# Patient Record
Sex: FEMALE | Race: WHITE | NOT HISPANIC OR LATINO | Employment: FULL TIME | URBAN - METROPOLITAN AREA
[De-identification: names, ages, dates, MRNs, and addresses within clinical notes are randomized per-mention and may not be internally consistent; named-entity substitution may affect disease eponyms.]

---

## 2017-01-20 ENCOUNTER — GENERIC CONVERSION - ENCOUNTER (OUTPATIENT)
Dept: OTHER | Facility: OTHER | Age: 52
End: 2017-01-20

## 2017-01-25 ENCOUNTER — GENERIC CONVERSION - ENCOUNTER (OUTPATIENT)
Dept: OTHER | Facility: OTHER | Age: 52
End: 2017-01-25

## 2017-01-25 ENCOUNTER — ALLSCRIPTS OFFICE VISIT (OUTPATIENT)
Dept: OTHER | Facility: OTHER | Age: 52
End: 2017-01-25

## 2017-01-25 LAB
ADDITIONAL INFORMATION (HISTORICAL): NORMAL
ADEQUACY: (HISTORICAL): NORMAL
COMMENT (HISTORICAL): NORMAL
CYTOTECHNOLOGIST: (HISTORICAL): NORMAL
GENERAL CATEGORIZATION (HISTORICAL): NORMAL
INFECTION (HISTORICAL): NORMAL
INTERPRETATION (HISTORICAL): NORMAL
LMP (HISTORICAL): NORMAL
PATHOLOGIST (HISTORICAL): NORMAL
PREV. BX: (HISTORICAL): NORMAL
PREV. PAP (HISTORICAL): NORMAL
REVIEWED BY (HISTORICAL): NORMAL
SOURCE (HISTORICAL): NORMAL
SPECIMEN STATUS REPORT (HISTORICAL): NORMAL

## 2017-02-14 ENCOUNTER — GENERIC CONVERSION - ENCOUNTER (OUTPATIENT)
Dept: OTHER | Facility: OTHER | Age: 52
End: 2017-02-14

## 2017-02-14 LAB
CHOLEST SERPL-MCNC: 192 MG/DL
HDLC SERPL-MCNC: 69 MG/DL
TRIGL SERPL-MCNC: 89 MG/DL

## 2017-03-17 ENCOUNTER — ALLSCRIPTS OFFICE VISIT (OUTPATIENT)
Dept: OTHER | Facility: OTHER | Age: 52
End: 2017-03-17

## 2017-04-20 ENCOUNTER — ALLSCRIPTS OFFICE VISIT (OUTPATIENT)
Dept: OTHER | Facility: OTHER | Age: 52
End: 2017-04-20

## 2017-05-12 ENCOUNTER — ALLSCRIPTS OFFICE VISIT (OUTPATIENT)
Dept: OTHER | Facility: OTHER | Age: 52
End: 2017-05-12

## 2017-06-02 ENCOUNTER — ALLSCRIPTS OFFICE VISIT (OUTPATIENT)
Dept: OTHER | Facility: OTHER | Age: 52
End: 2017-06-02

## 2017-06-02 ENCOUNTER — GENERIC CONVERSION - ENCOUNTER (OUTPATIENT)
Dept: OTHER | Facility: OTHER | Age: 52
End: 2017-06-02

## 2017-06-08 ENCOUNTER — ALLSCRIPTS OFFICE VISIT (OUTPATIENT)
Dept: OTHER | Facility: OTHER | Age: 52
End: 2017-06-08

## 2017-06-08 ENCOUNTER — TRANSCRIBE ORDERS (OUTPATIENT)
Dept: ADMINISTRATIVE | Facility: HOSPITAL | Age: 52
End: 2017-06-08

## 2017-06-08 DIAGNOSIS — M48.02 SPINAL STENOSIS OF CERVICAL REGION: ICD-10-CM

## 2017-06-08 DIAGNOSIS — M48.02 SPINAL STENOSIS IN CERVICAL REGION: Primary | ICD-10-CM

## 2017-06-08 DIAGNOSIS — M48.02 FORAMINAL STENOSIS OF CERVICAL REGION: ICD-10-CM

## 2017-06-14 ENCOUNTER — HOSPITAL ENCOUNTER (OUTPATIENT)
Dept: MRI IMAGING | Facility: HOSPITAL | Age: 52
Discharge: HOME | End: 2017-06-14
Attending: INTERNAL MEDICINE
Payer: COMMERCIAL

## 2017-06-14 DIAGNOSIS — M48.02 FORAMINAL STENOSIS OF CERVICAL REGION: ICD-10-CM

## 2017-06-14 DIAGNOSIS — M48.02 SPINAL STENOSIS IN CERVICAL REGION: ICD-10-CM

## 2017-06-14 PROCEDURE — 72141 MRI NECK SPINE W/O DYE: CPT

## 2017-06-15 ENCOUNTER — GENERIC CONVERSION - ENCOUNTER (OUTPATIENT)
Dept: OTHER | Facility: OTHER | Age: 52
End: 2017-06-15

## 2017-07-26 ENCOUNTER — ALLSCRIPTS OFFICE VISIT (OUTPATIENT)
Dept: OTHER | Facility: OTHER | Age: 52
End: 2017-07-26

## 2017-07-26 DIAGNOSIS — M48.02 SPINAL STENOSIS OF CERVICAL REGION: ICD-10-CM

## 2017-09-17 DIAGNOSIS — E66.01 MORBID (SEVERE) OBESITY DUE TO EXCESS CALORIES (HCC): ICD-10-CM

## 2017-09-17 DIAGNOSIS — R19.7 DIARRHEA: ICD-10-CM

## 2017-09-17 DIAGNOSIS — J45.909 UNCOMPLICATED ASTHMA: ICD-10-CM

## 2017-09-17 DIAGNOSIS — I10 ESSENTIAL (PRIMARY) HYPERTENSION: ICD-10-CM

## 2017-09-17 DIAGNOSIS — R73.03 PREDIABETES: ICD-10-CM

## 2017-10-04 ENCOUNTER — GENERIC CONVERSION - ENCOUNTER (OUTPATIENT)
Dept: INTERNAL MEDICINE CLINIC | Facility: CLINIC | Age: 52
End: 2017-10-04

## 2017-10-04 ENCOUNTER — GENERIC CONVERSION - ENCOUNTER (OUTPATIENT)
Dept: OTHER | Facility: OTHER | Age: 52
End: 2017-10-04

## 2017-10-12 ENCOUNTER — ALLSCRIPTS OFFICE VISIT (OUTPATIENT)
Dept: RADIOLOGY | Facility: CLINIC | Age: 52
End: 2017-10-12
Payer: COMMERCIAL

## 2017-11-17 ENCOUNTER — GENERIC CONVERSION - ENCOUNTER (OUTPATIENT)
Dept: OTHER | Facility: OTHER | Age: 52
End: 2017-11-17

## 2017-11-27 ENCOUNTER — ALLSCRIPTS OFFICE VISIT (OUTPATIENT)
Dept: OTHER | Facility: OTHER | Age: 52
End: 2017-11-27

## 2017-11-27 DIAGNOSIS — M25.572 PAIN IN LEFT ANKLE: ICD-10-CM

## 2017-11-29 ENCOUNTER — APPOINTMENT (OUTPATIENT)
Dept: RADIOLOGY | Facility: CLINIC | Age: 52
End: 2017-11-29
Payer: COMMERCIAL

## 2017-11-29 DIAGNOSIS — M25.572 PAIN IN LEFT ANKLE: ICD-10-CM

## 2017-11-29 PROCEDURE — 73610 X-RAY EXAM OF ANKLE: CPT

## 2017-11-29 NOTE — PROGRESS NOTES
Assessment    1  Left ankle pain (706 72) (M25 572)    Plan  Left ankle pain    · * XR ANKLE 3+ VIEW LEFT; Status:Active; Requested for:27Nov2017;    · Follow-up PRN Evaluation and Treatment  Follow-up  Status: Complete  Done: 67RID1131    Discussion/Summary  Discussion Summary:   Left ankle discomfort-slight swelling noted to lateral aspect of L ankle, will do ankle xray for now  up with me as needed and with Dr Gia Farr as scheduled in April, labs prior  Counseling Documentation With Imm: The patient was counseled regarding instructions for management,-- risk factor reductions,-- patient and family education,-- impressions,-- importance of compliance with treatment  total time of encounter was 20 minutes-- and-- 5 minutes was spent counseling  Medication SE Review and Pt Understands Tx: The treatment plan was reviewed with the patient/guardian  The patient/guardian understands and agrees with the treatment plan      Chief Complaint  Chief Complaint Free Text Note Form: pt presents for f/u left ankle discomfort  onset 11/11/2017      History of Present Illness  HPI: Patient with discomfort left ankle for 2 1/2 weeks, started after ankle became irritated by wool socks  It is sensitive to heat  She is able to walk and stand on it with no problems, but she does note that it feels irritated  Review of Systems  Complete-Female:  Constitutional: No fever, no chills, feels well, no tiredness, no recent weight gain or weight loss  Cardiovascular: No complaints of slow heart rate, no fast heart rate, no chest pain, no palpitations, no leg claudication, no lower extremity edema  Respiratory: No complaints of shortness of breath, no wheezing, no cough, no SOB on exertion, no orthopnea, no PND  Musculoskeletal: left ankle discomfort  Integumentary: No complaints of skin rash or lesions, no itching, no skin wounds, no breast pain or lump    Neurological: No complaints of headache, no confusion, no convulsions, no numbness, no dizziness or fainting, no tingling, no limb weakness, no difficulty walking  Psychiatric: Not suicidal, no sleep disturbance, no anxiety or depression, no change in personality, no emotional problems  Active Problems  1  Alopecia mucinosa (704 09) (L65 2)   2  Arthralgia of both ankles (719 47) (M25 571,M25 572)   3  Asthma (493 90) (J45 909)   4  Benign essential hypertension (401 1) (I10)   5  Cervical spinal stenosis (723 0) (M48 02)   6  Chronic sinusitis (473 9) (J32 9)   7  Colon polyp (211 3) (K63 5)   8  Diarrhea (787 91) (R19 7)   9  Ear pain (388 70) (H92 09)   10  Encounter for gynecological examination with Papanicolaou smear of cervix (V72 31) (Z01 419)   11  Encounter for screening colonoscopy (V76 51) (Z12 11)   12  Encounter for screening for cervical cancer (V76 2) (Z12 4)   13  Encounter for screening mammogram for malignant neoplasm of breast (V76 12) (Z12 31)   14  Eosinophilia (288 3) (D72 1)   15  Flu vaccine need (V04 81) (Z23)   16  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)   17  Fungal rash of trunk (111 9) (B36 9)   18  Greater trochanteric bursitis, left (726 5) (M70 62)   19  Hyperlipidemia (272 4) (E78 5)   20  Hypoalbuminemia (273 8) (E88 09)   21  Lumbar radiculopathy (724 4) (M54 16)   22  Memory disturbance (780 93) (R41 3)   23  Morbid or severe obesity due to excess calories (278 01) (E66 01)   24  Need for hepatitis C screening test (V73 89) (Z11 59)   25  Need for pneumococcal vaccination (V03 82) (Z23)   26  History of Need for vaccination for DTP (V06 1) (Z23)   27  Osteoarthritis of knees, bilateral (715 96) (M17 0)   28  Pain in shoulder (719 41) (M25 519)   29  Pityriasis rosea (696 3) (L42)   30  Prediabetes (790 29) (R73 03)   31  Right cervical radiculopathy (723 4) (M54 12)   32  Skin lesion of left lower extremity (709 9) (L98 9)   33  Skin rash (782 1) (R21)   34  Tendinitis (726 90) (M77 9)   35   Trochanteric bursitis of both hips (726 5) (M70 61,M70 62)   36  Upper extremity pain, diffuse, right (729 5) (M79 601)   37  UTI symptoms (788 99) (R39 9)    Past Medical History  1  History of Back Pain   2  History of Bleeding per rectum (569 3) (K62 5)   3  History of Burning with urination (788 1) (R30 0)   4  History of Colonoscopy (Fiberoptic) Screening   5  History of Cough (786 2) (R05)   6  History of Cutaneous skin tags (701 9) (L91 8)   7  History of Hip pain, unspecified laterality   8  History of acute bronchitis (V12 69) (Z87 09)   9  History of acute sinusitis (V12 69) (Z87 09)   10  History of allergic rhinitis (V12 69) (Z87 09)   11  History of chest pain (V13 89) (Z87 898)   12  History of hypercholesterolemia (V12 29) (Z86 39)   13  History of hypertension (V12 59) (Z86 79)   14  History of hypertension (V12 59) (Z86 79)   15  History of ulceration (V13 89) (Z87 898)   16  History of Hyponatremia (276 1) (E87 1)   17  History of Need for vaccination for DTP (V06 1) (Z23)   18  History of Pain in wrist, unspecified laterality (719 43) (M25 539)   19  History of Protein in urine (791 0) (R80 9)   20  History of Tick bite (919 4,E906 4) (W57 XXXA)   21  History of UTI (lower urinary tract infection) (599 0) (N39 0)   22  History of Vaginal candidiasis (112 1) (B37 3)  Active Problems And Past Medical History Reviewed: The active problems and past medical history were reviewed and updated today  Surgical History  1  History of  Section   2  History of Wrist Surgery  Surgical History Reviewed: The surgical history was reviewed and updated today  Family History  Mother    1  No pertinent family history  Family History    2  Adopted (V68 89) (Z02 82)  Family History Reviewed: The family history was reviewed and updated today  Social History     · Full-time employment   · Lives with spouse   ·    · Never A Smoker   · Never Drank Alcohol  Social History Reviewed:  The social history was reviewed and updated today  Current Meds   1  Acetaminophen 500 MG Oral Tablet; Take 1-2 every 4-6 hours as needed; Therapy: 77HCG7969 to Recorded   2  Cholestyramine 4 GM Oral Packet; take 1 packet twice daily; Therapy: 60WFX6407 to (Last Rx:04Oct2017)  Requested for: 74ONG3959 Ordered   3  Diclofenac Potassium 50 MG Oral Tablet; take 1 tablet by mouth twice a day; Therapy: 92Lfw3438 to (Evaluate:53Tgq3048)  Requested for: 74HRF5310; Last Rx:01Nov2017 Ordered   4  Gabapentin 400 MG Oral Capsule; TAKE 1 CAPSULE 3 TIMES DAILY; Therapy: 47COI6939 to (Evaluate:02Apr2018)  Requested for: 55PRW4075; Last Rx:04Oct2017 Ordered   5  Ginkgo Biloba 30 MG Oral Capsule; 1 DAILY; Therapy: 03WJW6493 to Recorded   6  Nasonex 50 MCG/ACT Nasal Suspension; instill 1 spray into each nostril once daily; Therapy: 91RSS6225 to (Nils Hurd)  Requested for: 86UHW0042; Last Rx:10Mar2016 Ordered   7  Omeprazole 10 MG Oral Capsule Delayed Release; take 1 capsule by mouth once daily; Therapy: 57NNZ7160 to (Evaluate:58Osh2768)  Requested for: 18MPW5385; Last Rx:17Mar2017 Ordered   8  Simvastatin 5 MG Oral Tablet; take 1 tablet by mouth once daily at bedtime; Therapy: 33Rat2355 to (Evaluate:01Jun2018)  Requested for: 20ELX1894; Last Rx:04Oct2017 Ordered   9  Spironolactone 50 MG Oral Tablet; take 1 tablet by mouth once daily; Therapy: 76BJQ3351 to (0489 33 97 26)  Requested for: 42BWY7133; Last Rx:04Oct2017 Ordered   10  Verapamil HCl  MG Oral Capsule Extended Release 24 Hour; take 1 capsule by mouth once  daily before EATING; Therapy: 93YRV5206 to (Evaluate:11Mar2018)  Requested for: 03Hua7741; Last Rx:91Aml5035  Ordered   11  Vitamin D 2000 UNIT Oral Tablet; Therapy: 48LDE0697 to Recorded  Medication List Reviewed: The medication list was reviewed and updated today  Allergies  1   Levaquin TABS   2  Hydrocodone HD SYRP    Vitals  Vital Signs    Recorded: 32LSS8825 04:49PM   Temperature 98 6 F   Heart Rate 79   Systolic 923 Diastolic 60   Height 5 ft 9 6 in   Weight 283 lb 4 oz   BMI Calculated 41 11   BSA Calculated 2 41   O2 Saturation 98       Physical Exam   Constitutional  General appearance: No acute distress, well appearing and well nourished  Pulmonary  Respiratory effort: No increased work of breathing or signs of respiratory distress  Auscultation of lungs: Clear to auscultation  Cardiovascular  Auscultation of heart: Normal rate and rhythm, normal S1 and S2, without murmurs  Musculoskeletal  Gait and station: Normal    Digits and nails: Normal without clubbing or cyanosis  Inspection/palpation of joints, bones, and muscles: Abnormal  -- lateral aspect of left ankle slightly swollen  FROM  Skin  Skin and subcutaneous tissue: Normal without rashes or lesions  Neurologic  Reflexes: 2+ and symmetric  Sensation: No sensory loss     Psychiatric  Orientation to person, place, and time: Normal    Mood and affect: Normal          Future Appointments    Date/Time Provider Specialty Site   04/17/2018 01:30 PM Tammy Pastor DO Internal Medicine [facility] INTERNAL MED Pravin Vaca       Signatures   Electronically signed by : Viktor Saez [facility]; Nov 27 2017  5:25PM EST                       (Author)    Electronically signed by : Hero Self DO; Nov 28 2017  7:43AM EST

## 2017-12-04 ENCOUNTER — GENERIC CONVERSION - ENCOUNTER (OUTPATIENT)
Dept: OTHER | Facility: OTHER | Age: 52
End: 2017-12-04

## 2018-01-12 NOTE — MISCELLANEOUS
Message   Recorded as Task   Date: 10/18/2017 10:03 AM, Created By: Olena Blanca   Task Name: Follow Up   Assigned To: [facility] ,Team   Regarding Patient: Shaniqua Vallejo, Status: In Progress   Comment:    Mamie Wheeler - 18 Oct 2017 10:03 AM     TASK CREATED  S/p MARLEN 10/12   Mamie Wheeler - 18 Oct 2017 11:15 AM     TASK EDITED  S/w pt who states she is doing ok and pain is better  When I questioned pt further, she then states that pain is the same as it was, but better than when she left procedure suite  Pt did not provide anymore information  Schedule f/u ov? Cyrilla Corporal - 75 Oct 2017 12:10 PM     TASK REPLIED TO: Previously Assigned To Cyrilla Corporal  schedule OV [address] - 18 Oct 2017 12:34 PM     TASK REASSIGNED: Previously Assigned To [facility] es procedure,Team   India Prescott - 23 Oct 2017 5:39 PM     TASK  [address] - 23 Oct 2017 5:39 PM     [facility]: [facility]  l[facility] to cb to schedule [facility] f-up appt   Mamie Wheeler - 13 Nov 2017 2:54 PM     TASK EDITED  Please try 1 more time to schedule f/u   India Prescott - 14 Nov 2017 11:29 AM     TASK REPLIED TO: Previously Assigned To [facility]  left 2nd vm to cb to schedue a 8 wk f-up from 10/12   Mamie Wheeler - 14 Nov 2017 1:13 PM     TASK IN PROGRESS   Kadi Thornton - 17 Nov 2017 11:00 AM     TASK EDITED  Please send unable to reach letter  Shanon Abdi - 17 Nov 2017 11:13 AM     TASK IN PROGRESS   Manuela Rivero - 17 Nov 2017 11:14 AM     TASK EDITED  letter sent        Active Problems    1  Alopecia mucinosa (704 09) (L65 2)   2  Arthralgia of both ankles (719 47) (M25 571,M25 572)   3  Asthma (493 90) (J45 909)   4  Benign essential hypertension (401 1) (I10)   5  Cervical spinal stenosis (723 0) (M48 02)   6  Chronic sinusitis (473 9) (J32 9)   7  Colon polyp (211 3) (K63 5)   8  Diarrhea (787 91) (R19 7)   9   Ear pain (202 70) (H92 09) 10  Encounter for gynecological examination with Papanicolaou smear of cervix (V72 31)    (Z01 419)   11  Encounter for screening colonoscopy (V76 51) (Z12 11)   12  Encounter for screening for cervical cancer (V76 2) (Z12 4)   13  Encounter for screening mammogram for malignant neoplasm of breast (V76 12)    (Z12 31)   14  Eosinophilia (288 3) (D72 1)   15  Flu vaccine need (V04 81) (Z23)   16  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)   17  Fungal rash of trunk (111 9) (B36 9)   18  Greater trochanteric bursitis, left (726 5) (M70 62)   19  Hyperlipidemia (272 4) (E78 5)   20  Hypoalbuminemia (273 8) (E88 09)   21  Lumbar radiculopathy (724 4) (M54 16)   22  Memory disturbance (780 93) (R41 3)   23  Morbid or severe obesity due to excess calories (278 01) (E66 01)   24  Need for hepatitis C screening test (V73 89) (Z11 59)   25  Need for pneumococcal vaccination (V03 82) (Z23)   26  History of Need for vaccination for DTP (V06 1) (Z23)   27  Osteoarthritis of knees, bilateral (715 96) (M17 0)   28  Pain in shoulder (719 41) (M25 519)   29  Pityriasis rosea (696 3) (L42)   30  Prediabetes (790 29) (R73 03)   31  Right cervical radiculopathy (723 4) (M54 12)   32  Skin lesion of left lower extremity (709 9) (L98 9)   33  Skin rash (782 1) (R21)   34  Tendinitis (726 90) (M77 9)   35  Trochanteric bursitis of both hips (726 5) (M70 61,M70 62)   36  Upper extremity pain, diffuse, right (729 5) (M79 601)   37  UTI symptoms (788 99) (R39 9)    Current Meds   1  Acetaminophen 500 MG Oral Tablet; Take 1-2 every 4-6 hours as needed; Therapy: 15NZM8132 to Recorded   2  Cholestyramine 4 GM Oral Packet; take 1 packet twice daily; Therapy: 28DRL9209 to (Last Rx:04Oct2017)  Requested for: 54NSL9113 Ordered   3  Diclofenac Potassium 50 MG Oral Tablet; take 1 tablet by mouth twice a day; Therapy: 91Imc5131 to (Evaluate:76Uwb5499)  Requested for: 57LEQ1356; Last   Rx:01Nov2017 Ordered   4   Gabapentin 400 MG Oral Capsule; TAKE 1 CAPSULE 3 TIMES DAILY; Therapy: 75MHS9331 to (Evaluate:02Apr2018)  Requested for: 45PQC0348; Last   Rx:04Oct2017 Ordered   5  Ginkgo Biloba 30 MG Oral Capsule; 1 DAILY; Therapy: 35PES5913 to Recorded   6  Nasonex 50 MCG/ACT Nasal Suspension (Mometasone Furoate); instill 1 spray into each   nostril once daily; Therapy: 67EXD2220 to (Jessica Conrad)  Requested for: 82TBB3017; Last   Rx:10Mar2016 Ordered   7  Omeprazole 10 MG Oral Capsule Delayed Release; take 1 capsule by mouth once daily; Therapy: 58QTB7711 to (Evaluate:42Ikc0443)  Requested for: 08QUT7607; Last   Rx:17Mar2017 Ordered   8  Simvastatin 5 MG Oral Tablet; take 1 tablet by mouth once daily at bedtime; Therapy: 27Uwi8935 to (Evaluate:01Jun2018)  Requested for: 57CMQ9150; Last   Rx:04Oct2017 Ordered   9  Spironolactone 50 MG Oral Tablet; take 1 tablet by mouth once daily; Therapy: 03XLP5170 to ([phone])  Requested for: 85PEL7572; Last   Rx:04Oct2017 Ordered   10  Verapamil HCl  MG Oral Capsule Extended Release 24 Hour; take 1 capsule by    mouth once daily before EATING; Therapy: 61UDK9679 to (Evaluate:11Mar2018)  Requested for: 22Qvy2877; Last    Rx:64Ydm0075 Ordered   11  Vitamin D 2000 UNIT Oral Tablet; Therapy: 53KAK2945 to Recorded    Allergies    1   Levaquin TABS   2  Hydrocodone HD SYRP    Signatures   Electronically signed by : Willian Prescott, ; Nov 17 2017 11:15AM EST                       (Author)

## 2018-01-12 NOTE — PROGRESS NOTES
Assessment    1  Tendinitis (726 90) (M77 9)   2  Prediabetes (790 29) (R73 09)   3  Hypoalbuminemia (273 8) (E88 09)    Plan  Cervical Spondylosis (C5 - C6)    · Diclofenac Potassium 50 MG Oral Tablet  Hypoalbuminemia    · (1) HEPATIC FUNCTION PANEL; Status:Active; Requested APY:85SMN0179;    · (1) PROTEIN, URINE 24HR; Status:Active; Requested NHW:23OGH4208;    · Follow-up visit in 2 weeks Evaluation and Treatment  Follow-up  Status: Complete  Done:  36KML2089  PMH: UTI (lower urinary tract infection)    · Nitrofurantoin Macrocrystal 100 MG Oral Capsule; TAKE 1 CAPSULE EVERY 12  HOURS DAILY  Prediabetes    · (1) BASIC METABOLIC PROFILE; Status:Active; Requested APM:86JAG9067;    · (1) HEMOGLOBIN A1C; Status:Active; Requested IGD:68WFK7334;   Tendinitis    · Oxaprozin 600 MG Oral Tablet (Daypro); TAKE 1 TABLET TWICE DAILY    Chief Complaint  pt c/o Left elbow pain and decreased ROM  urine frequency and burning  Patient denies fevers chills flank pain hematuria  She does note urinary frequency and dysuria  She notes a few week history of left elbow discomfort she noted this after picking up CDs and caring large groceries  Just denies any elbow on breakage of the skin fevers chills swelling or any infectious type symptoms in reference to the elbow      Review of Systems    Constitutional: No fever, no chills, feels well, no tiredness, no recent weight gain or loss  ENT: no ear ache, no loss of hearing, no nosebleeds or nasal discharge, no sore throat or hoarseness  Cardiovascular: no complaints of slow or fast heart rate, no chest pain, no palpitations, no leg claudication or lower extremity edema  Respiratory: no complaints of shortness of breath, no wheezing, no dyspnea on exertion, no orthopnea or PND  Breasts: no complaints of breast pain, breast lump or nipple discharge  Gastrointestinal: no complaints of abdominal pain, no constipation, no nausea or diarrhea, no vomiting, no bloody stools  Genitourinary: dysuria and urine frequency, but no complaints of dysuria, no incontinence, no pelvic pain, no dysmenorrhea, no vaginal discharge or abnormal vaginal bleeding  Musculoskeletal: no complaints of arthralgia, no myalgia, no joint swelling or stiffness, no limb pain or swelling  Integumentary: no complaints of skin rash or lesion, no itching or dry skin, no skin wounds  Neurological: no complaints of headache, no confusion, no numbness or tingling, no dizziness or fainting  ROS reviewed  Active Problems    1  Alopecia mucinosa (704 09) (L65 2)   2  Asthma (493 90) (J45 909)   3  Cervical spinal stenosis (723 0) (M48 02)   4  Cervical Spondylosis (C5 - C6) (721 0)   5  Chronic sinusitis (473 9) (J32 9)   6  Colon polyp (211 3) (K63 5)   7  Encounter for gynecological examination with Papanicolaou smear of cervix   (V72 31,V76 2) (Z01 419,Z12 4)   8  Encounter for screening mammogram for malignant neoplasm of breast (V76 12)   (Z12 31)   9  Flu vaccine need (V04 81) (Z23)   10  Greater trochanteric bursitis, left (726 5) (M70 62)   11  Hyperlipidemia (272 4) (E78 5)   12  Lumbar radiculopathy (724 4) (M54 16)   13  Memory disturbance (780 93) (R41 3)   14  Morbid or severe obesity due to excess calories (278 01) (E66 01)   15  Need for pneumococcal vaccination (V03 82) (Z23)   16  History of Need for vaccination for DTP (V06 1) (Z23)   17  Pityriasis rosea (696 3) (L42)   18  Prediabetes (790 29) (R73 09)   19  Right cervical radiculopathy (723 4) (M54 12)   20  Skin lesion of left lower extremity (709 9) (L98 9)   21  Skin rash (782 1) (R21)    Past Medical History  Active Problems And Past Medical History Reviewed: The active problems and past medical history were reviewed and updated today  Social History    · Never A Smoker   · Never Drank Alcohol  The social history was reviewed and is unchanged  Family History  Family History Reviewed:    The family history was reviewed and updated today  Current Meds   1  Acetaminophen 500 MG Oral Tablet; Take 1-2 every 4-6 hours as needed; Therapy: 83THT6910 to Recorded   2  Diclofenac Potassium 50 MG Oral Tablet; take 1 tablet by mouth twice a day; Therapy: 13Vmq3827 to (Evaluate:31Mar2016)  Requested for: 33Nli2019; Last   Rx:03Vyp3872 Ordered   3  Gabapentin 300 MG Oral Capsule; TAKE 1 CAPSULE 3 TIMES DAILY; Therapy: 07BCP2647 to (Mikie Coral)  Requested for: 33Fgw3413; Last   Rx:51Bod1884 Ordered   4  Ginkgo Biloba 30 MG Oral Capsule; 1 DAILY; Therapy: 17AVK8264 to Recorded   5  Nasonex 50 MCG/ACT Nasal Suspension; instill 1 spray into each nostril once daily; Therapy: 06RLX4033 to (Evaluate:54Qae6749)  Requested for: 73ZGV0967; Last   Rx:63Dwd5893 Ordered   6  Omeprazole 10 MG Oral Capsule Delayed Release; take 1 capsule by mouth once daily; Therapy: 07KOV7136 to (Evaluate:11Ikp2787)  Requested for: 14ECT8989; Last   Rx:69Tnh4949 Ordered   7  Ortho Tri-Cyclen (28) TABS; TAKE AS DIRECTED; Therapy: (Recorded:33Ffl9867) to Recorded   8  Simvastatin 5 MG Oral Tablet; take 1 tablet by mouth once daily at bedtime; Therapy: 41RVR5609 to (Evaluate:89Mtl4026)  Requested for: 91Tss3237; Last   Rx:40Wuo1320 Ordered   9  Spironolactone 50 MG Oral Tablet; Take 1 tablet daily; Therapy: 25STM1244 to (Evaluate:20Jan2016)  Requested for: 39Tuy8227; Last   Rx:02Xya6409 Ordered   10  Ventolin  (90 Base) MCG/ACT Inhalation Aerosol Solution; INHALE 2 PUFFS    EVERY 4-6 HOURS AS NEEDED; Therapy: 45AEC0948 to (Last Rx:96Svy3652)  Requested for: 01VJV0730 Ordered   11  Verapamil HCl  MG Oral Tablet Extended Release; take 1 tablet by mouth every    day; Therapy: 64YEH3871 to (Kenji Victor)  Requested for: 91DTM3120; Last    Rx:94Ccq1230 Ordered   12  Vitamin D 2000 UNIT Oral Tablet; Therapy: 46JCD8064 to Recorded    Allergies    1   Levaquin TABS   2  Hydrocodone HD SYRP    Vitals   Recorded: 57QMM9723 11:05AM Temperature 98 6 F   Heart Rate 873   Systolic 369   Diastolic 70   Height 5 ft 9 6 in   Weight 252 lb    BMI Calculated 36 58   BSA Calculated 2 29   O2 Saturation 97     Physical Exam    Constitutional   General appearance: No acute distress, well appearing and well nourished  Eyes   Conjunctiva and lids: No swelling, erythema or discharge  Pupils and irises: Equal, round and reactive to light  Ears, Nose, Mouth, and Throat   External inspection of ears and nose: Normal     Otoscopic examination: Tympanic membranes translucent with normal light reflex  Canals patent without erythema  Nasal mucosa, septum, and turbinates: Normal without edema or erythema  Oropharynx: Normal with no erythema, edema, exudate or lesions  Pulmonary   Respiratory effort: No increased work of breathing or signs of respiratory distress  Auscultation of lungs: Clear to auscultation  Cardiovascular   Palpation of heart: Normal PMI, no thrills  Auscultation of heart: Normal rate and rhythm, normal S1 and S2, without murmurs  Examination of extremities for edema and/or varicosities: Normal     Carotid pulses: Normal     Abdomen   Abdomen: Abnormal   Trace suprapubic tenderness without rebound no flank pain or ill-appearing patient  Liver and spleen: No hepatomegaly or splenomegaly  Lymphatic   Palpation of lymph nodes in neck: No lymphadenopathy  Musculoskeletal   Gait and station: Normal     Digits and nails: Normal without clubbing or cyanosis  Inspection/palpation of joints, bones, and muscles: Abnormal   Positive tenderness left elbow lateral epicondyle, no elbow swelling tenderness or heat  Skin   Skin and subcutaneous tissue: Normal without rashes or lesions  Neurologic   Cranial nerves: Cranial nerves 2-12 intact  Reflexes: 2+ and symmetric  Sensation: No sensory loss      Psychiatric   Orientation to person, place, and time: Normal     Mood and affect: Normal          Future Appointments    Date/Time Provider Specialty Site   10/12/2016 02:00 PM Mar Shaw,  Internal Medicine [address] [facility] INTERNAL MED Kayleen Calzada     Signatures   Electronically signed by : BRII Hathaway ; Jan 9 2016 12:10PM EST                       (Author)

## 2018-01-13 VITALS
HEART RATE: 80 BPM | HEIGHT: 70 IN | DIASTOLIC BLOOD PRESSURE: 78 MMHG | TEMPERATURE: 97.6 F | WEIGHT: 280 LBS | SYSTOLIC BLOOD PRESSURE: 126 MMHG | BODY MASS INDEX: 40.09 KG/M2

## 2018-01-13 VITALS
HEIGHT: 70 IN | TEMPERATURE: 98.3 F | SYSTOLIC BLOOD PRESSURE: 124 MMHG | OXYGEN SATURATION: 97 % | DIASTOLIC BLOOD PRESSURE: 80 MMHG | WEIGHT: 283 LBS | HEART RATE: 75 BPM | BODY MASS INDEX: 40.52 KG/M2

## 2018-01-13 VITALS
BODY MASS INDEX: 38.87 KG/M2 | WEIGHT: 271.5 LBS | SYSTOLIC BLOOD PRESSURE: 100 MMHG | HEIGHT: 70 IN | DIASTOLIC BLOOD PRESSURE: 80 MMHG | HEART RATE: 84 BPM

## 2018-01-13 NOTE — PROGRESS NOTES
Assessment    1  Eosinophilia (288 3) (D72 1)   2  Hypoalbuminemia (273 8) (E88 09)   3  UTI symptoms (788 99) (R39 9)   4  Tendinitis (726 90) (M77 9)    Plan  Colon polyp    · Follow-up visit in 2 months Evaluation and Treatment  Follow-up  Status: Complete  Done: 50VPK2435   · 1 - Camryn BRANDT, Yamile Daniel  (Gastroenterology) Physician Referral  Consult  colon polyp rectal bleeding  Status: Active  Requested for: 30Jun2016  Care Summary provided  : Yes  Eosinophilia    · (1) CBC/PLT/DIFF; Status:Active; Requested for:44Zmu5883;   Hypoalbuminemia    · (1) PROTEIN, URINE 24HR; Status:Active; Requested for:83Ipk4311;    · (Q) MICROALBUMIN, RANDOM URINE (W/CREATININE); Status:Active; Requested YDL:87QYE5749;   UTI symptoms    · (1) URINALYSIS w URINE C/S REFLEX (will reflex a microscopy if leukocytes, occult blood, or  nitrites are not within normal limits); Status:Active; Requested for:48Drs5743;     Chief Complaint  Chief Complaint Free Text Note Form: pt presents for f/u      History of Present Illness  HPI: 1-proteinuria-patient had this with long-term NSAID<SHE IS DUE [facility] Dr Ronnie Lorenz not resolved at ace  3-ayssqmcvnj-wxnzjrjg with rest and meds  3-prior UTI-repeat UA  $-EOSINOPHILIA-RESOLVED  Rectal bleeding-GI EVAL FOLLOWUP @[facility] DR REGAN-damien-scope with polyp hx  MASTODYNIA-RESOLVED< Pap and breast exam via Dr Kelly Douglas  follow-up2 months Dr Leobardo Childress      Review of Systems  Complete-Female:   Constitutional: No fever, no chills, feels well, no tiredness, no recent weight gain or weight loss  Eyes: No complaints of eye pain, no red eyes, no eyesight problems, no discharge, no dry eyes, no itching of eyes  ENT: no complaints of earache, no loss of hearing, no nose bleeds, no nasal discharge, no sore throat, no hoarseness  Cardiovascular: No complaints of slow heart rate, no fast heart rate, no chest pain, no palpitations, no leg claudication, no lower extremity edema  Respiratory: No complaints of shortness of breath, no wheezing, no cough, no SOB on exertion, no orthopnea, no PND  Gastrointestinal: No complaints of abdominal pain, no constipation, no nausea or vomiting, no diarrhea, no bloody stools  Genitourinary: No complaints of dysuria, no incontinence, no pelvic pain, no dysmenorrhea, no vaginal discharge or bleeding  Musculoskeletal: arthralgias, but No complaints of arthralgias, no myalgias, no joint swelling or stiffness, no limb pain or swelling  Integumentary: pain improving, but No complaints of skin rash or lesions, no itching, no skin wounds, no breast pain or lump  Neurological: No complaints of headache, no confusion, no convulsions, no numbness, no dizziness or fainting, no tingling, no limb weakness, no difficulty walking  Psychiatric: Not suicidal, no sleep disturbance, no anxiety or depression, no change in personality, no emotional problems  Endocrine: No complaints of proptosis, no hot flashes, no muscle weakness, no deepening of the voice, no feelings of weakness  Hematologic/Lymphatic: No complaints of swollen glands, no swollen glands in the neck, does not bleed easily, does not bruise easily  Active Problems    1  Alopecia mucinosa (704 09) (L65 2)   2  Arthralgia of both ankles (719 47) (M25 571,M25 572)   3  Asthma (493 90) (J45 909)   4  Cervical spinal stenosis (723 0) (M48 02)   5  Cervical Spondylosis (C5 - C6) (721 0)   6  Chronic sinusitis (473 9) (J32 9)   7  Colon polyp (211 3) (K63 5)   8  Encounter for gynecological examination with Papanicolaou smear of cervix (V72 31,V76 2)   (Z01 419,Z12 4)   9  Encounter for screening for cervical cancer (V76 2) (Z12 4)   10  Encounter for screening mammogram for malignant neoplasm of breast (V76 12) (Z12 31)   11  Eosinophilia (288 3) (D72 1)   12  Flu vaccine need (V04 81) (Z23)   13  Greater trochanteric bursitis, left (726 5) (M70 62)   14  Hyperlipidemia (272 4) (E78 5)   15  Hypoalbuminemia (273 8) (E88 09)   16  Lumbar radiculopathy (724 4) (M54 16)   17  Memory disturbance (780 93) (R41 3)   18  Morbid or severe obesity due to excess calories (278 01) (E66 01)   19  Need for pneumococcal vaccination (V03 82) (Z23)   20  History of Need for vaccination for DTP (V06 1) (Z23)   21  Osteoarthritis of knees, bilateral (715 96) (M17 0)   22  Pain in shoulder (719 41) (M25 519)   23  Pityriasis rosea (696 3) (L42)   24  Prediabetes (790 29) (R73 09)   25  Right cervical radiculopathy (723 4) (M54 12)   26  Skin lesion of left lower extremity (709 9) (L98 9)   27  Skin rash (782 1) (R21)   28  Tendinitis (726 90) (M77 9)   29  UTI symptoms (788 99) (R39 9)    Past Medical History    1  History of Back Pain   2  History of Burning with urination (788 1) (R30 0)   3  History of Colonoscopy (Fiberoptic) Screening   4  History of Cough (786 2) (R05)   5  History of Cutaneous skin tags (701 9) (L91 8)   6  History of Hip pain, unspecified laterality   7  History of acute bronchitis (V12 69) (Z87 09)   8  History of acute sinusitis (V12 69) (Z87 09)   9  History of allergic rhinitis (V12 69) (Z87 09)   10  History of chest pain (V13 89) (Z87 898)   11  History of hypercholesterolemia (V12 29) (Z86 39)   12  History of hypertension (V12 59) (Z86 79)   13  History of hypertension (V12 59) (Z86 79)   14  History of Hyponatremia (276 1) (E87 1)   15  History of Need for vaccination for DTP (V06 1) (Z23)   16  History of Pain in wrist, unspecified laterality (719 43) (M25 539)   17  History of Protein in urine (791 0) (R80 9)   18  History of Tick bite (919 4,E906 4) (W57 XXXA)   19  History of UTI (lower urinary tract infection) (599 0) (N39 0)   20  History of Vaginal candidiasis (112 1) (B37 3)  Active Problems And Past Medical History Reviewed: The active problems and past medical history were reviewed and updated today  Surgical History    1   History of  Section  Surgical History Reviewed: The surgical history was reviewed and updated today  Family History  Mother    1  No pertinent family history  Family History    2  Adopted (V68 89) (Z02 82)  Family History Reviewed: The family history was reviewed and updated today  Social History    · Never A Smoker   · Never Drank Alcohol  Social History Reviewed: The social history was reviewed and updated today  The social history was reviewed and is unchanged  Current Meds   1  Acetaminophen 500 MG Oral Tablet; Take 1-2 every 4-6 hours as needed; Therapy: 34ASN4215 to Recorded   2  Gabapentin 300 MG Oral Capsule; TAKE 1 CAPSULE 3 TIMES DAILY; Therapy: 21ATQ9039 to (Loli Sánchez)  Requested for: 86Eiy3713; Last Rx:16Pdk1628   Ordered   3  Ginkgo Biloba 30 MG Oral Capsule; 1 DAILY; Therapy: 51ZXL2660 to Recorded   4  Nasonex 50 MCG/ACT Nasal Suspension; instill 1 spray into each nostril once daily; Therapy: 21CPD3094 to (Whitney Xiong)  Requested for: 49MDX0192; Last Rx:10Mar2016   Ordered   5  Nitrofurantoin Macrocrystal 100 MG Oral Capsule; TAKE 1 CAPSULE EVERY 12 HOURS DAILY; Therapy: 59KIL2687 to (Evaluate:84Kph0187)  Requested for: 21QLJ8804; Last Rx:17Gcn6653   Ordered   6  Omeprazole 10 MG Oral Capsule Delayed Release; take 1 capsule by mouth once daily; Therapy: 25UHM9441 to (Evaluate:44Erk2622)  Requested for: 20Jan2016; Last Rx:20Jan2016   Ordered   7  Ortho Tri-Cyclen (28) TABS; TAKE AS DIRECTED; Therapy: (Recorded:80Acs2399) to Recorded   8  Oxaprozin 600 MG Oral Tablet; take 1 tablet by mouth twice a day; Therapy: 47JBY7957 to (Evaluate:04Yyk7953)  Requested for: 18ROS8210; Last Rx:98Hxr7975   Ordered   9  Simvastatin 5 MG Oral Tablet; take 1 tablet by mouth once daily at bedtime; Therapy: 65Vku2780 to (Evaluate:05Jan2017)  Requested for: 64HAA7896; Last Rx:10May2016   Ordered   10  Spironolactone 50 MG Oral Tablet; Take 1 tablet daily;     Therapy: 92EEJ9388 to (Evaluate:00Ibm0440) Requested for: 25FJU1013; Last Rx:14Jan2016    Ordered   11  Ventolin  (90 Base) MCG/ACT Inhalation Aerosol Solution; INHALE 2 PUFFS EVERY 4-6    HOURS AS NEEDED; Therapy: 66CAT9060 to (Last Rx:41Xzw1396)  Requested for: 65JUP5210 Ordered   12  Verapamil HCl  MG Oral Tablet Extended Release; take 1 tablet by mouth every day; Therapy: 10NPM3574 to (Conner Heck)  Requested for: 04Apr2016; Last Rx:96Azl1326;    Status: ACTIVE - Renewal Denied Ordered   13  Vitamin D 2000 UNIT Oral Tablet; Therapy: 16CMF6221 to Recorded  Medication List Reviewed: The medication list was reviewed and updated today  Allergies    1  Levaquin TABS   2  Hydrocodone HD SYRP    Vitals  Vital Signs [Data Includes: Current Encounter]    Recorded: 37OLV9440 05:26PM   Temperature 98 4 F   Heart Rate 97   Systolic 864   Diastolic 78   Weight 291 lb 6 08 oz   O2 Saturation 97     Physical Exam    Constitutional   General appearance: No acute distress, well appearing and well nourished  Eyes   Conjunctiva and lids: No swelling, erythema or discharge  Pupils and irises: Equal, round and reactive to light  Ears, Nose, Mouth, and Throat   External inspection of ears and nose: Normal     Otoscopic examination: Tympanic membranes translucent with normal light reflex  Canals patent without erythema  Nasal mucosa, septum, and turbinates: Normal without edema or erythema  Oropharynx: Normal with no erythema, edema, exudate or lesions  Pulmonary   Respiratory effort: No increased work of breathing or signs of respiratory distress  Auscultation of lungs: Clear to auscultation  Cardiovascular   Palpation of heart: Normal PMI, no thrills  Auscultation of heart: Normal rate and rhythm, normal S1 and S2, without murmurs  Examination of extremities for edema and/or varicosities: Normal     Carotid pulses: Normal     Abdomen   Abdomen: Non-tender, no masses      Liver and spleen: No hepatomegaly or splenomegaly  Lymphatic   Palpation of lymph nodes in neck: No lymphadenopathy  Musculoskeletal   Gait and station: Normal     Digits and nails: Normal without clubbing or cyanosis  Inspection/palpation of joints, bones, and muscles: Normal     Skin   Skin and subcutaneous tissue: Normal without rashes or lesions  Neurologic   Cranial nerves: Cranial nerves 2-12 intact  Reflexes: 2+ and symmetric  Sensation: No sensory loss      Psychiatric   Orientation to person, place, and time: Normal     Mood and affect: Normal          Future Appointments    Date/Time Provider Specialty Site   09/08/2016 04:30 PM Vita Macias DO Internal Medicine [facility]   10/12/2016 02:00 PM Vita Macias DO Internal Medicine [address] [place]     Signatures   Electronically signed by : BRII Salmon ; Jun 30 2016  9:34PM EST                       (Author)

## 2018-01-14 VITALS
OXYGEN SATURATION: 98 % | SYSTOLIC BLOOD PRESSURE: 105 MMHG | BODY MASS INDEX: 40.3 KG/M2 | WEIGHT: 281.5 LBS | HEART RATE: 102 BPM | TEMPERATURE: 98.5 F | HEIGHT: 70 IN | DIASTOLIC BLOOD PRESSURE: 62 MMHG

## 2018-01-14 VITALS
SYSTOLIC BLOOD PRESSURE: 115 MMHG | HEART RATE: 110 BPM | BODY MASS INDEX: 40.16 KG/M2 | OXYGEN SATURATION: 98 % | TEMPERATURE: 98.8 F | DIASTOLIC BLOOD PRESSURE: 80 MMHG | HEIGHT: 70 IN | WEIGHT: 280.5 LBS

## 2018-01-14 VITALS
HEIGHT: 70 IN | WEIGHT: 276.5 LBS | DIASTOLIC BLOOD PRESSURE: 76 MMHG | SYSTOLIC BLOOD PRESSURE: 120 MMHG | HEART RATE: 98 BPM | OXYGEN SATURATION: 97 % | TEMPERATURE: 98.6 F | BODY MASS INDEX: 39.58 KG/M2

## 2018-01-14 VITALS
SYSTOLIC BLOOD PRESSURE: 120 MMHG | HEART RATE: 77 BPM | HEIGHT: 70 IN | BODY MASS INDEX: 40.8 KG/M2 | OXYGEN SATURATION: 98 % | TEMPERATURE: 97.2 F | WEIGHT: 285 LBS | DIASTOLIC BLOOD PRESSURE: 70 MMHG

## 2018-01-15 VITALS
HEART RATE: 79 BPM | OXYGEN SATURATION: 98 % | TEMPERATURE: 98.6 F | HEIGHT: 70 IN | WEIGHT: 283.25 LBS | SYSTOLIC BLOOD PRESSURE: 118 MMHG | DIASTOLIC BLOOD PRESSURE: 60 MMHG | BODY MASS INDEX: 40.55 KG/M2

## 2018-01-15 NOTE — MISCELLANEOUS
Date: 07/06/2017   Dear Forrest Lu:     We have attempted to reach you regarding your upcoming appointment on July 26, 2017 and with Dr Cayla Logan     Unfortunately, due to a change in the provider's schedule we need to change your appointment  Please call our office as soon as possible so we can reschedule your appointment  We apologize for any inconvenience this may cause  Thank you in advance for your cooperation and assistance       Sincerely,   [facility]      Signatures   Electronically signed by : Jamal Washington, ; Jul 6 2017 10:34AM EST                       (Author)

## 2018-01-22 VITALS
HEART RATE: 78 BPM | BODY MASS INDEX: 39.65 KG/M2 | TEMPERATURE: 98.3 F | WEIGHT: 277 LBS | OXYGEN SATURATION: 97 % | HEIGHT: 70 IN

## 2018-01-22 VITALS — DIASTOLIC BLOOD PRESSURE: 65 MMHG | SYSTOLIC BLOOD PRESSURE: 110 MMHG

## 2018-01-23 NOTE — RESULT NOTES
Verified Results  * XR ANKLE 3+ VIEW LEFT 22VCM4559 04:19PM Hector COOPER Order Number: [id number]     Test Name Result Flag Reference   XR ANKLE 3+ VW LEFT (Report)     LEFT ANKLE     INDICATION: Left ankle pain  COMPARISON: None     VIEWS: 3     IMAGES: 3     FINDINGS:     There is no acute fracture or dislocation  Tibiotalar joint within normal limits  There is a large calcaneal heel spur noted  No lytic or blastic lesions are seen  Soft tissues are unremarkable  IMPRESSION:     No acute osseous abnormality       Large calcaneal heel spur       Workstation performed: [id number]     Signed by:   Dominik Maloney MD   12/2/17

## 2018-02-06 ENCOUNTER — OFFICE VISIT (OUTPATIENT)
Dept: INTERNAL MEDICINE CLINIC | Facility: CLINIC | Age: 53
End: 2018-02-06
Payer: COMMERCIAL

## 2018-02-06 VITALS
SYSTOLIC BLOOD PRESSURE: 110 MMHG | BODY MASS INDEX: 42.77 KG/M2 | HEART RATE: 87 BPM | OXYGEN SATURATION: 98 % | HEIGHT: 68 IN | WEIGHT: 282.2 LBS | TEMPERATURE: 98.2 F | DIASTOLIC BLOOD PRESSURE: 78 MMHG

## 2018-02-06 DIAGNOSIS — M70.61 TROCHANTERIC BURSITIS OF BOTH HIPS: ICD-10-CM

## 2018-02-06 DIAGNOSIS — M70.62 TROCHANTERIC BURSITIS OF BOTH HIPS: ICD-10-CM

## 2018-02-06 DIAGNOSIS — M54.16 LUMBAR RADICULOPATHY: Primary | ICD-10-CM

## 2018-02-06 PROBLEM — M25.572 LEFT ANKLE PAIN: Status: ACTIVE | Noted: 2017-11-27

## 2018-02-06 PROBLEM — M79.601 UPPER EXTREMITY PAIN, DIFFUSE, RIGHT: Status: ACTIVE | Noted: 2017-04-20

## 2018-02-06 PROCEDURE — 99213 OFFICE O/P EST LOW 20 MIN: CPT | Performed by: INTERNAL MEDICINE

## 2018-02-06 PROCEDURE — 20610 DRAIN/INJ JOINT/BURSA W/O US: CPT | Performed by: INTERNAL MEDICINE

## 2018-02-06 RX ORDER — MOMETASONE FUROATE 50 UG/1
1 SPRAY, METERED NASAL DAILY
COMMUNITY
Start: 2013-01-16 | End: 2018-04-04 | Stop reason: SDUPTHER

## 2018-02-06 RX ORDER — VERAPAMIL HYDROCHLORIDE 180 MG/1
1 CAPSULE, EXTENDED RELEASE ORAL DAILY
COMMUNITY
Start: 2016-07-14 | End: 2018-03-11 | Stop reason: SDUPTHER

## 2018-02-06 RX ORDER — SPIRONOLACTONE 50 MG/1
1 TABLET, FILM COATED ORAL DAILY
COMMUNITY
Start: 2012-12-20 | End: 2018-03-23 | Stop reason: SDUPTHER

## 2018-02-06 RX ORDER — ALBUTEROL SULFATE 90 UG/1
AEROSOL, METERED RESPIRATORY (INHALATION)
COMMUNITY
End: 2018-10-23 | Stop reason: SDUPTHER

## 2018-02-06 RX ORDER — ACETAMINOPHEN 500 MG
TABLET ORAL
COMMUNITY
Start: 2013-09-27

## 2018-02-06 RX ORDER — METHYLPREDNISOLONE ACETATE 40 MG/ML
2 INJECTION, SUSPENSION INTRA-ARTICULAR; INTRALESIONAL; INTRAMUSCULAR; SOFT TISSUE
Status: COMPLETED | OUTPATIENT
Start: 2018-02-06 | End: 2018-02-06

## 2018-02-06 RX ORDER — CHOLESTYRAMINE 4 G/9G
1 POWDER, FOR SUSPENSION ORAL 2 TIMES DAILY
COMMUNITY
Start: 2017-10-04 | End: 2018-10-23 | Stop reason: SDUPTHER

## 2018-02-06 RX ORDER — GABAPENTIN 400 MG/1
1 CAPSULE ORAL 3 TIMES DAILY
COMMUNITY
Start: 2013-12-13 | End: 2018-05-02 | Stop reason: SDUPTHER

## 2018-02-06 RX ORDER — SIMVASTATIN 5 MG
1 TABLET ORAL
COMMUNITY
Start: 2013-04-13 | End: 2018-06-18 | Stop reason: SDUPTHER

## 2018-02-06 RX ORDER — DICLOFENAC POTASSIUM 50 MG/1
TABLET, FILM COATED ORAL
COMMUNITY
Start: 2017-12-23 | End: 2018-10-23 | Stop reason: SDUPTHER

## 2018-02-06 RX ORDER — LIDOCAINE HYDROCHLORIDE 10 MG/ML
1 INJECTION, SOLUTION INFILTRATION; PERINEURAL
Status: COMPLETED | OUTPATIENT
Start: 2018-02-06 | End: 2018-02-06

## 2018-02-06 RX ORDER — OMEPRAZOLE 10 MG/1
1 CAPSULE, DELAYED RELEASE ORAL DAILY
COMMUNITY
Start: 2013-12-13 | End: 2018-06-18 | Stop reason: SDUPTHER

## 2018-02-06 RX ORDER — ACETAMINOPHEN 160 MG/5ML
SUSPENSION, ORAL (FINAL DOSE FORM) ORAL DAILY
COMMUNITY
Start: 2015-11-25

## 2018-02-06 RX ORDER — MULTIVIT-MIN/IRON/FOLIC ACID/K 18-600-40
CAPSULE ORAL
COMMUNITY
Start: 2013-01-16

## 2018-02-06 RX ADMIN — LIDOCAINE HYDROCHLORIDE 1 ML: 10 INJECTION, SOLUTION INFILTRATION; PERINEURAL at 10:43

## 2018-02-06 RX ADMIN — METHYLPREDNISOLONE ACETATE 2 ML: 40 INJECTION, SUSPENSION INTRA-ARTICULAR; INTRALESIONAL; INTRAMUSCULAR; SOFT TISSUE at 10:43

## 2018-02-06 NOTE — PROGRESS NOTES
Assessment/Plan:    1 patient with known lumbar radiculopathy is having occasional numbness of her right leg as well as hip pain patient did respond well to cortisone injection and lidocaine to the right greater trochanter which makes trochanteric bursitis more likely will continue conservative treatment if patient develops weakness of her right leg which she does not have or bladder bowel incontinence to return immediately  2  Greater trochanter bursitis improved greatly with injection may do every 6 months if patient is not much improved next week will get x-ray of the right hip to rule out osteoarthritis     Diagnoses and all orders for this visit:    Lumbar radiculopathy    Trochanteric bursitis of both hips  -     Large joint arthrocentesis    Other orders  -     acetaminophen (TYLENOL) 500 mg tablet; Take by mouth  -     albuterol (PROVENTIL HFA,VENTOLIN HFA) 90 mcg/act inhaler; 2 puffs  -     cholestyramine (QUESTRAN) 4 GM/DOSE powder; Take 1 packet by mouth 2 (two) times a day  -     diclofenac potassium (CATAFLAM) 50 mg tablet;   -     gabapentin (NEURONTIN) 400 mg capsule; Take 1 capsule by mouth 3 (three) times a day  -     Ginkgo Biloba 30 MG CAPS; Take by mouth daily  -     mometasone (NASONEX) 50 mcg/act nasal spray; 1 spray into each nostril daily  -     omeprazole (PriLOSEC) 10 mg delayed release capsule; Take 1 capsule by mouth daily  -     simvastatin (ZOCOR) 5 MG tablet; Take 1 tablet by mouth  -     spironolactone (ALDACTONE) 50 mg tablet; Take 1 tablet by mouth daily  -     verapamil (VERELAN PM) 180 MG 24 hr capsule; Take 1 capsule by mouth daily  -     Cholecalciferol (VITAMIN D) 2000 units CAPS; Take by mouth  -     XR hip/pelv 2-3 vws right if performed;  Future        The patient was counseled regarding instructions for management, risk factor reductions, patient and family education,impressions, risks and benefits of treatment options, side effects of medications, importance of compliance with treatment  The treatment plan was reviewed with the patient/guardian and patient/guardian understands and agrees with the treatment plan  Large joint arthrocentesis  Date/Time: 2/6/2018 10:43 AM  Consent given by: patient  Site marked: site marked  Supporting Documentation  Indications: pain   Procedure Details  Location: hip - R hip joint  Needle gauge: spinal needle  Ultrasound guidance: no  Medications administered: 1 mL lidocaine 1 %; 2 mL methylPREDNISolone acetate 40 mg/mL                     Subjective:      Patient ID: Zuleyka Brunson is a 46 y o  female  HPI    The following portions of the patient's history were reviewed and updated as appropriate:   She has a past medical history of Allergic; Arthritis; Asthma; Diverticulitis of colon; GERD (gastroesophageal reflux disease); Hypertension; and Obesity  ,   does not have any pertinent problems on file  ,   has no past surgical history on file  ,  family history is not on file  She was adopted  ,   reports that she has never smoked  She has never used smokeless tobacco  She reports that she drinks alcohol  She reports that she does not use drugs  ,  is allergic to levofloxacin; sulfamethoxazole-trimethoprim; and hydrocodone bitartrate er  Review of Systems   Constitutional: Negative for appetite change, chills, fatigue, fever and unexpected weight change  HENT: Negative for congestion, ear pain, facial swelling, hearing loss, mouth sores, nosebleeds, postnasal drip, rhinorrhea, sinus pain, sore throat, trouble swallowing and voice change  Eyes: Negative for pain, discharge, redness and visual disturbance  Respiratory: Negative for apnea, chest tightness, shortness of breath, wheezing and stridor  Cardiovascular: Negative for chest pain, palpitations and leg swelling  Gastrointestinal: Negative for abdominal distention, abdominal pain, blood in stool, constipation, diarrhea and vomiting     Endocrine: Negative for cold intolerance, heat intolerance, polydipsia, polyphagia and polyuria  Genitourinary: Negative for difficulty urinating, dysuria, flank pain, frequency, genital sores, hematuria and urgency  Musculoskeletal: Negative for arthralgias and back pain  Back hip right numbness,and pain right hip   Skin: Negative for rash and wound  Allergic/Immunologic: Negative for environmental allergies, food allergies and immunocompromised state  Neurological: Negative for dizziness, tremors, seizures, syncope, facial asymmetry, speech difficulty, weakness, light-headedness, numbness and headaches  Hematological: Negative for adenopathy  Does not bruise/bleed easily  Psychiatric/Behavioral: Negative for agitation, behavioral problems, dysphoric mood, hallucinations, self-injury, sleep disturbance and suicidal ideas  The patient is not hyperactive            Objective:     Physical Exam

## 2018-02-06 NOTE — PATIENT INSTRUCTIONS
Lower Back Exercises   WHAT YOU NEED TO KNOW:   What do I need to know about lower back exercises? Lower back exercises help heal and strengthen your back muscles to prevent another injury  Ask your healthcare provider if you need to see a physical therapist for more advanced exercises  · Do the exercises on a mat or firm surface  (not on a bed) to support your spine and prevent low back pain  · Move slowly and smoothly  Avoid fast or jerky motions  · Breathe normally  Do not hold your breath  · Stop if you feel pain  It is normal to feel some discomfort at first  Regular exercise will help decrease your discomfort over time  How do I perform lower back exercises safely? Your healthcare provider may recommend that you do back exercises 10 to 30 minutes each day  He may also recommend that you do exercises 1 to 3 times each day  Ask your healthcare provider which exercises are best for you and how often to do them  · Ankle pumps:  Lie on your back  Move your foot up (with your toes pointing toward your head)  Then, move your foot down (with your toes pointing away from you)  Repeat this exercise 10 times on each side  · Heel slides:  Lie on your back  Slowly bend one leg and then straighten it  Next, bend the other leg and then straighten it  Repeat 10 times on each side  · Pelvic tilt:  Lie on your back with your knees bent and feet flat on the floor  Place your arms in a relaxed position beside your body  Tighten the muscles of your abdomen and flatten your back against the floor  Hold for 5 seconds  Repeat 5 times  · Back stretch:  Lie on your back with your hands behind your head  Bend your knees and turn the lower half of your body to one side  Hold this position for 10 seconds  Repeat 3 times on each side  · Straight leg raises:  Lie on your back with one leg straight  Bend the other knee   Tighten your abdomen and then slowly lift the straight leg up about 6 to 12 inches off the floor  Hold for 1 to 5 seconds  Lower your leg slowly  Repeat 10 times on each leg  · Knee-to-chest:  Lie on your back with your knees bent and feet flat on the floor  Pull one of your knees toward your chest and hold it there for 5 seconds  Return your leg to the starting position  Lift the other knee toward your chest and hold for 5 seconds  Do this 5 times on each side  · Cat and camel:  Place your hands and knees on the floor  Arch your back upward toward the ceiling and lower your head  Round out your spine as much as you can  Hold for 5 seconds  Lift your head upward and push your chest downward toward the floor  Hold for 5 seconds  Do 3 sets or as directed  · Wall squats:  Stand with your back against a wall  Tighten the muscles of your abdomen  Slowly lower your body until your knees are bent at a 45 degree angle  Hold this position for 5 seconds  Slowly move back up to a standing position  Repeat 10 times  · Curl up:  Lie on your back with your knees bent and feet flat on the floor  Place your hands, palms down, underneath the curve in your lower back  Next, with your elbows on the floor, lift your shoulders and chest 2 to 3 inches  Keep your head in line with your shoulders  Hold this position for 5 seconds  When you can do this exercise without pain for 10 to 15 seconds, you may add a rotation  While your shoulders and chest are lifted off the ground, turn slightly to the left and hold  Repeat on the other side  · Bird dog:  Place your hands and knees on the floor  Keep your wrists directly below your shoulders and your knees directly below your hips  Pull your belly button in toward your spine  Do not flatten or arch your back  Tighten your abdominal muscles  Raise one arm straight out so that it is aligned with your head  Next, raise the leg opposite your arm  Hold this position for 15 seconds   Lower your arm and leg slowly and change sides  Do 5 sets  When should I seek immediate care? · You have severe pain that prevents you from moving  When should I contact my healthcare provider? · Your pain becomes worse  · You have new pain  · You have questions or concerns about your condition or care  CARE AGREEMENT:   You have the right to help plan your care  Learn about your health condition and how it may be treated  Discuss treatment options with your caregivers to decide what care you want to receive  You always have the right to refuse treatment  The above information is an  only  It is not intended as medical advice for individual conditions or treatments  Talk to your doctor, nurse or pharmacist before following any medical regimen to see if it is safe and effective for you  © 2017 [employer] [employer]  Information is for End User's use only and may not be sold, redistributed or otherwise used for commercial purposes  All illustrations and images included in CareNotes® are the copyrighted property of [employer]  or [address]

## 2018-02-06 NOTE — PROGRESS NOTES
Assessment/Plan:    ***     Diagnoses and all orders for this visit:    Lumbar radiculopathy    Trochanteric bursitis of both hips    Other orders  -     acetaminophen (TYLENOL) 500 mg tablet; Take by mouth  -     albuterol (PROVENTIL HFA,VENTOLIN HFA) 90 mcg/act inhaler; 2 puffs  -     cholestyramine (QUESTRAN) 4 GM/DOSE powder; Take 1 packet by mouth 2 (two) times a day  -     diclofenac potassium (CATAFLAM) 50 mg tablet;   -     gabapentin (NEURONTIN) 400 mg capsule; Take 1 capsule by mouth 3 (three) times a day  -     Ginkgo Biloba 30 MG CAPS; Take by mouth daily  -     mometasone (NASONEX) 50 mcg/act nasal spray; 1 spray into each nostril daily  -     omeprazole (PriLOSEC) 10 mg delayed release capsule; Take 1 capsule by mouth daily  -     simvastatin (ZOCOR) 5 MG tablet; Take 1 tablet by mouth  -     spironolactone (ALDACTONE) 50 mg tablet; Take 1 tablet by mouth daily  -     verapamil (VERELAN PM) 180 MG 24 hr capsule; Take 1 capsule by mouth daily  -     Cholecalciferol (VITAMIN D) 2000 units CAPS; Take by mouth        The patient was counseled regarding instructions for management, risk factor reductions, patient and family education,impressions, risks and benefits of treatment options, side effects of medications, importance of compliance with treatment  The treatment plan was reviewed with the patient/guardian and patient/guardian understands and agrees with the treatment plan  Subjective:      Patient ID: Lazarus Helling is a 46 y o  female  Pain right hip 2 months worsening limping, lidocaine cream not helpful, had shot 5 months ago very helpful shot usually lasts a week        The following portions of the patient's history were reviewed and updated as appropriate:   She has a past medical history of Allergic; Arthritis; Asthma; Diverticulitis of colon; GERD (gastroesophageal reflux disease); Hypertension; and Obesity  ,   does not have any pertinent problems on file  ,   has no past surgical history on file  ,  family history is not on file  She was adopted  ,   reports that she has never smoked  She has never used smokeless tobacco  She reports that she drinks alcohol  She reports that she does not use drugs  ,  is allergic to levofloxacin; sulfamethoxazole-trimethoprim; and hydrocodone bitartrate er       Review of Systems   Constitutional: Negative for appetite change, chills, fatigue, fever and unexpected weight change  HENT: Negative for congestion, ear pain, facial swelling, hearing loss, mouth sores, nosebleeds, postnasal drip, rhinorrhea, sinus pain, sore throat, trouble swallowing and voice change  Eyes: Negative for pain, discharge, redness and visual disturbance  Respiratory: Negative for apnea, chest tightness, shortness of breath, wheezing and stridor  Cardiovascular: Negative for chest pain, palpitations and leg swelling  Gastrointestinal: Negative for abdominal distention, abdominal pain, blood in stool, constipation, diarrhea and vomiting  Endocrine: Negative for cold intolerance, heat intolerance, polydipsia, polyphagia and polyuria  Genitourinary: Negative for difficulty urinating, dysuria, flank pain, frequency, genital sores, hematuria and urgency  Musculoskeletal: Negative for arthralgias and back pain  Right hip pain   Skin: Negative for rash and wound  Allergic/Immunologic: Negative for environmental allergies, food allergies and immunocompromised state  Neurological: Negative for dizziness, tremors, seizures, syncope, facial asymmetry, speech difficulty, weakness, light-headedness, numbness and headaches  Hematological: Negative for adenopathy  Does not bruise/bleed easily  Psychiatric/Behavioral: Negative for agitation, behavioral problems, dysphoric mood, hallucinations, self-injury, sleep disturbance and suicidal ideas  The patient is not hyperactive            Objective:     Physical Exam   Constitutional: She is oriented to person, place, and time  She appears well-developed  HENT:   Right Ear: External ear normal    Left Ear: External ear normal    Eyes: Right eye exhibits no discharge  Left eye exhibits no discharge  No scleral icterus  Neck: Carotid bruit is not present  No tracheal deviation present  No thyroid mass and no thyromegaly present  Cardiovascular: Normal rate, regular rhythm, normal heart sounds and intact distal pulses  Exam reveals no gallop and no friction rub  No murmur heard  Pulmonary/Chest: No respiratory distress  She has no wheezes  She has no rales  Musculoskeletal: She exhibits no edema  Straight leg raising left 45° straight leg raising right 10° with pain   pain with pressure over the right greater trochanter   Lymphadenopathy:     She has no cervical adenopathy  Neurological: She is alert and oriented to person, place, and time  Coordination normal    Psychiatric: She has a normal mood and affect  Her behavior is normal  Judgment and thought content normal    Nursing note and vitals reviewed

## 2018-03-07 NOTE — PROCEDURES
Procedure    Pre-procedure Diagnosis: Cervical Radiculopathy   Post-procedure Diagnosis: Cervical Radiculopathy   Procedure Title(s):  1  C6-C7 Translaminar epidural steroid injection      2  Intraoperative fluoroscopy  Attending Surgeon:   Lynne Hill MD  Anesthesia:   Local    Procedure Note:  The patient's history and physical exam were reviewed  I explained the details of the procedure to the patient including the risks, benefits and other alternatives  We had an informed discussion and the patient verbalized understanding and signed the consent form  All questions were answered appropriately  The patient was taken to the Fluoroscopy Suite and placed prone on the table with support and arms down at the sides  Prior to the procedure, a "time out" was performed  Fluoroscopic guidance was used to locate the anatomic landmarks for an approach to the C6-C7 interspace  The area was sterilely prepped three times using ChloroPrep x3 and sterilely draped  The skin was anesthetized with 3cc's of 1% lidocaine in the midline at this interspace  A 3 5 inch, 20-gauge Tuohy epidural needle was directed toward the interspace under fluoroscopic guidance until the ligamentum flavum was engaged  From this point, a 45 degree contra-lateral oblique view obtained  The epidural tuohy needle was gradually advanced using loss-of-resistance (ALFA) technique  Once ALFA was obtained, 1cc Omnipaque 300 contrast solution was injected  An appropriate epidurogram was noted  Then, after negative aspiration, a solution consisting of 1-cc of 1% lidocaine and 10mg dexamethasone and 2-cc preservative-free saline was easily injected, demonstrating good washout of the dye  The needle was then removed with a 1cc of 1% lidocaine flush  The patient's neck was cleaned and a bandage was placed over the site of needle insertion  Disposition: The patient tolerated the procedure well, and there were no apparent complications   The patient was taken to the recovery area where written discharge instructions for the procedure were given            Signatures   Electronically signed by : Lynne Hill MD; Oct 12 2017  3:36PM EST                       (Author)

## 2018-03-11 DIAGNOSIS — I10 HYPERTENSION, UNSPECIFIED TYPE: Primary | ICD-10-CM

## 2018-03-12 RX ORDER — VERAPAMIL HYDROCHLORIDE 180 MG/1
CAPSULE, EXTENDED RELEASE ORAL
Qty: 30 CAPSULE | Refills: 5 | Status: SHIPPED | OUTPATIENT
Start: 2018-03-12 | End: 2018-04-04 | Stop reason: SDUPTHER

## 2018-03-23 DIAGNOSIS — L65.9 ALOPECIA: Primary | ICD-10-CM

## 2018-03-26 RX ORDER — SPIRONOLACTONE 50 MG/1
TABLET, FILM COATED ORAL
Qty: 30 TABLET | Refills: 5 | Status: SHIPPED | OUTPATIENT
Start: 2018-03-26 | End: 2018-09-17 | Stop reason: SDUPTHER

## 2018-04-04 DIAGNOSIS — J01.91 ACUTE RECURRENT SINUSITIS, UNSPECIFIED LOCATION: Primary | ICD-10-CM

## 2018-04-04 DIAGNOSIS — I10 HYPERTENSION, UNSPECIFIED TYPE: ICD-10-CM

## 2018-04-04 RX ORDER — MOMETASONE FUROATE 50 UG/1
1 SPRAY, METERED NASAL DAILY
Qty: 17 G | Refills: 2 | Status: SHIPPED | OUTPATIENT
Start: 2018-04-04 | End: 2019-05-07 | Stop reason: SDUPTHER

## 2018-04-04 RX ORDER — VERAPAMIL HYDROCHLORIDE 180 MG/1
180 CAPSULE, EXTENDED RELEASE ORAL
Qty: 90 CAPSULE | Refills: 2 | Status: SHIPPED | OUTPATIENT
Start: 2018-04-04 | End: 2018-10-23 | Stop reason: SDUPTHER

## 2018-05-02 DIAGNOSIS — M54.16 LUMBAR RADICULOPATHY: Primary | ICD-10-CM

## 2018-05-02 PROBLEM — N95.0 PMB (POSTMENOPAUSAL BLEEDING): Status: ACTIVE | Noted: 2018-01-23

## 2018-05-02 RX ORDER — GABAPENTIN 400 MG/1
CAPSULE ORAL
Qty: 90 CAPSULE | Refills: 5 | Status: SHIPPED | OUTPATIENT
Start: 2018-05-02 | End: 2018-10-23 | Stop reason: SDUPTHER

## 2018-05-16 LAB — HBA1C MFR BLD HPLC: 6 %

## 2018-06-12 ENCOUNTER — OFFICE VISIT (OUTPATIENT)
Dept: INTERNAL MEDICINE CLINIC | Facility: CLINIC | Age: 53
End: 2018-06-12
Payer: COMMERCIAL

## 2018-06-12 VITALS
SYSTOLIC BLOOD PRESSURE: 115 MMHG | RESPIRATION RATE: 20 BRPM | WEIGHT: 285 LBS | BODY MASS INDEX: 43.19 KG/M2 | DIASTOLIC BLOOD PRESSURE: 60 MMHG | HEART RATE: 87 BPM | TEMPERATURE: 98.3 F | OXYGEN SATURATION: 98 % | HEIGHT: 68 IN

## 2018-06-12 DIAGNOSIS — Z23 NEED FOR SHINGLES VACCINE: ICD-10-CM

## 2018-06-12 DIAGNOSIS — E88.09 HYPOALBUMINEMIA: ICD-10-CM

## 2018-06-12 DIAGNOSIS — M22.41 CHONDROMALACIA OF RIGHT PATELLA: ICD-10-CM

## 2018-06-12 DIAGNOSIS — E78.5 HYPERLIPIDEMIA, UNSPECIFIED HYPERLIPIDEMIA TYPE: ICD-10-CM

## 2018-06-12 DIAGNOSIS — J45.909 MODERATE ASTHMA WITHOUT COMPLICATION, UNSPECIFIED WHETHER PERSISTENT: ICD-10-CM

## 2018-06-12 DIAGNOSIS — R73.03 PREDIABETES: ICD-10-CM

## 2018-06-12 DIAGNOSIS — E66.01 MORBID OBESITY (HCC): Primary | ICD-10-CM

## 2018-06-12 DIAGNOSIS — M71.21 POPLITEAL CYST, RIGHT: ICD-10-CM

## 2018-06-12 DIAGNOSIS — I10 BENIGN ESSENTIAL HYPERTENSION: ICD-10-CM

## 2018-06-12 PROCEDURE — 3078F DIAST BP <80 MM HG: CPT | Performed by: INTERNAL MEDICINE

## 2018-06-12 PROCEDURE — 3008F BODY MASS INDEX DOCD: CPT | Performed by: INTERNAL MEDICINE

## 2018-06-12 PROCEDURE — 99214 OFFICE O/P EST MOD 30 MIN: CPT | Performed by: INTERNAL MEDICINE

## 2018-06-12 PROCEDURE — 3074F SYST BP LT 130 MM HG: CPT | Performed by: INTERNAL MEDICINE

## 2018-06-12 NOTE — PROGRESS NOTES
Assessment/Plan:  Will loose 15-20 lbs in 4 months  1  Morbid obesity patient is motivated with diet and exercise will consider saxagliptin for weight loss will see her back in 4 months  2  Prediabetes A1c of 6 will recheck in 4 months  3  Hyperlipidemia continue simvastatin recheck in 4 months  4  Hypertension is at goal  5  Patient with symptoms consistent with chondromalacia patella patellar knee strap discussed home exercise program had MRI of the knee in 2016 will see her back in 4 months  6  Memory disturbance continue gingko biloba and pulse  7  Asthma is stable rarely uses her inhaler         Diagnoses and all orders for this visit:    Morbid obesity ([address] )  -     liraglutide (SAXENDA) injection; 0 6 mg subcu for 1 week then 1 2 mg subcu for 1 week then 1 8 mg subcu for 1 week then 2 4 mg subcu for 1 week and then 3 mg subcu  -     Insulin Pen Needle (DROPLET PEN NEEDLES) 32G X 8 MM MISC; by Does not apply route daily  -     CBC and differential; Future  -     Comprehensive metabolic panel; Future  -     Hemoglobin A1C; Future  -     TSH, 3rd generation with Free T4 reflex; Future  -     Patellar strap    Moderate asthma without complication, unspecified whether persistent  -     CBC and differential; Future  -     Comprehensive metabolic panel; Future  -     Hemoglobin A1C; Future  -     TSH, 3rd generation with Free T4 reflex; Future  -     Patellar strap    Hyperlipidemia, unspecified hyperlipidemia type  -     CBC and differential; Future  -     Comprehensive metabolic panel; Future  -     Hemoglobin A1C; Future  -     TSH, 3rd generation with Free T4 reflex; Future  -     Patellar strap    Hypoalbuminemia  -     CBC and differential; Future  -     Comprehensive metabolic panel; Future  -     Hemoglobin A1C; Future  -     TSH, 3rd generation with Free T4 reflex; Future  -     Patellar strap    Prediabetes  -     CBC and differential; Future  -     Comprehensive metabolic panel;  Future  - Hemoglobin A1C; Future  -     TSH, 3rd generation with Free T4 reflex; Future  -     Patellar strap    Chondromalacia of right patella  -     CBC and differential; Future  -     Comprehensive metabolic panel; Future  -     Hemoglobin A1C; Future  -     TSH, 3rd generation with Free T4 reflex; Future  -     Patellar strap    Benign essential hypertension         Scheduled Meds:  Continuous Infusions:  No current facility-administered medications for this visit  PRN Meds:   Scheduled Meds:  Continuous Infusions:  No current facility-administered medications for this visit     Scheduled Meds:    Current Outpatient Prescriptions:     acetaminophen (TYLENOL) 500 mg tablet, Take by mouth, Disp: , Rfl:     albuterol (PROVENTIL HFA,VENTOLIN HFA) 90 mcg/act inhaler, 2 puffs, Disp: , Rfl:     Cholecalciferol (VITAMIN D) 2000 units CAPS, Take by mouth, Disp: , Rfl:     cholestyramine (QUESTRAN) 4 GM/DOSE powder, Take 1 packet by mouth 2 (two) times a day, Disp: , Rfl:     diclofenac potassium (CATAFLAM) 50 mg tablet, , Disp: , Rfl:     gabapentin (NEURONTIN) 400 mg capsule, take 1 capsule by mouth three times a day, Disp: 90 capsule, Rfl: 5    Ginkgo Biloba 30 MG CAPS, Take by mouth daily, Disp: , Rfl:     mometasone (NASONEX) 50 mcg/act nasal spray, 1 spray into each nostril daily, Disp: 17 g, Rfl: 2    omeprazole (PriLOSEC) 10 mg delayed release capsule, Take 1 capsule by mouth daily, Disp: , Rfl:     simvastatin (ZOCOR) 5 MG tablet, Take 1 tablet by mouth, Disp: , Rfl:     spironolactone (ALDACTONE) 50 mg tablet, take 1 tablet by mouth once daily, Disp: 30 tablet, Rfl: 5    verapamil (VERELAN PM) 180 MG 24 hr capsule, Take 1 capsule (180 mg total) by mouth daily before breakfast, Disp: 90 capsule, Rfl: 2    Insulin Pen Needle (DROPLET PEN NEEDLES) 32G X 8 MM MISC, by Does not apply route daily, Disp: 100 each, Rfl: 2    liraglutide (SAXENDA) injection, 0 6 mg subcu for 1 week then 1 2 mg subcu for 1 week then 1 8 mg subcu for 1 week then 2 4 mg subcu for 1 week and then 3 mg subcu, Disp: 1 pen, Rfl: 5      The patient was counseled regarding instructions for management, risk factor reductions, patient and family education,impressions, risks and benefits of treatment options, side effects of medications, importance of compliance with treatment  The treatment plan was reviewed with the patient/guardian and patient/guardian understands and agrees with the treatment plan  Subjective:      Patient ID: David Rangel is a 46 y o  female  Right knee pain popping with bike riding never uses inhaler        The following portions of the patient's history were reviewed and updated as appropriate:   She has a past medical history of Allergic; Arthritis; Asthma; Diverticulitis of colon; GERD (gastroesophageal reflux disease); Hypertension; and Obesity  ,   does not have any pertinent problems on file  ,   has no past surgical history on file  ,  family history is not on file  She was adopted  ,   reports that she has never smoked  She has never used smokeless tobacco  She reports that she drinks alcohol  She reports that she does not use drugs  ,  is allergic to levofloxacin; sulfamethoxazole-trimethoprim; and hydrocodone bitartrate er       Review of Systems   Constitutional: Negative for appetite change, chills, fatigue, fever and unexpected weight change  HENT: Negative for congestion, ear pain, facial swelling, hearing loss, mouth sores, nosebleeds, postnasal drip, rhinorrhea, sinus pain, sore throat, trouble swallowing and voice change  Eyes: Negative for pain, discharge, redness and visual disturbance  Respiratory: Negative for apnea, chest tightness, shortness of breath, wheezing and stridor  Cardiovascular: Negative for chest pain, palpitations and leg swelling  Gastrointestinal: Negative for abdominal distention, abdominal pain, blood in stool, constipation, diarrhea and vomiting     Endocrine: Negative for cold intolerance, heat intolerance, polydipsia, polyphagia and polyuria  Genitourinary: Negative for difficulty urinating, dysuria, flank pain, frequency, genital sores, hematuria and urgency  Musculoskeletal: Positive for arthralgias  Negative for back pain  Skin: Negative for rash and wound  Allergic/Immunologic: Negative for environmental allergies, food allergies and immunocompromised state  Neurological: Negative for dizziness, tremors, seizures, syncope, facial asymmetry, speech difficulty, weakness, light-headedness, numbness and headaches  Hematological: Negative for adenopathy  Does not bruise/bleed easily  Psychiatric/Behavioral: Negative for agitation, behavioral problems, dysphoric mood, hallucinations, self-injury, sleep disturbance and suicidal ideas  The patient is not hyperactive  Objective:     Physical Exam   Constitutional: She is oriented to person, place, and time  She appears well-developed  HENT:   Right Ear: External ear normal    Left Ear: External ear normal    Eyes: Right eye exhibits no discharge  Left eye exhibits no discharge  No scleral icterus  Neck: Carotid bruit is not present  No tracheal deviation present  No thyroid mass and no thyromegaly present  Cardiovascular: Normal rate, regular rhythm, normal heart sounds and intact distal pulses  Exam reveals no gallop and no friction rub  No murmur heard  Pulmonary/Chest: No respiratory distress  She has no wheezes  She has no rales  Musculoskeletal: She exhibits no edema  Some clicking of the right knee with flexion of in statin chin no medial or lateral joint will laxity or tenderness popliteal cyst noted   Lymphadenopathy:     She has no cervical adenopathy  Neurological: She is alert and oriented to person, place, and time  Coordination normal    Psychiatric: She has a normal mood and affect  Her behavior is normal  Judgment and thought content normal    Nursing note and vitals reviewed  Vitals:    06/12/18 1424 06/12/18 1458   BP:  115/60   BP Location:  Left arm   Patient Position:  Sitting   Pulse: 87    Resp: 20    Temp: 98 3 °F (36 8 °C)    SpO2: 98%    Weight: 129 kg (285 lb)    Height: 5' 8" (1 727 m)

## 2018-06-12 NOTE — PATIENT INSTRUCTIONS
Patellofemoral Pain Syndrome   AMBULATORY CARE:   Patellofemoral pain syndrome (PFPS)  is pain in or around your patella (kneecap)  PFPS is also called runner's knee or jumper's knee and is common in athletes  Common signs and symptoms of PFPS:   · Popping or crackling sounds when you move your knee    · Pain when you go up or down the stairs, squat, run, or ride a bike    · Pain after you sit for a long time with your knees bent    · Swelling, stiffness, or weakness in your knee  Contact your healthcare provider if:   · You have trouble walking  · You have a fever  · Your knee brace or sleeve is too tight  · Your symptoms are not getting better, or they get worse  · Your pain and swelling increase even after you take pain medicine  · You have questions or concerns about your condition or care  Treatment  may include any of the following:  · Acetaminophen  decreases pain  It is available without a doctor's order  Ask how much to take and how often to take it  Follow directions  Acetaminophen can cause liver damage if not taken correctly  · NSAIDs , such as ibuprofen, help decrease swelling, pain, and fever  This medicine is available with or without a doctor's order  NSAIDs can cause stomach bleeding or kidney problems in certain people  If you take blood thinner medicine, always ask your healthcare provider if NSAIDs are safe for you  Always read the medicine label and follow directions  · Surgery  may be needed if other treatments do not work  During surgery, the back of your kneecap is smoothed  A ligament may also be cut to allow the knee to return to its normal position  Surgery may be done through small incisions as during arthroscopy, or you may need a larger incision  A cut in your shin bone may also be needed to help line up your kneecap correctly  Prevent another episode:   · Wear the right shoes for your activities  Increase activity gradually  · Warm up before you exercise  Stretch your leg muscles before and after activity  Manage your symptoms:   · Change your activity  You may need to rest your knee  You may need to change your exercise routine to low-impact activities  · Apply ice  on your knee for 15 to 20 minutes every hour or as directed  Use an ice pack, or put crushed ice in a plastic bag  Cover it with a towel  Ice helps prevent tissue damage and decreases swelling and pain  · Elevate  your knee above the level of your heart as often as you can  This will help decrease swelling and pain  Prop your knee on pillows or blankets to keep it elevated comfortably  · Support  your knee by wrapping it with tape or an elastic bandage  You may need a brace for more support  This will help decrease swelling and keep your kneecap in the correct spot  · Wear shoe inserts as directed  Orthotics or arch supports help keep your foot and ankle stable and in line to decrease stress on your knee  · Go to physical therapy as directed  A physical therapist will teach you exercises to help improve movement and strength, and to decrease pain  · Maintain a healthy weight  Ask your healthcare provider how much you should weigh  Ask him to help you create a weight loss plan if you are overweight  Losing weight can help decrease pressure on your knee  Follow up with your healthcare provider as directed: You may be referred to an orthopedic surgeon  Write down your questions so you remember to ask them during your visits  © 2017 [address] [employer] Information is for End User's use only and may not be sold, redistributed or otherwise used for commercial purposes  All illustrations and images included in CareNotes® are the copyrighted property of [employer]  or [employer]  The above information is an  only  It is not intended as medical advice for individual conditions or treatments   Talk to your doctor, nurse or pharmacist before following any medical regimen to see if it is safe and effective for you

## 2018-06-18 DIAGNOSIS — E78.5 HYPERLIPIDEMIA, UNSPECIFIED HYPERLIPIDEMIA TYPE: ICD-10-CM

## 2018-06-18 DIAGNOSIS — E78.5 HYPERLIPIDEMIA, UNSPECIFIED HYPERLIPIDEMIA TYPE: Primary | ICD-10-CM

## 2018-06-18 DIAGNOSIS — K21.9 GASTROESOPHAGEAL REFLUX DISEASE, ESOPHAGITIS PRESENCE NOT SPECIFIED: ICD-10-CM

## 2018-06-18 DIAGNOSIS — K21.9 GASTROESOPHAGEAL REFLUX DISEASE, ESOPHAGITIS PRESENCE NOT SPECIFIED: Primary | ICD-10-CM

## 2018-06-18 RX ORDER — SIMVASTATIN 5 MG
TABLET ORAL
Qty: 30 TABLET | Refills: 7 | Status: CANCELLED | OUTPATIENT
Start: 2018-06-18

## 2018-06-18 RX ORDER — OMEPRAZOLE 10 MG/1
10 CAPSULE, DELAYED RELEASE ORAL DAILY
Qty: 90 CAPSULE | Refills: 2 | Status: SHIPPED | OUTPATIENT
Start: 2018-06-18 | End: 2018-10-23 | Stop reason: SDUPTHER

## 2018-06-18 RX ORDER — SIMVASTATIN 5 MG
5 TABLET ORAL
Qty: 90 TABLET | Refills: 2 | Status: SHIPPED | OUTPATIENT
Start: 2018-06-18 | End: 2018-10-23 | Stop reason: SDUPTHER

## 2018-06-18 RX ORDER — OMEPRAZOLE 10 MG/1
CAPSULE, DELAYED RELEASE ORAL
Qty: 30 CAPSULE | Refills: 5 | Status: CANCELLED | OUTPATIENT
Start: 2018-06-18

## 2018-09-17 DIAGNOSIS — L65.9 ALOPECIA: ICD-10-CM

## 2018-09-18 RX ORDER — SPIRONOLACTONE 50 MG/1
TABLET, FILM COATED ORAL
Qty: 30 TABLET | Refills: 5 | Status: SHIPPED | OUTPATIENT
Start: 2018-09-18 | End: 2019-03-13 | Stop reason: SDUPTHER

## 2018-10-06 LAB — HBA1C MFR BLD HPLC: 5.7 %

## 2018-10-23 ENCOUNTER — OFFICE VISIT (OUTPATIENT)
Dept: INTERNAL MEDICINE CLINIC | Facility: CLINIC | Age: 53
End: 2018-10-23
Payer: COMMERCIAL

## 2018-10-23 VITALS
OXYGEN SATURATION: 98 % | HEIGHT: 68 IN | RESPIRATION RATE: 18 BRPM | DIASTOLIC BLOOD PRESSURE: 70 MMHG | SYSTOLIC BLOOD PRESSURE: 105 MMHG | BODY MASS INDEX: 39.5 KG/M2 | HEART RATE: 71 BPM | WEIGHT: 260.6 LBS | TEMPERATURE: 98.5 F

## 2018-10-23 DIAGNOSIS — M48.02 FORAMINAL STENOSIS OF CERVICAL REGION: ICD-10-CM

## 2018-10-23 DIAGNOSIS — M22.41 CHONDROMALACIA OF RIGHT PATELLA: ICD-10-CM

## 2018-10-23 DIAGNOSIS — I10 BENIGN ESSENTIAL HYPERTENSION: ICD-10-CM

## 2018-10-23 DIAGNOSIS — M54.16 LUMBAR RADICULOPATHY: ICD-10-CM

## 2018-10-23 DIAGNOSIS — E88.09 HYPOALBUMINEMIA: ICD-10-CM

## 2018-10-23 DIAGNOSIS — K21.9 GASTROESOPHAGEAL REFLUX DISEASE, ESOPHAGITIS PRESENCE NOT SPECIFIED: ICD-10-CM

## 2018-10-23 DIAGNOSIS — I10 HYPERTENSION, UNSPECIFIED TYPE: ICD-10-CM

## 2018-10-23 DIAGNOSIS — M71.21 POPLITEAL CYST, RIGHT: ICD-10-CM

## 2018-10-23 DIAGNOSIS — M17.0 OSTEOARTHRITIS OF BOTH KNEES, UNSPECIFIED OSTEOARTHRITIS TYPE: Primary | ICD-10-CM

## 2018-10-23 DIAGNOSIS — J45.909 MODERATE ASTHMA WITHOUT COMPLICATION, UNSPECIFIED WHETHER PERSISTENT: ICD-10-CM

## 2018-10-23 DIAGNOSIS — E78.5 HYPERLIPIDEMIA, UNSPECIFIED HYPERLIPIDEMIA TYPE: ICD-10-CM

## 2018-10-23 DIAGNOSIS — K58.0 IRRITABLE BOWEL SYNDROME WITH DIARRHEA: ICD-10-CM

## 2018-10-23 PROCEDURE — 99214 OFFICE O/P EST MOD 30 MIN: CPT | Performed by: INTERNAL MEDICINE

## 2018-10-23 PROCEDURE — 3078F DIAST BP <80 MM HG: CPT | Performed by: INTERNAL MEDICINE

## 2018-10-23 PROCEDURE — 1036F TOBACCO NON-USER: CPT | Performed by: INTERNAL MEDICINE

## 2018-10-23 PROCEDURE — 3008F BODY MASS INDEX DOCD: CPT | Performed by: INTERNAL MEDICINE

## 2018-10-23 PROCEDURE — 3074F SYST BP LT 130 MM HG: CPT | Performed by: INTERNAL MEDICINE

## 2018-10-23 RX ORDER — ALBUTEROL SULFATE 90 UG/1
2 AEROSOL, METERED RESPIRATORY (INHALATION) EVERY 6 HOURS PRN
Qty: 1 INHALER | Refills: 5 | Status: SHIPPED | OUTPATIENT
Start: 2018-10-23 | End: 2020-06-25 | Stop reason: ALTCHOICE

## 2018-10-23 RX ORDER — DICLOFENAC POTASSIUM 50 MG/1
50 TABLET, FILM COATED ORAL DAILY
Qty: 90 TABLET | Refills: 2 | Status: SHIPPED | OUTPATIENT
Start: 2018-10-23 | End: 2019-06-10 | Stop reason: RX

## 2018-10-23 RX ORDER — OMEPRAZOLE 10 MG/1
10 CAPSULE, DELAYED RELEASE ORAL DAILY
Qty: 90 CAPSULE | Refills: 0 | Status: SHIPPED | OUTPATIENT
Start: 2018-10-23 | End: 2019-05-07 | Stop reason: SDUPTHER

## 2018-10-23 RX ORDER — SIMVASTATIN 5 MG
5 TABLET ORAL
Qty: 90 TABLET | Refills: 0 | Status: SHIPPED | OUTPATIENT
Start: 2018-10-23 | End: 2019-05-07 | Stop reason: SDUPTHER

## 2018-10-23 RX ORDER — VERAPAMIL HYDROCHLORIDE 180 MG/1
180 CAPSULE, EXTENDED RELEASE ORAL
Qty: 90 CAPSULE | Refills: 0 | Status: SHIPPED | OUTPATIENT
Start: 2018-10-23 | End: 2019-04-18 | Stop reason: SDUPTHER

## 2018-10-23 RX ORDER — GABAPENTIN 400 MG/1
400 CAPSULE ORAL 2 TIMES DAILY
Qty: 180 CAPSULE | Refills: 2 | Status: SHIPPED | OUTPATIENT
Start: 2018-10-23 | End: 2019-05-07 | Stop reason: SDUPTHER

## 2018-10-23 RX ORDER — CHOLESTYRAMINE 4 G/9G
4 POWDER, FOR SUSPENSION ORAL 2 TIMES DAILY
Qty: 378 G | Refills: 5 | Status: SHIPPED | OUTPATIENT
Start: 2018-10-23

## 2018-10-23 NOTE — PROGRESS NOTES
Assessment/Plan: will wear patella knee strap during the day  1  Bilateral knee pain does have popliteal cysts suggestive of osteoarthritis will get x-ray of her knees will see her back in a few weeks may consider physical therapy and/or cortisone injections of her knees to help improve mobility  2  Patient with hypertension is at goal has lost 25 lb will recheck her in 6 months consider taking her off of verapamil to see if her blood pressure remains at goal with her weight loss  3  Asthma is stable on present medications rarely uses her rescue inhaler  4  Hyperlipidemia discussed drug interactions with verapamil and simvastatin patient is not interested in switching at this time will not increase simvastatin to greater than 10 mg           Diagnoses and all orders for this visit:    Osteoarthritis of both knees, unspecified osteoarthritis type  -     XR knee 3 vw left non injury; Future  -     XR knee 3 vw right non injury; Future  -     CBC and differential; Future  -     Comprehensive metabolic panel; Future  -     LDL cholesterol, direct; Future  -     Lipid Panel with Direct LDL reflex; Future  -     Magnesium; Future  -     Hemoglobin A1C; Future  -     TSH, 3rd generation with Free T4 reflex; Future    Hyperlipidemia, unspecified hyperlipidemia type  -     XR knee 3 vw left non injury; Future  -     simvastatin (ZOCOR) 5 MG tablet; Take 1 tablet (5 mg total) by mouth daily at bedtime  -     CBC and differential; Future  -     Comprehensive metabolic panel; Future  -     LDL cholesterol, direct; Future  -     Lipid Panel with Direct LDL reflex; Future  -     Magnesium; Future  -     Hemoglobin A1C; Future  -     TSH, 3rd generation with Free T4 reflex; Future    Hypoalbuminemia  -     XR knee 3 vw left non injury; Future  -     CBC and differential; Future  -     Comprehensive metabolic panel; Future  -     LDL cholesterol, direct; Future  -     Lipid Panel with Direct LDL reflex;  Future  -     Magnesium; Future  -     Hemoglobin A1C; Future  -     TSH, 3rd generation with Free T4 reflex; Future    Foraminal stenosis of cervical region  -     XR knee 3 vw left non injury; Future  -     CBC and differential; Future  -     Comprehensive metabolic panel; Future  -     LDL cholesterol, direct; Future  -     Lipid Panel with Direct LDL reflex; Future  -     Magnesium; Future  -     Hemoglobin A1C; Future  -     TSH, 3rd generation with Free T4 reflex; Future    Benign essential hypertension  -     XR knee 3 vw left non injury; Future  -     CBC and differential; Future  -     Comprehensive metabolic panel; Future  -     LDL cholesterol, direct; Future  -     Lipid Panel with Direct LDL reflex; Future  -     Magnesium; Future  -     Hemoglobin A1C; Future  -     TSH, 3rd generation with Free T4 reflex; Future    Moderate asthma without complication, unspecified whether persistent  -     XR knee 3 vw left non injury; Future  -     albuterol (PROVENTIL HFA,VENTOLIN HFA) 90 mcg/act inhaler; Inhale 2 puffs every 6 (six) hours as needed for wheezing or shortness of breath (Cough)  -     CBC and differential; Future  -     Comprehensive metabolic panel; Future  -     LDL cholesterol, direct; Future  -     Lipid Panel with Direct LDL reflex; Future  -     Magnesium; Future  -     Hemoglobin A1C; Future  -     TSH, 3rd generation with Free T4 reflex; Future    Chondromalacia of right patella  -     XR knee 3 vw left non injury; Future  -     CBC and differential; Future  -     Comprehensive metabolic panel; Future  -     LDL cholesterol, direct; Future  -     Lipid Panel with Direct LDL reflex; Future  -     Magnesium; Future  -     Hemoglobin A1C; Future  -     TSH, 3rd generation with Free T4 reflex; Future    Popliteal cyst, right  -     XR knee 3 vw left non injury; Future  -     CBC and differential; Future  -     Comprehensive metabolic panel; Future  -     LDL cholesterol, direct;  Future  -     Lipid Panel with Direct LDL reflex; Future  -     Magnesium; Future  -     Hemoglobin A1C; Future  -     TSH, 3rd generation with Free T4 reflex; Future    Gastroesophageal reflux disease, esophagitis presence not specified  -     XR knee 3 vw left non injury; Future  -     omeprazole (PriLOSEC) 10 mg delayed release capsule; Take 1 capsule (10 mg total) by mouth daily  -     CBC and differential; Future  -     Comprehensive metabolic panel; Future  -     LDL cholesterol, direct; Future  -     Lipid Panel with Direct LDL reflex; Future  -     Magnesium; Future  -     Hemoglobin A1C; Future  -     TSH, 3rd generation with Free T4 reflex; Future    Hypertension, unspecified type  -     XR knee 3 vw left non injury; Future  -     verapamil (VERELAN PM) 180 MG 24 hr capsule; Take 1 capsule (180 mg total) by mouth daily before breakfast  -     CBC and differential; Future  -     Comprehensive metabolic panel; Future  -     LDL cholesterol, direct; Future  -     Lipid Panel with Direct LDL reflex; Future  -     Magnesium; Future  -     Hemoglobin A1C; Future  -     TSH, 3rd generation with Free T4 reflex; Future    Lumbar radiculopathy  -     XR knee 3 vw left non injury; Future  -     gabapentin (NEURONTIN) 400 mg capsule; Take 1 capsule (400 mg total) by mouth 2 (two) times a day  -     diclofenac potassium (CATAFLAM) 50 mg tablet; Take 1 tablet (50 mg total) by mouth daily  -     CBC and differential; Future  -     Comprehensive metabolic panel; Future  -     LDL cholesterol, direct; Future  -     Lipid Panel with Direct LDL reflex; Future  -     Magnesium; Future  -     Hemoglobin A1C; Future  -     TSH, 3rd generation with Free T4 reflex; Future    Irritable bowel syndrome with diarrhea  -     XR knee 3 vw left non injury; Future  -     cholestyramine (QUESTRAN) 4 GM/DOSE powder; Take 1 packet (4 g total) by mouth 2 (two) times a day As needed for diarrhea  -     CBC and differential; Future  -     Comprehensive metabolic panel;  Future  - LDL cholesterol, direct; Future  -     Lipid Panel with Direct LDL reflex; Future  -     Magnesium; Future  -     Hemoglobin A1C; Future  -     TSH, 3rd generation with Free T4 reflex; Future         Scheduled Meds:  Continuous Infusions:  No current facility-administered medications for this visit  PRN Meds:   Scheduled Meds:  Continuous Infusions:  No current facility-administered medications for this visit     Scheduled Meds:    Current Outpatient Prescriptions:     acetaminophen (TYLENOL) 500 mg tablet, Take by mouth, Disp: , Rfl:     albuterol (PROVENTIL HFA,VENTOLIN HFA) 90 mcg/act inhaler, Inhale 2 puffs every 6 (six) hours as needed for wheezing or shortness of breath (Cough), Disp: 1 Inhaler, Rfl: 5    Cholecalciferol (VITAMIN D) 2000 units CAPS, Take by mouth, Disp: , Rfl:     cholestyramine (QUESTRAN) 4 GM/DOSE powder, Take 1 packet (4 g total) by mouth 2 (two) times a day As needed for diarrhea, Disp: 378 g, Rfl: 5    diclofenac potassium (CATAFLAM) 50 mg tablet, Take 1 tablet (50 mg total) by mouth daily, Disp: 90 tablet, Rfl: 2    gabapentin (NEURONTIN) 400 mg capsule, Take 1 capsule (400 mg total) by mouth 2 (two) times a day, Disp: 180 capsule, Rfl: 2    Ginkgo Biloba 30 MG CAPS, Take by mouth daily, Disp: , Rfl:     mometasone (NASONEX) 50 mcg/act nasal spray, 1 spray into each nostril daily, Disp: 17 g, Rfl: 2    omeprazole (PriLOSEC) 10 mg delayed release capsule, Take 1 capsule (10 mg total) by mouth daily, Disp: 90 capsule, Rfl: 0    simvastatin (ZOCOR) 5 MG tablet, Take 1 tablet (5 mg total) by mouth daily at bedtime, Disp: 90 tablet, Rfl: 0    spironolactone (ALDACTONE) 50 mg tablet, take 1 tablet by mouth once daily, Disp: 30 tablet, Rfl: 5    verapamil (VERELAN PM) 180 MG 24 hr capsule, Take 1 capsule (180 mg total) by mouth daily before breakfast, Disp: 90 capsule, Rfl: 0      The patient was counseled regarding instructions for management, risk factor reductions, patient and family education,impressions, risks and benefits of treatment options, side effects of medications, importance of compliance with treatment  The treatment plan was reviewed with the patient/guardian and patient/guardian understands and agrees with the treatment plan  Subjective:      Patient ID: Bradley Gill is a 46 y o  female  Pain behind knees,         The following portions of the patient's history were reviewed and updated as appropriate:   She has a past medical history of Allergic; Allergic rhinitis; Arthritis; Asthma; Bleeding per rectum; Diverticulitis of colon; GERD (gastroesophageal reflux disease); Hypercholesterolemia; Hypertension; Hyponatremia; and Obesity  ,   does not have any pertinent problems on file  ,   has a past surgical history that includes  section and Wrist surgery  ,  family history includes No Known Problems in her mother  She was adopted  ,   reports that she has never smoked  She has never used smokeless tobacco  She reports that she drinks alcohol  She reports that she does not use drugs  ,  is allergic to levofloxacin; sulfamethoxazole-trimethoprim; and hydrocodone bitartrate er       Review of Systems   Constitutional: Negative for appetite change, chills, fatigue, fever and unexpected weight change  HENT: Negative for congestion, ear pain, facial swelling, hearing loss, mouth sores, nosebleeds, postnasal drip, rhinorrhea, sinus pain, sore throat, trouble swallowing and voice change  Eyes: Negative for pain, discharge, redness and visual disturbance  Respiratory: Negative for apnea, chest tightness, shortness of breath, wheezing and stridor  Cardiovascular: Negative for chest pain, palpitations and leg swelling  Gastrointestinal: Negative for abdominal distention, abdominal pain, blood in stool, constipation, diarrhea and vomiting  Endocrine: Negative for cold intolerance, heat intolerance, polydipsia, polyphagia and polyuria     Genitourinary: Negative for difficulty urinating, dysuria, flank pain, frequency, genital sores, hematuria and urgency  Musculoskeletal: Positive for gait problem  Negative for arthralgias and back pain  Skin: Negative for rash and wound  Allergic/Immunologic: Negative for environmental allergies, food allergies and immunocompromised state  Neurological: Negative for dizziness, tremors, seizures, syncope, facial asymmetry, speech difficulty, weakness, light-headedness, numbness and headaches  Hematological: Negative for adenopathy  Does not bruise/bleed easily  Psychiatric/Behavioral: Negative for agitation, behavioral problems, dysphoric mood, hallucinations, self-injury, sleep disturbance and suicidal ideas  The patient is not hyperactive  Objective:     Physical Exam   Constitutional: She is oriented to person, place, and time  She appears well-developed  obese   HENT:   Right Ear: External ear normal    Left Ear: External ear normal    Eyes: Right eye exhibits no discharge  Left eye exhibits no discharge  No scleral icterus  Neck: Carotid bruit is not present  No tracheal deviation present  No thyroid mass and no thyromegaly present  Cardiovascular: Normal rate, regular rhythm, normal heart sounds and intact distal pulses  Exam reveals no gallop and no friction rub  No murmur heard  Pulmonary/Chest: No respiratory distress  She has no wheezes  She has no rales  Musculoskeletal: She exhibits no edema  Bilateral popliteal cysts decreased rom both knees no medial or lateral joint tenderness bilaterally   Lymphadenopathy:     She has no cervical adenopathy  Neurological: She is alert and oriented to person, place, and time  Coordination normal    Psychiatric: She has a normal mood and affect  Her behavior is normal  Judgment and thought content normal    Nursing note and vitals reviewed        Vitals:    10/23/18 1022 10/23/18 1108   BP:  105/70   BP Location:  Left arm   Patient Position:  Sitting Pulse: 71    Resp: 18    Temp: 98 5 °F (36 9 °C)    TempSrc: Tympanic    SpO2: 98%    Weight: 118 kg (260 lb 9 6 oz)    Height: 5' 8" (1 727 m)

## 2018-10-24 ENCOUNTER — HOSPITAL ENCOUNTER (OUTPATIENT)
Dept: RADIOLOGY | Facility: HOSPITAL | Age: 53
Discharge: HOME | End: 2018-10-24
Attending: INTERNAL MEDICINE
Payer: COMMERCIAL

## 2018-10-24 DIAGNOSIS — M71.21 POPLITEAL CYST, RIGHT: ICD-10-CM

## 2018-10-24 DIAGNOSIS — M17.0 OSTEOARTHRITIS OF BOTH KNEES, UNSPECIFIED OSTEOARTHRITIS TYPE: ICD-10-CM

## 2018-10-24 DIAGNOSIS — J45.909 MODERATE ASTHMA WITHOUT COMPLICATION, UNSPECIFIED WHETHER PERSISTENT: ICD-10-CM

## 2018-10-24 DIAGNOSIS — M48.02 FORAMINAL STENOSIS OF CERVICAL REGION: ICD-10-CM

## 2018-10-24 DIAGNOSIS — I10 BENIGN ESSENTIAL HYPERTENSION: ICD-10-CM

## 2018-10-24 DIAGNOSIS — K58.0 IRRITABLE BOWEL SYNDROME WITH DIARRHEA: ICD-10-CM

## 2018-10-24 DIAGNOSIS — E78.5 HYPERLIPIDEMIA, UNSPECIFIED HYPERLIPIDEMIA TYPE: ICD-10-CM

## 2018-10-24 DIAGNOSIS — K21.9 GASTROESOPHAGEAL REFLUX DISEASE, ESOPHAGITIS PRESENCE NOT SPECIFIED: ICD-10-CM

## 2018-10-24 DIAGNOSIS — M22.41 CHONDROMALACIA OF RIGHT PATELLA: ICD-10-CM

## 2018-10-24 DIAGNOSIS — M54.16 LUMBAR RADICULOPATHY: ICD-10-CM

## 2018-10-24 DIAGNOSIS — I10 HYPERTENSION, UNSPECIFIED TYPE: ICD-10-CM

## 2018-10-24 DIAGNOSIS — E88.09 HYPOALBUMINEMIA: ICD-10-CM

## 2018-10-24 PROCEDURE — 73562 X-RAY EXAM OF KNEE 3: CPT

## 2018-10-31 ENCOUNTER — TELEPHONE (OUTPATIENT)
Dept: INTERNAL MEDICINE CLINIC | Facility: CLINIC | Age: 53
End: 2018-10-31

## 2018-10-31 NOTE — TELEPHONE ENCOUNTER
Patient aware and instructed with the Orthopedics at [facility]  ----- Message from Mike Qureshi DO sent at 10/30/2018  6:15 PM EDT -----  Call patient she has severe osteoarthritis of the right knee, MRI of the knee would probably not be helpful and orthopedic evaluation may be helpful if she is interested in a total knee replacement

## 2018-12-17 ENCOUNTER — TELEPHONE (OUTPATIENT)
Dept: INTERNAL MEDICINE CLINIC | Facility: CLINIC | Age: 53
End: 2018-12-17

## 2018-12-17 NOTE — TELEPHONE ENCOUNTER
Patient is experiencing Constipation and sharp stomach pains  Would like to know if she can take the cholestyramine for these symptoms? Patient is in [facility]) and is looking for advice before going to urgent care       [phone]

## 2018-12-17 NOTE — TELEPHONE ENCOUNTER
Pt advised to increase fluids and take a bottle of citrate magnesium  pt will follow plan and call if sx persist

## 2019-03-13 DIAGNOSIS — L65.9 ALOPECIA: ICD-10-CM

## 2019-03-13 RX ORDER — SPIRONOLACTONE 50 MG/1
TABLET, FILM COATED ORAL
Qty: 30 TABLET | Refills: 5 | Status: SHIPPED | OUTPATIENT
Start: 2019-03-13 | End: 2019-05-07 | Stop reason: SDUPTHER

## 2019-03-22 ENCOUNTER — OFFICE VISIT (OUTPATIENT)
Dept: INTERNAL MEDICINE CLINIC | Facility: CLINIC | Age: 54
End: 2019-03-22
Payer: COMMERCIAL

## 2019-03-22 VITALS
RESPIRATION RATE: 18 BRPM | BODY MASS INDEX: 37.59 KG/M2 | HEART RATE: 112 BPM | SYSTOLIC BLOOD PRESSURE: 118 MMHG | HEIGHT: 68 IN | DIASTOLIC BLOOD PRESSURE: 80 MMHG | TEMPERATURE: 98 F | WEIGHT: 248 LBS | OXYGEN SATURATION: 98 %

## 2019-03-22 DIAGNOSIS — J06.9 UPPER RESPIRATORY INFECTION, ACUTE: Primary | ICD-10-CM

## 2019-03-22 PROCEDURE — 3008F BODY MASS INDEX DOCD: CPT | Performed by: NURSE PRACTITIONER

## 2019-03-22 PROCEDURE — 99213 OFFICE O/P EST LOW 20 MIN: CPT | Performed by: NURSE PRACTITIONER

## 2019-03-22 PROCEDURE — 1036F TOBACCO NON-USER: CPT | Performed by: NURSE PRACTITIONER

## 2019-03-22 RX ORDER — AMOXICILLIN AND CLAVULANATE POTASSIUM 875; 125 MG/1; MG/1
1 TABLET, FILM COATED ORAL EVERY 12 HOURS SCHEDULED
Qty: 14 TABLET | Refills: 0 | Status: SHIPPED | OUTPATIENT
Start: 2019-03-22 | End: 2019-03-29

## 2019-03-22 RX ORDER — BENZONATATE 100 MG/1
100 CAPSULE ORAL 3 TIMES DAILY PRN
Qty: 20 CAPSULE | Refills: 0 | Status: SHIPPED | OUTPATIENT
Start: 2019-03-22 | End: 2019-05-07 | Stop reason: ALTCHOICE

## 2019-03-22 RX ORDER — PREDNISONE 10 MG/1
10 TABLET ORAL DAILY
Qty: 5 TABLET | Refills: 0 | Status: SHIPPED | OUTPATIENT
Start: 2019-03-22 | End: 2019-05-07 | Stop reason: ALTCHOICE

## 2019-03-22 NOTE — PROGRESS NOTES
Assessment/Plan:    Upper respiratory infection with underlying asthma- Lungs are clear  Start antibiotic and low dose prednisone for now  You can take Tessalon perles for cough  Please call Monday if symptoms get worse  Follow up with me as needed and with Dr Anitha Whitley in May, labs prior  Diagnoses and all orders for this visit:    Upper respiratory infection, acute  -     amoxicillin-clavulanate (AUGMENTIN) 875-125 mg per tablet; Take 1 tablet by mouth every 12 (twelve) hours for 7 days  -     predniSONE 10 mg tablet; Take 1 tablet (10 mg total) by mouth daily  -     benzonatate (TESSALON PERLES) 100 mg capsule; Take 1 capsule (100 mg total) by mouth 3 (three) times a day as needed for cough    The patient was counseled regarding instructions for management, risk factor reductions, patient and family education,impressions, risks and benefits of treatment options, side effects of medications, importance of compliance with treatment  The treatment plan was reviewed with the patient/guardian and patient/guardian understands and agrees with the treatment plan          Current Outpatient Medications:     acetaminophen (TYLENOL) 500 mg tablet, Take by mouth, Disp: , Rfl:     albuterol (PROVENTIL HFA,VENTOLIN HFA) 90 mcg/act inhaler, Inhale 2 puffs every 6 (six) hours as needed for wheezing or shortness of breath (Cough), Disp: 1 Inhaler, Rfl: 5    Cholecalciferol (VITAMIN D) 2000 units CAPS, Take by mouth, Disp: , Rfl:     cholestyramine (QUESTRAN) 4 GM/DOSE powder, Take 1 packet (4 g total) by mouth 2 (two) times a day As needed for diarrhea, Disp: 378 g, Rfl: 5    diclofenac potassium (CATAFLAM) 50 mg tablet, Take 1 tablet (50 mg total) by mouth daily, Disp: 90 tablet, Rfl: 2    gabapentin (NEURONTIN) 400 mg capsule, Take 1 capsule (400 mg total) by mouth 2 (two) times a day, Disp: 180 capsule, Rfl: 2    Ginkgo Biloba 30 MG CAPS, Take by mouth daily, Disp: , Rfl:     mometasone (NASONEX) 50 mcg/act nasal spray, 1 spray into each nostril daily, Disp: 17 g, Rfl: 2    omeprazole (PriLOSEC) 10 mg delayed release capsule, Take 1 capsule (10 mg total) by mouth daily, Disp: 90 capsule, Rfl: 0    simvastatin (ZOCOR) 5 MG tablet, Take 1 tablet (5 mg total) by mouth daily at bedtime, Disp: 90 tablet, Rfl: 0    spironolactone (ALDACTONE) 50 mg tablet, take 1 tablet by mouth once daily, Disp: 30 tablet, Rfl: 5    verapamil (VERELAN PM) 180 MG 24 hr capsule, Take 1 capsule (180 mg total) by mouth daily before breakfast, Disp: 90 capsule, Rfl: 0    amoxicillin-clavulanate (AUGMENTIN) 875-125 mg per tablet, Take 1 tablet by mouth every 12 (twelve) hours for 7 days, Disp: 14 tablet, Rfl: 0    benzonatate (TESSALON PERLES) 100 mg capsule, Take 1 capsule (100 mg total) by mouth 3 (three) times a day as needed for cough, Disp: 20 capsule, Rfl: 0    predniSONE 10 mg tablet, Take 1 tablet (10 mg total) by mouth daily, Disp: 5 tablet, Rfl: 0    Subjective:      Patient ID: Rosalinda Mullins is a 48 y o  female  One week of productive cough, nasal congestion, taking OTC Claritin, Mucinex, Nyquil with little relief  No fever, but has been taking Tylenol or Advil  The following portions of the patient's history were reviewed and updated as appropriate:   She has a past medical history of Allergic, Allergic rhinitis, Arthritis, Asthma, Bleeding per rectum, Diverticulitis of colon, GERD (gastroesophageal reflux disease), Hypercholesterolemia, Hypertension, Hyponatremia, and Obesity  ,  does not have any pertinent problems on file  ,   has a past surgical history that includes  section and Wrist surgery  ,  family history includes No Known Problems in her mother  She was adopted  ,   reports that she has never smoked  She has never used smokeless tobacco  She reports that she drinks alcohol  She reports that she does not use drugs  ,  is allergic to levofloxacin; sulfamethoxazole-trimethoprim; and hydrocodone bitartrate er     Review of Systems   Constitutional: Positive for fatigue  HENT: Positive for congestion, ear pain, sinus pressure and sore throat  Respiratory: Positive for cough  Cardiovascular: Negative  Musculoskeletal: Negative  Psychiatric/Behavioral: Negative  Objective:  /80   Pulse (!) 112   Temp 98 °F (36 7 °C)   Resp 18   Ht 5' 8" (1 727 m)   Wt 112 kg (248 lb)   SpO2 98%   BMI 37 71 kg/m²     Lab Review  No visits with results within 2 Month(s) from this visit  Latest known visit with results is:   Orders Only on 10/06/2018   Component Date Value    Hemoglobin A1C 10/06/2018 5 7         Imaging: Hm Mammography    Result Date: 3/14/2019  Impression: Care Everywhere (CE) procedure/test/imaging document can be found within Chart Review under the Encounters Tab  Physical Exam   Constitutional: She is oriented to person, place, and time  She appears well-developed and well-nourished  HENT:   Right Ear: External ear normal    Left Ear: External ear normal    Mouth/Throat: Oropharynx is clear and moist    Cardiovascular: Normal rate, regular rhythm, normal heart sounds and intact distal pulses  Pulmonary/Chest: Effort normal and breath sounds normal    Musculoskeletal: Normal range of motion  Neurological: She is alert and oriented to person, place, and time  She has normal reflexes  Psychiatric: She has a normal mood and affect   Her behavior is normal  Judgment and thought content normal

## 2019-03-22 NOTE — PATIENT INSTRUCTIONS
Upper respiratory infection with underlying asthma- Lungs are clear  Start antibiotic and low dose prednisone for now  You can take Tessalon perles for cough  Please call Monday if symptoms get worse  Follow up with me as needed and with Dr Suellen Cristina in May, labs prior

## 2019-03-22 NOTE — PROGRESS NOTES
PHQ-9 Depression Screening    PHQ-9:    Frequency of the following problems over the past two weeks:          0

## 2019-04-18 DIAGNOSIS — I10 HYPERTENSION, UNSPECIFIED TYPE: ICD-10-CM

## 2019-04-19 ENCOUNTER — TELEPHONE (OUTPATIENT)
Dept: INTERNAL MEDICINE CLINIC | Facility: CLINIC | Age: 54
End: 2019-04-19

## 2019-04-19 RX ORDER — VERAPAMIL HYDROCHLORIDE 180 MG/1
CAPSULE, EXTENDED RELEASE ORAL
Qty: 90 CAPSULE | Refills: 0 | Status: SHIPPED | OUTPATIENT
Start: 2019-04-19 | End: 2019-05-07 | Stop reason: SDUPTHER

## 2019-04-20 LAB — HBA1C MFR BLD HPLC: 5.7 %

## 2019-05-07 ENCOUNTER — TELEPHONE (OUTPATIENT)
Dept: INTERNAL MEDICINE CLINIC | Facility: CLINIC | Age: 54
End: 2019-05-07

## 2019-05-07 ENCOUNTER — APPOINTMENT (OUTPATIENT)
Dept: LAB | Facility: HOSPITAL | Age: 54
End: 2019-05-07
Payer: COMMERCIAL

## 2019-05-07 ENCOUNTER — OFFICE VISIT (OUTPATIENT)
Dept: INTERNAL MEDICINE CLINIC | Facility: CLINIC | Age: 54
End: 2019-05-07
Payer: COMMERCIAL

## 2019-05-07 VITALS
SYSTOLIC BLOOD PRESSURE: 120 MMHG | OXYGEN SATURATION: 98 % | RESPIRATION RATE: 18 BRPM | DIASTOLIC BLOOD PRESSURE: 78 MMHG | WEIGHT: 249 LBS | HEIGHT: 68 IN | BODY MASS INDEX: 37.74 KG/M2 | HEART RATE: 85 BPM | TEMPERATURE: 98.1 F

## 2019-05-07 DIAGNOSIS — K21.9 GASTROESOPHAGEAL REFLUX DISEASE, ESOPHAGITIS PRESENCE NOT SPECIFIED: ICD-10-CM

## 2019-05-07 DIAGNOSIS — I10 HYPERTENSION, UNSPECIFIED TYPE: ICD-10-CM

## 2019-05-07 DIAGNOSIS — E78.5 HYPERLIPIDEMIA, UNSPECIFIED HYPERLIPIDEMIA TYPE: ICD-10-CM

## 2019-05-07 DIAGNOSIS — M79.89 LEG SWELLING: ICD-10-CM

## 2019-05-07 DIAGNOSIS — M54.16 LUMBAR RADICULOPATHY: ICD-10-CM

## 2019-05-07 DIAGNOSIS — J45.909 MODERATE ASTHMA WITHOUT COMPLICATION, UNSPECIFIED WHETHER PERSISTENT: ICD-10-CM

## 2019-05-07 DIAGNOSIS — R73.03 PREDIABETES: ICD-10-CM

## 2019-05-07 DIAGNOSIS — L98.9 SKIN LESION: Primary | ICD-10-CM

## 2019-05-07 DIAGNOSIS — L65.9 ALOPECIA: ICD-10-CM

## 2019-05-07 DIAGNOSIS — L98.9 SKIN LESION: ICD-10-CM

## 2019-05-07 DIAGNOSIS — I10 BENIGN ESSENTIAL HYPERTENSION: ICD-10-CM

## 2019-05-07 DIAGNOSIS — J01.91 ACUTE RECURRENT SINUSITIS, UNSPECIFIED LOCATION: ICD-10-CM

## 2019-05-07 PROBLEM — J06.9 UPPER RESPIRATORY INFECTION, ACUTE: Status: RESOLVED | Noted: 2019-03-22 | Resolved: 2019-05-07

## 2019-05-07 LAB — DEPRECATED D DIMER PPP: 298 NG/ML (FEU)

## 2019-05-07 PROCEDURE — 3008F BODY MASS INDEX DOCD: CPT | Performed by: NURSE PRACTITIONER

## 2019-05-07 PROCEDURE — 1036F TOBACCO NON-USER: CPT | Performed by: NURSE PRACTITIONER

## 2019-05-07 PROCEDURE — 3074F SYST BP LT 130 MM HG: CPT | Performed by: NURSE PRACTITIONER

## 2019-05-07 PROCEDURE — 36415 COLL VENOUS BLD VENIPUNCTURE: CPT

## 2019-05-07 PROCEDURE — 85379 FIBRIN DEGRADATION QUANT: CPT

## 2019-05-07 PROCEDURE — 3078F DIAST BP <80 MM HG: CPT | Performed by: NURSE PRACTITIONER

## 2019-05-07 PROCEDURE — 99214 OFFICE O/P EST MOD 30 MIN: CPT | Performed by: NURSE PRACTITIONER

## 2019-05-07 RX ORDER — SIMVASTATIN 5 MG
5 TABLET ORAL
Qty: 90 TABLET | Refills: 2 | Status: SHIPPED | OUTPATIENT
Start: 2019-05-07 | End: 2019-08-02 | Stop reason: SDUPTHER

## 2019-05-07 RX ORDER — SPIRONOLACTONE 50 MG/1
50 TABLET, FILM COATED ORAL DAILY
Qty: 90 TABLET | Refills: 2 | Status: SHIPPED | OUTPATIENT
Start: 2019-05-07 | End: 2019-11-14 | Stop reason: SDUPTHER

## 2019-05-07 RX ORDER — MOMETASONE FUROATE 50 UG/1
1 SPRAY, METERED NASAL DAILY
Qty: 17 G | Refills: 2 | Status: SHIPPED | OUTPATIENT
Start: 2019-05-07 | End: 2019-11-14 | Stop reason: SDUPTHER

## 2019-05-07 RX ORDER — GABAPENTIN 400 MG/1
400 CAPSULE ORAL 2 TIMES DAILY
Qty: 180 CAPSULE | Refills: 2 | Status: SHIPPED | OUTPATIENT
Start: 2019-05-07 | End: 2019-11-14 | Stop reason: SDUPTHER

## 2019-05-07 RX ORDER — OMEPRAZOLE 10 MG/1
10 CAPSULE, DELAYED RELEASE ORAL DAILY
Qty: 90 CAPSULE | Refills: 2 | Status: SHIPPED | OUTPATIENT
Start: 2019-05-07 | End: 2020-02-21

## 2019-05-07 RX ORDER — VERAPAMIL HYDROCHLORIDE 180 MG/1
180 CAPSULE, EXTENDED RELEASE ORAL
Qty: 90 CAPSULE | Refills: 2 | Status: SHIPPED | OUTPATIENT
Start: 2019-05-07 | End: 2019-11-14 | Stop reason: SDUPTHER

## 2019-05-08 ENCOUNTER — TELEPHONE (OUTPATIENT)
Dept: INTERNAL MEDICINE CLINIC | Facility: CLINIC | Age: 54
End: 2019-05-08

## 2019-05-09 ENCOUNTER — TELEPHONE (OUTPATIENT)
Dept: INTERNAL MEDICINE CLINIC | Facility: CLINIC | Age: 54
End: 2019-05-09

## 2019-06-10 ENCOUNTER — TELEPHONE (OUTPATIENT)
Dept: INTERNAL MEDICINE CLINIC | Facility: CLINIC | Age: 54
End: 2019-06-10

## 2019-06-10 DIAGNOSIS — M17.0 OSTEOARTHRITIS OF BOTH KNEES, UNSPECIFIED OSTEOARTHRITIS TYPE: ICD-10-CM

## 2019-06-10 DIAGNOSIS — M54.16 LUMBAR RADICULOPATHY: Primary | ICD-10-CM

## 2019-06-10 DIAGNOSIS — M54.12 RIGHT CERVICAL RADICULOPATHY: ICD-10-CM

## 2019-08-02 DIAGNOSIS — J01.91 ACUTE RECURRENT SINUSITIS, UNSPECIFIED LOCATION: ICD-10-CM

## 2019-08-02 DIAGNOSIS — L98.9 SKIN LESION: ICD-10-CM

## 2019-08-02 DIAGNOSIS — E78.5 HYPERLIPIDEMIA, UNSPECIFIED HYPERLIPIDEMIA TYPE: ICD-10-CM

## 2019-08-02 DIAGNOSIS — I10 BENIGN ESSENTIAL HYPERTENSION: ICD-10-CM

## 2019-08-02 DIAGNOSIS — L65.9 ALOPECIA: ICD-10-CM

## 2019-08-02 DIAGNOSIS — R73.03 PREDIABETES: ICD-10-CM

## 2019-08-02 DIAGNOSIS — J45.909 MODERATE ASTHMA WITHOUT COMPLICATION, UNSPECIFIED WHETHER PERSISTENT: ICD-10-CM

## 2019-08-02 DIAGNOSIS — K21.9 GASTROESOPHAGEAL REFLUX DISEASE, ESOPHAGITIS PRESENCE NOT SPECIFIED: ICD-10-CM

## 2019-08-02 DIAGNOSIS — M79.89 LEG SWELLING: ICD-10-CM

## 2019-08-02 DIAGNOSIS — I10 HYPERTENSION, UNSPECIFIED TYPE: ICD-10-CM

## 2019-08-02 DIAGNOSIS — M54.16 LUMBAR RADICULOPATHY: ICD-10-CM

## 2019-08-02 RX ORDER — SIMVASTATIN 5 MG
5 TABLET ORAL
Qty: 90 TABLET | Refills: 2 | Status: SHIPPED | OUTPATIENT
Start: 2019-08-02 | End: 2019-11-14 | Stop reason: SDUPTHER

## 2019-11-06 ENCOUNTER — APPOINTMENT (OUTPATIENT)
Dept: LAB | Facility: HOSPITAL | Age: 54
End: 2019-11-06
Payer: COMMERCIAL

## 2019-11-06 DIAGNOSIS — L65.9 ALOPECIA: ICD-10-CM

## 2019-11-06 DIAGNOSIS — K21.9 GASTROESOPHAGEAL REFLUX DISEASE, ESOPHAGITIS PRESENCE NOT SPECIFIED: ICD-10-CM

## 2019-11-06 DIAGNOSIS — R73.03 PREDIABETES: ICD-10-CM

## 2019-11-06 DIAGNOSIS — I10 HYPERTENSION, UNSPECIFIED TYPE: ICD-10-CM

## 2019-11-06 DIAGNOSIS — J01.91 ACUTE RECURRENT SINUSITIS, UNSPECIFIED LOCATION: ICD-10-CM

## 2019-11-06 DIAGNOSIS — I10 BENIGN ESSENTIAL HYPERTENSION: ICD-10-CM

## 2019-11-06 DIAGNOSIS — M54.16 LUMBAR RADICULOPATHY: ICD-10-CM

## 2019-11-06 DIAGNOSIS — M79.89 LEG SWELLING: ICD-10-CM

## 2019-11-06 DIAGNOSIS — J45.909 MODERATE ASTHMA WITHOUT COMPLICATION, UNSPECIFIED WHETHER PERSISTENT: ICD-10-CM

## 2019-11-06 DIAGNOSIS — L98.9 SKIN LESION: ICD-10-CM

## 2019-11-06 DIAGNOSIS — E78.5 HYPERLIPIDEMIA, UNSPECIFIED HYPERLIPIDEMIA TYPE: ICD-10-CM

## 2019-11-06 LAB
ALBUMIN SERPL BCP-MCNC: 3.6 G/DL (ref 3.5–5)
ALP SERPL-CCNC: 62 U/L (ref 46–116)
ALT SERPL W P-5'-P-CCNC: 23 U/L (ref 12–78)
ANION GAP SERPL CALCULATED.3IONS-SCNC: 6 MMOL/L (ref 4–13)
AST SERPL W P-5'-P-CCNC: 15 U/L (ref 5–45)
BASOPHILS # BLD AUTO: 0.03 THOUSANDS/ΜL (ref 0–0.1)
BASOPHILS NFR BLD AUTO: 1 % (ref 0–1)
BILIRUB SERPL-MCNC: 0.6 MG/DL (ref 0.2–1)
BUN SERPL-MCNC: 13 MG/DL (ref 5–25)
CALCIUM SERPL-MCNC: 9 MG/DL (ref 8.3–10.1)
CHLORIDE SERPL-SCNC: 104 MMOL/L (ref 100–108)
CHOLEST SERPL-MCNC: 151 MG/DL (ref 50–200)
CO2 SERPL-SCNC: 31 MMOL/L (ref 21–32)
CREAT SERPL-MCNC: 0.8 MG/DL (ref 0.6–1.3)
CREAT UR-MCNC: 84.9 MG/DL
EOSINOPHIL # BLD AUTO: 0.21 THOUSAND/ΜL (ref 0–0.61)
EOSINOPHIL NFR BLD AUTO: 5 % (ref 0–6)
ERYTHROCYTE [DISTWIDTH] IN BLOOD BY AUTOMATED COUNT: 12.5 % (ref 11.6–15.1)
EST. AVERAGE GLUCOSE BLD GHB EST-MCNC: 111 MG/DL
GFR SERPL CREATININE-BSD FRML MDRD: 84 ML/MIN/1.73SQ M
GLUCOSE P FAST SERPL-MCNC: 91 MG/DL (ref 65–99)
HBA1C MFR BLD: 5.5 % (ref 4.2–6.3)
HCT VFR BLD AUTO: 44.1 % (ref 34.8–46.1)
HDLC SERPL-MCNC: 58 MG/DL
HGB BLD-MCNC: 13.6 G/DL (ref 11.5–15.4)
IMM GRANULOCYTES # BLD AUTO: 0.01 THOUSAND/UL (ref 0–0.2)
IMM GRANULOCYTES NFR BLD AUTO: 0 % (ref 0–2)
LDLC SERPL CALC-MCNC: 79 MG/DL (ref 0–100)
LYMPHOCYTES # BLD AUTO: 1.03 THOUSANDS/ΜL (ref 0.6–4.47)
LYMPHOCYTES NFR BLD AUTO: 26 % (ref 14–44)
MAGNESIUM SERPL-MCNC: 1.9 MG/DL (ref 1.6–2.6)
MCH RBC QN AUTO: 29.3 PG (ref 26.8–34.3)
MCHC RBC AUTO-ENTMCNC: 30.8 G/DL (ref 31.4–37.4)
MCV RBC AUTO: 95 FL (ref 82–98)
MICROALBUMIN UR-MCNC: 5.1 MG/L (ref 0–20)
MICROALBUMIN/CREAT 24H UR: 6 MG/G CREATININE (ref 0–30)
MONOCYTES # BLD AUTO: 0.35 THOUSAND/ΜL (ref 0.17–1.22)
MONOCYTES NFR BLD AUTO: 9 % (ref 4–12)
NEUTROPHILS # BLD AUTO: 2.4 THOUSANDS/ΜL (ref 1.85–7.62)
NEUTS SEG NFR BLD AUTO: 59 % (ref 43–75)
NONHDLC SERPL-MCNC: 93 MG/DL
NRBC BLD AUTO-RTO: 0 /100 WBCS
PLATELET # BLD AUTO: 272 THOUSANDS/UL (ref 149–390)
PMV BLD AUTO: 9.5 FL (ref 8.9–12.7)
POTASSIUM SERPL-SCNC: 4 MMOL/L (ref 3.5–5.3)
PROT SERPL-MCNC: 7.2 G/DL (ref 6.4–8.2)
RBC # BLD AUTO: 4.64 MILLION/UL (ref 3.81–5.12)
SODIUM SERPL-SCNC: 141 MMOL/L (ref 136–145)
TRIGL SERPL-MCNC: 72 MG/DL
TSH SERPL DL<=0.05 MIU/L-ACNC: 2.16 UIU/ML (ref 0.36–3.74)
WBC # BLD AUTO: 4.03 THOUSAND/UL (ref 4.31–10.16)

## 2019-11-06 PROCEDURE — 84443 ASSAY THYROID STIM HORMONE: CPT

## 2019-11-06 PROCEDURE — 85025 COMPLETE CBC W/AUTO DIFF WBC: CPT

## 2019-11-06 PROCEDURE — 83735 ASSAY OF MAGNESIUM: CPT

## 2019-11-06 PROCEDURE — 80061 LIPID PANEL: CPT

## 2019-11-06 PROCEDURE — 82043 UR ALBUMIN QUANTITATIVE: CPT

## 2019-11-06 PROCEDURE — 36415 COLL VENOUS BLD VENIPUNCTURE: CPT

## 2019-11-06 PROCEDURE — 82570 ASSAY OF URINE CREATININE: CPT

## 2019-11-06 PROCEDURE — 80053 COMPREHEN METABOLIC PANEL: CPT

## 2019-11-06 PROCEDURE — 83036 HEMOGLOBIN GLYCOSYLATED A1C: CPT

## 2019-11-14 ENCOUNTER — OFFICE VISIT (OUTPATIENT)
Dept: INTERNAL MEDICINE CLINIC | Facility: CLINIC | Age: 54
End: 2019-11-14
Payer: COMMERCIAL

## 2019-11-14 VITALS
OXYGEN SATURATION: 97 % | DIASTOLIC BLOOD PRESSURE: 60 MMHG | HEART RATE: 82 BPM | BODY MASS INDEX: 38.19 KG/M2 | HEIGHT: 68 IN | WEIGHT: 252 LBS | RESPIRATION RATE: 16 BRPM | SYSTOLIC BLOOD PRESSURE: 110 MMHG | TEMPERATURE: 98.6 F

## 2019-11-14 DIAGNOSIS — J01.91 ACUTE RECURRENT SINUSITIS, UNSPECIFIED LOCATION: ICD-10-CM

## 2019-11-14 DIAGNOSIS — Z11.4 SCREENING FOR HIV WITHOUT PRESENCE OF RISK FACTORS: ICD-10-CM

## 2019-11-14 DIAGNOSIS — Z11.59 ENCOUNTER FOR HEPATITIS C SCREENING TEST FOR LOW RISK PATIENT: ICD-10-CM

## 2019-11-14 DIAGNOSIS — M79.89 LEG SWELLING: ICD-10-CM

## 2019-11-14 DIAGNOSIS — M54.16 LUMBAR RADICULOPATHY: ICD-10-CM

## 2019-11-14 DIAGNOSIS — I10 HYPERTENSION, UNSPECIFIED TYPE: ICD-10-CM

## 2019-11-14 DIAGNOSIS — J45.909 MODERATE ASTHMA WITHOUT COMPLICATION, UNSPECIFIED WHETHER PERSISTENT: ICD-10-CM

## 2019-11-14 DIAGNOSIS — R41.3 MEMORY DISTURBANCE: ICD-10-CM

## 2019-11-14 DIAGNOSIS — E78.5 HYPERLIPIDEMIA, UNSPECIFIED HYPERLIPIDEMIA TYPE: ICD-10-CM

## 2019-11-14 DIAGNOSIS — L98.9 SKIN LESION: ICD-10-CM

## 2019-11-14 DIAGNOSIS — K21.9 GASTROESOPHAGEAL REFLUX DISEASE, ESOPHAGITIS PRESENCE NOT SPECIFIED: ICD-10-CM

## 2019-11-14 DIAGNOSIS — I10 BENIGN ESSENTIAL HYPERTENSION: ICD-10-CM

## 2019-11-14 DIAGNOSIS — Z23 FLU VACCINE NEED: Primary | ICD-10-CM

## 2019-11-14 DIAGNOSIS — M54.12 RIGHT CERVICAL RADICULOPATHY: ICD-10-CM

## 2019-11-14 DIAGNOSIS — R73.03 PREDIABETES: ICD-10-CM

## 2019-11-14 DIAGNOSIS — L65.9 ALOPECIA: ICD-10-CM

## 2019-11-14 DIAGNOSIS — K63.5 POLYP OF COLON, UNSPECIFIED PART OF COLON, UNSPECIFIED TYPE: ICD-10-CM

## 2019-11-14 PROBLEM — D70.8 OTHER NEUTROPENIA (HCC): Status: ACTIVE | Noted: 2019-11-14

## 2019-11-14 PROBLEM — M25.641 HAND JOINT STIFF, RIGHT: Status: ACTIVE | Noted: 2019-11-14

## 2019-11-14 PROCEDURE — 99214 OFFICE O/P EST MOD 30 MIN: CPT | Performed by: INTERNAL MEDICINE

## 2019-11-14 PROCEDURE — 90471 IMMUNIZATION ADMIN: CPT

## 2019-11-14 PROCEDURE — 90682 RIV4 VACC RECOMBINANT DNA IM: CPT

## 2019-11-14 PROCEDURE — 1036F TOBACCO NON-USER: CPT | Performed by: INTERNAL MEDICINE

## 2019-11-14 RX ORDER — ASCORBIC ACID 1000 MG
TABLET ORAL
COMMUNITY
End: 2021-01-28 | Stop reason: DRUGHIGH

## 2019-11-14 RX ORDER — SPIRONOLACTONE 50 MG/1
50 TABLET, FILM COATED ORAL DAILY
Qty: 90 TABLET | Refills: 2 | Status: SHIPPED | OUTPATIENT
Start: 2019-11-14 | End: 2020-05-12 | Stop reason: SDUPTHER

## 2019-11-14 RX ORDER — VERAPAMIL HYDROCHLORIDE 180 MG/1
180 CAPSULE, EXTENDED RELEASE ORAL
Qty: 90 CAPSULE | Refills: 2 | Status: SHIPPED | OUTPATIENT
Start: 2019-11-14 | End: 2020-09-03

## 2019-11-14 RX ORDER — GABAPENTIN 400 MG/1
400 CAPSULE ORAL 2 TIMES DAILY
Qty: 180 CAPSULE | Refills: 2
Start: 2019-11-14 | End: 2020-05-04 | Stop reason: SDUPTHER

## 2019-11-14 RX ORDER — SIMVASTATIN 5 MG
5 TABLET ORAL
Qty: 90 TABLET | Refills: 2 | Status: SHIPPED | OUTPATIENT
Start: 2019-11-14 | End: 2020-09-17

## 2019-11-14 RX ORDER — MOMETASONE FUROATE 50 UG/1
1 SPRAY, METERED NASAL DAILY
Qty: 17 G | Refills: 2 | Status: SHIPPED | OUTPATIENT
Start: 2019-11-14 | End: 2021-09-22

## 2019-11-14 NOTE — PROGRESS NOTES
Assessment/Plan:    1 mild leukopenia will recheck in 6 months  2  Hypertension is at goal  3  Patient with some right PIP joint hand stiffness will observe at this time no evidence of inflammatory arthritis may consider arthritis workup if things worsen  4  History of colon polyp will need to review when her next colonoscopy is she had previously seen Dr Олег Park  5  Diagnosed with asthma has not used an inhaler in many years will just observe  6  Patient with cervical spinal stenosis cervical radiculopathy back pain and osteoarthritis of the knee patient will be trying to decrease her gabapentin to 300 mg from 400 mg she will let me know if that is effective  7  Patient with alopecia continue spironolactone will get laboratory work           Diagnoses and all orders for this visit:    Flu vaccine need  -     influenza vaccine, [id number], quadrivalent, recombinant, PF, 0 5 mL, for patients 18 yr+ ([id number])  -     Leukemia/Lymphoma flow cytometry; Future  -     Vitamin B12; Future  -     CBC and differential; Future  -     Comprehensive metabolic panel; Future  -     Hemoglobin A1C; Future  -     Lipid Panel with Direct LDL reflex; Future  -     LDL cholesterol, direct; Future    Moderate asthma without complication, unspecified whether persistent  -     Leukemia/Lymphoma flow cytometry; Future  -     Vitamin B12; Future  -     CBC and differential; Future  -     Comprehensive metabolic panel; Future  -     Hemoglobin A1C; Future  -     Lipid Panel with Direct LDL reflex; Future  -     LDL cholesterol, direct; Future  -     spironolactone (ALDACTONE) 50 mg tablet; Take 1 tablet (50 mg total) by mouth daily  -     mometasone (NASONEX) 50 mcg/act nasal spray; 1 spray into each nostril daily  -     simvastatin (ZOCOR) 5 MG tablet; Take 1 tablet (5 mg total) by mouth daily at bedtime  -     verapamil (VERELAN PM) 180 MG 24 hr capsule;  Take 1 capsule (180 mg total) by mouth daily before breakfast    Benign essential hypertension  -     Leukemia/Lymphoma flow cytometry; Future  -     Vitamin B12; Future  -     CBC and differential; Future  -     Comprehensive metabolic panel; Future  -     Hemoglobin A1C; Future  -     Lipid Panel with Direct LDL reflex; Future  -     LDL cholesterol, direct; Future  -     spironolactone (ALDACTONE) 50 mg tablet; Take 1 tablet (50 mg total) by mouth daily  -     mometasone (NASONEX) 50 mcg/act nasal spray; 1 spray into each nostril daily  -     simvastatin (ZOCOR) 5 MG tablet; Take 1 tablet (5 mg total) by mouth daily at bedtime  -     verapamil (VERELAN PM) 180 MG 24 hr capsule; Take 1 capsule (180 mg total) by mouth daily before breakfast    Hyperlipidemia, unspecified hyperlipidemia type  -     Leukemia/Lymphoma flow cytometry; Future  -     Vitamin B12; Future  -     CBC and differential; Future  -     Comprehensive metabolic panel; Future  -     Hemoglobin A1C; Future  -     Lipid Panel with Direct LDL reflex; Future  -     LDL cholesterol, direct; Future  -     spironolactone (ALDACTONE) 50 mg tablet; Take 1 tablet (50 mg total) by mouth daily  -     mometasone (NASONEX) 50 mcg/act nasal spray; 1 spray into each nostril daily  -     simvastatin (ZOCOR) 5 MG tablet; Take 1 tablet (5 mg total) by mouth daily at bedtime  -     verapamil (VERELAN PM) 180 MG 24 hr capsule; Take 1 capsule (180 mg total) by mouth daily before breakfast    Prediabetes  -     Leukemia/Lymphoma flow cytometry; Future  -     Vitamin B12; Future  -     CBC and differential; Future  -     Comprehensive metabolic panel; Future  -     Hemoglobin A1C; Future  -     Lipid Panel with Direct LDL reflex; Future  -     LDL cholesterol, direct; Future  -     spironolactone (ALDACTONE) 50 mg tablet; Take 1 tablet (50 mg total) by mouth daily  -     mometasone (NASONEX) 50 mcg/act nasal spray; 1 spray into each nostril daily  -     simvastatin (ZOCOR) 5 MG tablet;  Take 1 tablet (5 mg total) by mouth daily at bedtime  - verapamil (VERELAN PM) 180 MG 24 hr capsule; Take 1 capsule (180 mg total) by mouth daily before breakfast    Gastroesophageal reflux disease, esophagitis presence not specified  -     Leukemia/Lymphoma flow cytometry; Future  -     Vitamin B12; Future  -     CBC and differential; Future  -     Comprehensive metabolic panel; Future  -     Hemoglobin A1C; Future  -     Lipid Panel with Direct LDL reflex; Future  -     LDL cholesterol, direct; Future  -     spironolactone (ALDACTONE) 50 mg tablet; Take 1 tablet (50 mg total) by mouth daily  -     mometasone (NASONEX) 50 mcg/act nasal spray; 1 spray into each nostril daily  -     simvastatin (ZOCOR) 5 MG tablet; Take 1 tablet (5 mg total) by mouth daily at bedtime  -     verapamil (VERELAN PM) 180 MG 24 hr capsule; Take 1 capsule (180 mg total) by mouth daily before breakfast    Hypertension, unspecified type  -     Leukemia/Lymphoma flow cytometry; Future  -     Vitamin B12; Future  -     CBC and differential; Future  -     Comprehensive metabolic panel; Future  -     Hemoglobin A1C; Future  -     Lipid Panel with Direct LDL reflex; Future  -     LDL cholesterol, direct; Future  -     spironolactone (ALDACTONE) 50 mg tablet; Take 1 tablet (50 mg total) by mouth daily  -     mometasone (NASONEX) 50 mcg/act nasal spray; 1 spray into each nostril daily  -     simvastatin (ZOCOR) 5 MG tablet; Take 1 tablet (5 mg total) by mouth daily at bedtime  -     verapamil (VERELAN PM) 180 MG 24 hr capsule; Take 1 capsule (180 mg total) by mouth daily before breakfast    Skin lesion  -     Leukemia/Lymphoma flow cytometry; Future  -     Vitamin B12; Future  -     CBC and differential; Future  -     Comprehensive metabolic panel; Future  -     Hemoglobin A1C; Future  -     Lipid Panel with Direct LDL reflex; Future  -     LDL cholesterol, direct; Future  -     spironolactone (ALDACTONE) 50 mg tablet;  Take 1 tablet (50 mg total) by mouth daily  -     mometasone (NASONEX) 50 mcg/act nasal spray; 1 spray into each nostril daily  -     simvastatin (ZOCOR) 5 MG tablet; Take 1 tablet (5 mg total) by mouth daily at bedtime  -     verapamil (VERELAN PM) 180 MG 24 hr capsule; Take 1 capsule (180 mg total) by mouth daily before breakfast    Leg swelling  -     Leukemia/Lymphoma flow cytometry; Future  -     Vitamin B12; Future  -     CBC and differential; Future  -     Comprehensive metabolic panel; Future  -     Hemoglobin A1C; Future  -     Lipid Panel with Direct LDL reflex; Future  -     LDL cholesterol, direct; Future  -     spironolactone (ALDACTONE) 50 mg tablet; Take 1 tablet (50 mg total) by mouth daily  -     mometasone (NASONEX) 50 mcg/act nasal spray; 1 spray into each nostril daily  -     simvastatin (ZOCOR) 5 MG tablet; Take 1 tablet (5 mg total) by mouth daily at bedtime  -     verapamil (VERELAN PM) 180 MG 24 hr capsule; Take 1 capsule (180 mg total) by mouth daily before breakfast    Lumbar radiculopathy  -     Leukemia/Lymphoma flow cytometry; Future  -     Vitamin B12; Future  -     CBC and differential; Future  -     Comprehensive metabolic panel; Future  -     Hemoglobin A1C; Future  -     Lipid Panel with Direct LDL reflex; Future  -     LDL cholesterol, direct; Future  -     spironolactone (ALDACTONE) 50 mg tablet; Take 1 tablet (50 mg total) by mouth daily  -     mometasone (NASONEX) 50 mcg/act nasal spray; 1 spray into each nostril daily  -     simvastatin (ZOCOR) 5 MG tablet; Take 1 tablet (5 mg total) by mouth daily at bedtime  -     verapamil (VERELAN PM) 180 MG 24 hr capsule; Take 1 capsule (180 mg total) by mouth daily before breakfast  -     gabapentin (NEURONTIN) 400 mg capsule; Take 1 capsule (400 mg total) by mouth 2 (two) times a day    Acute recurrent sinusitis, unspecified location  -     Leukemia/Lymphoma flow cytometry; Future  -     Vitamin B12; Future  -     CBC and differential; Future  -     Comprehensive metabolic panel;  Future  -     Hemoglobin A1C; Future  -     Lipid Panel with Direct LDL reflex; Future  -     LDL cholesterol, direct; Future  -     spironolactone (ALDACTONE) 50 mg tablet; Take 1 tablet (50 mg total) by mouth daily  -     mometasone (NASONEX) 50 mcg/act nasal spray; 1 spray into each nostril daily  -     simvastatin (ZOCOR) 5 MG tablet; Take 1 tablet (5 mg total) by mouth daily at bedtime  -     verapamil (VERELAN PM) 180 MG 24 hr capsule; Take 1 capsule (180 mg total) by mouth daily before breakfast    Alopecia  -     Leukemia/Lymphoma flow cytometry; Future  -     Vitamin B12; Future  -     CBC and differential; Future  -     Comprehensive metabolic panel; Future  -     Hemoglobin A1C; Future  -     Lipid Panel with Direct LDL reflex; Future  -     LDL cholesterol, direct; Future  -     spironolactone (ALDACTONE) 50 mg tablet; Take 1 tablet (50 mg total) by mouth daily  -     mometasone (NASONEX) 50 mcg/act nasal spray; 1 spray into each nostril daily  -     simvastatin (ZOCOR) 5 MG tablet; Take 1 tablet (5 mg total) by mouth daily at bedtime  -     verapamil (VERELAN PM) 180 MG 24 hr capsule; Take 1 capsule (180 mg total) by mouth daily before breakfast    BMI 38 0-38 9,adult  -     Naltrexone-buPROPion HCl ER 8-90 MG TB12; 1 tab daily x 7 days, 1 tab twice daily x 7 days, 2 tabs in AM and 1 tab in PM x 7 days, then 2 tabs twice daily  -     Leukemia/Lymphoma flow cytometry; Future  -     Vitamin B12; Future  -     CBC and differential; Future  -     Comprehensive metabolic panel; Future  -     Hemoglobin A1C; Future  -     Lipid Panel with Direct LDL reflex; Future  -     LDL cholesterol, direct; Future    Memory disturbance  -     Leukemia/Lymphoma flow cytometry; Future  -     Vitamin B12; Future  -     CBC and differential; Future  -     Comprehensive metabolic panel; Future  -     Hemoglobin A1C; Future  -     Lipid Panel with Direct LDL reflex; Future  -     LDL cholesterol, direct;  Future  -     Ambulatory Referral to Neuropsychiatry; Future    Right cervical radiculopathy  -     Leukemia/Lymphoma flow cytometry; Future  -     Vitamin B12; Future  -     CBC and differential; Future  -     Comprehensive metabolic panel; Future  -     Hemoglobin A1C; Future  -     Lipid Panel with Direct LDL reflex; Future  -     LDL cholesterol, direct; Future  -     gabapentin (NEURONTIN) 400 mg capsule; Take 1 capsule (400 mg total) by mouth 2 (two) times a day    Polyp of colon, unspecified part of colon, unspecified type  -     Leukemia/Lymphoma flow cytometry; Future  -     Vitamin B12; Future  -     CBC and differential; Future  -     Comprehensive metabolic panel; Future  -     Hemoglobin A1C; Future  -     Lipid Panel with Direct LDL reflex; Future  -     LDL cholesterol, direct; Future    Screening for HIV without presence of risk factors  -     HIV 1/2 AG-AB combo; Future  -     Leukemia/Lymphoma flow cytometry; Future  -     Vitamin B12; Future  -     CBC and differential; Future  -     Comprehensive metabolic panel; Future  -     Hemoglobin A1C; Future  -     Lipid Panel with Direct LDL reflex; Future  -     LDL cholesterol, direct; Future    Encounter for hepatitis C screening test for low risk patient  -     Hepatitis C antibody; Future  -     Leukemia/Lymphoma flow cytometry; Future  -     Vitamin B12; Future  -     CBC and differential; Future  -     Comprehensive metabolic panel; Future  -     Hemoglobin A1C; Future  -     Lipid Panel with Direct LDL reflex; Future  -     LDL cholesterol, direct; Future    Other orders  -     Ginkgo Biloba 40 MG TABS        The patient was counseled regarding instructions for management, risk factor reductions, patient and family education,impressions, risks and benefits of treatment options, side effects of medications, importance of compliance with treatment  The treatment plan was reviewed with the patient/guardian and patient/guardian understands and agrees with the treatment plan  Current Outpatient Medications:     acetaminophen (TYLENOL) 500 mg tablet, Take by mouth, Disp: , Rfl:     albuterol (PROVENTIL HFA,VENTOLIN HFA) 90 mcg/act inhaler, Inhale 2 puffs every 6 (six) hours as needed for wheezing or shortness of breath (Cough), Disp: 1 Inhaler, Rfl: 5    Cholecalciferol (VITAMIN D) 2000 units CAPS, Take by mouth, Disp: , Rfl:     Cholecalciferol 2000 units CAPS, Take by mouth, Disp: , Rfl:     cholestyramine (QUESTRAN) 4 GM/DOSE powder, Take 1 packet (4 g total) by mouth 2 (two) times a day As needed for diarrhea, Disp: 378 g, Rfl: 5    gabapentin (NEURONTIN) 400 mg capsule, Take 1 capsule (400 mg total) by mouth 2 (two) times a day, Disp: 180 capsule, Rfl: 2    Ginkgo Biloba 30 MG CAPS, Take by mouth daily, Disp: , Rfl:     mometasone (NASONEX) 50 mcg/act nasal spray, 1 spray into each nostril daily, Disp: 17 g, Rfl: 2    omeprazole (PriLOSEC) 10 mg delayed release capsule, Take 1 capsule (10 mg total) by mouth daily, Disp: 90 capsule, Rfl: 2    simvastatin (ZOCOR) 5 MG tablet, Take 1 tablet (5 mg total) by mouth daily at bedtime, Disp: 90 tablet, Rfl: 2    spironolactone (ALDACTONE) 50 mg tablet, Take 1 tablet (50 mg total) by mouth daily, Disp: 90 tablet, Rfl: 2    verapamil (VERELAN PM) 180 MG 24 hr capsule, Take 1 capsule (180 mg total) by mouth daily before breakfast, Disp: 90 capsule, Rfl: 2    Ginkgo Biloba 40 MG TABS, , Disp: , Rfl:     Naltrexone-buPROPion HCl ER 8-90 MG TB12, 1 tab daily x 7 days, 1 tab twice daily x 7 days, 2 tabs in AM and 1 tab in PM x 7 days, then 2 tabs twice daily, Disp: 120 tablet, Rfl: 0    Subjective:      Patient ID: Kristen Cruz is a 47 y o  female      Tight right hand over the past several weeks not changing pain level      The following portions of the patient's history were reviewed and updated as appropriate:   She has a past medical history of Allergic, Allergic rhinitis, Arthritis, Asthma, Bleeding per rectum, Diverticulitis of colon, GERD (gastroesophageal reflux disease), Hypercholesterolemia, Hypertension, Hyponatremia, and Obesity  ,  does not have any pertinent problems on file  ,   has a past surgical history that includes  section and Wrist surgery  ,  family history includes No Known Problems in her mother  She was adopted  ,   reports that she has never smoked  She has never used smokeless tobacco  She reports that she drinks alcohol  She reports that she does not use drugs  ,  is allergic to levofloxacin; sulfamethoxazole-trimethoprim; and hydrocodone bitartrate       Review of Systems   Constitutional: Negative for appetite change, chills, fatigue, fever and unexpected weight change  HENT: Negative for congestion, ear pain, facial swelling, hearing loss, mouth sores, nosebleeds, postnasal drip, rhinorrhea, sinus pain, sore throat, trouble swallowing and voice change  Eyes: Negative for pain, discharge, redness and visual disturbance  Respiratory: Negative for apnea, chest tightness, shortness of breath, wheezing and stridor  Cardiovascular: Negative for chest pain, palpitations and leg swelling  Gastrointestinal: Negative for abdominal distention, abdominal pain, blood in stool, constipation, diarrhea and vomiting  Endocrine: Negative for cold intolerance, heat intolerance, polydipsia, polyphagia and polyuria  Genitourinary: Negative for difficulty urinating, dysuria, flank pain, frequency, genital sores, hematuria and urgency  Musculoskeletal: Positive for arthralgias  Negative for back pain  Skin: Negative for rash and wound  Allergic/Immunologic: Negative for environmental allergies, food allergies and immunocompromised state  Neurological: Negative for dizziness, tremors, seizures, syncope, facial asymmetry, speech difficulty, weakness, light-headedness, numbness and headaches  Hematological: Negative for adenopathy  Does not bruise/bleed easily     Psychiatric/Behavioral: Negative for agitation, behavioral problems, dysphoric mood, hallucinations, self-injury, sleep disturbance and suicidal ideas  The patient is not hyperactive  Objective:  /60 (BP Location: Left arm, Patient Position: Sitting)   Pulse 82   Temp 98 6 °F (37 °C)   Resp 16   Ht 5' 8" (1 727 m)   Wt 114 kg (252 lb)   SpO2 97%   BMI 38 32 kg/m²     Lab Review  Appointment on 11/06/2019   Component Date Value    WBC 11/06/2019 4 03*    RBC 11/06/2019 4 64     Hemoglobin 11/06/2019 13 6     Hematocrit 11/06/2019 44 1     MCV 11/06/2019 95     MCH 11/06/2019 29 3     MCHC 11/06/2019 30 8*    RDW 11/06/2019 12 5     MPV 11/06/2019 9 5     Platelets 93/17/4133 272     nRBC 11/06/2019 0     Neutrophils Relative 11/06/2019 59     Immat GRANS % 11/06/2019 0     Lymphocytes Relative 11/06/2019 26     Monocytes Relative 11/06/2019 9     Eosinophils Relative 11/06/2019 5     Basophils Relative 11/06/2019 1     Neutrophils Absolute 11/06/2019 2 40     Immature Grans Absolute 11/06/2019 0 01     Lymphocytes Absolute 11/06/2019 1 03     Monocytes Absolute 11/06/2019 0 35     Eosinophils Absolute 11/06/2019 0 21     Basophils Absolute 11/06/2019 0 03     Sodium 11/06/2019 141     Potassium 11/06/2019 4 0     Chloride 11/06/2019 104     CO2 11/06/2019 31     ANION GAP 11/06/2019 6     BUN 11/06/2019 13     Creatinine 11/06/2019 0 80     Glucose, Fasting 11/06/2019 91     Calcium 11/06/2019 9 0     AST 11/06/2019 15     ALT 11/06/2019 23     Alkaline Phosphatase 11/06/2019 62     Total Protein 11/06/2019 7 2     Albumin 11/06/2019 3 6     Total Bilirubin 11/06/2019 0 60     eGFR 11/06/2019 84     Hemoglobin A1C 11/06/2019 5 5     EAG 11/06/2019 111     Cholesterol 11/06/2019 151     Triglycerides 11/06/2019 72     HDL, Direct 11/06/2019 58     LDL Calculated 11/06/2019 79     Non-HDL-Chol (CHOL-HDL) 11/06/2019 93     TSH 3RD GENERATON 11/06/2019 2  158     Magnesium 11/06/2019 1 9     Creatinine, Ur 11/06/2019 84 9     Microalbum  ,U,Random 11/06/2019 5 1     Microalb Creat Ratio 11/06/2019 6          Imaging  [email or web address]@     No orders to display     No results found for this or any previous visit  Physical Exam   Constitutional: She is oriented to person, place, and time  She appears well-developed  HENT:   Right Ear: External ear normal    Left Ear: External ear normal    Eyes: Right eye exhibits no discharge  Left eye exhibits no discharge  No scleral icterus  Neck: Carotid bruit is not present  No tracheal deviation present  No thyroid mass and no thyromegaly present  Cardiovascular: Normal rate, regular rhythm, normal heart sounds and intact distal pulses  Exam reveals no gallop and no friction rub  No murmur heard  Pulmonary/Chest: No respiratory distress  She has no wheezes  She has no rales  Musculoskeletal: She exhibits no edema  Lymphadenopathy:     She has no cervical adenopathy  Neurological: She is alert and oriented to person, place, and time  Coordination normal    Psychiatric: She has a normal mood and affect  Her behavior is normal  Judgment and thought content normal    Nursing note and vitals reviewed  BMI Counseling: Body mass index is 38 32 kg/m²  The BMI is above normal  Nutrition recommendations include reducing portion sizes, decreasing overall calorie intake, 3-5 servings of fruits/vegetables daily, reducing fast food intake, consuming healthier snacks, decreasing soda and/or juice intake, moderation in carbohydrate intake, increasing intake of lean protein and reducing intake of saturated fat and trans fat  Exercise recommendations include moderate aerobic physical activity for 150 minutes/week, exercising 3-5 times per week and strength training exercises  Pharmacotherapy was ordered for patient to aid in weight loss

## 2019-11-14 NOTE — PATIENT INSTRUCTIONS
Obesity   AMBULATORY CARE:   Obesity  is when your body mass index (BMI) is greater than 30  Your healthcare provider will use your height and weight to measure your BMI  The risks of obesity include  many health problems, such as injuries or physical disability  You may need tests to check for the following:  · Diabetes     · High blood pressure or high cholesterol     · Heart disease     · Gallbladder or liver disease     · Cancer of the colon, breast, prostate, liver, or kidney     · Sleep apnea     · Arthritis or gout  Seek care immediately if:   · You have a severe headache, confusion, or difficulty speaking  · You have weakness on one side of your body  · You have chest pain, sweating, or shortness of breath  Contact your healthcare provider if:   · You have symptoms of gallbladder or liver disease, such as pain in your upper abdomen  · You have knee or hip pain and discomfort while walking  · You have symptoms of diabetes, such as intense hunger and thirst, and frequent urination  · You have symptoms of sleep apnea, such as snoring or daytime sleepiness  · You have questions or concerns about your condition or care  Treatment for obesity  focuses on helping you lose weight to improve your health  Even a small decrease in BMI can reduce the risk for many health problems  Your healthcare provider will help you set a weight-loss goal   · Lifestyle changes  are the first step in treating obesity  These include making healthy food choices and getting regular physical activity  Your healthcare provider may suggest a weight-loss program that involves coaching, education, and therapy  · Medicine  may help you lose weight when it is used with a healthy diet and physical activity  · Surgery  can help you lose weight if you are very obese and have other health problems  There are several types of weight-loss surgery  Ask your healthcare provider for more information    Be successful losing weight:   · Set small, realistic goals  An example of a small goal is to walk for 20 minutes 5 days a week  Anther goal is to lose 5% of your body weight  · Tell friends, family members, and coworkers about your goals  and ask for their support  Ask a friend to lose weight with you, or join a weight-loss support group  · Identify foods or triggers that may cause you to overeat , and find ways to avoid them  Remove tempting high-calorie foods from your home and workplace  Place a bowl of fresh fruit on your kitchen counter  If stress causes you to eat, then find other ways to cope with stress  · Keep a diary to track what you eat and drink  Also write down how many minutes of physical activity you do each day  Weigh yourself once a week and record it in your diary  Eating changes: You will need to eat 500 to 1,000 fewer calories each day than you currently eat to lose 1 to 2 pounds a week  The following changes will help you cut calories:  · Eat smaller portions  Use small plates, no larger than 9 inches in diameter  Fill your plate half full of fruits and vegetables  Measure your food using measuring cups until you know what a serving size looks like  · Eat 3 meals and 1 or 2 snacks each day  Plan your meals in advance  [place]by Loo and eat at home most of the time  Eat slowly  · Eat fruits and vegetables at every meal   They are low in calories and high in fiber, which makes you feel full  Do not add butter, margarine, or cream sauce to vegetables  Use herbs to season steamed vegetables  · Eat less fat and fewer fried foods  Eat more baked or grilled chicken and fish  These protein sources are lower in calories and fat than red meat  Limit fast food  Dress your salads with olive oil and vinegar instead of bottled dressing  · Limit the amount of sugar you eat  Do not drink sugary beverages  Limit alcohol  Activity changes:  Physical activity is good for your body in many ways   It helps you burn calories and build strong muscles  It decreases stress and depression, and improves your mood  It can also help you sleep better  Talk to your healthcare provider before you begin an exercise program   · Exercise for at least 30 minutes 5 days a week  Start slowly  Set aside time each day for physical activity that you enjoy and that is convenient for you  It is best to do both weight training and an activity that increases your heart rate, such as walking, bicycling, or swimming  · Find ways to be more active  Do yard work and housecleaning  Walk up the stairs instead of using elevators  Spend your leisure time going to events that require walking, such as outdoor festivals or fairs  This extra physical activity can help you lose weight and keep it off  Follow up with your healthcare provider as directed: You may need to meet with a dietitian  Write down your questions so you remember to ask them during your visits  © 2017 [address] [employer] Information is for End User's use only and may not be sold, redistributed or otherwise used for commercial purposes  All illustrations and images included in CareNotes® are the copyrighted property of [employer]  or Robel Feliz  The above information is an  only  It is not intended as medical advice for individual conditions or treatments  Talk to your doctor, nurse or pharmacist before following any medical regimen to see if it is safe and effective for you  1 mild leukopenia will recheck in 6 months  2  Hypertension is at goal  3  Patient with some right PIP joint hand stiffness will observe at this time no evidence of inflammatory arthritis may consider arthritis workup if things worsen  4  History of colon polyp will need to review when her next colonoscopy is she had previously seen Dr Mitchell Beasley  5  Diagnosed with asthma has not used an inhaler in many years will just observe  6   Patient with cervical spinal stenosis cervical radiculopathy back pain and osteoarthritis of the knee patient will be trying to decrease her gabapentin to 300 mg from 400 mg she will let me know if that is effective  7   Patient with alopecia continue spironolactone will get laboratory work

## 2019-11-29 DIAGNOSIS — M54.12 RIGHT CERVICAL RADICULOPATHY: ICD-10-CM

## 2019-11-29 DIAGNOSIS — M17.0 OSTEOARTHRITIS OF BOTH KNEES, UNSPECIFIED OSTEOARTHRITIS TYPE: ICD-10-CM

## 2019-11-29 DIAGNOSIS — M54.16 LUMBAR RADICULOPATHY: ICD-10-CM

## 2020-02-21 DIAGNOSIS — K21.9 GASTROESOPHAGEAL REFLUX DISEASE, ESOPHAGITIS PRESENCE NOT SPECIFIED: ICD-10-CM

## 2020-02-21 DIAGNOSIS — M54.16 LUMBAR RADICULOPATHY: ICD-10-CM

## 2020-02-21 DIAGNOSIS — M79.89 LEG SWELLING: ICD-10-CM

## 2020-02-21 DIAGNOSIS — I10 BENIGN ESSENTIAL HYPERTENSION: ICD-10-CM

## 2020-02-21 DIAGNOSIS — R73.03 PREDIABETES: ICD-10-CM

## 2020-02-21 DIAGNOSIS — L65.9 ALOPECIA: ICD-10-CM

## 2020-02-21 DIAGNOSIS — L98.9 SKIN LESION: ICD-10-CM

## 2020-02-21 DIAGNOSIS — J45.909 MODERATE ASTHMA WITHOUT COMPLICATION, UNSPECIFIED WHETHER PERSISTENT: ICD-10-CM

## 2020-02-21 DIAGNOSIS — E78.5 HYPERLIPIDEMIA, UNSPECIFIED HYPERLIPIDEMIA TYPE: ICD-10-CM

## 2020-02-21 DIAGNOSIS — J01.91 ACUTE RECURRENT SINUSITIS, UNSPECIFIED LOCATION: ICD-10-CM

## 2020-02-21 DIAGNOSIS — I10 HYPERTENSION, UNSPECIFIED TYPE: ICD-10-CM

## 2020-02-21 RX ORDER — OMEPRAZOLE 10 MG/1
CAPSULE, DELAYED RELEASE ORAL
Qty: 90 CAPSULE | Refills: 2 | Status: SHIPPED | OUTPATIENT
Start: 2020-02-21 | End: 2020-11-16

## 2020-02-22 DIAGNOSIS — M17.0 OSTEOARTHRITIS OF BOTH KNEES, UNSPECIFIED OSTEOARTHRITIS TYPE: ICD-10-CM

## 2020-02-22 DIAGNOSIS — M54.12 RIGHT CERVICAL RADICULOPATHY: ICD-10-CM

## 2020-02-22 DIAGNOSIS — M54.16 LUMBAR RADICULOPATHY: ICD-10-CM

## 2020-05-04 DIAGNOSIS — M54.12 RIGHT CERVICAL RADICULOPATHY: ICD-10-CM

## 2020-05-04 DIAGNOSIS — M54.16 LUMBAR RADICULOPATHY: ICD-10-CM

## 2020-05-04 RX ORDER — GABAPENTIN 400 MG/1
400 CAPSULE ORAL 2 TIMES DAILY
Qty: 180 CAPSULE | Refills: 2
Start: 2020-05-04 | End: 2020-06-24

## 2020-05-11 ENCOUNTER — NURSE TRIAGE (OUTPATIENT)
Dept: OTHER | Facility: OTHER | Age: 55
End: 2020-05-11

## 2020-05-12 ENCOUNTER — TELEPHONE (OUTPATIENT)
Dept: ADMINISTRATIVE | Facility: OTHER | Age: 55
End: 2020-05-12

## 2020-05-12 ENCOUNTER — TELEMEDICINE (OUTPATIENT)
Dept: INTERNAL MEDICINE CLINIC | Facility: CLINIC | Age: 55
End: 2020-05-12
Payer: COMMERCIAL

## 2020-05-12 VITALS
HEIGHT: 68 IN | SYSTOLIC BLOOD PRESSURE: 138 MMHG | TEMPERATURE: 94.4 F | WEIGHT: 248 LBS | HEART RATE: 75 BPM | DIASTOLIC BLOOD PRESSURE: 82 MMHG | BODY MASS INDEX: 37.59 KG/M2

## 2020-05-12 DIAGNOSIS — I10 HYPERTENSION, UNSPECIFIED TYPE: ICD-10-CM

## 2020-05-12 DIAGNOSIS — K59.1 FUNCTIONAL DIARRHEA: ICD-10-CM

## 2020-05-12 DIAGNOSIS — M25.572 ARTHRALGIA OF BOTH ANKLES: Primary | ICD-10-CM

## 2020-05-12 DIAGNOSIS — K63.5 POLYP OF COLON, UNSPECIFIED PART OF COLON, UNSPECIFIED TYPE: ICD-10-CM

## 2020-05-12 DIAGNOSIS — E78.5 HYPERLIPIDEMIA, UNSPECIFIED HYPERLIPIDEMIA TYPE: ICD-10-CM

## 2020-05-12 DIAGNOSIS — I10 BENIGN ESSENTIAL HYPERTENSION: ICD-10-CM

## 2020-05-12 DIAGNOSIS — J45.909 MODERATE ASTHMA WITHOUT COMPLICATION, UNSPECIFIED WHETHER PERSISTENT: ICD-10-CM

## 2020-05-12 DIAGNOSIS — M25.571 ARTHRALGIA OF BOTH ANKLES: Primary | ICD-10-CM

## 2020-05-12 DIAGNOSIS — R73.03 PREDIABETES: ICD-10-CM

## 2020-05-12 DIAGNOSIS — M17.0 OSTEOARTHRITIS OF BOTH KNEES, UNSPECIFIED OSTEOARTHRITIS TYPE: ICD-10-CM

## 2020-05-12 DIAGNOSIS — J01.91 ACUTE RECURRENT SINUSITIS, UNSPECIFIED LOCATION: ICD-10-CM

## 2020-05-12 DIAGNOSIS — L98.9 SKIN LESION: ICD-10-CM

## 2020-05-12 DIAGNOSIS — M54.16 LUMBAR RADICULOPATHY: ICD-10-CM

## 2020-05-12 DIAGNOSIS — L65.9 ALOPECIA: ICD-10-CM

## 2020-05-12 DIAGNOSIS — K21.9 GASTROESOPHAGEAL REFLUX DISEASE, ESOPHAGITIS PRESENCE NOT SPECIFIED: ICD-10-CM

## 2020-05-12 DIAGNOSIS — M79.89 LEG SWELLING: ICD-10-CM

## 2020-05-12 DIAGNOSIS — K92.1 HEMATOCHEZIA: ICD-10-CM

## 2020-05-12 PROCEDURE — 99214 OFFICE O/P EST MOD 30 MIN: CPT | Performed by: INTERNAL MEDICINE

## 2020-05-12 RX ORDER — SPIRONOLACTONE 50 MG/1
50 TABLET, FILM COATED ORAL DAILY
Qty: 90 TABLET | Refills: 2 | Status: SHIPPED | OUTPATIENT
Start: 2020-05-12 | End: 2021-03-17 | Stop reason: SDUPTHER

## 2020-06-16 ENCOUNTER — OFFICE VISIT (OUTPATIENT)
Dept: GASTROENTEROLOGY | Facility: CLINIC | Age: 55
End: 2020-06-16
Payer: COMMERCIAL

## 2020-06-16 VITALS
HEIGHT: 68 IN | WEIGHT: 254 LBS | TEMPERATURE: 98.8 F | SYSTOLIC BLOOD PRESSURE: 122 MMHG | BODY MASS INDEX: 38.49 KG/M2 | HEART RATE: 65 BPM | DIASTOLIC BLOOD PRESSURE: 80 MMHG

## 2020-06-16 DIAGNOSIS — Z20.822 ENCOUNTER FOR LABORATORY TESTING FOR COVID-19 VIRUS: ICD-10-CM

## 2020-06-16 DIAGNOSIS — K62.5 RECTAL BLEEDING: ICD-10-CM

## 2020-06-16 DIAGNOSIS — K59.1 FUNCTIONAL DIARRHEA: Primary | ICD-10-CM

## 2020-06-16 DIAGNOSIS — K21.9 GASTROESOPHAGEAL REFLUX DISEASE, ESOPHAGITIS PRESENCE NOT SPECIFIED: ICD-10-CM

## 2020-06-16 DIAGNOSIS — K63.5 POLYP OF COLON, UNSPECIFIED PART OF COLON, UNSPECIFIED TYPE: ICD-10-CM

## 2020-06-16 PROCEDURE — 3079F DIAST BP 80-89 MM HG: CPT | Performed by: INTERNAL MEDICINE

## 2020-06-16 PROCEDURE — 1036F TOBACCO NON-USER: CPT | Performed by: INTERNAL MEDICINE

## 2020-06-16 PROCEDURE — 3008F BODY MASS INDEX DOCD: CPT | Performed by: INTERNAL MEDICINE

## 2020-06-16 PROCEDURE — 99214 OFFICE O/P EST MOD 30 MIN: CPT | Performed by: INTERNAL MEDICINE

## 2020-06-16 PROCEDURE — 3074F SYST BP LT 130 MM HG: CPT | Performed by: INTERNAL MEDICINE

## 2020-06-20 DIAGNOSIS — Z20.822 ENCOUNTER FOR LABORATORY TESTING FOR COVID-19 VIRUS: ICD-10-CM

## 2020-06-20 PROCEDURE — U0003 INFECTIOUS AGENT DETECTION BY NUCLEIC ACID (DNA OR RNA); SEVERE ACUTE RESPIRATORY SYNDROME CORONAVIRUS 2 (SARS-COV-2) (CORONAVIRUS DISEASE [COVID-19]), AMPLIFIED PROBE TECHNIQUE, MAKING USE OF HIGH THROUGHPUT TECHNOLOGIES AS DESCRIBED BY CMS-2020-01-R: HCPCS

## 2020-06-21 LAB — SARS-COV-2 RNA SPEC QL NAA+PROBE: NOT DETECTED

## 2020-06-24 ENCOUNTER — ANESTHESIA EVENT (OUTPATIENT)
Dept: GASTROENTEROLOGY | Facility: HOSPITAL | Age: 55
End: 2020-06-24

## 2020-06-24 DIAGNOSIS — M54.12 RIGHT CERVICAL RADICULOPATHY: ICD-10-CM

## 2020-06-24 DIAGNOSIS — M54.16 LUMBAR RADICULOPATHY: ICD-10-CM

## 2020-06-24 RX ORDER — GABAPENTIN 400 MG/1
CAPSULE ORAL
Qty: 180 CAPSULE | Refills: 2 | Status: SHIPPED | OUTPATIENT
Start: 2020-06-24 | End: 2020-10-08 | Stop reason: SDUPTHER

## 2020-06-25 ENCOUNTER — HOSPITAL ENCOUNTER (OUTPATIENT)
Dept: GASTROENTEROLOGY | Facility: HOSPITAL | Age: 55
Setting detail: OUTPATIENT SURGERY
Discharge: HOME | End: 2020-06-25
Attending: INTERNAL MEDICINE | Admitting: INTERNAL MEDICINE
Payer: COMMERCIAL

## 2020-06-25 ENCOUNTER — ANESTHESIA (OUTPATIENT)
Dept: GASTROENTEROLOGY | Facility: HOSPITAL | Age: 55
End: 2020-06-25

## 2020-06-25 VITALS
DIASTOLIC BLOOD PRESSURE: 76 MMHG | HEIGHT: 68 IN | WEIGHT: 247.8 LBS | BODY MASS INDEX: 37.56 KG/M2 | HEART RATE: 63 BPM | SYSTOLIC BLOOD PRESSURE: 121 MMHG | RESPIRATION RATE: 16 BRPM | OXYGEN SATURATION: 97 % | TEMPERATURE: 99.1 F

## 2020-06-25 DIAGNOSIS — K63.5 POLYP OF COLON, UNSPECIFIED PART OF COLON, UNSPECIFIED TYPE: ICD-10-CM

## 2020-06-25 DIAGNOSIS — K62.5 RECTAL BLEEDING: ICD-10-CM

## 2020-06-25 DIAGNOSIS — K59.1 FUNCTIONAL DIARRHEA: ICD-10-CM

## 2020-06-25 PROCEDURE — 45378 DIAGNOSTIC COLONOSCOPY: CPT | Performed by: INTERNAL MEDICINE

## 2020-06-25 RX ORDER — SODIUM CHLORIDE, SODIUM LACTATE, POTASSIUM CHLORIDE, CALCIUM CHLORIDE 600; 310; 30; 20 MG/100ML; MG/100ML; MG/100ML; MG/100ML
INJECTION, SOLUTION INTRAVENOUS CONTINUOUS PRN
Status: DISCONTINUED | OUTPATIENT
Start: 2020-06-25 | End: 2020-06-25 | Stop reason: SURG

## 2020-06-25 RX ORDER — PROPOFOL 10 MG/ML
INJECTION, EMULSION INTRAVENOUS AS NEEDED
Status: DISCONTINUED | OUTPATIENT
Start: 2020-06-25 | End: 2020-06-25 | Stop reason: SURG

## 2020-06-25 RX ORDER — SODIUM CHLORIDE, SODIUM LACTATE, POTASSIUM CHLORIDE, CALCIUM CHLORIDE 600; 310; 30; 20 MG/100ML; MG/100ML; MG/100ML; MG/100ML
100 INJECTION, SOLUTION INTRAVENOUS CONTINUOUS
Status: CANCELLED | OUTPATIENT
Start: 2020-06-25

## 2020-06-25 RX ORDER — OCTISALATE, AVOBENZONE, HOMOSALATE, AND OCTOCRYLENE 29.4; 29.4; 49; 25.48 MG/ML; MG/ML; MG/ML; MG/ML
LOTION TOPICAL
COMMUNITY

## 2020-06-25 RX ADMIN — PROPOFOL 140 MG: 10 INJECTION, EMULSION INTRAVENOUS at 11:59

## 2020-06-25 RX ADMIN — SODIUM CHLORIDE, SODIUM LACTATE, POTASSIUM CHLORIDE, AND CALCIUM CHLORIDE: .6; .31; .03; .02 INJECTION, SOLUTION INTRAVENOUS at 11:56

## 2020-06-25 RX ADMIN — PROPOFOL 40 MG: 10 INJECTION, EMULSION INTRAVENOUS at 12:01

## 2020-06-25 RX ADMIN — PROPOFOL 20 MG: 10 INJECTION, EMULSION INTRAVENOUS at 12:03

## 2020-06-25 RX ADMIN — PROPOFOL 20 MG: 10 INJECTION, EMULSION INTRAVENOUS at 12:05

## 2020-06-25 RX ADMIN — PROPOFOL 20 MG: 10 INJECTION, EMULSION INTRAVENOUS at 12:07

## 2020-09-03 DIAGNOSIS — M54.16 LUMBAR RADICULOPATHY: ICD-10-CM

## 2020-09-03 DIAGNOSIS — I10 BENIGN ESSENTIAL HYPERTENSION: ICD-10-CM

## 2020-09-03 DIAGNOSIS — L65.9 ALOPECIA: ICD-10-CM

## 2020-09-03 DIAGNOSIS — J01.91 ACUTE RECURRENT SINUSITIS, UNSPECIFIED LOCATION: ICD-10-CM

## 2020-09-03 DIAGNOSIS — R73.03 PREDIABETES: ICD-10-CM

## 2020-09-03 DIAGNOSIS — I10 HYPERTENSION, UNSPECIFIED TYPE: ICD-10-CM

## 2020-09-03 DIAGNOSIS — K21.9 GASTROESOPHAGEAL REFLUX DISEASE, ESOPHAGITIS PRESENCE NOT SPECIFIED: ICD-10-CM

## 2020-09-03 DIAGNOSIS — L98.9 SKIN LESION: ICD-10-CM

## 2020-09-03 DIAGNOSIS — M79.89 LEG SWELLING: ICD-10-CM

## 2020-09-03 DIAGNOSIS — E78.5 HYPERLIPIDEMIA, UNSPECIFIED HYPERLIPIDEMIA TYPE: ICD-10-CM

## 2020-09-03 DIAGNOSIS — J45.909 MODERATE ASTHMA WITHOUT COMPLICATION, UNSPECIFIED WHETHER PERSISTENT: ICD-10-CM

## 2020-09-03 RX ORDER — VERAPAMIL HYDROCHLORIDE 180 MG/1
CAPSULE, EXTENDED RELEASE ORAL
Qty: 90 CAPSULE | Refills: 2 | Status: SHIPPED | OUTPATIENT
Start: 2020-09-03 | End: 2021-03-08 | Stop reason: SDUPTHER

## 2020-09-17 DIAGNOSIS — L98.9 SKIN LESION: ICD-10-CM

## 2020-09-17 DIAGNOSIS — L65.9 ALOPECIA: ICD-10-CM

## 2020-09-17 DIAGNOSIS — J45.909 MODERATE ASTHMA WITHOUT COMPLICATION, UNSPECIFIED WHETHER PERSISTENT: ICD-10-CM

## 2020-09-17 DIAGNOSIS — E78.5 HYPERLIPIDEMIA, UNSPECIFIED HYPERLIPIDEMIA TYPE: ICD-10-CM

## 2020-09-17 DIAGNOSIS — M54.16 LUMBAR RADICULOPATHY: ICD-10-CM

## 2020-09-17 DIAGNOSIS — M79.89 LEG SWELLING: ICD-10-CM

## 2020-09-17 DIAGNOSIS — I10 HYPERTENSION, UNSPECIFIED TYPE: ICD-10-CM

## 2020-09-17 DIAGNOSIS — I10 BENIGN ESSENTIAL HYPERTENSION: ICD-10-CM

## 2020-09-17 DIAGNOSIS — R73.03 PREDIABETES: ICD-10-CM

## 2020-09-17 DIAGNOSIS — K21.9 GASTROESOPHAGEAL REFLUX DISEASE, ESOPHAGITIS PRESENCE NOT SPECIFIED: ICD-10-CM

## 2020-09-17 DIAGNOSIS — J01.91 ACUTE RECURRENT SINUSITIS, UNSPECIFIED LOCATION: ICD-10-CM

## 2020-09-17 RX ORDER — SIMVASTATIN 5 MG
TABLET ORAL
Qty: 90 TABLET | Refills: 2 | Status: SHIPPED | OUTPATIENT
Start: 2020-09-17 | End: 2021-03-16 | Stop reason: SDUPTHER

## 2020-09-28 ENCOUNTER — TRANSCRIBE ORDERS (OUTPATIENT)
Dept: ADMINISTRATIVE | Facility: HOSPITAL | Age: 55
End: 2020-09-28

## 2020-09-28 ENCOUNTER — HOSPITAL ENCOUNTER (OUTPATIENT)
Dept: RADIOLOGY | Facility: HOSPITAL | Age: 55
Discharge: HOME | End: 2020-09-28
Payer: COMMERCIAL

## 2020-09-28 ENCOUNTER — APPOINTMENT (OUTPATIENT)
Dept: LAB | Facility: HOSPITAL | Age: 55
End: 2020-09-28
Attending: INTERNAL MEDICINE
Payer: COMMERCIAL

## 2020-09-28 DIAGNOSIS — M79.89 LEG SWELLING: ICD-10-CM

## 2020-09-28 DIAGNOSIS — J45.909 MODERATE ASTHMA WITHOUT COMPLICATION, UNSPECIFIED WHETHER PERSISTENT: ICD-10-CM

## 2020-09-28 DIAGNOSIS — K21.9 GASTROESOPHAGEAL REFLUX DISEASE, ESOPHAGITIS PRESENCE NOT SPECIFIED: ICD-10-CM

## 2020-09-28 DIAGNOSIS — M77.32 CALCANEAL SPUR OF LEFT FOOT: ICD-10-CM

## 2020-09-28 DIAGNOSIS — E78.5 HYPERLIPIDEMIA, UNSPECIFIED HYPERLIPIDEMIA TYPE: ICD-10-CM

## 2020-09-28 DIAGNOSIS — M54.16 LUMBAR RADICULOPATHY: ICD-10-CM

## 2020-09-28 DIAGNOSIS — I10 BENIGN ESSENTIAL HYPERTENSION: ICD-10-CM

## 2020-09-28 DIAGNOSIS — L98.9 SKIN LESION: ICD-10-CM

## 2020-09-28 DIAGNOSIS — M54.12 RIGHT CERVICAL RADICULOPATHY: ICD-10-CM

## 2020-09-28 DIAGNOSIS — Z11.4 SCREENING FOR HIV WITHOUT PRESENCE OF RISK FACTORS: ICD-10-CM

## 2020-09-28 DIAGNOSIS — J01.91 ACUTE RECURRENT SINUSITIS, UNSPECIFIED LOCATION: ICD-10-CM

## 2020-09-28 DIAGNOSIS — R73.03 PREDIABETES: ICD-10-CM

## 2020-09-28 DIAGNOSIS — I10 HYPERTENSION, UNSPECIFIED TYPE: ICD-10-CM

## 2020-09-28 DIAGNOSIS — L65.9 ALOPECIA: ICD-10-CM

## 2020-09-28 DIAGNOSIS — R41.3 MEMORY DISTURBANCE: ICD-10-CM

## 2020-09-28 DIAGNOSIS — Z11.59 ENCOUNTER FOR HEPATITIS C SCREENING TEST FOR LOW RISK PATIENT: ICD-10-CM

## 2020-09-28 DIAGNOSIS — Z23 FLU VACCINE NEED: ICD-10-CM

## 2020-09-28 DIAGNOSIS — K63.5 POLYP OF COLON, UNSPECIFIED PART OF COLON, UNSPECIFIED TYPE: ICD-10-CM

## 2020-09-28 DIAGNOSIS — M77.32 CALCANEAL SPUR OF LEFT FOOT: Primary | ICD-10-CM

## 2020-09-28 LAB
ALBUMIN SERPL BCP-MCNC: 3.5 G/DL (ref 3.5–5)
ALP SERPL-CCNC: 76 U/L (ref 46–116)
ALT SERPL W P-5'-P-CCNC: 20 U/L (ref 12–78)
ANION GAP SERPL CALCULATED.3IONS-SCNC: 7 MMOL/L (ref 4–13)
AST SERPL W P-5'-P-CCNC: 15 U/L (ref 5–45)
BASOPHILS # BLD AUTO: 0.05 THOUSANDS/ΜL (ref 0–0.1)
BASOPHILS NFR BLD AUTO: 1 % (ref 0–1)
BILIRUB SERPL-MCNC: 0.6 MG/DL (ref 0.2–1)
BUN SERPL-MCNC: 11 MG/DL (ref 5–25)
CALCIUM SERPL-MCNC: 8.8 MG/DL (ref 8.3–10.1)
CHLORIDE SERPL-SCNC: 103 MMOL/L (ref 100–108)
CHOLEST SERPL-MCNC: 152 MG/DL (ref 50–200)
CO2 SERPL-SCNC: 28 MMOL/L (ref 21–32)
CREAT SERPL-MCNC: 1.01 MG/DL (ref 0.6–1.3)
EOSINOPHIL # BLD AUTO: 0.26 THOUSAND/ΜL (ref 0–0.61)
EOSINOPHIL NFR BLD AUTO: 6 % (ref 0–6)
ERYTHROCYTE [DISTWIDTH] IN BLOOD BY AUTOMATED COUNT: 12.6 % (ref 11.6–15.1)
EST. AVERAGE GLUCOSE BLD GHB EST-MCNC: 117 MG/DL
GFR SERPL CREATININE-BSD FRML MDRD: 63 ML/MIN/1.73SQ M
GLUCOSE P FAST SERPL-MCNC: 91 MG/DL (ref 65–99)
HBA1C MFR BLD: 5.7 %
HCT VFR BLD AUTO: 45.3 % (ref 34.8–46.1)
HCV AB SER QL: NORMAL
HDLC SERPL-MCNC: 58 MG/DL
HGB BLD-MCNC: 14.2 G/DL (ref 11.5–15.4)
IMM GRANULOCYTES # BLD AUTO: 0.01 THOUSAND/UL (ref 0–0.2)
IMM GRANULOCYTES NFR BLD AUTO: 0 % (ref 0–2)
LDLC SERPL CALC-MCNC: 84 MG/DL (ref 0–100)
LDLC SERPL DIRECT ASSAY-MCNC: 90 MG/DL (ref 0–100)
LYMPHOCYTES # BLD AUTO: 0.87 THOUSANDS/ΜL (ref 0.6–4.47)
LYMPHOCYTES NFR BLD AUTO: 19 % (ref 14–44)
MCH RBC QN AUTO: 29.5 PG (ref 26.8–34.3)
MCHC RBC AUTO-ENTMCNC: 31.3 G/DL (ref 31.4–37.4)
MCV RBC AUTO: 94 FL (ref 82–98)
MONOCYTES # BLD AUTO: 0.38 THOUSAND/ΜL (ref 0.17–1.22)
MONOCYTES NFR BLD AUTO: 8 % (ref 4–12)
NEUTROPHILS # BLD AUTO: 2.97 THOUSANDS/ΜL (ref 1.85–7.62)
NEUTS SEG NFR BLD AUTO: 66 % (ref 43–75)
NRBC BLD AUTO-RTO: 0 /100 WBCS
PLATELET # BLD AUTO: 272 THOUSANDS/UL (ref 149–390)
PMV BLD AUTO: 9.2 FL (ref 8.9–12.7)
POTASSIUM SERPL-SCNC: 4 MMOL/L (ref 3.5–5.3)
PROT SERPL-MCNC: 7.2 G/DL (ref 6.4–8.2)
RBC # BLD AUTO: 4.82 MILLION/UL (ref 3.81–5.12)
SODIUM SERPL-SCNC: 138 MMOL/L (ref 136–145)
TRIGL SERPL-MCNC: 48 MG/DL
VIT B12 SERPL-MCNC: 549 PG/ML (ref 100–900)
WBC # BLD AUTO: 4.54 THOUSAND/UL (ref 4.31–10.16)

## 2020-09-28 PROCEDURE — 85025 COMPLETE CBC W/AUTO DIFF WBC: CPT

## 2020-09-28 PROCEDURE — 80061 LIPID PANEL: CPT

## 2020-09-28 PROCEDURE — 82607 VITAMIN B-12: CPT

## 2020-09-28 PROCEDURE — 73630 X-RAY EXAM OF FOOT: CPT

## 2020-09-28 PROCEDURE — 88184 FLOWCYTOMETRY/ TC 1 MARKER: CPT

## 2020-09-28 PROCEDURE — 83036 HEMOGLOBIN GLYCOSYLATED A1C: CPT

## 2020-09-28 PROCEDURE — 86803 HEPATITIS C AB TEST: CPT

## 2020-09-28 PROCEDURE — 88185 FLOWCYTOMETRY/TC ADD-ON: CPT

## 2020-09-28 PROCEDURE — 83721 ASSAY OF BLOOD LIPOPROTEIN: CPT

## 2020-09-28 PROCEDURE — 36415 COLL VENOUS BLD VENIPUNCTURE: CPT

## 2020-09-28 PROCEDURE — 87389 HIV-1 AG W/HIV-1&-2 AB AG IA: CPT

## 2020-09-28 PROCEDURE — 80053 COMPREHEN METABOLIC PANEL: CPT

## 2020-09-30 LAB — HIV 1+2 AB+HIV1 P24 AG SERPL QL IA: NORMAL

## 2020-10-01 LAB — SCAN RESULT: NORMAL

## 2020-10-08 ENCOUNTER — OFFICE VISIT (OUTPATIENT)
Dept: INTERNAL MEDICINE CLINIC | Facility: CLINIC | Age: 55
End: 2020-10-08
Payer: COMMERCIAL

## 2020-10-08 VITALS
HEIGHT: 68 IN | DIASTOLIC BLOOD PRESSURE: 70 MMHG | WEIGHT: 249 LBS | HEART RATE: 73 BPM | SYSTOLIC BLOOD PRESSURE: 110 MMHG | OXYGEN SATURATION: 98 % | TEMPERATURE: 98.7 F | BODY MASS INDEX: 37.74 KG/M2 | RESPIRATION RATE: 16 BRPM

## 2020-10-08 DIAGNOSIS — I10 BENIGN ESSENTIAL HYPERTENSION: ICD-10-CM

## 2020-10-08 DIAGNOSIS — M54.12 RIGHT CERVICAL RADICULOPATHY: ICD-10-CM

## 2020-10-08 DIAGNOSIS — M72.2 PLANTAR FASCIITIS, LEFT: ICD-10-CM

## 2020-10-08 DIAGNOSIS — M54.16 LUMBAR RADICULOPATHY: ICD-10-CM

## 2020-10-08 DIAGNOSIS — M17.0 OSTEOARTHRITIS OF BOTH KNEES, UNSPECIFIED OSTEOARTHRITIS TYPE: ICD-10-CM

## 2020-10-08 DIAGNOSIS — Z23 NEED FOR INFLUENZA VACCINATION: Primary | ICD-10-CM

## 2020-10-08 DIAGNOSIS — E78.5 HYPERLIPIDEMIA, UNSPECIFIED HYPERLIPIDEMIA TYPE: ICD-10-CM

## 2020-10-08 DIAGNOSIS — N60.12 FIBROCYSTIC DISEASE OF LEFT BREAST: ICD-10-CM

## 2020-10-08 PROCEDURE — 3078F DIAST BP <80 MM HG: CPT | Performed by: INTERNAL MEDICINE

## 2020-10-08 PROCEDURE — 90471 IMMUNIZATION ADMIN: CPT

## 2020-10-08 PROCEDURE — 99214 OFFICE O/P EST MOD 30 MIN: CPT | Performed by: INTERNAL MEDICINE

## 2020-10-08 PROCEDURE — 3074F SYST BP LT 130 MM HG: CPT | Performed by: INTERNAL MEDICINE

## 2020-10-08 PROCEDURE — 90682 RIV4 VACC RECOMBINANT DNA IM: CPT

## 2020-10-08 PROCEDURE — 1036F TOBACCO NON-USER: CPT | Performed by: INTERNAL MEDICINE

## 2020-10-08 RX ORDER — VITAMIN E 268 MG
400 CAPSULE ORAL DAILY
Start: 2020-10-08

## 2020-10-08 RX ORDER — GABAPENTIN 100 MG/1
CAPSULE ORAL
Qty: 35 CAPSULE | Refills: 0 | Status: SHIPPED | OUTPATIENT
Start: 2020-10-08 | End: 2020-10-20 | Stop reason: SDUPTHER

## 2020-10-08 RX ORDER — LANOLIN ALCOHOL/MO/W.PET/CERES
1 CREAM (GRAM) TOPICAL 3 TIMES DAILY
Qty: 100 TABLET | Refills: 2
Start: 2020-10-08

## 2020-10-18 DIAGNOSIS — M54.16 LUMBAR RADICULOPATHY: ICD-10-CM

## 2020-10-18 DIAGNOSIS — M54.12 RIGHT CERVICAL RADICULOPATHY: ICD-10-CM

## 2020-10-20 RX ORDER — GABAPENTIN 100 MG/1
CAPSULE ORAL
Qty: 35 CAPSULE | Refills: 0 | Status: SHIPPED | OUTPATIENT
Start: 2020-10-20 | End: 2020-11-04 | Stop reason: SDUPTHER

## 2020-10-30 ENCOUNTER — APPOINTMENT (EMERGENCY)
Dept: RADIOLOGY | Facility: HOSPITAL | Age: 55
End: 2020-10-30
Payer: COMMERCIAL

## 2020-10-30 ENCOUNTER — HOSPITAL ENCOUNTER (EMERGENCY)
Facility: HOSPITAL | Age: 55
Discharge: HOME | End: 2020-10-30
Attending: EMERGENCY MEDICINE
Payer: COMMERCIAL

## 2020-10-30 VITALS
OXYGEN SATURATION: 99 % | HEIGHT: 68 IN | HEART RATE: 68 BPM | TEMPERATURE: 98 F | DIASTOLIC BLOOD PRESSURE: 62 MMHG | RESPIRATION RATE: 20 BRPM | SYSTOLIC BLOOD PRESSURE: 118 MMHG | BODY MASS INDEX: 37.76 KG/M2 | WEIGHT: 249.12 LBS

## 2020-10-30 DIAGNOSIS — R07.89 ATYPICAL CHEST PAIN: Primary | ICD-10-CM

## 2020-10-30 LAB
ALBUMIN SERPL BCP-MCNC: 3.9 G/DL (ref 3.5–5)
ALP SERPL-CCNC: 76 U/L (ref 46–116)
ALT SERPL W P-5'-P-CCNC: 22 U/L (ref 12–78)
ANION GAP SERPL CALCULATED.3IONS-SCNC: 8 MMOL/L (ref 4–13)
AST SERPL W P-5'-P-CCNC: 15 U/L (ref 5–45)
BASOPHILS # BLD AUTO: 0.07 THOUSANDS/ΜL (ref 0–0.1)
BASOPHILS NFR BLD AUTO: 1 % (ref 0–1)
BILIRUB SERPL-MCNC: 0.2 MG/DL (ref 0.2–1)
BUN SERPL-MCNC: 12 MG/DL (ref 5–25)
CALCIUM SERPL-MCNC: 9.1 MG/DL (ref 8.3–10.1)
CHLORIDE SERPL-SCNC: 103 MMOL/L (ref 100–108)
CO2 SERPL-SCNC: 29 MMOL/L (ref 21–32)
CREAT SERPL-MCNC: 1.01 MG/DL (ref 0.6–1.3)
EOSINOPHIL # BLD AUTO: 0.45 THOUSAND/ΜL (ref 0–0.61)
EOSINOPHIL NFR BLD AUTO: 7 % (ref 0–6)
ERYTHROCYTE [DISTWIDTH] IN BLOOD BY AUTOMATED COUNT: 12.5 % (ref 11.6–15.1)
GFR SERPL CREATININE-BSD FRML MDRD: 63 ML/MIN/1.73SQ M
GLUCOSE SERPL-MCNC: 106 MG/DL (ref 65–140)
HCT VFR BLD AUTO: 46.5 % (ref 34.8–46.1)
HGB BLD-MCNC: 14.8 G/DL (ref 11.5–15.4)
IMM GRANULOCYTES # BLD AUTO: 0.02 THOUSAND/UL (ref 0–0.2)
IMM GRANULOCYTES NFR BLD AUTO: 0 % (ref 0–2)
LYMPHOCYTES # BLD AUTO: 1.74 THOUSANDS/ΜL (ref 0.6–4.47)
LYMPHOCYTES NFR BLD AUTO: 26 % (ref 14–44)
MCH RBC QN AUTO: 29.7 PG (ref 26.8–34.3)
MCHC RBC AUTO-ENTMCNC: 31.8 G/DL (ref 31.4–37.4)
MCV RBC AUTO: 93 FL (ref 82–98)
MONOCYTES # BLD AUTO: 0.61 THOUSAND/ΜL (ref 0.17–1.22)
MONOCYTES NFR BLD AUTO: 9 % (ref 4–12)
NEUTROPHILS # BLD AUTO: 3.9 THOUSANDS/ΜL (ref 1.85–7.62)
NEUTS SEG NFR BLD AUTO: 57 % (ref 43–75)
NRBC BLD AUTO-RTO: 0 /100 WBCS
PLATELET # BLD AUTO: 264 THOUSANDS/UL (ref 149–390)
PMV BLD AUTO: 9.4 FL (ref 8.9–12.7)
POTASSIUM SERPL-SCNC: 4 MMOL/L (ref 3.5–5.3)
PROT SERPL-MCNC: 7.6 G/DL (ref 6.4–8.2)
RBC # BLD AUTO: 4.99 MILLION/UL (ref 3.81–5.12)
SODIUM SERPL-SCNC: 140 MMOL/L (ref 136–145)
TROPONIN I SERPL-MCNC: <0.02 NG/ML
WBC # BLD AUTO: 6.79 THOUSAND/UL (ref 4.31–10.16)

## 2020-10-30 PROCEDURE — 99285 EMERGENCY DEPT VISIT HI MDM: CPT

## 2020-10-30 PROCEDURE — 80053 COMPREHEN METABOLIC PANEL: CPT | Performed by: PHYSICIAN ASSISTANT

## 2020-10-30 PROCEDURE — 71045 X-RAY EXAM CHEST 1 VIEW: CPT

## 2020-10-30 PROCEDURE — 93005 ELECTROCARDIOGRAM TRACING: CPT

## 2020-10-30 PROCEDURE — 99285 EMERGENCY DEPT VISIT HI MDM: CPT | Performed by: PHYSICIAN ASSISTANT

## 2020-10-30 PROCEDURE — 36415 COLL VENOUS BLD VENIPUNCTURE: CPT | Performed by: PHYSICIAN ASSISTANT

## 2020-10-30 PROCEDURE — 84484 ASSAY OF TROPONIN QUANT: CPT | Performed by: PHYSICIAN ASSISTANT

## 2020-10-30 PROCEDURE — 85025 COMPLETE CBC W/AUTO DIFF WBC: CPT | Performed by: PHYSICIAN ASSISTANT

## 2020-10-31 LAB
ATRIAL RATE: 67 BPM
P AXIS: 63 DEGREES
PR INTERVAL: 182 MS
QRS AXIS: 11 DEGREES
QRSD INTERVAL: 70 MS
QT INTERVAL: 392 MS
QTC INTERVAL: 414 MS
T WAVE AXIS: 4 DEGREES
VENTRICULAR RATE: 67 BPM

## 2020-10-31 PROCEDURE — 93010 ELECTROCARDIOGRAM REPORT: CPT | Performed by: INTERNAL MEDICINE

## 2020-11-01 DIAGNOSIS — M54.16 LUMBAR RADICULOPATHY: ICD-10-CM

## 2020-11-01 DIAGNOSIS — M54.12 RIGHT CERVICAL RADICULOPATHY: ICD-10-CM

## 2020-11-02 RX ORDER — GABAPENTIN 100 MG/1
CAPSULE ORAL
Qty: 35 CAPSULE | Refills: 0 | OUTPATIENT
Start: 2020-11-02

## 2020-11-04 ENCOUNTER — OFFICE VISIT (OUTPATIENT)
Dept: INTERNAL MEDICINE CLINIC | Facility: CLINIC | Age: 55
End: 2020-11-04
Payer: COMMERCIAL

## 2020-11-04 VITALS
WEIGHT: 246 LBS | DIASTOLIC BLOOD PRESSURE: 80 MMHG | OXYGEN SATURATION: 98 % | TEMPERATURE: 98.6 F | SYSTOLIC BLOOD PRESSURE: 132 MMHG | HEART RATE: 94 BPM | RESPIRATION RATE: 18 BRPM | BODY MASS INDEX: 37.28 KG/M2 | HEIGHT: 68 IN

## 2020-11-04 DIAGNOSIS — M54.16 LUMBAR RADICULOPATHY: ICD-10-CM

## 2020-11-04 DIAGNOSIS — M48.02 FORAMINAL STENOSIS OF CERVICAL REGION: ICD-10-CM

## 2020-11-04 DIAGNOSIS — R07.89 ATYPICAL CHEST PAIN: Primary | ICD-10-CM

## 2020-11-04 DIAGNOSIS — M48.02 CERVICAL SPINAL STENOSIS: ICD-10-CM

## 2020-11-04 DIAGNOSIS — I10 BENIGN ESSENTIAL HYPERTENSION: ICD-10-CM

## 2020-11-04 DIAGNOSIS — M54.12 RIGHT CERVICAL RADICULOPATHY: ICD-10-CM

## 2020-11-04 PROCEDURE — 3079F DIAST BP 80-89 MM HG: CPT | Performed by: NURSE PRACTITIONER

## 2020-11-04 PROCEDURE — 99214 OFFICE O/P EST MOD 30 MIN: CPT | Performed by: NURSE PRACTITIONER

## 2020-11-04 PROCEDURE — 3008F BODY MASS INDEX DOCD: CPT | Performed by: NURSE PRACTITIONER

## 2020-11-04 PROCEDURE — 3075F SYST BP GE 130 - 139MM HG: CPT | Performed by: NURSE PRACTITIONER

## 2020-11-04 PROCEDURE — 1036F TOBACCO NON-USER: CPT | Performed by: NURSE PRACTITIONER

## 2020-11-04 RX ORDER — GABAPENTIN 100 MG/1
100 CAPSULE ORAL DAILY
Qty: 90 CAPSULE | Refills: 2 | Status: SHIPPED | OUTPATIENT
Start: 2020-11-04 | End: 2021-04-19 | Stop reason: ALTCHOICE

## 2020-11-10 ENCOUNTER — TELEPHONE (OUTPATIENT)
Dept: INTERNAL MEDICINE CLINIC | Facility: CLINIC | Age: 55
End: 2020-11-10

## 2020-11-11 DIAGNOSIS — E78.5 HYPERLIPIDEMIA, UNSPECIFIED HYPERLIPIDEMIA TYPE: ICD-10-CM

## 2020-11-11 DIAGNOSIS — R73.03 PREDIABETES: ICD-10-CM

## 2020-11-11 DIAGNOSIS — J45.909 MODERATE ASTHMA WITHOUT COMPLICATION, UNSPECIFIED WHETHER PERSISTENT: Primary | ICD-10-CM

## 2020-11-15 DIAGNOSIS — R73.03 PREDIABETES: ICD-10-CM

## 2020-11-15 DIAGNOSIS — E78.5 HYPERLIPIDEMIA, UNSPECIFIED HYPERLIPIDEMIA TYPE: ICD-10-CM

## 2020-11-15 DIAGNOSIS — I10 BENIGN ESSENTIAL HYPERTENSION: ICD-10-CM

## 2020-11-15 DIAGNOSIS — I10 HYPERTENSION, UNSPECIFIED TYPE: ICD-10-CM

## 2020-11-15 DIAGNOSIS — J45.909 MODERATE ASTHMA WITHOUT COMPLICATION, UNSPECIFIED WHETHER PERSISTENT: ICD-10-CM

## 2020-11-15 DIAGNOSIS — J01.91 ACUTE RECURRENT SINUSITIS, UNSPECIFIED LOCATION: ICD-10-CM

## 2020-11-15 DIAGNOSIS — M79.89 LEG SWELLING: ICD-10-CM

## 2020-11-15 DIAGNOSIS — K21.9 GASTROESOPHAGEAL REFLUX DISEASE: ICD-10-CM

## 2020-11-15 DIAGNOSIS — L98.9 SKIN LESION: ICD-10-CM

## 2020-11-15 DIAGNOSIS — L65.9 ALOPECIA: ICD-10-CM

## 2020-11-15 DIAGNOSIS — M54.16 LUMBAR RADICULOPATHY: ICD-10-CM

## 2020-11-16 RX ORDER — OMEPRAZOLE 10 MG/1
CAPSULE, DELAYED RELEASE ORAL
Qty: 90 CAPSULE | Refills: 2 | Status: SHIPPED | OUTPATIENT
Start: 2020-11-16 | End: 2021-04-19 | Stop reason: SDUPTHER

## 2021-01-18 ENCOUNTER — TELEPHONE (OUTPATIENT)
Dept: INTERNAL MEDICINE CLINIC | Facility: CLINIC | Age: 56
End: 2021-01-18

## 2021-01-18 DIAGNOSIS — M70.61 TROCHANTERIC BURSITIS OF BOTH HIPS: Primary | ICD-10-CM

## 2021-01-18 DIAGNOSIS — M70.62 TROCHANTERIC BURSITIS OF BOTH HIPS: Primary | ICD-10-CM

## 2021-01-18 NOTE — TELEPHONE ENCOUNTER
Patient said Dr Mike Agarwal used to give cortizone shots to jpaient in her hips  Patient would like to know if we are giving them and if could we give her a recommendation  Please advise    Reginaldo Ayala     Contact #[phone]

## 2021-01-19 ENCOUNTER — HOSPITAL ENCOUNTER (OUTPATIENT)
Dept: RADIOLOGY | Facility: HOSPITAL | Age: 56
Discharge: HOME | End: 2021-01-19
Attending: INTERNAL MEDICINE
Payer: COMMERCIAL

## 2021-01-19 DIAGNOSIS — M70.61 TROCHANTERIC BURSITIS OF BOTH HIPS: ICD-10-CM

## 2021-01-19 DIAGNOSIS — M70.62 TROCHANTERIC BURSITIS OF BOTH HIPS: ICD-10-CM

## 2021-01-19 PROCEDURE — 73502 X-RAY EXAM HIP UNI 2-3 VIEWS: CPT

## 2021-01-27 ENCOUNTER — TELEPHONE (OUTPATIENT)
Dept: CARDIOLOGY CLINIC | Facility: CLINIC | Age: 56
End: 2021-01-27

## 2021-01-27 NOTE — TELEPHONE ENCOUNTER
Pt  Has appt with you tomorrow  Was in [place] to take her daughter back to college  She did not stop to eat  They only time she got out of the car was to get gas  Is it okay for you to see her tomorrow  Patient understands if you want her to reschedule  This was last Weekend        [place] = [phone]

## 2021-01-28 ENCOUNTER — CONSULT (OUTPATIENT)
Dept: CARDIOLOGY CLINIC | Facility: CLINIC | Age: 56
End: 2021-01-28
Payer: COMMERCIAL

## 2021-01-28 VITALS
WEIGHT: 242 LBS | RESPIRATION RATE: 18 BRPM | SYSTOLIC BLOOD PRESSURE: 122 MMHG | HEIGHT: 68 IN | DIASTOLIC BLOOD PRESSURE: 86 MMHG | HEART RATE: 97 BPM | OXYGEN SATURATION: 96 % | BODY MASS INDEX: 36.68 KG/M2

## 2021-01-28 DIAGNOSIS — R07.89 ATYPICAL CHEST PAIN: Primary | ICD-10-CM

## 2021-01-28 DIAGNOSIS — Z76.89 ENCOUNTER TO ESTABLISH CARE: ICD-10-CM

## 2021-01-28 DIAGNOSIS — R07.9 CHEST PAIN, UNSPECIFIED TYPE: ICD-10-CM

## 2021-01-28 PROCEDURE — 3008F BODY MASS INDEX DOCD: CPT | Performed by: INTERNAL MEDICINE

## 2021-01-28 PROCEDURE — 99203 OFFICE O/P NEW LOW 30 MIN: CPT | Performed by: INTERNAL MEDICINE

## 2021-01-28 PROCEDURE — 3079F DIAST BP 80-89 MM HG: CPT | Performed by: INTERNAL MEDICINE

## 2021-01-28 PROCEDURE — 3074F SYST BP LT 130 MM HG: CPT | Performed by: INTERNAL MEDICINE

## 2021-01-28 PROCEDURE — 1036F TOBACCO NON-USER: CPT | Performed by: INTERNAL MEDICINE

## 2021-01-28 PROCEDURE — 93000 ELECTROCARDIOGRAM COMPLETE: CPT | Performed by: INTERNAL MEDICINE

## 2021-01-28 NOTE — PROGRESS NOTES
Cardiology Office Note    Elif Cabrera 54 y o  female MRN: [id number]    01/28/21          Assessment:  1  Chest pain   2  Hypertension   3  Hyperlipidemia      Plan:  · Will evaluate with a TTE and exercise stress test  · Her blood pressure is at goal, continue verpamil and aldactone  · Lipids at goal, continue simvastatin      Follow up: 4 months or sooner as needed    1  Atypical chest pain  Ambulatory referral to Cardiology    POCT ECG   2  Encounter to establish care  POCT ECG   3  Chest pain, unspecified type  Echo complete with contrast if indicated    Stress test only, exercise       HPI: Elif Cabrera is a 54y o  year old female with history of hypertension and hyperlipidemia who was referred by her PCP REBA Rao to establish care  She reports having an episode of chest discomfort on 10/30/20  The pain occurred at rest, was left-sided and radiated to her left shoulder  She denies any provoking factors  She presented to the ED for further evaluation  Cardiac enzymes were negative  ECG was unrevealing for ischemia  Her symptoms were felt to be musculoskeletal     On presentation today she denies recurrent chest discomfort  She denies dyspnea on exertion, palpitations or any other associated cardiac concerns  She was previously walking a mile daily without limitation however has not been as active over the past 2 weeks  She has been tolerating her medications without side effect       Family History:  Unknown family history of cardiac disease as she is adopted    Social history:  Lifelong nonsmoker      Allergies   Allergen Reactions    Levofloxacin Hives and Tachycardia     Annotation - [id number]: LIGHTHEADED    Sulfamethoxazole-Trimethoprim     Hydrocodone Bitartrate Er      Annotation - [id number]: FLUSHING    CHEST/RIB PAIN         Current Outpatient Medications:     acetaminophen (TYLENOL) 500 mg tablet, Take by mouth, Disp: , Rfl:     Cholecalciferol (VITAMIN D) 2000 units CAPS, Take by mouth, Disp: , Rfl:     cholestyramine (QUESTRAN) 4 GM/DOSE powder, Take 1 packet (4 g total) by mouth 2 (two) times a day As needed for diarrhea, Disp: 378 g, Rfl: 5    diclofenac sodium (VOLTAREN) 50 mg EC tablet, take 1 tablet by mouth twice a day (Patient taking differently: No sig reported), Disp: 180 tablet, Rfl: 1    gabapentin (NEURONTIN) 100 mg capsule, Take 1 capsule (100 mg total) by mouth daily, Disp: 90 capsule, Rfl: 2    Ginkgo Biloba 60 MG TABS, Take by mouth daily , Disp: , Rfl:     glucosamine-chondroitin 500-400 MG tablet, Take 1 tablet by mouth 3 (three) times a day, Disp: 100 tablet, Rfl: 2    mometasone (NASONEX) 50 mcg/act nasal spray, 1 spray into each nostril daily, Disp: 17 g, Rfl: 2    omeprazole (PriLOSEC) 10 mg delayed release capsule, take 1 capsule by mouth once daily, Disp: 90 capsule, Rfl: 2    Probiotic Product (ALIGN) 4 MG CAPS, Take by mouth, Disp: , Rfl:     simvastatin (ZOCOR) 5 MG tablet, take 1 tablet by mouth at bedtime, Disp: 90 tablet, Rfl: 2    spironolactone (ALDACTONE) 50 mg tablet, Take 1 tablet (50 mg total) by mouth daily, Disp: 90 tablet, Rfl: 2    verapamil (VERELAN PM) 180 MG 24 hr capsule, take 1 capsule by mouth daily before breakfast, Disp: 90 capsule, Rfl: 2    vitamin E, tocopherol, 400 units capsule, Take 1 capsule (400 Units total) by mouth daily, Disp: , Rfl:     Naltrexone-buPROPion HCl ER 8-90 MG TB12, 2 tabs  daily (Patient not taking: Reported on 1/28/2021), Disp: 120 tablet, Rfl: 3    Past Medical History:   Diagnosis Date    Allergic     Allergic rhinitis     last assessed 11/17/14     Arthritis     Asthma     Bleeding per rectum     Last assessed 8/1/16     Colon polyp     Diverticulitis of colon     GERD (gastroesophageal reflux disease)     Hypercholesterolemia     Hypertension     last assessed 3/24/15     Hyponatremia     last assessed 9/17/15     Obesity        Family History   Adopted: Yes   Problem Relation Age of Onset    No Known Problems Mother        Past Surgical History:   Procedure Laterality Date     SECTION      COLONOSCOPY      WISDOM TOOTH EXTRACTION      WRIST SURGERY         Social History     Socioeconomic History    Marital status: /Civil Union     Spouse name: Not on file    Number of children: Not on file    Years of education: Not on file    Highest education level: Not on file   Occupational History     Comment: full time employment    Social Needs    Financial resource strain: Not on file    Food insecurity     Worry: Not on file     Inability: Not on file   [employer] needs     Medical: Not on file     Non-medical: Not on file   Tobacco Use    Smoking status: Never Smoker    Smokeless tobacco: Never Used   Substance and Sexual Activity    Alcohol use: Yes     Comment: rare / never drank alcohol per allscript     Drug use: No    Sexual activity: Yes   Lifestyle    Physical activity     Days per week: Not on file     Minutes per session: Not on file    Stress: Not on file   Relationships    Social connections     Talks on phone: Not on file     Gets together: Not on file     Attends Denominational service: Not on file     Active member of club or organization: Not on file     Attends meetings of clubs or organizations: Not on file     Relationship status: Not on file    Intimate partner violence     Fear of current or ex partner: Not on file     Emotionally abused: Not on file     Physically abused: Not on file     Forced sexual activity: Not on file   Other Topics Concern    Not on file   Social History Narrative    Lives with spouse       Review of Systems   Constitution: Negative for diaphoresis, weight gain and weight loss  HENT: Negative for congestion  Cardiovascular: Positive for chest pain  Negative for dyspnea on exertion, irregular heartbeat, leg swelling, near-syncope, orthopnea, palpitations, paroxysmal nocturnal dyspnea and syncope     Respiratory: Negative for shortness of breath, sleep disturbances due to breathing and snoring  Hematologic/Lymphatic: Does not bruise/bleed easily  Skin: Negative for rash  Musculoskeletal: Negative for myalgias  Gastrointestinal: Negative for nausea and vomiting  Neurological: Negative for excessive daytime sleepiness and light-headedness  Psychiatric/Behavioral: The patient is not nervous/anxious  Vitals: /86   Pulse 97   Resp 18   Ht 5' 8" (1 727 m)   Wt 110 kg (242 lb)   SpO2 96%   BMI 36 80 kg/m²       Physical Exam:     GEN: Alert and oriented x 3, in no acute distress  Well appearing and well nourished  HEENT: Sclera anicteric, conjunctivae pink, mucous membranes moist  Oropharynx clear  NECK: Supple, no carotid bruits, no significant JVD  Trachea midline, no thyromegaly  HEART: Regular rhythm, normal S1 and S2, no murmurs, clicks, gallops or rubs  PMI nondisplaced, no thrills  LUNGS: Clear to auscultation bilaterally; no wheezes, rales, or rhonchi  No increased work of breathing or signs of respiratory distress  ABDOMEN: Soft, nontender, nondistended, normoactive bowel sounds  EXTREMITIES: Skin warm and well perfused, no clubbing, cyanosis, or edema  NEURO: No focal findings  Normal speech  Mood and affect normal    SKIN: Normal without suspicious lesions on exposed skin

## 2021-02-15 ENCOUNTER — CONSULT (OUTPATIENT)
Dept: OBGYN CLINIC | Facility: CLINIC | Age: 56
End: 2021-02-15
Payer: COMMERCIAL

## 2021-02-15 VITALS
RESPIRATION RATE: 20 BRPM | HEART RATE: 123 BPM | SYSTOLIC BLOOD PRESSURE: 107 MMHG | BODY MASS INDEX: 37.13 KG/M2 | WEIGHT: 245 LBS | HEIGHT: 68 IN | DIASTOLIC BLOOD PRESSURE: 74 MMHG

## 2021-02-15 DIAGNOSIS — M76.31 IT BAND SYNDROME, RIGHT: ICD-10-CM

## 2021-02-15 DIAGNOSIS — M70.62 TROCHANTERIC BURSITIS OF BOTH HIPS: Primary | ICD-10-CM

## 2021-02-15 DIAGNOSIS — M53.3 SACROILIAC JOINT DYSFUNCTION OF BOTH SIDES: ICD-10-CM

## 2021-02-15 DIAGNOSIS — M16.0 PRIMARY OSTEOARTHRITIS OF BOTH HIPS: ICD-10-CM

## 2021-02-15 DIAGNOSIS — R29.898 WEAKNESS OF BOTH HIPS: ICD-10-CM

## 2021-02-15 DIAGNOSIS — M62.9 HAMSTRING TIGHTNESS OF BOTH LOWER EXTREMITIES: ICD-10-CM

## 2021-02-15 DIAGNOSIS — M70.61 TROCHANTERIC BURSITIS OF BOTH HIPS: Primary | ICD-10-CM

## 2021-02-15 DIAGNOSIS — M47.816 LUMBAR FACET ARTHROPATHY: ICD-10-CM

## 2021-02-15 DIAGNOSIS — M51.36 DEGENERATIVE DISC DISEASE, LUMBAR: ICD-10-CM

## 2021-02-15 PROCEDURE — 99203 OFFICE O/P NEW LOW 30 MIN: CPT | Performed by: FAMILY MEDICINE

## 2021-02-15 PROCEDURE — 20610 DRAIN/INJ JOINT/BURSA W/O US: CPT | Performed by: FAMILY MEDICINE

## 2021-02-15 RX ORDER — LIDOCAINE HYDROCHLORIDE 10 MG/ML
5 INJECTION, SOLUTION INFILTRATION; PERINEURAL
Status: COMPLETED | OUTPATIENT
Start: 2021-02-15 | End: 2021-02-15

## 2021-02-15 RX ORDER — TRIAMCINOLONE ACETONIDE 40 MG/ML
40 INJECTION, SUSPENSION INTRA-ARTICULAR; INTRAMUSCULAR
Status: COMPLETED | OUTPATIENT
Start: 2021-02-15 | End: 2021-02-15

## 2021-02-15 RX ORDER — LIDOCAINE HYDROCHLORIDE 10 MG/ML
4 INJECTION, SOLUTION INFILTRATION; PERINEURAL
Status: COMPLETED | OUTPATIENT
Start: 2021-02-15 | End: 2021-02-15

## 2021-02-15 RX ADMIN — TRIAMCINOLONE ACETONIDE 40 MG: 40 INJECTION, SUSPENSION INTRA-ARTICULAR; INTRAMUSCULAR at 09:38

## 2021-02-15 RX ADMIN — LIDOCAINE HYDROCHLORIDE 5 ML: 10 INJECTION, SOLUTION INFILTRATION; PERINEURAL at 09:38

## 2021-02-15 RX ADMIN — LIDOCAINE HYDROCHLORIDE 4 ML: 10 INJECTION, SOLUTION INFILTRATION; PERINEURAL at 09:38

## 2021-02-15 NOTE — PROGRESS NOTES
Assessment/Plan:  Assessment/Plan   Diagnoses and all orders for this visit:    Trochanteric bursitis of both hips  -     Ambulatory referral to Sports Medicine  -     Large joint arthrocentesis: R greater trochanteric bursa    Primary osteoarthritis of both hips    Degenerative disc disease, lumbar    Lumbar facet arthropathy    Sacroiliac joint dysfunction of both sides    Weakness of both hips    Hamstring tightness of both lower extremities    It band syndrome, right    Other orders  -     Black Cohosh (REMIFEMIN MENOPAUSE PO); Take by mouth       80-year-old female with right lateral hip pain of more than few years duration  Discussed with patient physical exam, imaging studies, impression and plan  X-rays of the hip and pelvis are noted for mild degenerative changes of both hips and degenerative changes of lumbar spine  Physical exam of lumbar spine noted for midline and bilateral paraspinal and bilateral sacroiliac joint tenderness  She has intact range of motion of lumbar spine  She has tenderness of the right greater trochanter and along the right IT band  There is no groin pain with HODA and FADDIR maneuvers of the hips  She has intact sensation both lower extremities and weakness both hips with abduction  Clinical impression that she is symptomatic from greater trochanter bursitis and IT band syndrome  I discussed treatment regimen of corticosteroid injection, topical anesthetic, supplements, and home exercises to which she agreed  I administered mixture of 4 cc 1% lidocaine and 1 cc Kenalog to the right greater trochanter bursa without complication  She is to continue with topical Voltaren gel and she may also apply topical CBD containing less than 0 3% THC  She is to continue with tumeric supplements  I provided her printed instructions home exercises to do on a regular basis  She will follow up as needed  Subjective:   Patient ID: Giovanna Whitfield is a 54 y o  female    Chief Complaint Patient presents with    Right Hip - Pain        51-year-old female presents for evaluation of right lateral hip pain of more than few years duration  She denies any particular trauma or inciting event  She reports have been diagnosed with bursitis hips and had received corticoid injection in the past, with most recent injection have been performed 2 years ago  She has been experiencing pain described as localized mainly to the lateral aspect both hips, right worse than the left, nonradiating, worse when laying on her right side, burning and sometimes sharp, and improved with changing position  She has been applying topical Solutions including Voltaren gel to help with symptoms  She recently had x-rays done which were noted for arthritis of the hips and she was referred to orthopedic care  Hip Pain  This is a chronic problem  The current episode started more than 1 year ago  The problem occurs daily  The problem has been gradually worsening  Associated symptoms include arthralgias  Pertinent negatives include no abdominal pain, chest pain, chills, fever, joint swelling, numbness, rash, sore throat or weakness  Exacerbated by: Direct pressure, lateral recumbent  She has tried rest, acetaminophen and position changes for the symptoms  The treatment provided mild relief  Review of Systems   Constitutional: Negative for chills and fever  HENT: Negative for sore throat  Eyes: Negative for visual disturbance  Respiratory: Negative for shortness of breath  Cardiovascular: Negative for chest pain  Gastrointestinal: Negative for abdominal pain  Genitourinary: Negative for flank pain  Musculoskeletal: Positive for arthralgias  Negative for joint swelling  Skin: Negative for rash and wound  Neurological: Negative for weakness and numbness  Hematological: Does not bruise/bleed easily  Psychiatric/Behavioral: Negative for self-injury         Objective:  Vitals:    02/15/21 0907   BP: 107/74   Pulse: (!) 123   Resp: 20   Weight: 111 kg (245 lb)   Height: 5' 8" (1 727 m)     Right Ankle Exam     Muscle Strength   Dorsiflexion:  5/5  Plantar flexion:  5/5      Left Ankle Exam     Muscle Strength   Dorsiflexion:  5/5   Plantar flexion:  5/5       Right Hip Exam     Tenderness   The patient is experiencing tenderness in the greater trochanter (IT band)  Muscle Strength   Abduction: 4/5   Flexion: 5/5     Tests   HODA: negative    Comments:  Negative FADDIR  Hamstring tightness      Left Hip Exam     Tenderness   The patient is experiencing tenderness in the greater trochanter  Muscle Strength   Abduction: 4/5   Flexion: 5/5     Tests   HODA: negative    Comments:  Negative FADDIR  Hamstring tightness      Back Exam     Tenderness   The patient is experiencing tenderness in the lumbar and sacroiliac  Range of Motion   The patient has normal back ROM  Muscle Strength   Right Quadriceps:  5/5   Left Quadriceps:  5/5     Tests   Straight leg raise right: negative  Straight leg raise left: negative    Reflexes   Patellar: Hyporeflexic    Other   Sensation: normal          Strength/Myotome Testing     Left Ankle/Foot   Dorsiflexion: 5  Plantar flexion: 5    Right Ankle/Foot   Dorsiflexion: 5  Plantar flexion: 5      Physical Exam  Vitals signs and nursing note reviewed  Constitutional:       General: She is not in acute distress  Appearance: She is well-developed  HENT:      Head: Normocephalic  Right Ear: External ear normal       Left Ear: External ear normal    Eyes:      Conjunctiva/sclera: Conjunctivae normal    Neck:      Trachea: No tracheal deviation  Cardiovascular:      Comments: Tachycardic  Pulmonary:      Effort: Pulmonary effort is normal  No respiratory distress  Abdominal:      General: There is no distension  Musculoskeletal:         General: Tenderness present  Skin:     General: Skin is warm and dry     Neurological:      Mental Status: She is alert and oriented to person, place, and time  Psychiatric:         Behavior: Behavior normal          I have personally reviewed pertinent films in PACS and my interpretation is Degenerative changes of the hips and lumbar spine  Large joint arthrocentesis: R greater trochanteric bursa  Universal Protocol:  Consent: Verbal consent obtained  Risks and benefits: risks, benefits and alternatives were discussed  Consent given by: patient  Time out: Immediately prior to procedure a "time out" was called to verify the correct patient, procedure, equipment, support staff and site/side marked as required  Timeout called at: 2/15/2021 9:33 AM   Patient understanding: patient states understanding of the procedure being performed  Patient consent: the patient's understanding of the procedure matches consent given  Procedure consent: procedure consent matches procedure scheduled  Relevant documents: relevant documents present and verified  Test results: test results available and properly labeled  Site marked: the operative site was marked  Radiology Images displayed and confirmed   If images not available, report reviewed: imaging studies available  Required items: required blood products, implants, devices, and special equipment available  Patient identity confirmed: verbally with patient    Supporting Documentation  Indications: pain   Procedure Details  Location: hip - R greater trochanteric bursa  Preparation: Patient was prepped and draped in the usual sterile fashion  Needle size: 22 G (3 5" Spinal)  Ultrasound guidance: no  Approach: lateral  Medications administered: 5 mL lidocaine 1 %; 4 mL lidocaine 1 %; 40 mg triamcinolone acetonide 40 mg/mL    Patient tolerance: patient tolerated the procedure well with no immediate complications  Dressing:  Sterile dressing applied

## 2021-02-15 NOTE — LETTER
February 15, 2021     Calin Judd, [address] [facility]  [facility] [place] [id number]    Patient: Elif Cabrera   YOB: 1965   Date of Visit: 2/15/2021       Dear Dr Megan العراقي: Thank you for referring Edgar Minors to me for evaluation  Below are my notes for this consultation  If you have questions, please do not hesitate to call me  I look forward to following your patient along with you  Sincerely,        [employer], DO        CC: No Recipients  [employer], DO  2/15/2021 12:48 PM  Sign when Signing Visit  Assessment/Plan:  Assessment/Plan   Diagnoses and all orders for this visit:    Trochanteric bursitis of both hips  -     Ambulatory referral to Sports Medicine  -     Large joint arthrocentesis: R greater trochanteric bursa    Primary osteoarthritis of both hips    Degenerative disc disease, lumbar    Lumbar facet arthropathy    Sacroiliac joint dysfunction of both sides    Weakness of both hips    Hamstring tightness of both lower extremities    It band syndrome, right    Other orders  -     Black Cohosh (REMIFEMIN MENOPAUSE PO); Take by mouth       70-year-old female with right lateral hip pain of more than few years duration  Discussed with patient physical exam, imaging studies, impression and plan  X-rays of the hip and pelvis are noted for mild degenerative changes of both hips and degenerative changes of lumbar spine  Physical exam of lumbar spine noted for midline and bilateral paraspinal and bilateral sacroiliac joint tenderness  She has intact range of motion of lumbar spine  She has tenderness of the right greater trochanter and along the right IT band  There is no groin pain with HODA and FADDIR maneuvers of the hips  She has intact sensation both lower extremities and weakness both hips with abduction  Clinical impression that she is symptomatic from greater trochanter bursitis and IT band syndrome    I discussed treatment regimen of corticosteroid injection, topical anesthetic, supplements, and home exercises to which she agreed  I administered mixture of 4 cc 1% lidocaine and 1 cc Kenalog to the right greater trochanter bursa without complication  She is to continue with topical Voltaren gel and she may also apply topical CBD containing less than 0 3% THC  She is to continue with tumeric supplements  I provided her printed instructions home exercises to do on a regular basis  She will follow up as needed  Subjective:   Patient ID: Sade Fish is a 54 y o  female  Chief Complaint   Patient presents with    Right Hip - Pain        70-year-old female presents for evaluation of right lateral hip pain of more than few years duration  She denies any particular trauma or inciting event  She reports have been diagnosed with bursitis hips and had received corticoid injection in the past, with most recent injection have been performed 2 years ago  She has been experiencing pain described as localized mainly to the lateral aspect both hips, right worse than the left, nonradiating, worse when laying on her right side, burning and sometimes sharp, and improved with changing position  She has been applying topical Solutions including Voltaren gel to help with symptoms  She recently had x-rays done which were noted for arthritis of the hips and she was referred to orthopedic care  Hip Pain  This is a chronic problem  The current episode started more than 1 year ago  The problem occurs daily  The problem has been gradually worsening  Associated symptoms include arthralgias  Pertinent negatives include no abdominal pain, chest pain, chills, fever, joint swelling, numbness, rash, sore throat or weakness  Exacerbated by: Direct pressure, lateral recumbent  She has tried rest, acetaminophen and position changes for the symptoms  The treatment provided mild relief  Review of Systems   Constitutional: Negative for chills and fever  HENT: Negative for sore throat  Eyes: Negative for visual disturbance  Respiratory: Negative for shortness of breath  Cardiovascular: Negative for chest pain  Gastrointestinal: Negative for abdominal pain  Genitourinary: Negative for flank pain  Musculoskeletal: Positive for arthralgias  Negative for joint swelling  Skin: Negative for rash and wound  Neurological: Negative for weakness and numbness  Hematological: Does not bruise/bleed easily  Psychiatric/Behavioral: Negative for self-injury  Objective:  Vitals:    02/15/21 0907   BP: 107/74   Pulse: (!) 123   Resp: 20   Weight: 111 kg (245 lb)   Height: 5' 8" (1 727 m)     Right Ankle Exam     Muscle Strength   Dorsiflexion:  5/5  Plantar flexion:  5/5      Left Ankle Exam     Muscle Strength   Dorsiflexion:  5/5   Plantar flexion:  5/5       Right Hip Exam     Tenderness   The patient is experiencing tenderness in the greater trochanter (IT band)  Muscle Strength   Abduction: 4/5   Flexion: 5/5     Tests   HODA: negative    Comments:  Negative FADDIR  Hamstring tightness      Left Hip Exam     Tenderness   The patient is experiencing tenderness in the greater trochanter  Muscle Strength   Abduction: 4/5   Flexion: 5/5     Tests   HODA: negative    Comments:  Negative FADDIR  Hamstring tightness      Back Exam     Tenderness   The patient is experiencing tenderness in the lumbar and sacroiliac  Range of Motion   The patient has normal back ROM  Muscle Strength   Right Quadriceps:  5/5   Left Quadriceps:  5/5     Tests   Straight leg raise right: negative  Straight leg raise left: negative    Reflexes   Patellar: Hyporeflexic    Other   Sensation: normal          Strength/Myotome Testing     Left Ankle/Foot   Dorsiflexion: 5  Plantar flexion: 5    Right Ankle/Foot   Dorsiflexion: 5  Plantar flexion: 5      Physical Exam  Vitals signs and nursing note reviewed  Constitutional:       General: She is not in acute distress       Appearance: She is well-developed  HENT:      Head: Normocephalic  Right Ear: External ear normal       Left Ear: External ear normal    Eyes:      Conjunctiva/sclera: Conjunctivae normal    Neck:      Trachea: No tracheal deviation  Cardiovascular:      Comments: Tachycardic  Pulmonary:      Effort: Pulmonary effort is normal  No respiratory distress  Abdominal:      General: There is no distension  Musculoskeletal:         General: Tenderness present  Skin:     General: Skin is warm and dry  Neurological:      Mental Status: She is alert and oriented to person, place, and time  Psychiatric:         Behavior: Behavior normal          I have personally reviewed pertinent films in PACS and my interpretation is Degenerative changes of the hips and lumbar spine  Large joint arthrocentesis: R greater trochanteric bursa  Universal Protocol:  Consent: Verbal consent obtained  Risks and benefits: risks, benefits and alternatives were discussed  Consent given by: patient  Time out: Immediately prior to procedure a "time out" was called to verify the correct patient, procedure, equipment, support staff and site/side marked as required  Timeout called at: 2/15/2021 9:33 AM   Patient understanding: patient states understanding of the procedure being performed  Patient consent: the patient's understanding of the procedure matches consent given  Procedure consent: procedure consent matches procedure scheduled  Relevant documents: relevant documents present and verified  Test results: test results available and properly labeled  Site marked: the operative site was marked  Radiology Images displayed and confirmed   If images not available, report reviewed: imaging studies available  Required items: required blood products, implants, devices, and special equipment available  Patient identity confirmed: verbally with patient    Supporting Documentation  Indications: pain   Procedure Details  Location: hip - R greater trochanteric bursa  Preparation: Patient was prepped and draped in the usual sterile fashion  Needle size: 22 G (3 5" Spinal)  Ultrasound guidance: no  Approach: lateral  Medications administered: 5 mL lidocaine 1 %; 4 mL lidocaine 1 %; 40 mg triamcinolone acetonide 40 mg/mL    Patient tolerance: patient tolerated the procedure well with no immediate complications  Dressing:  Sterile dressing applied

## 2021-02-15 NOTE — PATIENT INSTRUCTIONS
Iliotibial Band Syndrome Exercises   AMBULATORY CARE:   Iliotibial band syndrome (ITBS) , also known as runner's knee, happens when your iliotibial band (ITB) becomes injured and causes pain  The ITB is a long band of tissue  It extends from the outside of your pelvis (hip bone) to the outside of your tibia (shin bone)  It helps keeps the knee in the correct position when you stand or move  ITBS occurs most often in long distance runners and cyclists  What you need to know about ITB exercises:  ITB exercises help strengthen the muscles around your knee and hip  Strong muscles can help reduce pain and decrease your risk of future injury  Contact your healthcare provider if:   · You have sharp pain during exercise or at rest     · You have questions or concerns about stretches or exercises  Decrease pain and swelling:   · Apply ice  on your hip, knee, or thigh for 15 to 20 minutes every hour or as directed  Use an ice pack, or put crushed ice in a plastic bag  Cover it with a towel  Ice helps prevent tissue damage and decreases swelling and pain  · Apply heat  on your hip, knee, or thigh for 20 to 30 minutes before you stretch or exercise  Use a heat pack or wet a washcloth and heat it for 15 seconds in the microwave  Heat helps decrease pain and makes it easier to stretch your muscles  · Massage painful areas as directed  Use a foam roller to gently massage your painful areas  Place the foam roller on a flat surface  Lie on your side with the foam roller against your painful leg  Move your body so that it rolls up and down from your hip to above your knee  Do not lie with it against the outside of your knee cap  Exercise safety:  Do not start an exercise program before you talk to your healthcare provider  You may need to wait until your swelling and pain have gone down before you start to exercise  · Move slowly and smoothly  Avoid fast or jerky motions  This will help prevent another injury  · Breathe normally  Do not hold your breath  It is important to breathe in and out so you do not tense up during exercise  Tension could prevent you from moving your joint in a full range of motion  · Do the exercises and stretches on both legs  Do this so both ITBs remain strong and flexible  · Stop if you feel sharp pain or an increase in pain  Stop the exercise and contact your healthcare provider if you have these symptoms  It is normal to feel some discomfort, such as a dull ache, during exercise  Regular exercise will help decrease your discomfort over time  · Warm up before you stretch and exercise  This will help prevent an injury  Walk or ride a stationary bike for 5 to 10 minutes  How to perform ITB stretches:  Always stretch before and after you do strengthening exercises  Hold each stretch for 30 seconds to 1 minute  Repeat each stretch 2 to 3 times or as directed  · Standing ITB stretch:  Stand with your injured leg behind your other leg  Cross your front leg over your injured leg  Bend sideways toward the hip that is not injured  Stop when you feel a stretch in the hip of your injured leg  Repeat on the other side  · Lying ITB stretch:  Lie on your back  Bend the knee of your injured leg toward your chest  Place your hand on the outside of your thigh  Slowly pull your knee across your body  Stop when you feel a stretch in your hip and outside of your thigh  Repeat on the other side  · Hip stretch:  Lie on the ground  Place both hands on the shin of 1 leg  Pull your knee toward your chest  Repeat on the other side  · Standing quadriceps stretch:  Stand and place one hand against a wall or hold the back of a chair for balance  With your weight on one leg, bend your other leg and grab your ankle  Pull your heel toward your buttocks  · Sitting hamstring stretch:  Sit with both legs straight in front of you   Place your palms on the floor and slide your hands forward until you feel the stretch  If possible, grab your toes  Do not round your back  How to perform ITB strengthening exercises: Your healthcare provider will tell you how often to do the following exercises:  · Standing half squats:  Stand with your feet shoulder-width apart  Lean your back against a wall or hold the back of a chair for balance, if needed  Slowly sit down about 10 inches, as if you are going to sit in a chair  Put most of your weight in your heels  Hold the squat for 5 seconds, then slowly rise to a standing position  Do 3 sets of 10 squats  · Sitting leg lifts:  Sit in a chair with both feet flat on the floor  Slowly straighten and raise one leg  Squeeze your thigh muscles and hold for 5 seconds  Relax and return your foot to the floor  Do 3 sets of 10 lifts on each leg  · Single leg dips:  Stand on your injured leg, between 2 chairs  The backs of the chairs should be toward you  Put 1 hand on each chair  Straighten your leg that is not injured and lift it off the floor  Use the chairs to hold some of your weight  Bend the knee of your injured leg  Slowly lower your body toward the floor a few inches  Your weight should be in your heel  Hold for 5 seconds  Slowly return to a standing position  Do 3 sets of 10 on each leg  · Standing hamstring curls: Face a wall and place both palms flat on the wall  Instead you can hold the back of a chair for balance  With your weight on 1 leg, lift your other foot as close to your buttocks as you can  Hold for 5 seconds and then lower your leg  Do 3 sets of 10 curls on each leg  · Straight leg lifts:  Lie on your stomach with straight legs  Fold your arms in front of you and rest your head in your arms  Tighten your leg muscles and raise one leg as high as you can  Hold for 5 seconds, then lower your leg  Do 2 sets of 10 lifts on each leg to strengthen your buttocks         · Hip adduction:  Lie on your injured side  Cross your top leg over your injured leg  Put your foot on the floor in front of you  Raise your injured leg until it touches the other leg  Slowly lower the leg to the floor  Do 3 sets of 10 on each leg  · Hip abduction:  Lie on your side that is not injured  Straighten your legs  Slowly raise your injured leg as high as you can  Keep your foot pointing straight  Hold for 5 seconds then slowly lower your leg  Do 3 sets of 10 on each leg  Follow up with your healthcare provider as directed:  Write down your questions so you remember to ask them during your visits  © Copyright [facility] [address] Information is for End User's use only and may not be sold, redistributed or otherwise used for commercial purposes  All illustrations and images included in CareNotes® are the copyrighted property of [employer] D A M [employer] Inc  or [facility]   The above information is an  only  It is not intended as medical advice for individual conditions or treatments  Talk to your doctor, nurse or pharmacist before following any medical regimen to see if it is safe and effective for you

## 2021-02-17 ENCOUNTER — HOSPITAL ENCOUNTER (OUTPATIENT)
Dept: NON INVASIVE DIAGNOSTICS | Facility: CLINIC | Age: 56
Discharge: HOME | End: 2021-02-17
Payer: COMMERCIAL

## 2021-02-17 DIAGNOSIS — R07.9 CHEST PAIN, UNSPECIFIED TYPE: ICD-10-CM

## 2021-02-17 LAB
ARRHY DURING EX: NORMAL
CHEST PAIN STATEMENT: NORMAL
MAX DIASTOLIC BP: 78 MMHG
MAX HEART RATE: 142 BPM
MAX PREDICTED HEART RATE: 165 BPM
MAX. SYSTOLIC BP: 162 MMHG
PROTOCOL NAME: NORMAL
REASON FOR TERMINATION: NORMAL
TARGET HR FORMULA: NORMAL
TEST INDICATION: NORMAL
TIME IN EXERCISE PHASE: NORMAL

## 2021-02-17 PROCEDURE — 93306 TTE W/DOPPLER COMPLETE: CPT | Performed by: INTERNAL MEDICINE

## 2021-02-17 PROCEDURE — 93017 CV STRESS TEST TRACING ONLY: CPT

## 2021-02-17 PROCEDURE — 93016 CV STRESS TEST SUPVJ ONLY: CPT | Performed by: INTERNAL MEDICINE

## 2021-02-17 PROCEDURE — 93018 CV STRESS TEST I&R ONLY: CPT | Performed by: INTERNAL MEDICINE

## 2021-02-17 PROCEDURE — 93306 TTE W/DOPPLER COMPLETE: CPT

## 2021-02-18 NOTE — RESULT ENCOUNTER NOTE
Please let the patient know that there were no significant abnormalities on their echo  We can discuss further at their next appointment  Thanks

## 2021-03-01 ENCOUNTER — APPOINTMENT (EMERGENCY)
Dept: RADIOLOGY | Facility: HOSPITAL | Age: 56
End: 2021-03-01
Payer: COMMERCIAL

## 2021-03-01 ENCOUNTER — HOSPITAL ENCOUNTER (EMERGENCY)
Facility: HOSPITAL | Age: 56
Discharge: HOME | End: 2021-03-01
Attending: EMERGENCY MEDICINE
Payer: COMMERCIAL

## 2021-03-01 VITALS
OXYGEN SATURATION: 98 % | TEMPERATURE: 97.8 F | DIASTOLIC BLOOD PRESSURE: 88 MMHG | RESPIRATION RATE: 16 BRPM | HEART RATE: 68 BPM | SYSTOLIC BLOOD PRESSURE: 154 MMHG

## 2021-03-01 DIAGNOSIS — S43.409A SHOULDER SPRAIN: ICD-10-CM

## 2021-03-01 DIAGNOSIS — M25.511 RIGHT SHOULDER PAIN: Primary | ICD-10-CM

## 2021-03-01 PROCEDURE — 99283 EMERGENCY DEPT VISIT LOW MDM: CPT

## 2021-03-01 PROCEDURE — 99284 EMERGENCY DEPT VISIT MOD MDM: CPT | Performed by: EMERGENCY MEDICINE

## 2021-03-01 PROCEDURE — 73070 X-RAY EXAM OF ELBOW: CPT

## 2021-03-01 PROCEDURE — 73030 X-RAY EXAM OF SHOULDER: CPT

## 2021-03-01 RX ORDER — NAPROXEN 375 MG/1
375 TABLET ORAL 2 TIMES DAILY WITH MEALS
Qty: 20 TABLET | Refills: 0 | Status: SHIPPED | OUTPATIENT
Start: 2021-03-01

## 2021-03-01 RX ORDER — NAPROXEN 250 MG/1
500 TABLET ORAL ONCE
Status: COMPLETED | OUTPATIENT
Start: 2021-03-01 | End: 2021-03-01

## 2021-03-01 RX ADMIN — NAPROXEN 500 MG: 250 TABLET ORAL at 20:03

## 2021-03-01 NOTE — Clinical Note
Miri Kurtz was seen and treated in our emergency department on 3/1/2021  Diagnosis:     Sherlyn Zhou  may return to work on return date  She may return on this date: 03/02/2021    No lifting/use of R arm/shoulder until cleared by physician  If you have any questions or concerns, please don't hesitate to call        Preeti Kumar MD    ______________________________           _______________          _______________  Hospital Representative                              Date                                Time

## 2021-03-02 NOTE — ED PROVIDER NOTES
History  Chief Complaint   Patient presents with    Shoulder Injury     Pt reports falling on ice and falling on right elbow  Pt denies BTs, hitting head, or LOC  History provided by:  Patient   used: No    Shoulder Injury  Location:  Shoulder  Shoulder location:  R shoulder  Injury: yes    Mechanism of injury: fall    Pain details:     Quality:  Aching    Radiates to:  R elbow    Severity:  Moderate    Onset quality:  Sudden    Timing:  Constant    Progression:  Unchanged  Handedness:  Right-handed  Dislocation: no    Prior injury to area:  No  Relieved by:  Rest and ice  Worsened by: Movement and bearing weight  Ineffective treatments:  None tried  Associated symptoms: no back pain, no fever and no neck pain        Prior to Admission Medications   Prescriptions Last Dose Informant Patient Reported? Taking?    Black Cohosh (REMIFEMIN MENOPAUSE PO)  Self Yes No   Sig: Take by mouth   Cholecalciferol (VITAMIN D) 2000 units CAPS  Self Yes No   Sig: Take by mouth   Ginkgo Biloba 60 MG TABS  Self Yes No   Sig: Take by mouth daily    Naltrexone-buPROPion HCl ER 8-90 MG TB12  Self No No   Si tabs  daily   Probiotic Product (ALIGN) 4 MG CAPS  Self Yes No   Sig: Take by mouth   acetaminophen (TYLENOL) 500 mg tablet  Self Yes No   Sig: Take by mouth   cholestyramine (QUESTRAN) 4 GM/DOSE powder  Self No No   Sig: Take 1 packet (4 g total) by mouth 2 (two) times a day As needed for diarrhea   diclofenac sodium (VOLTAREN) 50 mg EC tablet  Self No No   Sig: take 1 tablet by mouth twice a day   Patient taking differently: No sig reported   gabapentin (NEURONTIN) 100 mg capsule  Self No No   Sig: Take 1 capsule (100 mg total) by mouth daily   glucosamine-chondroitin 500-400 MG tablet  Self No No   Sig: Take 1 tablet by mouth 3 (three) times a day   mometasone (NASONEX) 50 mcg/act nasal spray  Self No No   Si spray into each nostril daily   omeprazole (PriLOSEC) 10 mg delayed release capsule Self No No   Sig: take 1 capsule by mouth once daily   simvastatin (ZOCOR) 5 MG tablet  Self No No   Sig: take 1 tablet by mouth at bedtime   spironolactone (ALDACTONE) 50 mg tablet  Self No No   Sig: Take 1 tablet (50 mg total) by mouth daily   verapamil (VERELAN PM) 180 MG 24 hr capsule  Self No No   Sig: take 1 capsule by mouth daily before breakfast   vitamin E, tocopherol, 400 units capsule  Self No No   Sig: Take 1 capsule (400 Units total) by mouth daily      Facility-Administered Medications: None       Past Medical History:   Diagnosis Date    Allergic     Allergic rhinitis     last assessed 14     Arthritis     Asthma     Bleeding per rectum     Last assessed 16     Colon polyp     Diverticulitis of colon     GERD (gastroesophageal reflux disease)     Hypercholesterolemia     Hypertension     last assessed 3/24/15     Hyponatremia     last assessed 9/17/15     Obesity        Past Surgical History:   Procedure Laterality Date     SECTION      COLONOSCOPY      WISDOM TOOTH EXTRACTION      WRIST SURGERY         Family History   Adopted: Yes   Problem Relation Age of Onset    No Known Problems Mother      I have reviewed and agree with the history as documented  E-Cigarette/Vaping    E-Cigarette Use Never User      E-Cigarette/Vaping Substances     Social History     Tobacco Use    Smoking status: Never Smoker    Smokeless tobacco: Never Used   Substance Use Topics    Alcohol use: Yes     Comment: rare / never drank alcohol per allscript     Drug use: No       Review of Systems   Constitutional: Negative for chills and fever  HENT: Negative for ear pain and sore throat  Eyes: Negative for pain and visual disturbance  Respiratory: Negative for cough and shortness of breath  Cardiovascular: Negative for chest pain and palpitations  Gastrointestinal: Negative for abdominal pain, nausea and vomiting  Genitourinary: Negative for dysuria and hematuria  Musculoskeletal: Negative for arthralgias, back pain, neck pain and neck stiffness  Skin: Negative for color change and rash  Neurological: Negative for seizures, syncope, weakness, light-headedness and numbness  All other systems reviewed and are negative  Physical Exam  Physical Exam  Vitals signs and nursing note reviewed  Constitutional:       General: She is not in acute distress  Appearance: She is well-developed  HENT:      Head: Normocephalic and atraumatic  Mouth/Throat:      Mouth: Mucous membranes are moist       Pharynx: Oropharynx is clear  Eyes:      Conjunctiva/sclera: Conjunctivae normal    Neck:      Musculoskeletal: Neck supple  Cardiovascular:      Rate and Rhythm: Normal rate and regular rhythm  Heart sounds: No murmur  Pulmonary:      Effort: Pulmonary effort is normal  No respiratory distress  Breath sounds: Normal breath sounds  Abdominal:      Palpations: Abdomen is soft  Tenderness: There is no abdominal tenderness  Musculoskeletal:      Comments: Patient is able to fully extend the elbow she is able to actively range her shoulder to about 30° of abduction and about 45° of external rotation  Able to passively range the shoulder but complains of pain, primarily in the area of the lateral proximal humerus  No deformity is noted  No tenderness over the [facility] [place] joint  Point tenderness over the olecranon fossa where patient has small hematoma  No lacerations or abrasions  Skin:     General: Skin is warm and dry  Capillary Refill: Capillary refill takes less than 2 seconds  Neurological:      General: No focal deficit present  Mental Status: She is alert and oriented to person, place, and time           Vital Signs  ED Triage Vitals   Temperature Pulse Respirations Blood Pressure SpO2   03/01/21 1854 03/01/21 1854 03/01/21 1854 03/01/21 1854 03/01/21 1854   97 8 °F (36 6 °C) 64 17 144/63 96 %      Temp Source Heart Rate Source Patient Position - Orthostatic VS BP Location FiO2 (%)   03/01/21 1854 03/01/21 1854 03/01/21 1854 03/01/21 1854 --   Oral Monitor Sitting Left arm       Pain Score       03/01/21 2003       8           Vitals:    03/01/21 1854 03/01/21 1954   BP: 144/63 154/88   Pulse: 64 68   Patient Position - Orthostatic VS: Sitting Sitting         Visual Acuity      ED Medications  Medications   naproxen (NAPROSYN) tablet 500 mg (500 mg Oral Given 3/1/21 2003)       Diagnostic Studies  Results Reviewed     None                 XR shoulder 2+ vw right    (Results Pending)   XR elbow 2 vw right    (Results Pending)              Procedures  Procedures         ED Course                             SBIRT 20yo+      Most Recent Value   SBIRT (25 yo +)   In order to provide better care to our patients, we are screening all of our patients for alcohol and drug use  Would it be okay to ask you these screening questions? No Filed at: 03/01/2021 1956   Initial Alcohol Screen: US AUDIT-C    1  How often do you have a drink containing alcohol?  0 Filed at: 03/01/2021 1956   Audit-C Score  0 Filed at: 03/01/2021 1956   NERY: How many times in the past year have you    Used an illegal drug or used a prescription medication for non-medical reasons? Never Filed at: 03/01/2021 1956                    MDM  Number of Diagnoses or Management Options  Right shoulder pain: new and requires workup  Shoulder sprain: new and requires workup  Diagnosis management comments: 25-year-old female presenting for right-sided shoulder pain after a fall while slipping on ice  She waited struck her elbow is now having pain in her shoulder and elbow  She denies head trauma, loss of consciousness, trauma to other extremities, or use of blood thinners  She has been ambulating without difficulty since the accident  Plain radiographs were reviewed independently by me and appears negative for fracture or dislocation    She does have range of motion limited by pain with shoulder abduction and external rotation  I suspect she may have a sprain/strain of muscles of the rotator cuff  We will sling and have follow-up with orthopedics for sports medicine  An over-the-counter analgesia for pain  Discussed return precautions  Amount and/or Complexity of Data Reviewed  Tests in the radiology section of CPT®: reviewed and ordered  Review and summarize past medical records: yes  Independent visualization of images, tracings, or specimens: yes        Disposition  Final diagnoses:   Right shoulder pain   Shoulder sprain     Time reflects when diagnosis was documented in both MDM as applicable and the Disposition within this note     Time User Action Codes Description Comment    3/1/2021  8:38 PM Paty Crater Add [M25 511] Right shoulder pain     3/1/2021  8:38 PM Paty Crater Add [M58 048E] Shoulder sprain       ED Disposition     ED Disposition Condition Date/Time Comment    Discharge Stable Mon Mar 1, 2021  8:40 PM Beth Going discharge to home/self care  Follow-up Information     Follow up With Specialties Details Why Contact Info Additional [address] [facility], Nurse Practitioner   [phone]  [facility] [phone]       [address] [facility] [place] [address] Orthopedic Surgery   [address]  [address] [facility]  [address] [facility] [address] [phone] [address] [facility] [place] [address], [address] [place] [address], [address], [address], [phone]    [facility], DO Sports Medicine   [address] [facility]  [address]  [facility] [address]  [phone]             Patient's Medications   Discharge Prescriptions    NAPROXEN (NAPROSYN) 375 MG TABLET    Take 1 tablet (375 mg total) by mouth 2 (two) times a day with meals       Start Date: 3/1/2021  End Date: --       Order Dose: 375 mg       Quantity: 20 tablet    Refills: 0     No discharge procedures on file      PDMP Review None          ED Provider  Electronically Signed by           Alison Gallardo MD  03/01/21 6510

## 2021-03-08 ENCOUNTER — OFFICE VISIT (OUTPATIENT)
Dept: OBGYN CLINIC | Facility: CLINIC | Age: 56
End: 2021-03-08
Payer: COMMERCIAL

## 2021-03-08 VITALS
BODY MASS INDEX: 37.44 KG/M2 | WEIGHT: 247 LBS | HEART RATE: 65 BPM | HEIGHT: 68 IN | SYSTOLIC BLOOD PRESSURE: 126 MMHG | DIASTOLIC BLOOD PRESSURE: 85 MMHG

## 2021-03-08 DIAGNOSIS — L65.9 ALOPECIA: ICD-10-CM

## 2021-03-08 DIAGNOSIS — M79.89 LEG SWELLING: ICD-10-CM

## 2021-03-08 DIAGNOSIS — J45.909 MODERATE ASTHMA WITHOUT COMPLICATION, UNSPECIFIED WHETHER PERSISTENT: ICD-10-CM

## 2021-03-08 DIAGNOSIS — J01.91 ACUTE RECURRENT SINUSITIS, UNSPECIFIED LOCATION: ICD-10-CM

## 2021-03-08 DIAGNOSIS — I10 BENIGN ESSENTIAL HYPERTENSION: ICD-10-CM

## 2021-03-08 DIAGNOSIS — I10 BENIGN ESSENTIAL HTN: ICD-10-CM

## 2021-03-08 DIAGNOSIS — I10 HYPERTENSION, UNSPECIFIED TYPE: ICD-10-CM

## 2021-03-08 DIAGNOSIS — S49.91XA INJURY OF GLENOID LABRUM, RIGHT, INITIAL ENCOUNTER: Primary | ICD-10-CM

## 2021-03-08 DIAGNOSIS — E78.5 HYPERLIPIDEMIA, UNSPECIFIED HYPERLIPIDEMIA TYPE: ICD-10-CM

## 2021-03-08 DIAGNOSIS — R73.03 PREDIABETES: ICD-10-CM

## 2021-03-08 DIAGNOSIS — L98.9 SKIN LESION: ICD-10-CM

## 2021-03-08 DIAGNOSIS — M54.16 LUMBAR RADICULOPATHY: ICD-10-CM

## 2021-03-08 PROCEDURE — 99213 OFFICE O/P EST LOW 20 MIN: CPT | Performed by: FAMILY MEDICINE

## 2021-03-08 RX ORDER — VERAPAMIL HYDROCHLORIDE 180 MG/1
180 CAPSULE, EXTENDED RELEASE ORAL
Qty: 90 CAPSULE | Refills: 2 | Status: SHIPPED | OUTPATIENT
Start: 2021-03-08 | End: 2021-10-27 | Stop reason: SDUPTHER

## 2021-03-08 NOTE — TELEPHONE ENCOUNTER
Pt needs a refill of [facility] [place] please     I cannot update the dx to do this properly fyi,  Please put order in

## 2021-03-08 NOTE — PROGRESS NOTES
Assessment/Plan:  Assessment/Plan   Diagnoses and all orders for this visit:    Injury of glenoid labrum, right, initial encounter  -     MRI arthrogram right shoulder; Future  -     FL injection right shoulder (arthrogram); Future      54-year-old right-hand-dominant female with right shoulder pain and weakness following fall injury outside her home on 03/01/2021  Discussed with patient physical exam, radiographs, impression and plan  X-rays the right shoulder and right elbow are unremarkable for acute osseous abnormality  Cervical spine is unremarkable for midline or paraspinal tenderness  She has limited range of motion with extension and side bending to both sides  Right shoulder noted for tenderness at the posterior and lateral aspects  She has range of motion limited to forward flexion of 40°, abduction 30°, and internal rotation to lateral hip  She has 4/5 strength abduction, external rotation, and supraspinatus  There is positive Dupree maneuver and positive empty can test   Based on mechanism of injury she may have muscle and cartilage tear  At this time I will refer her for MRI arthrogram of the right shoulder to evaluate for rotator cuff and labral tear  She will follow up after getting MRI done  Subjective:   Patient ID: Daniel Fleming is a 54 y o  female  Chief Complaint   Patient presents with    Right Elbow - Pain    Right Shoulder - Pain       54-year-old right-hand-dominant female presents for evaluation of right shoulder pain of onset from fall injury outside her home on 03/01/2021  She slipped and fell landing on her elbow with axial load mechanism to the shoulder  She had pain described as sudden onset, generalized to the shoulder worse at the axillary and lateral aspects, sharp, nonradiating, severe intensity, worse with movement of the arm, associated limited range of motion, and improved with resting  She recently applied ice    She was taken to the emergency room x-ray evaluation of the elbow and shoulder were unremarkable  She was provided with shoulder sling and prescribed naproxen  She has been taking 2 Aleve tablets 1 to 2 times a day which she states have been helping with her pain  She denies any numbness or tingling  She has had difficulty with moving the arm to do tasks such as doing her hair or feeding herself  Shoulder Pain  This is a new problem  The current episode started 1 to 4 weeks ago  The problem occurs daily  The problem has been unchanged  Associated symptoms include arthralgias and weakness  Pertinent negatives include no joint swelling or numbness  Exacerbated by: Arm movement  She has tried rest, immobilization and NSAIDs for the symptoms  The treatment provided mild relief  Review of Systems   Musculoskeletal: Positive for arthralgias  Negative for joint swelling  Neurological: Positive for weakness  Negative for numbness  Objective:  Vitals:    03/08/21 0957   BP: 126/85   Pulse: 65   Weight: 112 kg (247 lb)   Height: 5' 8" (1 727 m)     Back Exam     Comments:    Cervical spine  -no tenderness  -limited range of motion with extension and side bending to both sides      Right Hand Exam     Muscle Strength   The patient has normal right wrist strength  Other   Sensation: normal      Right Elbow Exam     Tenderness   The patient is experiencing no tenderness  Muscle Strength   The patient has normal right elbow strength (5/5 strength flexion and extension)  Other   Sensation: normal      Right Shoulder Exam     Range of Motion   Active abduction: 30   Forward flexion: 40   Right shoulder internal rotation 0 degrees: Lateral hip       Muscle Strength   Abduction: 4/5   Internal rotation: 5/5   External rotation: 4/5   Supraspinatus: 4/5     Tests   Dupree test: positive    Other   Sensation: normal    Comments:  Positive empty can test          Strength/Myotome Testing     Right Wrist/Hand   Normal wrist strength      Physical Exam  Vitals signs and nursing note reviewed  Constitutional:       General: She is not in acute distress  Appearance: She is well-developed  HENT:      Head: Normocephalic  Right Ear: External ear normal       Left Ear: External ear normal    Eyes:      Conjunctiva/sclera: Conjunctivae normal    Neck:      Trachea: No tracheal deviation  Cardiovascular:      Rate and Rhythm: Normal rate  Pulmonary:      Effort: Pulmonary effort is normal  No respiratory distress  Abdominal:      General: There is no distension  Musculoskeletal:         General: Tenderness present  Skin:     General: Skin is warm and dry  Neurological:      Mental Status: She is alert and oriented to person, place, and time  Psychiatric:         Behavior: Behavior normal          I have personally reviewed pertinent films in PACS and my interpretation is No acute osseous abnormality of right elbow and right shoulder

## 2021-03-16 DIAGNOSIS — E78.5 HYPERLIPIDEMIA, UNSPECIFIED HYPERLIPIDEMIA TYPE: ICD-10-CM

## 2021-03-16 DIAGNOSIS — R73.03 PREDIABETES: ICD-10-CM

## 2021-03-16 DIAGNOSIS — M54.16 LUMBAR RADICULOPATHY: ICD-10-CM

## 2021-03-16 DIAGNOSIS — M79.89 LEG SWELLING: ICD-10-CM

## 2021-03-16 DIAGNOSIS — L98.9 SKIN LESION: ICD-10-CM

## 2021-03-16 DIAGNOSIS — K21.9 GASTROESOPHAGEAL REFLUX DISEASE: ICD-10-CM

## 2021-03-16 DIAGNOSIS — I10 HYPERTENSION, UNSPECIFIED TYPE: ICD-10-CM

## 2021-03-16 DIAGNOSIS — I10 BENIGN ESSENTIAL HYPERTENSION: ICD-10-CM

## 2021-03-16 DIAGNOSIS — J01.91 ACUTE RECURRENT SINUSITIS, UNSPECIFIED LOCATION: ICD-10-CM

## 2021-03-16 DIAGNOSIS — L65.9 ALOPECIA: ICD-10-CM

## 2021-03-16 DIAGNOSIS — J45.909 MODERATE ASTHMA WITHOUT COMPLICATION, UNSPECIFIED WHETHER PERSISTENT: ICD-10-CM

## 2021-03-16 NOTE — NURSING NOTE
Call placed to patient to discuss upcoming appointment at [place] [address] radiology department and complete consultation with patient  Patient is having right shoulder MRI arthrogram   Reviewed with patient her allergies , no current anticoagulant medication present per patient , also discussed the pre and post procedure expectations  Reminded patient of location and time expected for procedure, Patient expressed understanding by verbalizing and repeating instructions

## 2021-03-16 NOTE — TELEPHONE ENCOUNTER
----- Message from [address] ERNIE Staples sent at 3/16/2021 11:38 AM EDT -----  Regarding: Prescription Question  Contact: [phone]  Need a refill for simvastatin please 5 mg  Unable to request from medication page  [facility] also called    [address] [place] [address], [phone]    Thanks

## 2021-03-17 DIAGNOSIS — J45.909 MODERATE ASTHMA WITHOUT COMPLICATION, UNSPECIFIED WHETHER PERSISTENT: ICD-10-CM

## 2021-03-17 DIAGNOSIS — M79.89 LEG SWELLING: ICD-10-CM

## 2021-03-17 DIAGNOSIS — I10 BENIGN ESSENTIAL HYPERTENSION: ICD-10-CM

## 2021-03-17 DIAGNOSIS — I10 HYPERTENSION, UNSPECIFIED TYPE: ICD-10-CM

## 2021-03-17 DIAGNOSIS — L65.9 ALOPECIA: ICD-10-CM

## 2021-03-17 RX ORDER — SPIRONOLACTONE 50 MG/1
50 TABLET, FILM COATED ORAL DAILY
Qty: 90 TABLET | Refills: 2 | Status: SHIPPED | OUTPATIENT
Start: 2021-03-17 | End: 2021-03-22 | Stop reason: SDUPTHER

## 2021-03-17 RX ORDER — SIMVASTATIN 5 MG
5 TABLET ORAL
Qty: 90 TABLET | Refills: 2 | Status: SHIPPED | OUTPATIENT
Start: 2021-03-17 | End: 2021-12-03

## 2021-03-18 ENCOUNTER — HOSPITAL ENCOUNTER (OUTPATIENT)
Dept: RADIOLOGY | Facility: HOSPITAL | Age: 56
Discharge: HOME | End: 2021-03-18
Attending: FAMILY MEDICINE
Payer: COMMERCIAL

## 2021-03-18 ENCOUNTER — TRANSCRIBE ORDERS (OUTPATIENT)
Dept: RADIOLOGY | Facility: HOSPITAL | Age: 56
End: 2021-03-18

## 2021-03-18 DIAGNOSIS — S49.91XA INJURY OF GLENOID LABRUM, RIGHT, INITIAL ENCOUNTER: ICD-10-CM

## 2021-03-18 PROCEDURE — 77002 NEEDLE LOCALIZATION BY XRAY: CPT

## 2021-03-18 PROCEDURE — G1004 CDSM NDSC: HCPCS

## 2021-03-18 PROCEDURE — 23350 INJECTION FOR SHOULDER X-RAY: CPT

## 2021-03-18 PROCEDURE — A9585 GADOBUTROL INJECTION: HCPCS | Performed by: FAMILY MEDICINE

## 2021-03-18 PROCEDURE — 73222 MRI JOINT UPR EXTREM W/DYE: CPT

## 2021-03-18 RX ORDER — LIDOCAINE HYDROCHLORIDE 10 MG/ML
10 INJECTION, SOLUTION EPIDURAL; INFILTRATION; INTRACAUDAL; PERINEURAL
Status: DISCONTINUED | OUTPATIENT
Start: 2021-03-18 | End: 2021-03-19 | Stop reason: HOSPADM

## 2021-03-18 RX ADMIN — IOHEXOL 5 ML: 300 INJECTION, SOLUTION INTRAVENOUS at 15:05

## 2021-03-18 RX ADMIN — GADOBUTROL 0.2 ML: 604.72 INJECTION INTRAVENOUS at 15:35

## 2021-03-19 ENCOUNTER — OFFICE VISIT (OUTPATIENT)
Dept: OBGYN CLINIC | Facility: CLINIC | Age: 56
End: 2021-03-19
Payer: COMMERCIAL

## 2021-03-19 VITALS
BODY MASS INDEX: 36.37 KG/M2 | HEART RATE: 73 BPM | HEIGHT: 68 IN | DIASTOLIC BLOOD PRESSURE: 86 MMHG | WEIGHT: 240 LBS | SYSTOLIC BLOOD PRESSURE: 120 MMHG

## 2021-03-19 DIAGNOSIS — M67.819 TENDINOSIS OF ROTATOR CUFF: ICD-10-CM

## 2021-03-19 DIAGNOSIS — M19.011 ARTHRITIS OF RIGHT ACROMIOCLAVICULAR JOINT: Primary | ICD-10-CM

## 2021-03-19 DIAGNOSIS — M67.813 BICEPS TENDINOSIS OF RIGHT SHOULDER: ICD-10-CM

## 2021-03-19 DIAGNOSIS — M75.101 TEAR OF RIGHT SUPRASPINATUS TENDON: ICD-10-CM

## 2021-03-19 DIAGNOSIS — S46.211D BICEPS STRAIN, RIGHT, SUBSEQUENT ENCOUNTER: ICD-10-CM

## 2021-03-19 PROCEDURE — 3008F BODY MASS INDEX DOCD: CPT | Performed by: FAMILY MEDICINE

## 2021-03-19 PROCEDURE — 1036F TOBACCO NON-USER: CPT | Performed by: FAMILY MEDICINE

## 2021-03-19 PROCEDURE — 3074F SYST BP LT 130 MM HG: CPT | Performed by: FAMILY MEDICINE

## 2021-03-19 PROCEDURE — 99213 OFFICE O/P EST LOW 20 MIN: CPT | Performed by: FAMILY MEDICINE

## 2021-03-19 PROCEDURE — 3079F DIAST BP 80-89 MM HG: CPT | Performed by: FAMILY MEDICINE

## 2021-03-19 NOTE — PROGRESS NOTES
Assessment/Plan:  Assessment/Plan   Diagnoses and all orders for this visit:    Arthritis of right acromioclavicular joint  -     Ambulatory referral to Physical Therapy; Future    Tear of right supraspinatus tendon  -     Ambulatory referral to Physical Therapy; Future    Tendinosis of rotator cuff  -     Ambulatory referral to Physical Therapy; Future    Biceps tendinosis of right shoulder  -     Ambulatory referral to Physical Therapy; Future    Biceps strain, right, subsequent encounter  -     Ambulatory referral to Physical Therapy; Future        54-year-old right-hand-dominant female with right shoulder pain and weakness following fall injury outside her home on 03/01/2021  Discussed with patient MRI results, impression and plan  MRI of the right shoulder is noted for focal full-thickness tear anterior supraspinatus insertion measuring 0 8 cm x 0 7 cm, mild infraspinatus tendinosis with low-grade articular sided fraying, muscle bulk is preserved  Moderate tendinosis long head of biceps, fraying of the glenoid labrum, moderate osteoarthritis of the AC joint  She has range of motion forward flexion to 140° and abduction to 110°  Clinical impression is that she is improving  I discussed with patient that surgical intervention is not warranted at this time  We will do trial of conservative management with formal physical therapy and topical anti-inflammatory  She declines corticosteroid injection at this time  She may continue topical Voltaren gel may also apply topical CBD with less than 0 3% THC  She is to start physical therapy as soon as possible and do home exercises as directed  She will follow up in 6 weeks at which point she will be re-evaluated  Subjective:   Patient ID: Lashon Chatman is a 54 y o  female    Chief Complaint   Patient presents with    Right Shoulder - Follow-up       54-year-old right-hand-dominant female with right shoulder pain and weakness following fall injury outside her home on 03/01/2021  She was last seen by me 11 days ago at which point she was referred for MRI arthrogram of the right shoulder was suspicion for labral tear  She has been resting, applying topical Voltaren gel, and doing range-of-motion exercises  She reports that pain and range of motion have been improving since her last visit  At home she has been without arm sling, but also at home she has been wearing arm sling  She reports having pain described as generalized to the shoulder worse at the lateral aspect, achy and sore and sometimes sharp, radiating distally along the lateral aspect of the upper arm, worse with elevating the arm, and improved with resting  Shoulder Pain  This is a new problem  The current episode started 1 to 4 weeks ago  The problem occurs daily  The problem has been gradually improving  Associated symptoms include arthralgias and weakness  Pertinent negatives include no joint swelling or numbness  Exacerbated by: Arm movement  She has tried rest and immobilization (Voltaren gel, home exercise) for the symptoms  The treatment provided mild relief  Review of Systems   Musculoskeletal: Positive for arthralgias  Negative for joint swelling  Neurological: Positive for weakness  Negative for numbness  Objective:  Vitals:    03/19/21 1410   BP: 120/86   Pulse: 73   Weight: 109 kg (240 lb)   Height: 5' 8" (1 727 m)     Right Shoulder Exam     Range of Motion   Active abduction: 110   Forward flexion: 140             Physical Exam  Vitals signs and nursing note reviewed  Constitutional:       General: She is not in acute distress  Appearance: She is well-developed  HENT:      Head: Normocephalic  Right Ear: External ear normal       Left Ear: External ear normal    Eyes:      Conjunctiva/sclera: Conjunctivae normal    Neck:      Trachea: No tracheal deviation  Cardiovascular:      Rate and Rhythm: Normal rate     Pulmonary:      Effort: Pulmonary effort is normal  No respiratory distress  Abdominal:      General: There is no distension  Skin:     General: Skin is warm and dry  Neurological:      Mental Status: She is alert and oriented to person, place, and time  Psychiatric:         Behavior: Behavior normal          I have personally reviewed pertinent films in PACS and my interpretation is Supraspinatus tear

## 2021-03-22 DIAGNOSIS — I10 HYPERTENSION, UNSPECIFIED TYPE: ICD-10-CM

## 2021-03-22 DIAGNOSIS — L65.9 ALOPECIA: ICD-10-CM

## 2021-03-22 DIAGNOSIS — M79.89 LEG SWELLING: ICD-10-CM

## 2021-03-22 DIAGNOSIS — I10 BENIGN ESSENTIAL HYPERTENSION: ICD-10-CM

## 2021-03-22 DIAGNOSIS — J45.909 MODERATE ASTHMA WITHOUT COMPLICATION, UNSPECIFIED WHETHER PERSISTENT: ICD-10-CM

## 2021-03-22 RX ORDER — SPIRONOLACTONE 50 MG/1
50 TABLET, FILM COATED ORAL DAILY
Qty: 90 TABLET | Refills: 2 | Status: SHIPPED | OUTPATIENT
Start: 2021-03-22 | End: 2022-06-01

## 2021-03-30 DIAGNOSIS — Z23 ENCOUNTER FOR IMMUNIZATION: ICD-10-CM

## 2021-04-03 ENCOUNTER — APPOINTMENT (OUTPATIENT)
Dept: LAB | Facility: HOSPITAL | Age: 56
End: 2021-04-03
Payer: COMMERCIAL

## 2021-04-03 DIAGNOSIS — E78.5 HYPERLIPIDEMIA, UNSPECIFIED HYPERLIPIDEMIA TYPE: ICD-10-CM

## 2021-04-03 DIAGNOSIS — R73.03 PREDIABETES: ICD-10-CM

## 2021-04-03 DIAGNOSIS — J45.909 MODERATE ASTHMA WITHOUT COMPLICATION, UNSPECIFIED WHETHER PERSISTENT: ICD-10-CM

## 2021-04-03 LAB
ALBUMIN SERPL BCP-MCNC: 3.7 G/DL (ref 3.5–5)
ALP SERPL-CCNC: 71 U/L (ref 46–116)
ALT SERPL W P-5'-P-CCNC: 18 U/L (ref 12–78)
ANION GAP SERPL CALCULATED.3IONS-SCNC: 5 MMOL/L (ref 4–13)
AST SERPL W P-5'-P-CCNC: 15 U/L (ref 5–45)
BASOPHILS # BLD AUTO: 0.05 THOUSANDS/ΜL (ref 0–0.1)
BASOPHILS NFR BLD AUTO: 1 % (ref 0–1)
BILIRUB SERPL-MCNC: 0.58 MG/DL (ref 0.2–1)
BUN SERPL-MCNC: 14 MG/DL (ref 5–25)
CALCIUM SERPL-MCNC: 9 MG/DL (ref 8.3–10.1)
CHLORIDE SERPL-SCNC: 102 MMOL/L (ref 100–108)
CHOLEST SERPL-MCNC: 161 MG/DL (ref 50–200)
CO2 SERPL-SCNC: 31 MMOL/L (ref 21–32)
CREAT SERPL-MCNC: 1.01 MG/DL (ref 0.6–1.3)
EOSINOPHIL # BLD AUTO: 0.16 THOUSAND/ΜL (ref 0–0.61)
EOSINOPHIL NFR BLD AUTO: 4 % (ref 0–6)
ERYTHROCYTE [DISTWIDTH] IN BLOOD BY AUTOMATED COUNT: 13.2 % (ref 11.6–15.1)
EST. AVERAGE GLUCOSE BLD GHB EST-MCNC: 117 MG/DL
GFR SERPL CREATININE-BSD FRML MDRD: 63 ML/MIN/1.73SQ M
GLUCOSE P FAST SERPL-MCNC: 92 MG/DL (ref 65–99)
HBA1C MFR BLD: 5.7 %
HCT VFR BLD AUTO: 45.7 % (ref 34.8–46.1)
HDLC SERPL-MCNC: 65 MG/DL
HGB BLD-MCNC: 14.4 G/DL (ref 11.5–15.4)
IMM GRANULOCYTES # BLD AUTO: 0.01 THOUSAND/UL (ref 0–0.2)
IMM GRANULOCYTES NFR BLD AUTO: 0 % (ref 0–2)
LDLC SERPL CALC-MCNC: 83 MG/DL (ref 0–100)
LYMPHOCYTES # BLD AUTO: 0.93 THOUSANDS/ΜL (ref 0.6–4.47)
LYMPHOCYTES NFR BLD AUTO: 23 % (ref 14–44)
MCH RBC QN AUTO: 29.6 PG (ref 26.8–34.3)
MCHC RBC AUTO-ENTMCNC: 31.5 G/DL (ref 31.4–37.4)
MCV RBC AUTO: 94 FL (ref 82–98)
MONOCYTES # BLD AUTO: 0.36 THOUSAND/ΜL (ref 0.17–1.22)
MONOCYTES NFR BLD AUTO: 9 % (ref 4–12)
NEUTROPHILS # BLD AUTO: 2.63 THOUSANDS/ΜL (ref 1.85–7.62)
NEUTS SEG NFR BLD AUTO: 63 % (ref 43–75)
NONHDLC SERPL-MCNC: 96 MG/DL
NRBC BLD AUTO-RTO: 0 /100 WBCS
PLATELET # BLD AUTO: 301 THOUSANDS/UL (ref 149–390)
PMV BLD AUTO: 8.6 FL (ref 8.9–12.7)
POTASSIUM SERPL-SCNC: 4.6 MMOL/L (ref 3.5–5.3)
PROT SERPL-MCNC: 7.4 G/DL (ref 6.4–8.2)
RBC # BLD AUTO: 4.86 MILLION/UL (ref 3.81–5.12)
SODIUM SERPL-SCNC: 138 MMOL/L (ref 136–145)
TRIGL SERPL-MCNC: 67 MG/DL
TSH SERPL DL<=0.05 MIU/L-ACNC: 1.37 UIU/ML (ref 0.36–3.74)
WBC # BLD AUTO: 4.14 THOUSAND/UL (ref 4.31–10.16)

## 2021-04-03 PROCEDURE — 85025 COMPLETE CBC W/AUTO DIFF WBC: CPT

## 2021-04-03 PROCEDURE — 80061 LIPID PANEL: CPT

## 2021-04-03 PROCEDURE — 83036 HEMOGLOBIN GLYCOSYLATED A1C: CPT

## 2021-04-03 PROCEDURE — 36415 COLL VENOUS BLD VENIPUNCTURE: CPT

## 2021-04-03 PROCEDURE — 84443 ASSAY THYROID STIM HORMONE: CPT

## 2021-04-03 PROCEDURE — 80053 COMPREHEN METABOLIC PANEL: CPT

## 2021-04-16 ENCOUNTER — TELEPHONE (OUTPATIENT)
Dept: ADMINISTRATIVE | Facility: OTHER | Age: 56
End: 2021-04-16

## 2021-04-16 NOTE — TELEPHONE ENCOUNTER
----- Message from Daniel Denton sent at 4/16/2021 10:41 AM EDT -----  04/16/21 10:41 AM    Michael, our patient Gary Cerrato has had Mammogram completed/performed  Please assist in updating the patient chart by pulling the Care Everywhere (CE) document  The date of service is 2/23/2021       Thank you,  Daniel Denton  [facility]

## 2021-04-19 ENCOUNTER — OFFICE VISIT (OUTPATIENT)
Dept: INTERNAL MEDICINE CLINIC | Facility: CLINIC | Age: 56
End: 2021-04-19
Payer: COMMERCIAL

## 2021-04-19 VITALS
BODY MASS INDEX: 36.98 KG/M2 | SYSTOLIC BLOOD PRESSURE: 108 MMHG | DIASTOLIC BLOOD PRESSURE: 80 MMHG | WEIGHT: 244 LBS | RESPIRATION RATE: 16 BRPM | HEIGHT: 68 IN | TEMPERATURE: 98.6 F | OXYGEN SATURATION: 99 % | HEART RATE: 102 BPM

## 2021-04-19 DIAGNOSIS — M54.16 LUMBAR RADICULOPATHY: ICD-10-CM

## 2021-04-19 DIAGNOSIS — L65.9 ALOPECIA: ICD-10-CM

## 2021-04-19 DIAGNOSIS — E78.5 HYPERLIPIDEMIA, UNSPECIFIED HYPERLIPIDEMIA TYPE: ICD-10-CM

## 2021-04-19 DIAGNOSIS — I10 BENIGN ESSENTIAL HYPERTENSION: ICD-10-CM

## 2021-04-19 DIAGNOSIS — R73.03 PREDIABETES: ICD-10-CM

## 2021-04-19 DIAGNOSIS — R07.89 ATYPICAL CHEST PAIN: ICD-10-CM

## 2021-04-19 DIAGNOSIS — K21.9 GASTROESOPHAGEAL REFLUX DISEASE: ICD-10-CM

## 2021-04-19 DIAGNOSIS — I10 HYPERTENSION, UNSPECIFIED TYPE: ICD-10-CM

## 2021-04-19 DIAGNOSIS — J01.91 ACUTE RECURRENT SINUSITIS, UNSPECIFIED LOCATION: ICD-10-CM

## 2021-04-19 DIAGNOSIS — J45.909 MODERATE ASTHMA WITHOUT COMPLICATION, UNSPECIFIED WHETHER PERSISTENT: ICD-10-CM

## 2021-04-19 DIAGNOSIS — M79.89 LEG SWELLING: ICD-10-CM

## 2021-04-19 DIAGNOSIS — M54.12 RIGHT CERVICAL RADICULOPATHY: ICD-10-CM

## 2021-04-19 DIAGNOSIS — L98.9 SKIN LESION: ICD-10-CM

## 2021-04-19 DIAGNOSIS — K21.9 GASTROESOPHAGEAL REFLUX DISEASE, UNSPECIFIED WHETHER ESOPHAGITIS PRESENT: Primary | ICD-10-CM

## 2021-04-19 PROBLEM — K59.1 FUNCTIONAL DIARRHEA: Status: RESOLVED | Noted: 2020-05-12 | Resolved: 2021-04-19

## 2021-04-19 PROCEDURE — 99214 OFFICE O/P EST MOD 30 MIN: CPT | Performed by: NURSE PRACTITIONER

## 2021-04-19 PROCEDURE — 3725F SCREEN DEPRESSION PERFORMED: CPT | Performed by: NURSE PRACTITIONER

## 2021-04-19 RX ORDER — OMEPRAZOLE 10 MG/1
10 CAPSULE, DELAYED RELEASE ORAL DAILY
Qty: 90 CAPSULE | Refills: 2 | Status: SHIPPED | OUTPATIENT
Start: 2021-04-19 | End: 2021-10-27 | Stop reason: SDUPTHER

## 2021-04-19 NOTE — PATIENT INSTRUCTIONS
1  Atypical chest pain in the past- Did consult cardiology, all testing appeared unremarkable  2  Hypertension- At goal, continue current medication  3  Hyperlipidemia- At goal  Continue current medication  4  GERD- Stable on current medication  5  Prediabetes- A1c 5 7, continue diet and lifestyle  6  Asthma- Stable  7  Neck pain- Off Gabapentin due to memory difficulties  Continue heating pad as needed  Follow up in six months, labs prior

## 2021-04-19 NOTE — PROGRESS NOTES
BMI Counseling: Body mass index is 37 1 kg/m²  The BMI is above normal  Nutrition recommendations include decreasing portion sizes, encouraging healthy choices of fruits and vegetables, decreasing fast food intake, consuming healthier snacks, limiting drinks that contain sugar, moderation in carbohydrate intake, increasing intake of lean protein, reducing intake of saturated and trans fat and reducing intake of cholesterol  Exercise recommendations include exercising 3-5 times per week  No pharmacotherapy was ordered  Patient referred to PCP due to patient being overweight  Assessment/Plan:    1  Atypical chest pain in the past- Did consult cardiology, all testing appeared unremarkable  2  Hypertension- At goal, continue current medication  3  Hyperlipidemia- At goal  Continue current medication  4  GERD- Stable on current medication  5  Prediabetes- A1c 5 7, continue diet and lifestyle  6  Asthma- Stable  7  Neck pain- Off Gabapentin due to memory difficulties  Continue heating pad as needed  Follow up in six months, labs prior  Diagnoses and all orders for this visit:    Gastroesophageal reflux disease, unspecified whether esophagitis present  -     omeprazole (PriLOSEC) 10 mg delayed release capsule; Take 1 capsule (10 mg total) by mouth daily    Moderate asthma without complication, unspecified whether persistent  -     omeprazole (PriLOSEC) 10 mg delayed release capsule; Take 1 capsule (10 mg total) by mouth daily    Benign essential hypertension  -     CBC and differential; Future  -     Comprehensive metabolic panel; Future  -     TSH, 3rd generation with Free T4 reflex; Future  -     omeprazole (PriLOSEC) 10 mg delayed release capsule; Take 1 capsule (10 mg total) by mouth daily    Right cervical radiculopathy    Prediabetes  -     HEMOGLOBIN A1C W/ EAG ESTIMATION; Future  -     omeprazole (PriLOSEC) 10 mg delayed release capsule;  Take 1 capsule (10 mg total) by mouth daily    Hyperlipidemia, unspecified hyperlipidemia type  -     Lipid panel; Future  -     omeprazole (PriLOSEC) 10 mg delayed release capsule; Take 1 capsule (10 mg total) by mouth daily    Atypical chest pain    Gastroesophageal reflux disease  -     omeprazole (PriLOSEC) 10 mg delayed release capsule; Take 1 capsule (10 mg total) by mouth daily    Skin lesion  -     omeprazole (PriLOSEC) 10 mg delayed release capsule; Take 1 capsule (10 mg total) by mouth daily    Leg swelling  -     omeprazole (PriLOSEC) 10 mg delayed release capsule; Take 1 capsule (10 mg total) by mouth daily    Lumbar radiculopathy  -     omeprazole (PriLOSEC) 10 mg delayed release capsule; Take 1 capsule (10 mg total) by mouth daily    Acute recurrent sinusitis, unspecified location  -     omeprazole (PriLOSEC) 10 mg delayed release capsule; Take 1 capsule (10 mg total) by mouth daily    Alopecia  -     omeprazole (PriLOSEC) 10 mg delayed release capsule; Take 1 capsule (10 mg total) by mouth daily    Hypertension, unspecified type  -     omeprazole (PriLOSEC) 10 mg delayed release capsule; Take 1 capsule (10 mg total) by mouth daily    The patient was counseled regarding instructions for management, risk factor reductions, patient and family education,impressions, risks and benefits of treatment options, side effects of medications, importance of compliance with treatment  The treatment plan was reviewed with the patient/guardian and patient/guardian understands and agrees with the treatment plan          Current Outpatient Medications:     acetaminophen (TYLENOL) 500 mg tablet, Take by mouth, Disp: , Rfl:     Black Cohosh (REMIFEMIN MENOPAUSE PO), Take by mouth, Disp: , Rfl:     Cholecalciferol (VITAMIN D) 2000 units CAPS, Take by mouth, Disp: , Rfl:     cholestyramine (QUESTRAN) 4 GM/DOSE powder, Take 1 packet (4 g total) by mouth 2 (two) times a day As needed for diarrhea, Disp: 378 g, Rfl: 5    diclofenac sodium (VOLTAREN) 50 mg EC tablet, take 1 tablet by mouth twice a day (Patient taking differently: No sig reported), Disp: 180 tablet, Rfl: 1    Ginkgo Biloba 60 MG TABS, Take by mouth daily , Disp: , Rfl:     glucosamine-chondroitin 500-400 MG tablet, Take 1 tablet by mouth 3 (three) times a day, Disp: 100 tablet, Rfl: 2    mometasone (NASONEX) 50 mcg/act nasal spray, 1 spray into each nostril daily, Disp: 17 g, Rfl: 2    Naltrexone-buPROPion HCl ER 8-90 MG TB12, 2 tabs  daily, Disp: 120 tablet, Rfl: 3    naproxen (NAPROSYN) 375 mg tablet, Take 1 tablet (375 mg total) by mouth 2 (two) times a day with meals, Disp: 20 tablet, Rfl: 0    omeprazole (PriLOSEC) 10 mg delayed release capsule, Take 1 capsule (10 mg total) by mouth daily, Disp: 90 capsule, Rfl: 2    Probiotic Product (ALIGN) 4 MG CAPS, Take by mouth, Disp: , Rfl:     simvastatin (ZOCOR) 5 MG tablet, Take 1 tablet (5 mg total) by mouth daily at bedtime, Disp: 90 tablet, Rfl: 2    spironolactone (ALDACTONE) 50 mg tablet, Take 1 tablet (50 mg total) by mouth daily, Disp: 90 tablet, Rfl: 2    verapamil (VERELAN PM) 180 MG 24 hr capsule, Take 1 capsule (180 mg total) by mouth daily before breakfast, Disp: 90 capsule, Rfl: 2    vitamin E, tocopherol, 400 units capsule, Take 1 capsule (400 Units total) by mouth daily, Disp: , Rfl:     Subjective:      Patient ID: Cassandra Lopez is a 54 y o  female  3/1 fell on ice on right shoulder- followed up with Dr Jovani Mobley, currently in physical therapy, but has pain  Left knee pain, followed by ortho  The following portions of the patient's history were reviewed and updated as appropriate:   She has a past medical history of Allergic, Allergic rhinitis, Arthritis, Asthma, Bleeding per rectum, Colon polyp, Diverticulitis of colon, GERD (gastroesophageal reflux disease), Hypercholesterolemia, Hypertension, Hyponatremia, and Obesity  ,  does not have any pertinent problems on file  ,   has a past surgical history that includes  section; Wrist surgery; Colonoscopy; [place] tooth extraction; and [place] injection right shoulder (arthrogram) (3/18/2021)  ,  family history includes No Known Problems in her mother  She was adopted  ,   reports that she has never smoked  She has never used smokeless tobacco  She reports current alcohol use  She reports that she does not use drugs  ,  is allergic to levofloxacin; sulfamethoxazole-trimethoprim; and hydrocodone bitartrate er       Review of Systems   Constitutional: Negative  Respiratory: Negative  Cardiovascular: Negative  Musculoskeletal: Negative  Psychiatric/Behavioral: Negative          Objective:  /80 (BP Location: Left arm, Patient Position: Sitting, Cuff Size: Standard)   Pulse 102   Temp 98 6 °F (37 °C) (Tympanic)   Resp 16   Ht 5' 8" (1 727 m)   Wt 111 kg (244 lb)   SpO2 99%   BMI 37 10 kg/m²     Lab Review  Appointment on 04/03/2021   Component Date Value    WBC 04/03/2021 4 14*    RBC 04/03/2021 4 86     Hemoglobin 04/03/2021 14 4     Hematocrit 04/03/2021 45 7     MCV 04/03/2021 94     MCH 04/03/2021 29 6     MCHC 04/03/2021 31 5     RDW 04/03/2021 13 2     MPV 04/03/2021 8 6*    Platelets 77/21/7556 301     nRBC 04/03/2021 0     Neutrophils Relative 04/03/2021 63     Immat GRANS % 04/03/2021 0     Lymphocytes Relative 04/03/2021 23     Monocytes Relative 04/03/2021 9     Eosinophils Relative 04/03/2021 4     Basophils Relative 04/03/2021 1     Neutrophils Absolute 04/03/2021 2 63     Immature Grans Absolute 04/03/2021 0 01     Lymphocytes Absolute 04/03/2021 0 93     Monocytes Absolute 04/03/2021 0 36     Eosinophils Absolute 04/03/2021 0 16     Basophils Absolute 04/03/2021 0 05     Sodium 04/03/2021 138     Potassium 04/03/2021 4 6     Chloride 04/03/2021 102     CO2 04/03/2021 31     ANION GAP 04/03/2021 5     BUN 04/03/2021 14     Creatinine 04/03/2021 1 01     Glucose, Fasting 04/03/2021 92     Calcium 04/03/2021 9 0     AST 04/03/2021 15     ALT 04/03/2021 18     Alkaline Phosphatase 04/03/2021 71     Total Protein 04/03/2021 7 4     Albumin 04/03/2021 3 7     Total Bilirubin 04/03/2021 0 58     eGFR 04/03/2021 63     Hemoglobin A1C 04/03/2021 5 7*    EAG 04/03/2021 117     Cholesterol 04/03/2021 161     Triglycerides 04/03/2021 67     HDL, Direct 04/03/2021 65     LDL Calculated 04/03/2021 83     Non-HDL-Chol (CHOL-HDL) 04/03/2021 96     TSH 3RD GENERATON 04/03/2021 1 367       Imaging: No results found  Physical Exam  Constitutional:       Appearance: She is well-developed  Cardiovascular:      Rate and Rhythm: Normal rate and regular rhythm  Heart sounds: Normal heart sounds  Pulmonary:      Effort: Pulmonary effort is normal       Breath sounds: Normal breath sounds  Musculoskeletal: Normal range of motion  Neurological:      Mental Status: She is alert and oriented to person, place, and time  Deep Tendon Reflexes: Reflexes are normal and symmetric  Psychiatric:         Behavior: Behavior normal          Thought Content:  Thought content normal          Judgment: Judgment normal

## 2021-04-30 ENCOUNTER — OFFICE VISIT (OUTPATIENT)
Dept: OBGYN CLINIC | Facility: CLINIC | Age: 56
End: 2021-04-30
Payer: COMMERCIAL

## 2021-04-30 VITALS
BODY MASS INDEX: 37.59 KG/M2 | DIASTOLIC BLOOD PRESSURE: 81 MMHG | WEIGHT: 248 LBS | HEIGHT: 68 IN | SYSTOLIC BLOOD PRESSURE: 118 MMHG | HEART RATE: 79 BPM

## 2021-04-30 DIAGNOSIS — M67.819 TENDINOSIS OF ROTATOR CUFF: ICD-10-CM

## 2021-04-30 DIAGNOSIS — M67.813 BICEPS TENDINOSIS OF RIGHT SHOULDER: ICD-10-CM

## 2021-04-30 DIAGNOSIS — M75.101 TEAR OF RIGHT SUPRASPINATUS TENDON: Primary | ICD-10-CM

## 2021-04-30 DIAGNOSIS — M19.011 ARTHRITIS OF RIGHT ACROMIOCLAVICULAR JOINT: ICD-10-CM

## 2021-04-30 PROCEDURE — 1036F TOBACCO NON-USER: CPT | Performed by: FAMILY MEDICINE

## 2021-04-30 PROCEDURE — 3079F DIAST BP 80-89 MM HG: CPT | Performed by: FAMILY MEDICINE

## 2021-04-30 PROCEDURE — 3074F SYST BP LT 130 MM HG: CPT | Performed by: FAMILY MEDICINE

## 2021-04-30 PROCEDURE — 99213 OFFICE O/P EST LOW 20 MIN: CPT | Performed by: FAMILY MEDICINE

## 2021-04-30 PROCEDURE — 3008F BODY MASS INDEX DOCD: CPT | Performed by: FAMILY MEDICINE

## 2021-04-30 NOTE — PROGRESS NOTES
Assessment/Plan:  Assessment/Plan   Diagnoses and all orders for this visit:    Tear of right supraspinatus tendon  -     Ambulatory referral to Physical Therapy; Future    Tendinosis of rotator cuff  -     Ambulatory referral to Physical Therapy; Future    Biceps tendinosis of right shoulder  -     Ambulatory referral to Physical Therapy; Future    Arthritis of right acromioclavicular joint  -     Ambulatory referral to Physical Therapy; Future         68-year-old right-dominant female with right shoulder pain and weakness following fall injury outside her home on 03/01/2021  Discussed with patient physical exam, impression and plan  She has range of motion forward flexion to 170, abduction 160, and internal rotation to lumbar spine  She has 4+/ 5 external rotation  There is a very little pain with empty can testing  Clinical impression is that she is improving, but not fully recovered  She was given option of anesthetic / corticosteroid injection which she declines at this time  She is to continue with physical therapy and doing home exercises as directed  She will follow up in 5-6 weeks at which point she will be re-evaluated  Subjective:   Patient ID: Romayne Crimes is a 54 y o  female  Chief Complaint   Patient presents with    Right Shoulder - Follow-up, Pain       68-year-old right-hand-dominant female following up for right shoulder pain and weakness following fall injury outside her home on 03/01/2021  She was last seen by me 6 weeks ago which point MRI reviewed her was noted for focal full-thickness tear anterior supraspinatus and moderate long head biceps tendinosis  She declined corticosteroid injection  She was referred to physical therapy and advised on topical diclofenac and topical CBD  She has been attending physical therapy and doing home exercises  She reports improvement in pain and also improvement in range of motion    She has pain described as generalized to the shoulder and upper arm, achy and throbbing, nonradiating, worse with physical activity, and improved rest   She states that pain is no longer severe, but still bothersome  She has been taking Tylenol arthritis in the a m  and 1 at least tab in the p m  which has been helping her manage her symptoms  Shoulder Pain  This is a new problem  The current episode started more than 1 month ago  The problem occurs daily  The problem has been gradually improving  Associated symptoms include arthralgias  Pertinent negatives include no joint swelling, numbness or weakness  Exacerbated by: Physical activity  She has tried rest, NSAIDs, acetaminophen and heat (Physical therapy, home exercise) for the symptoms  The treatment provided mild relief  Review of Systems   Musculoskeletal: Positive for arthralgias  Negative for joint swelling  Neurological: Negative for weakness and numbness  Objective:  Vitals:    04/30/21 1328   BP: 118/81   Pulse: 79   Weight: 112 kg (248 lb)   Height: 5' 8" (1 727 m)     Right Shoulder Exam     Range of Motion   Active abduction: 150   Forward flexion: 170   Internal rotation 0 degrees: Lumbar     Muscle Strength   Right shoulder normal muscle strength: 4+/5 external rotation  Abduction: 5/5   Internal rotation: 5/5   Supraspinatus: 5/5     Tests   Drop arm: negative    Comments:    Negative empty can            Physical Exam  Vitals signs and nursing note reviewed  Constitutional:       General: She is not in acute distress  Appearance: She is well-developed  HENT:      Head: Normocephalic  Right Ear: External ear normal       Left Ear: External ear normal    Eyes:      Conjunctiva/sclera: Conjunctivae normal    Neck:      Trachea: No tracheal deviation  Cardiovascular:      Rate and Rhythm: Normal rate  Pulmonary:      Effort: Pulmonary effort is normal  No respiratory distress  Abdominal:      General: There is no distension     Skin:     General: Skin is warm and dry    Neurological:      Mental Status: She is alert and oriented to person, place, and time     Psychiatric:         Behavior: Behavior normal

## 2021-06-03 ENCOUNTER — OFFICE VISIT (OUTPATIENT)
Dept: OBGYN CLINIC | Facility: CLINIC | Age: 56
End: 2021-06-03
Payer: COMMERCIAL

## 2021-06-03 VITALS
HEART RATE: 83 BPM | DIASTOLIC BLOOD PRESSURE: 80 MMHG | HEIGHT: 68 IN | WEIGHT: 249 LBS | BODY MASS INDEX: 37.74 KG/M2 | SYSTOLIC BLOOD PRESSURE: 116 MMHG

## 2021-06-03 DIAGNOSIS — M75.101 TEAR OF RIGHT SUPRASPINATUS TENDON: Primary | ICD-10-CM

## 2021-06-03 DIAGNOSIS — M67.813 BICEPS TENDONOSIS OF RIGHT SHOULDER: ICD-10-CM

## 2021-06-03 DIAGNOSIS — M19.011 ARTHRITIS OF RIGHT ACROMIOCLAVICULAR JOINT: ICD-10-CM

## 2021-06-03 DIAGNOSIS — M67.819 TENDINOSIS OF ROTATOR CUFF: ICD-10-CM

## 2021-06-03 PROCEDURE — 3074F SYST BP LT 130 MM HG: CPT | Performed by: FAMILY MEDICINE

## 2021-06-03 PROCEDURE — 20610 DRAIN/INJ JOINT/BURSA W/O US: CPT | Performed by: FAMILY MEDICINE

## 2021-06-03 PROCEDURE — 3079F DIAST BP 80-89 MM HG: CPT | Performed by: FAMILY MEDICINE

## 2021-06-03 PROCEDURE — 3008F BODY MASS INDEX DOCD: CPT | Performed by: FAMILY MEDICINE

## 2021-06-03 PROCEDURE — 1036F TOBACCO NON-USER: CPT | Performed by: FAMILY MEDICINE

## 2021-06-03 PROCEDURE — 99214 OFFICE O/P EST MOD 30 MIN: CPT | Performed by: FAMILY MEDICINE

## 2021-06-03 RX ADMIN — LIDOCAINE HYDROCHLORIDE 3 ML: 10 INJECTION, SOLUTION INFILTRATION; PERINEURAL at 13:53

## 2021-06-03 RX ADMIN — TRIAMCINOLONE ACETONIDE 40 MG: 40 INJECTION, SUSPENSION INTRA-ARTICULAR; INTRAMUSCULAR at 13:53

## 2021-06-03 RX ADMIN — BUPIVACAINE HYDROCHLORIDE 2 ML: 2.5 INJECTION, SOLUTION INFILTRATION; PERINEURAL at 13:53

## 2021-06-03 RX ADMIN — LIDOCAINE HYDROCHLORIDE 2 ML: 10 INJECTION, SOLUTION INFILTRATION; PERINEURAL at 13:53

## 2021-06-03 NOTE — PROGRESS NOTES
Assessment/Plan:  Assessment/Plan   Diagnoses and all orders for this visit:    Tear of right supraspinatus tendon  -     Large joint arthrocentesis: R subacromial bursa  -     bupivacaine (MARCAINE) 0 25 % injection 2 mL  -     lidocaine (XYLOCAINE) 1 % injection 2 mL  -     lidocaine (XYLOCAINE) 1 % injection 3 mL  -     triamcinolone acetonide (KENALOG-40) 40 mg/mL injection 40 mg    Tendinosis of rotator cuff    Biceps tendonosis of right shoulder    Arthritis of right acromioclavicular joint          60-year-old right-dominant female right shoulder pain and weakness following fall injury outside her home on 03/01/2021  Discussed with patient physical exam, impression and plan  Right shoulder has range of motion forward flexion to 160°, abduction 160°, and internal rotation to lumbar spine  She has 4+/5 strength external rotation and supraspinatus  There is pain with empty can test/supraspinatus loading  Clinical impression that she may be symptomatic combination of rotator cuff pathology and AC joint arthropathy  I offered patient corticosteroid injection to which she agreed  I administered mixture of 2 cc 1% lidocaine, 2 cc 0 25% bupivacaine, and 1 cc Kenalog to right shoulder subacromial space without complication  She is advised to continue with home exercise program   She will follow up in 5 weeks at which point she will be re-evaluated  Subjective:   Patient ID: Simon Emerson is a 54 y o  female  Chief Complaint   Patient presents with    Right Shoulder - Follow-up, Pain       60-year-old right-hand-dominant female following up for right shoulder pain and weakness following fall injury outside her home on 03/01/2021  She was last seen by me 5 weeks ago which point she declined corticosteroid injection  She was advised to continue with physical therapy    She completed formal physical therapy 1 week ago and has been continuing with home exercise program   She reports improvement in range of motion and strength, but still experiences pain  She has pain described as localized mainly to the lateral aspect of the shoulder, achy and sore and sometimes sharp, nonradiating, worse with activity, and improved with rest   She alternates between ice and heat and also takes oral NSAID  She also applies various topical Solutions  Shoulder Pain  This is a new problem  The current episode started more than 1 month ago  The problem occurs daily  The problem has been waxing and waning  Associated symptoms include arthralgias  Pertinent negatives include no joint swelling, numbness or weakness  Exacerbated by: Arm use, physical activity  She has tried rest, NSAIDs, heat, ice and position changes (Physical therapy, home exercise) for the symptoms  The treatment provided mild relief  Review of Systems   Musculoskeletal: Positive for arthralgias  Negative for joint swelling  Neurological: Negative for weakness and numbness  Objective:  Vitals:    06/03/21 1322   BP: 116/80   Pulse: 83   Weight: 113 kg (249 lb)   Height: 5' 8" (1 727 m)     Right Shoulder Exam     Tenderness   Right shoulder tenderness location: Posterior lateral     Range of Motion   Active abduction: 160   Forward flexion: 160   Internal rotation 0 degrees: Lumbar     Muscle Strength   Right shoulder normal muscle strength: 4+/5 external rotation and supraspinatus  Abduction: 5/5   Internal rotation: 5/5     Comments:  Positive empty can test/supraspinatus loading              Physical Exam  Vitals signs and nursing note reviewed  Constitutional:       General: She is not in acute distress  Appearance: She is well-developed  She is not ill-appearing or diaphoretic  HENT:      Head: Normocephalic  Right Ear: External ear normal       Left Ear: External ear normal    Eyes:      Conjunctiva/sclera: Conjunctivae normal    Neck:      Trachea: No tracheal deviation  Cardiovascular:      Rate and Rhythm: Normal rate  Pulmonary:      Effort: Pulmonary effort is normal  No respiratory distress  Abdominal:      General: There is no distension  Musculoskeletal:         General: Tenderness present  No swelling, deformity or signs of injury  Skin:     General: Skin is warm and dry  Coloration: Skin is not jaundiced or pale  Neurological:      Mental Status: She is alert and oriented to person, place, and time  Psychiatric:         Mood and Affect: Mood normal          Behavior: Behavior normal          Thought Content: Thought content normal          Judgment: Judgment normal            Large joint arthrocentesis: R subacromial bursa  Universal Protocol:  Consent: Verbal consent obtained  Risks and benefits: risks, benefits and alternatives were discussed  Consent given by: patient  Time out: Immediately prior to procedure a "time out" was called to verify the correct patient, procedure, equipment, support staff and site/side marked as required    Timeout called at: 6/3/2021 1:53 PM   Patient understanding: patient states understanding of the procedure being performed  Patient consent: the patient's understanding of the procedure matches consent given  Procedure consent: procedure consent matches procedure scheduled  Relevant documents: relevant documents present and verified  Test results: test results available and properly labeled  Site marked: the operative site was marked  Required items: required blood products, implants, devices, and special equipment available  Patient identity confirmed: verbally with patient    Supporting Documentation  Indications: pain   Procedure Details  Location: shoulder - R subacromial bursa  Preparation: Patient was prepped and draped in the usual sterile fashion  Needle gauge: 21G 2"  Ultrasound guidance: no  Approach: posterolateral  Medications administered: 2 mL bupivacaine 0 25 %; 2 mL lidocaine 1 %; 3 mL lidocaine 1 %; 40 mg triamcinolone acetonide 40 mg/mL    Patient tolerance: patient tolerated the procedure well with no immediate complications  Dressing:  Sterile dressing applied

## 2021-06-04 RX ORDER — LIDOCAINE HYDROCHLORIDE 10 MG/ML
3 INJECTION, SOLUTION INFILTRATION; PERINEURAL
Status: COMPLETED | OUTPATIENT
Start: 2021-06-03 | End: 2021-06-03

## 2021-06-04 RX ORDER — LIDOCAINE HYDROCHLORIDE 10 MG/ML
2 INJECTION, SOLUTION INFILTRATION; PERINEURAL
Status: COMPLETED | OUTPATIENT
Start: 2021-06-03 | End: 2021-06-03

## 2021-06-04 RX ORDER — TRIAMCINOLONE ACETONIDE 40 MG/ML
40 INJECTION, SUSPENSION INTRA-ARTICULAR; INTRAMUSCULAR
Status: COMPLETED | OUTPATIENT
Start: 2021-06-03 | End: 2021-06-03

## 2021-06-04 RX ORDER — BUPIVACAINE HYDROCHLORIDE 2.5 MG/ML
2 INJECTION, SOLUTION INFILTRATION; PERINEURAL
Status: COMPLETED | OUTPATIENT
Start: 2021-06-03 | End: 2021-06-03

## 2021-07-02 ENCOUNTER — OFFICE VISIT (OUTPATIENT)
Dept: CARDIOLOGY CLINIC | Facility: CLINIC | Age: 56
End: 2021-07-02
Payer: COMMERCIAL

## 2021-07-02 VITALS
OXYGEN SATURATION: 97 % | HEART RATE: 76 BPM | SYSTOLIC BLOOD PRESSURE: 126 MMHG | DIASTOLIC BLOOD PRESSURE: 86 MMHG | BODY MASS INDEX: 37.44 KG/M2 | WEIGHT: 247 LBS | RESPIRATION RATE: 16 BRPM | HEIGHT: 68 IN

## 2021-07-02 DIAGNOSIS — E78.5 HYPERLIPIDEMIA, UNSPECIFIED HYPERLIPIDEMIA TYPE: Primary | ICD-10-CM

## 2021-07-02 DIAGNOSIS — I10 ESSENTIAL HYPERTENSION: ICD-10-CM

## 2021-07-02 PROCEDURE — 99214 OFFICE O/P EST MOD 30 MIN: CPT | Performed by: INTERNAL MEDICINE

## 2021-07-02 PROCEDURE — 3079F DIAST BP 80-89 MM HG: CPT | Performed by: INTERNAL MEDICINE

## 2021-07-02 PROCEDURE — 3074F SYST BP LT 130 MM HG: CPT | Performed by: INTERNAL MEDICINE

## 2021-07-02 NOTE — PROGRESS NOTES
Cardiology Office Note    Felix Juarez 54 y o  female MRN: [id number]    07/02/21          Assessment:  1  Hypertension   2  Hyperlipidemia    3  IFG    Plan:  · Her blood pressure is at goal, continue verapamil and aldactone  · Lipids at goal, continue simvastatin  · Ambulatory blood pressure monitoring and maintaining a low sodium diet was advised  Follow up: 1 year or sooner as needed    1  Hyperlipidemia, unspecified hyperlipidemia type     2  Essential hypertension         HPI: Felix Juarez is a 54y o  year old female with history of hypertension and hyperlipidemia who presents for routine follow up  Past cardiac history:   · Exercise stress test 2/2021: no evidence of ischemia  · TTE 2/2021: EF: 55%    She has been doing very well from a cardiac standpoint since her last evaluation  She denies recurrent chest discomfort  She has been walking/exercising regularly without limitation  She has been tolerating her medications without side effect  She denies any other cardiac concerns at this time      Family History:  Unknown family history of cardiac disease as she is adopted    Social history:  Lifelong nonsmoker      Allergies   Allergen Reactions    Levofloxacin Hives and Tachycardia     Annotation - [id number]: LIGHTHEADED    Sulfamethoxazole-Trimethoprim     Hydrocodone Bitartrate Er      Annotation - [id number]: FLUSHING    CHEST/RIB PAIN         Current Outpatient Medications:     acetaminophen (TYLENOL) 500 mg tablet, Take by mouth, Disp: , Rfl:     Black Cohosh (REMIFEMIN MENOPAUSE PO), Take by mouth, Disp: , Rfl:     Cholecalciferol (VITAMIN D) 2000 units CAPS, Take by mouth, Disp: , Rfl:     cholestyramine (QUESTRAN) 4 GM/DOSE powder, Take 1 packet (4 g total) by mouth 2 (two) times a day As needed for diarrhea, Disp: 378 g, Rfl: 5    diclofenac sodium (VOLTAREN) 50 mg EC tablet, take 1 tablet by mouth twice a day (Patient taking differently: No sig reported), Disp: 180 tablet, Rfl: 1    Ginkgo Biloba 60 MG TABS, Take by mouth daily , Disp: , Rfl:     glucosamine-chondroitin 500-400 MG tablet, Take 1 tablet by mouth 3 (three) times a day, Disp: 100 tablet, Rfl: 2    mometasone (NASONEX) 50 mcg/act nasal spray, 1 spray into each nostril daily, Disp: 17 g, Rfl: 2    Naltrexone-buPROPion HCl ER 8-90 MG TB12, 2 tabs  daily, Disp: 120 tablet, Rfl: 3    naproxen (NAPROSYN) 375 mg tablet, Take 1 tablet (375 mg total) by mouth 2 (two) times a day with meals, Disp: 20 tablet, Rfl: 0    omeprazole (PriLOSEC) 10 mg delayed release capsule, Take 1 capsule (10 mg total) by mouth daily, Disp: 90 capsule, Rfl: 2    Probiotic Product (ALIGN) 4 MG CAPS, Take by mouth, Disp: , Rfl:     simvastatin (ZOCOR) 5 MG tablet, Take 1 tablet (5 mg total) by mouth daily at bedtime, Disp: 90 tablet, Rfl: 2    spironolactone (ALDACTONE) 50 mg tablet, Take 1 tablet (50 mg total) by mouth daily, Disp: 90 tablet, Rfl: 2    verapamil (VERELAN PM) 180 MG 24 hr capsule, Take 1 capsule (180 mg total) by mouth daily before breakfast, Disp: 90 capsule, Rfl: 2    vitamin E, tocopherol, 400 units capsule, Take 1 capsule (400 Units total) by mouth daily, Disp: , Rfl:     Past Medical History:   Diagnosis Date    Allergic     Allergic rhinitis     last assessed 14     Arthritis     Asthma     Bleeding per rectum     Last assessed 16     Colon polyp     Diverticulitis of colon     GERD (gastroesophageal reflux disease)     Hypercholesterolemia     Hypertension     last assessed 3/24/15     Hyponatremia     last assessed 9/17/15     Obesity        Family History   Adopted: Yes   Problem Relation Age of Onset    No Known Problems Mother        Past Surgical History:   Procedure Laterality Date     SECTION      COLONOSCOPY      FL INJECTION RIGHT SHOULDER (ARTHROGRAM)  3/18/2021    WISDOM TOOTH EXTRACTION      WRIST SURGERY         Social History     Socioeconomic History    Marital status: /Civil [employer]     Spouse name: Not on file    Number of children: Not on file    Years of education: Not on file    Highest education level: Not on file   Occupational History     Comment: full time employment    Tobacco Use    Smoking status: Never Smoker    Smokeless tobacco: Never Used   Vaping Use    Vaping Use: Never used   Substance and Sexual Activity    Alcohol use: Yes     Comment: rare / never drank alcohol per allscript     Drug use: No    Sexual activity: Yes   Other Topics Concern    Not on file   Social History Narrative    Lives with spouse     Social Determinants of Health     Financial Resource Strain:     Difficulty of Paying Living Expenses:    Food Insecurity:     Worried About Running Out of Food in the Last Year:     [address] in the Last Year:    Transportation Needs:     Lack of Transportation (Medical):  Lack of Transportation (Non-Medical):    Physical Activity:     Days of Exercise per Week:     Minutes of Exercise per Session:    Stress:     Feeling of Stress :    Social Connections:     Frequency of Communication with Friends and Family:     Frequency of Social Gatherings with Friends and Family:     Attends Congregational Services:     Active Member of Clubs or Organizations:     Attends Club or Organization Meetings:     Marital Status:    Intimate Partner Violence:     Fear of Current or Ex-Partner:     Emotionally Abused:     Physically Abused:     Sexually Abused:        Review of Systems   Constitutional: Negative for diaphoresis, weight gain and weight loss  HENT: Negative for congestion  Cardiovascular: Negative for chest pain, dyspnea on exertion, irregular heartbeat, leg swelling, near-syncope, orthopnea, palpitations, paroxysmal nocturnal dyspnea and syncope  Respiratory: Negative for shortness of breath, sleep disturbances due to breathing and snoring  Hematologic/Lymphatic: Does not bruise/bleed easily     Skin: Negative for rash    Musculoskeletal: Negative for myalgias  Gastrointestinal: Negative for nausea and vomiting  Neurological: Negative for excessive daytime sleepiness and light-headedness  Psychiatric/Behavioral: The patient is not nervous/anxious  Vitals: /86   Pulse 76   Resp 16   Ht 5' 8" (1 727 m)   Wt 112 kg (247 lb)   SpO2 97%   BMI 37 56 kg/m²       Physical Exam:     GEN: Alert and oriented x 3, in no acute distress  Well appearing and well nourished  HEENT: Sclera anicteric, conjunctivae pink, mucous membranes moist  Oropharynx clear  NECK: Supple, no carotid bruits, no significant JVD  Trachea midline, no thyromegaly  HEART: Regular rhythm, normal S1 and S2, no murmurs, clicks, gallops or rubs  PMI nondisplaced, no thrills  LUNGS: Clear to auscultation bilaterally; no wheezes, rales, or rhonchi  No increased work of breathing or signs of respiratory distress  ABDOMEN: Soft, nontender, nondistended, normoactive bowel sounds  EXTREMITIES: Skin warm and well perfused, no clubbing, cyanosis, or edema  NEURO: No focal findings  Normal speech  Mood and affect normal    SKIN: Normal without suspicious lesions on exposed skin

## 2021-07-09 ENCOUNTER — OFFICE VISIT (OUTPATIENT)
Dept: OBGYN CLINIC | Facility: CLINIC | Age: 56
End: 2021-07-09
Payer: COMMERCIAL

## 2021-07-09 VITALS
WEIGHT: 248 LBS | HEART RATE: 76 BPM | SYSTOLIC BLOOD PRESSURE: 112 MMHG | DIASTOLIC BLOOD PRESSURE: 75 MMHG | BODY MASS INDEX: 37.59 KG/M2 | HEIGHT: 68 IN

## 2021-07-09 DIAGNOSIS — M70.61 GREATER TROCHANTERIC BURSITIS OF RIGHT HIP: Primary | ICD-10-CM

## 2021-07-09 DIAGNOSIS — M67.819 TENDINOSIS OF ROTATOR CUFF: ICD-10-CM

## 2021-07-09 DIAGNOSIS — M75.101 TEAR OF RIGHT SUPRASPINATUS TENDON: ICD-10-CM

## 2021-07-09 PROCEDURE — 99214 OFFICE O/P EST MOD 30 MIN: CPT | Performed by: FAMILY MEDICINE

## 2021-07-09 PROCEDURE — 20610 DRAIN/INJ JOINT/BURSA W/O US: CPT | Performed by: FAMILY MEDICINE

## 2021-07-09 PROCEDURE — 1036F TOBACCO NON-USER: CPT | Performed by: FAMILY MEDICINE

## 2021-07-09 PROCEDURE — 3008F BODY MASS INDEX DOCD: CPT | Performed by: FAMILY MEDICINE

## 2021-07-09 RX ORDER — TRIAMCINOLONE ACETONIDE 40 MG/ML
40 INJECTION, SUSPENSION INTRA-ARTICULAR; INTRAMUSCULAR
Status: COMPLETED | OUTPATIENT
Start: 2021-07-09 | End: 2021-07-09

## 2021-07-09 RX ORDER — LIDOCAINE HYDROCHLORIDE 10 MG/ML
5 INJECTION, SOLUTION INFILTRATION; PERINEURAL
Status: COMPLETED | OUTPATIENT
Start: 2021-07-09 | End: 2021-07-09

## 2021-07-09 RX ORDER — LIDOCAINE HYDROCHLORIDE 10 MG/ML
2 INJECTION, SOLUTION INFILTRATION; PERINEURAL
Status: COMPLETED | OUTPATIENT
Start: 2021-07-09 | End: 2021-07-09

## 2021-07-09 RX ORDER — BUPIVACAINE HYDROCHLORIDE 2.5 MG/ML
2 INJECTION, SOLUTION INFILTRATION; PERINEURAL
Status: COMPLETED | OUTPATIENT
Start: 2021-07-09 | End: 2021-07-09

## 2021-07-09 RX ADMIN — BUPIVACAINE HYDROCHLORIDE 2 ML: 2.5 INJECTION, SOLUTION INFILTRATION; PERINEURAL at 12:05

## 2021-07-09 RX ADMIN — LIDOCAINE HYDROCHLORIDE 5 ML: 10 INJECTION, SOLUTION INFILTRATION; PERINEURAL at 12:05

## 2021-07-09 RX ADMIN — TRIAMCINOLONE ACETONIDE 40 MG: 40 INJECTION, SUSPENSION INTRA-ARTICULAR; INTRAMUSCULAR at 12:05

## 2021-07-09 RX ADMIN — LIDOCAINE HYDROCHLORIDE 2 ML: 10 INJECTION, SOLUTION INFILTRATION; PERINEURAL at 12:05

## 2021-07-09 NOTE — PROGRESS NOTES
Assessment/Plan:  Assessment/Plan   Diagnoses and all orders for this visit:    Greater trochanteric bursitis of right hip  -     Large joint arthrocentesis: R greater trochanteric bursa    Tear of right supraspinatus tendon    Tendinosis of rotator cuff        44-year-old female with right lateral hip pain of more than few years duration  Discussed with patient physical exam, impression and plan  Physical the right hip is noted for tenderness at the greater trochanter  Clinical impression that she is symptomatic from greater trochanter bursitis  She has significant improvement pain from corticosteroid injection in the past so I offered repeat injection to which she agreed  I administered mixture of 2 cc 1% lidocaine, 2 cc 0 25% bupivacaine, and 1 cc Kenalog to the right greater trochanter bursa without complication  She may continue being physically active as tolerated  She will follow up with me as needed  Subjective:   Patient ID: Mann Belcher is a 54 y o  female  Chief Complaint   Patient presents with    Right Hip - Follow-up, Pain    Right Shoulder - Follow-up       44-year-old right-hand-dominant female following up for right lateral hip pain of more than few years duration  She was last seen by me in this regard nearly 5 months ago at which point she received right greater trochanter bursa corticosteroid injection  She reports having had significant improvement in right lateral hip pain up until about 1 week ago  Since that time she has been experiencing pain described as localized to the right lateral hip, aching and burning and sometimes sharp, nonradiating, worse with laying on her right side, worse with ambulating, and improved with resting  She has been taking ibuprofen and Tylenol  She reports feeling much better in her right shoulder  Hip Pain  This is a chronic problem  The current episode started more than 1 year ago  The problem occurs daily   The problem has been gradually worsening  Associated symptoms include arthralgias  Pertinent negatives include no joint swelling, numbness or weakness  The symptoms are aggravated by walking (Direct pressure)  She has tried rest, NSAIDs, acetaminophen and position changes for the symptoms  The treatment provided mild relief  Shoulder Pain  This is a new problem  The current episode started more than 1 month ago  The problem occurs intermittently  The problem has been gradually improving  Associated symptoms include arthralgias  Pertinent negatives include no joint swelling, numbness or weakness  Exacerbated by: Strenuous activity  She has tried rest, NSAIDs and acetaminophen (Corticosteroid injection, physical therapy, home exercise) for the symptoms  The treatment provided significant relief  Review of Systems   Musculoskeletal: Positive for arthralgias  Negative for joint swelling  Neurological: Negative for weakness and numbness  Objective:  Vitals:    07/09/21 1155   BP: 112/75   Pulse: 76   Weight: 112 kg (248 lb)   Height: 5' 8" (1 727 m)     Left Hip Exam     Tenderness   The patient is experiencing tenderness in the greater trochanter  Other   Erythema: absent              Physical Exam  Vitals and nursing note reviewed  Constitutional:       General: She is not in acute distress  Appearance: She is well-developed  She is not ill-appearing or diaphoretic  HENT:      Head: Normocephalic  Right Ear: External ear normal       Left Ear: External ear normal    Eyes:      Conjunctiva/sclera: Conjunctivae normal    Neck:      Trachea: No tracheal deviation  Cardiovascular:      Rate and Rhythm: Normal rate  Pulmonary:      Effort: Pulmonary effort is normal  No respiratory distress  Abdominal:      General: There is no distension  Musculoskeletal:         General: Tenderness present  No swelling, deformity or signs of injury  Skin:     General: Skin is warm and dry        Coloration: Skin is not jaundiced or pale  Neurological:      Mental Status: She is alert and oriented to person, place, and time  Psychiatric:         Mood and Affect: Mood normal          Behavior: Behavior normal          Thought Content: Thought content normal          Judgment: Judgment normal            Large joint arthrocentesis: R greater trochanteric bursa  Universal Protocol:  Consent: Verbal consent obtained  Risks and benefits: risks, benefits and alternatives were discussed  Consent given by: patient  Time out: Immediately prior to procedure a "time out" was called to verify the correct patient, procedure, equipment, support staff and site/side marked as required  Timeout called at: 7/9/2021 12:04 PM   Patient understanding: patient states understanding of the procedure being performed  Patient consent: the patient's understanding of the procedure matches consent given  Procedure consent: procedure consent matches procedure scheduled  Relevant documents: relevant documents present and verified  Test results: test results available and properly labeled  Site marked: the operative site was marked  Radiology Images displayed and confirmed   If images not available, report reviewed: imaging studies available  Required items: required blood products, implants, devices, and special equipment available  Patient identity confirmed: verbally with patient    Supporting Documentation  Indications: pain   Procedure Details  Location: hip - R greater trochanteric bursa  Preparation: Patient was prepped and draped in the usual sterile fashion  Needle gauge: 22G 3 5"  Ultrasound guidance: no  Approach: lateral  Medications administered: 2 mL bupivacaine 0 25 %; 2 mL lidocaine 1 %; 5 mL lidocaine 1 %; 40 mg triamcinolone acetonide 40 mg/mL    Patient tolerance: patient tolerated the procedure well with no immediate complications  Dressing:  Sterile dressing applied

## 2021-08-03 ENCOUNTER — APPOINTMENT (OUTPATIENT)
Dept: LAB | Facility: HOSPITAL | Age: 56
End: 2021-08-03
Attending: INTERNAL MEDICINE
Payer: COMMERCIAL

## 2021-08-03 ENCOUNTER — OFFICE VISIT (OUTPATIENT)
Dept: INTERNAL MEDICINE CLINIC | Facility: CLINIC | Age: 56
End: 2021-08-03
Payer: COMMERCIAL

## 2021-08-03 VITALS
DIASTOLIC BLOOD PRESSURE: 84 MMHG | OXYGEN SATURATION: 98 % | WEIGHT: 248 LBS | BODY MASS INDEX: 37.59 KG/M2 | SYSTOLIC BLOOD PRESSURE: 124 MMHG | HEART RATE: 95 BPM | HEIGHT: 68 IN | TEMPERATURE: 98.6 F | RESPIRATION RATE: 16 BRPM

## 2021-08-03 DIAGNOSIS — A69.20 ERYTHEMA MIGRANS (LYME DISEASE): ICD-10-CM

## 2021-08-03 DIAGNOSIS — A69.20 ERYTHEMA MIGRANS (LYME DISEASE): Primary | ICD-10-CM

## 2021-08-03 PROCEDURE — 1036F TOBACCO NON-USER: CPT | Performed by: INTERNAL MEDICINE

## 2021-08-03 PROCEDURE — 3079F DIAST BP 80-89 MM HG: CPT | Performed by: INTERNAL MEDICINE

## 2021-08-03 PROCEDURE — 86618 LYME DISEASE ANTIBODY: CPT

## 2021-08-03 PROCEDURE — 3008F BODY MASS INDEX DOCD: CPT | Performed by: INTERNAL MEDICINE

## 2021-08-03 PROCEDURE — 3074F SYST BP LT 130 MM HG: CPT | Performed by: INTERNAL MEDICINE

## 2021-08-03 PROCEDURE — 99214 OFFICE O/P EST MOD 30 MIN: CPT | Performed by: INTERNAL MEDICINE

## 2021-08-03 PROCEDURE — 36415 COLL VENOUS BLD VENIPUNCTURE: CPT

## 2021-08-03 RX ORDER — DOXYCYCLINE HYCLATE 100 MG
100 TABLET ORAL 2 TIMES DAILY
Qty: 28 TABLET | Refills: 0 | Status: SHIPPED | OUTPATIENT
Start: 2021-08-03 | End: 2021-08-17

## 2021-08-03 NOTE — PROGRESS NOTES
BMI Counseling: Body mass index is 37 71 kg/m²  The BMI is above normal  Nutrition recommendations include encouraging healthy choices of fruits and vegetables  Depression Screening and Follow-up Plan: Clincally patient does not have depression  No treatment is required  Assessment/Plan:    No problem-specific Assessment & Plan notes found for this encounter  Diagnoses and all orders for this visit:    Erythema migrans (Lyme disease)  -     doxycycline hyclate (VIBRA-TABS) 100 mg tablet; Take 1 tablet (100 mg total) by mouth 2 (two) times a day for 14 days  -     Lyme Antibody Profile with reflex to WB; Future          Subjective:      Patient ID: Yanira Munguia is a 54 y o  female  Recent tick bite last Thursday  Pollock a bite but did not look at her left leg at that time  One day later she noted a bulls eye rash  Has been more than 72 hours since bite  The following portions of the patient's history were reviewed and updated as appropriate:   She  has a past medical history of Allergic, Allergic rhinitis, Arthritis, Asthma, Bleeding per rectum, Colon polyp, Diverticulitis of colon, GERD (gastroesophageal reflux disease), Hypercholesterolemia, Hypertension, Hyponatremia, and Obesity    She   Patient Active Problem List    Diagnosis Date Noted    Atypical chest pain 10/30/2020    Plantar fasciitis, left 10/08/2020    Rectal bleeding 06/16/2020    Hematochezia 05/12/2020    Other neutropenia ([address] ) 11/14/2019    Hand joint stiff, right PIP 11/14/2019    Skin lesion 05/07/2019    GERD (gastroesophageal reflux disease) 06/18/2018    Chondromalacia of right patella 06/12/2018    Popliteal cyst, right 06/12/2018    PMB (postmenopausal bleeding) 01/23/2018    Left ankle pain 11/27/2017    Trochanteric bursitis of both hips 10/04/2017    Upper extremity pain, diffuse, right 04/20/2017    Benign essential hypertension 07/14/2016    Osteoarthritis of knees, bilateral 03/10/2016    Eosinophil count raised 2016    Arthralgia of both ankles 2016    Hypoalbuminemia 2016    Greater trochanteric bursitis, left 2015    Cervical spinal stenosis 2015    Right cervical radiculopathy 2015    Colon polyp 2015    Morbid obesity ([address] ) 2015    Prediabetes 2015    Asthma 2014    Foraminal stenosis of cervical region 2013    Lumbar radiculopathy 2013    Chronic sinusitis 2013    Hyperlipidemia 2013    Alopecia mucinosa 2012     She  has a past surgical history that includes  section; Wrist surgery; Colonoscopy; [place] tooth extraction; and FL injection right shoulder (arthrogram) (3/18/2021)  Her family history includes No Known Problems in her mother  She was adopted  She  reports that she has never smoked  She has never used smokeless tobacco  She reports current alcohol use  She reports that she does not use drugs    Current Outpatient Medications   Medication Sig Dispense Refill    acetaminophen (TYLENOL) 500 mg tablet Take by mouth      Black Cohosh (REMIFEMIN MENOPAUSE PO) Take by mouth      Cholecalciferol (VITAMIN D) 2000 units CAPS Take by mouth      cholestyramine (QUESTRAN) 4 GM/DOSE powder Take 1 packet (4 g total) by mouth 2 (two) times a day As needed for diarrhea 378 g 5    diclofenac sodium (VOLTAREN) 50 mg EC tablet take 1 tablet by mouth twice a day (Patient taking differently: No sig reported) 180 tablet 1    Ginkgo Biloba 60 MG TABS Take by mouth daily       glucosamine-chondroitin 500-400 MG tablet Take 1 tablet by mouth 3 (three) times a day 100 tablet 2    mometasone (NASONEX) 50 mcg/act nasal spray 1 spray into each nostril daily 17 g 2    Naltrexone-buPROPion HCl ER 8-90 MG TB12 2 tabs  daily 120 tablet 3    naproxen (NAPROSYN) 375 mg tablet Take 1 tablet (375 mg total) by mouth 2 (two) times a day with meals 20 tablet 0    omeprazole (PriLOSEC) 10 mg delayed release capsule Take 1 capsule (10 mg total) by mouth daily 90 capsule 2    Probiotic Product (ALIGN) 4 MG CAPS Take by mouth      simvastatin (ZOCOR) 5 MG tablet Take 1 tablet (5 mg total) by mouth daily at bedtime 90 tablet 2    spironolactone (ALDACTONE) 50 mg tablet Take 1 tablet (50 mg total) by mouth daily 90 tablet 2    verapamil (VERELAN PM) 180 MG 24 hr capsule Take 1 capsule (180 mg total) by mouth daily before breakfast 90 capsule 2    vitamin E, tocopherol, 400 units capsule Take 1 capsule (400 Units total) by mouth daily      doxycycline hyclate (VIBRA-TABS) 100 mg tablet Take 1 tablet (100 mg total) by mouth 2 (two) times a day for 14 days 28 tablet 0     No current facility-administered medications for this visit       Current Outpatient Medications on File Prior to Visit   Medication Sig    acetaminophen (TYLENOL) 500 mg tablet Take by mouth    Black Cohosh (REMIFEMIN MENOPAUSE PO) Take by mouth    Cholecalciferol (VITAMIN D) 2000 units CAPS Take by mouth    cholestyramine (QUESTRAN) 4 GM/DOSE powder Take 1 packet (4 g total) by mouth 2 (two) times a day As needed for diarrhea    diclofenac sodium (VOLTAREN) 50 mg EC tablet take 1 tablet by mouth twice a day (Patient taking differently: No sig reported)    Ginkgo Biloba 60 MG TABS Take by mouth daily     glucosamine-chondroitin 500-400 MG tablet Take 1 tablet by mouth 3 (three) times a day    mometasone (NASONEX) 50 mcg/act nasal spray 1 spray into each nostril daily    Naltrexone-buPROPion HCl ER 8-90 MG TB12 2 tabs  daily    naproxen (NAPROSYN) 375 mg tablet Take 1 tablet (375 mg total) by mouth 2 (two) times a day with meals    omeprazole (PriLOSEC) 10 mg delayed release capsule Take 1 capsule (10 mg total) by mouth daily    Probiotic Product (ALIGN) 4 MG CAPS Take by mouth    simvastatin (ZOCOR) 5 MG tablet Take 1 tablet (5 mg total) by mouth daily at bedtime    spironolactone (ALDACTONE) 50 mg tablet Take 1 tablet (50 mg total) by mouth daily    verapamil (VERELAN PM) 180 MG 24 hr capsule Take 1 capsule (180 mg total) by mouth daily before breakfast    vitamin E, tocopherol, 400 units capsule Take 1 capsule (400 Units total) by mouth daily     No current facility-administered medications on file prior to visit  She is allergic to levofloxacin, sulfamethoxazole-trimethoprim, and hydrocodone bitartrate er       Review of Systems   Skin: Positive for color change and rash  All other systems reviewed and are negative  Objective:      /84 (BP Location: Left arm, Patient Position: Sitting, Cuff Size: Standard)   Pulse 95   Temp 98 6 °F (37 °C) (Tympanic)   Resp 16   Ht 5' 8" (1 727 m)   Wt 112 kg (248 lb)   SpO2 98%   BMI 37 71 kg/m²          Physical Exam  Vitals and nursing note reviewed  Constitutional:       Appearance: Normal appearance  HENT:      Head: Normocephalic and atraumatic  Cardiovascular:      Rate and Rhythm: Normal rate  Pulmonary:      Effort: Pulmonary effort is normal    Musculoskeletal:         General: Normal range of motion  Cervical back: Normal range of motion  Skin:     Findings: Erythema and rash present  Comments: Clear center with erythematous outer [place] c/w bulls eye    Neurological:      General: No focal deficit present  Mental Status: She is alert and oriented to person, place, and time  Mental status is at baseline  Psychiatric:         Mood and Affect: Mood normal          Behavior: Behavior normal          Thought Content:  Thought content normal          Judgment: Judgment normal

## 2021-08-04 LAB — B BURGDOR IGG+IGM SER-ACNC: 13

## 2021-08-06 ENCOUNTER — TELEPHONE (OUTPATIENT)
Dept: INTERNAL MEDICINE CLINIC | Facility: CLINIC | Age: 56
End: 2021-08-06

## 2021-08-12 ENCOUNTER — TELEPHONE (OUTPATIENT)
Dept: INTERNAL MEDICINE CLINIC | Facility: CLINIC | Age: 56
End: 2021-08-12

## 2021-08-12 NOTE — TELEPHONE ENCOUNTER
Her other shoulder (left shoulder) is in pain now   armpit area and crosses through under her breast    She would like to talk to a nurse possibly today

## 2021-08-12 NOTE — TELEPHONE ENCOUNTER
Called patient back-she stated that she had a right arm injury but has been compensating with her left arm  She has been sleeping with her arm up over her head and has been having discomfort when picking up a glass/doing daily activities  Appt made 8/19 with Dr CHANG to assess  Pt denies ant chest pain, SOB, jaw pain or back pain  I explained to her it sound like it's muscular but if any of the above symptoms begin to go to the ER

## 2021-08-19 ENCOUNTER — OFFICE VISIT (OUTPATIENT)
Dept: INTERNAL MEDICINE CLINIC | Facility: CLINIC | Age: 56
End: 2021-08-19
Payer: COMMERCIAL

## 2021-08-19 VITALS
TEMPERATURE: 98.6 F | WEIGHT: 248 LBS | HEART RATE: 87 BPM | HEIGHT: 68 IN | BODY MASS INDEX: 37.59 KG/M2 | SYSTOLIC BLOOD PRESSURE: 116 MMHG | DIASTOLIC BLOOD PRESSURE: 82 MMHG | RESPIRATION RATE: 16 BRPM | OXYGEN SATURATION: 99 %

## 2021-08-19 DIAGNOSIS — T14.8XXA PULLED MUSCLE: Primary | ICD-10-CM

## 2021-08-19 PROCEDURE — 99213 OFFICE O/P EST LOW 20 MIN: CPT | Performed by: INTERNAL MEDICINE

## 2021-08-19 PROCEDURE — 3008F BODY MASS INDEX DOCD: CPT | Performed by: INTERNAL MEDICINE

## 2021-08-19 PROCEDURE — 1036F TOBACCO NON-USER: CPT | Performed by: INTERNAL MEDICINE

## 2021-08-19 RX ORDER — CYCLOBENZAPRINE HCL 10 MG
10 TABLET ORAL 3 TIMES DAILY PRN
Qty: 45 TABLET | Refills: 0 | Status: SHIPPED | OUTPATIENT
Start: 2021-08-19 | End: 2021-10-27 | Stop reason: ALTCHOICE

## 2021-08-19 NOTE — PROGRESS NOTES
Assessment/Plan:    No problem-specific Assessment & Plan notes found for this encounter  Diagnoses and all orders for this visit:    Pulled muscle  -     cyclobenzaprine (FLEXERIL) 10 mg tablet; Take 1 tablet (10 mg total) by mouth 3 (three) times a day as needed for muscle spasms          Subjective:      Patient ID: Matthew Nagel is a 54 y o  female  Patient is here with left shoulder pain  Started on Friday  No known event  No trauma no fall  Radiates to upper chest  Pain occurs when she uses her arm to pick things up  Sleeping on her arm exaercabted the arm  She has no pain when she does not move her arm  Tylenol helps the pain  She has been using warm compresses  The following portions of the patient's history were reviewed and updated as appropriate:   She  has a past medical history of Allergic, Allergic rhinitis, Arthritis, Asthma, Bleeding per rectum, Colon polyp, Diverticulitis of colon, GERD (gastroesophageal reflux disease), Hypercholesterolemia, Hypertension, Hyponatremia, and Obesity    She   Patient Active Problem List    Diagnosis Date Noted    Atypical chest pain 10/30/2020    Plantar fasciitis, left 10/08/2020    Rectal bleeding 06/16/2020    Hematochezia 05/12/2020    Other neutropenia ([facility] [address] ) 11/14/2019    Hand joint stiff, right PIP 11/14/2019    Skin lesion 05/07/2019    GERD (gastroesophageal reflux disease) 06/18/2018    Chondromalacia of right patella 06/12/2018    Popliteal cyst, right 06/12/2018    PMB (postmenopausal bleeding) 01/23/2018    Left ankle pain 11/27/2017    Trochanteric bursitis of both hips 10/04/2017    Upper extremity pain, diffuse, right 04/20/2017    Benign essential hypertension 07/14/2016    Osteoarthritis of knees, bilateral 03/10/2016    Eosinophil count raised 03/07/2016    Arthralgia of both ankles 01/25/2016    Hypoalbuminemia 01/09/2016    Greater trochanteric bursitis, left 11/25/2015    Cervical spinal stenosis 06/02/2015    Right cervical radiculopathy 06/02/2015    Colon polyp 03/24/2015    Morbid obesity ([facility] [address] ) 03/24/2015    Prediabetes 03/24/2015    Asthma 02/18/2014    Foraminal stenosis of cervical region 12/13/2013    Lumbar radiculopathy 12/13/2013    Chronic sinusitis 01/16/2013    Hyperlipidemia 01/16/2013    Alopecia mucinosa 12/20/2012     Current Outpatient Medications   Medication Sig Dispense Refill    acetaminophen (TYLENOL) 500 mg tablet Take by mouth      Black Cohosh (REMIFEMIN MENOPAUSE PO) Take by mouth      Cholecalciferol (VITAMIN D) 2000 units CAPS Take by mouth      cholestyramine (QUESTRAN) 4 GM/DOSE powder Take 1 packet (4 g total) by mouth 2 (two) times a day As needed for diarrhea 378 g 5    diclofenac sodium (VOLTAREN) 50 mg EC tablet take 1 tablet by mouth twice a day (Patient taking differently: No sig reported) 180 tablet 1    Ginkgo Biloba 60 MG TABS Take by mouth daily       glucosamine-chondroitin 500-400 MG tablet Take 1 tablet by mouth 3 (three) times a day 100 tablet 2    mometasone (NASONEX) 50 mcg/act nasal spray 1 spray into each nostril daily 17 g 2    Naltrexone-buPROPion HCl ER 8-90 MG TB12 2 tabs  daily 120 tablet 3    naproxen (NAPROSYN) 375 mg tablet Take 1 tablet (375 mg total) by mouth 2 (two) times a day with meals 20 tablet 0    omeprazole (PriLOSEC) 10 mg delayed release capsule Take 1 capsule (10 mg total) by mouth daily 90 capsule 2    Probiotic Product (ALIGN) 4 MG CAPS Take by mouth      simvastatin (ZOCOR) 5 MG tablet Take 1 tablet (5 mg total) by mouth daily at bedtime 90 tablet 2    spironolactone (ALDACTONE) 50 mg tablet Take 1 tablet (50 mg total) by mouth daily 90 tablet 2    verapamil (VERELAN PM) 180 MG 24 hr capsule Take 1 capsule (180 mg total) by mouth daily before breakfast 90 capsule 2    vitamin E, tocopherol, 400 units capsule Take 1 capsule (400 Units total) by mouth daily      cyclobenzaprine (FLEXERIL) 10 mg tablet Take 1 tablet (10 mg total) by mouth 3 (three) times a day as needed for muscle spasms 45 tablet 0     No current facility-administered medications for this visit       Review of Systems   Musculoskeletal:        Left shoulder pain   All other systems reviewed and are negative  Objective:      /82 (BP Location: Left arm, Patient Position: Sitting, Cuff Size: Large)   Pulse 87   Temp 98 6 °F (37 °C) (Tympanic)   Resp 16   Ht 5' 8" (1 727 m)   Wt 112 kg (248 lb)   SpO2 99%   BMI 37 71 kg/m²          Physical Exam  Vitals and nursing note reviewed  Constitutional:       Appearance: Normal appearance  HENT:      Head: Normocephalic and atraumatic  Cardiovascular:      Rate and Rhythm: Normal rate  Pulmonary:      Effort: Pulmonary effort is normal    Musculoskeletal:         General: Normal range of motion  Cervical back: Normal range of motion and neck supple  Skin:     General: Skin is warm and dry  Neurological:      General: No focal deficit present  Mental Status: She is alert and oriented to person, place, and time  Mental status is at baseline  Psychiatric:         Mood and Affect: Mood normal          Behavior: Behavior normal          Thought Content:  Thought content normal          Judgment: Judgment normal

## 2021-09-22 DIAGNOSIS — K21.9 GASTROESOPHAGEAL REFLUX DISEASE: ICD-10-CM

## 2021-09-22 DIAGNOSIS — M54.16 LUMBAR RADICULOPATHY: ICD-10-CM

## 2021-09-22 DIAGNOSIS — E78.5 HYPERLIPIDEMIA, UNSPECIFIED HYPERLIPIDEMIA TYPE: ICD-10-CM

## 2021-09-22 DIAGNOSIS — L98.9 SKIN LESION: ICD-10-CM

## 2021-09-22 DIAGNOSIS — M79.89 LEG SWELLING: ICD-10-CM

## 2021-09-22 DIAGNOSIS — R73.03 PREDIABETES: ICD-10-CM

## 2021-09-22 DIAGNOSIS — J45.909 MODERATE ASTHMA WITHOUT COMPLICATION, UNSPECIFIED WHETHER PERSISTENT: ICD-10-CM

## 2021-09-22 DIAGNOSIS — J01.91 ACUTE RECURRENT SINUSITIS, UNSPECIFIED LOCATION: ICD-10-CM

## 2021-09-22 DIAGNOSIS — I10 HYPERTENSION, UNSPECIFIED TYPE: ICD-10-CM

## 2021-09-22 DIAGNOSIS — I10 BENIGN ESSENTIAL HYPERTENSION: ICD-10-CM

## 2021-09-22 DIAGNOSIS — L65.9 ALOPECIA: ICD-10-CM

## 2021-09-22 RX ORDER — MOMETASONE FUROATE 50 UG/1
SPRAY, METERED NASAL
Qty: 17 G | Refills: 2 | Status: SHIPPED | OUTPATIENT
Start: 2021-09-22 | End: 2022-08-01 | Stop reason: SDUPTHER

## 2021-10-18 ENCOUNTER — APPOINTMENT (OUTPATIENT)
Dept: LAB | Facility: HOSPITAL | Age: 56
End: 2021-10-18
Payer: COMMERCIAL

## 2021-10-18 DIAGNOSIS — R73.03 PREDIABETES: ICD-10-CM

## 2021-10-18 DIAGNOSIS — E78.5 HYPERLIPIDEMIA, UNSPECIFIED HYPERLIPIDEMIA TYPE: ICD-10-CM

## 2021-10-18 DIAGNOSIS — I10 BENIGN ESSENTIAL HYPERTENSION: ICD-10-CM

## 2021-10-18 LAB
ALBUMIN SERPL BCP-MCNC: 3.7 G/DL (ref 3.5–5)
ALP SERPL-CCNC: 75 U/L (ref 46–116)
ALT SERPL W P-5'-P-CCNC: 16 U/L (ref 12–78)
ANION GAP SERPL CALCULATED.3IONS-SCNC: 10 MMOL/L (ref 4–13)
AST SERPL W P-5'-P-CCNC: 16 U/L (ref 5–45)
BASOPHILS # BLD AUTO: 0.05 THOUSANDS/ΜL (ref 0–0.1)
BASOPHILS NFR BLD AUTO: 1 % (ref 0–1)
BILIRUB SERPL-MCNC: 0.57 MG/DL (ref 0.2–1)
BUN SERPL-MCNC: 14 MG/DL (ref 5–25)
CALCIUM SERPL-MCNC: 9.4 MG/DL (ref 8.3–10.1)
CHLORIDE SERPL-SCNC: 104 MMOL/L (ref 100–108)
CHOLEST SERPL-MCNC: 169 MG/DL (ref 50–200)
CO2 SERPL-SCNC: 29 MMOL/L (ref 21–32)
CREAT SERPL-MCNC: 0.93 MG/DL (ref 0.6–1.3)
EOSINOPHIL # BLD AUTO: 0.2 THOUSAND/ΜL (ref 0–0.61)
EOSINOPHIL NFR BLD AUTO: 5 % (ref 0–6)
ERYTHROCYTE [DISTWIDTH] IN BLOOD BY AUTOMATED COUNT: 12.2 % (ref 11.6–15.1)
EST. AVERAGE GLUCOSE BLD GHB EST-MCNC: 105 MG/DL
GFR SERPL CREATININE-BSD FRML MDRD: 69 ML/MIN/1.73SQ M
GLUCOSE P FAST SERPL-MCNC: 81 MG/DL (ref 65–99)
HBA1C MFR BLD: 5.3 %
HCT VFR BLD AUTO: 42.3 % (ref 34.8–46.1)
HDLC SERPL-MCNC: 70 MG/DL
HGB BLD-MCNC: 13.4 G/DL (ref 11.5–15.4)
IMM GRANULOCYTES # BLD AUTO: 0.01 THOUSAND/UL (ref 0–0.2)
IMM GRANULOCYTES NFR BLD AUTO: 0 % (ref 0–2)
LDLC SERPL CALC-MCNC: 88 MG/DL (ref 0–100)
LYMPHOCYTES # BLD AUTO: 1.03 THOUSANDS/ΜL (ref 0.6–4.47)
LYMPHOCYTES NFR BLD AUTO: 24 % (ref 14–44)
MCH RBC QN AUTO: 29.6 PG (ref 26.8–34.3)
MCHC RBC AUTO-ENTMCNC: 31.7 G/DL (ref 31.4–37.4)
MCV RBC AUTO: 94 FL (ref 82–98)
MONOCYTES # BLD AUTO: 0.38 THOUSAND/ΜL (ref 0.17–1.22)
MONOCYTES NFR BLD AUTO: 9 % (ref 4–12)
NEUTROPHILS # BLD AUTO: 2.62 THOUSANDS/ΜL (ref 1.85–7.62)
NEUTS SEG NFR BLD AUTO: 61 % (ref 43–75)
NONHDLC SERPL-MCNC: 99 MG/DL
NRBC BLD AUTO-RTO: 0 /100 WBCS
PLATELET # BLD AUTO: 325 THOUSANDS/UL (ref 149–390)
PMV BLD AUTO: 9.3 FL (ref 8.9–12.7)
POTASSIUM SERPL-SCNC: 4.4 MMOL/L (ref 3.5–5.3)
PROT SERPL-MCNC: 7.6 G/DL (ref 6.4–8.2)
RBC # BLD AUTO: 4.52 MILLION/UL (ref 3.81–5.12)
SODIUM SERPL-SCNC: 143 MMOL/L (ref 136–145)
TRIGL SERPL-MCNC: 57 MG/DL
TSH SERPL DL<=0.05 MIU/L-ACNC: 1.47 UIU/ML (ref 0.36–3.74)
WBC # BLD AUTO: 4.29 THOUSAND/UL (ref 4.31–10.16)

## 2021-10-18 PROCEDURE — 36415 COLL VENOUS BLD VENIPUNCTURE: CPT

## 2021-10-18 PROCEDURE — 83036 HEMOGLOBIN GLYCOSYLATED A1C: CPT

## 2021-10-18 PROCEDURE — 85025 COMPLETE CBC W/AUTO DIFF WBC: CPT

## 2021-10-18 PROCEDURE — 80053 COMPREHEN METABOLIC PANEL: CPT

## 2021-10-18 PROCEDURE — 84443 ASSAY THYROID STIM HORMONE: CPT

## 2021-10-18 PROCEDURE — 80061 LIPID PANEL: CPT

## 2021-10-27 ENCOUNTER — OFFICE VISIT (OUTPATIENT)
Dept: INTERNAL MEDICINE CLINIC | Facility: CLINIC | Age: 56
End: 2021-10-27
Payer: COMMERCIAL

## 2021-10-27 VITALS
SYSTOLIC BLOOD PRESSURE: 121 MMHG | BODY MASS INDEX: 38.95 KG/M2 | RESPIRATION RATE: 16 BRPM | HEIGHT: 68 IN | WEIGHT: 257 LBS | HEART RATE: 102 BPM | TEMPERATURE: 98.6 F | OXYGEN SATURATION: 97 % | DIASTOLIC BLOOD PRESSURE: 82 MMHG

## 2021-10-27 DIAGNOSIS — I10 BENIGN ESSENTIAL HTN: ICD-10-CM

## 2021-10-27 DIAGNOSIS — I10 BENIGN ESSENTIAL HYPERTENSION: ICD-10-CM

## 2021-10-27 DIAGNOSIS — E66.01 MORBID OBESITY (HCC): ICD-10-CM

## 2021-10-27 DIAGNOSIS — L65.9 ALOPECIA: ICD-10-CM

## 2021-10-27 DIAGNOSIS — R73.03 PREDIABETES: ICD-10-CM

## 2021-10-27 DIAGNOSIS — Z23 NEED FOR INFLUENZA VACCINATION: Primary | ICD-10-CM

## 2021-10-27 DIAGNOSIS — M25.511 ACUTE PAIN OF RIGHT SHOULDER: ICD-10-CM

## 2021-10-27 DIAGNOSIS — K21.9 GASTROESOPHAGEAL REFLUX DISEASE: ICD-10-CM

## 2021-10-27 DIAGNOSIS — M79.89 LEG SWELLING: ICD-10-CM

## 2021-10-27 DIAGNOSIS — J45.909 MODERATE ASTHMA WITHOUT COMPLICATION, UNSPECIFIED WHETHER PERSISTENT: ICD-10-CM

## 2021-10-27 DIAGNOSIS — L98.9 SKIN LESION: ICD-10-CM

## 2021-10-27 DIAGNOSIS — J01.91 ACUTE RECURRENT SINUSITIS, UNSPECIFIED LOCATION: ICD-10-CM

## 2021-10-27 DIAGNOSIS — I10 HYPERTENSION, UNSPECIFIED TYPE: ICD-10-CM

## 2021-10-27 DIAGNOSIS — L57.0 ACTINIC KERATOSIS: ICD-10-CM

## 2021-10-27 DIAGNOSIS — E78.5 HYPERLIPIDEMIA, UNSPECIFIED HYPERLIPIDEMIA TYPE: ICD-10-CM

## 2021-10-27 DIAGNOSIS — M54.16 LUMBAR RADICULOPATHY: ICD-10-CM

## 2021-10-27 DIAGNOSIS — K21.9 GASTROESOPHAGEAL REFLUX DISEASE, UNSPECIFIED WHETHER ESOPHAGITIS PRESENT: ICD-10-CM

## 2021-10-27 PROCEDURE — 3725F SCREEN DEPRESSION PERFORMED: CPT | Performed by: NURSE PRACTITIONER

## 2021-10-27 PROCEDURE — 1036F TOBACCO NON-USER: CPT | Performed by: NURSE PRACTITIONER

## 2021-10-27 PROCEDURE — 3008F BODY MASS INDEX DOCD: CPT | Performed by: NURSE PRACTITIONER

## 2021-10-27 PROCEDURE — 90682 RIV4 VACC RECOMBINANT DNA IM: CPT | Performed by: NURSE PRACTITIONER

## 2021-10-27 PROCEDURE — 99214 OFFICE O/P EST MOD 30 MIN: CPT | Performed by: NURSE PRACTITIONER

## 2021-10-27 PROCEDURE — 90471 IMMUNIZATION ADMIN: CPT | Performed by: NURSE PRACTITIONER

## 2021-10-27 RX ORDER — VERAPAMIL HYDROCHLORIDE 180 MG/1
180 CAPSULE, EXTENDED RELEASE ORAL
Qty: 90 CAPSULE | Refills: 2 | Status: SHIPPED | OUTPATIENT
Start: 2021-10-27 | End: 2022-07-22

## 2021-10-27 RX ORDER — OMEPRAZOLE 10 MG/1
10 CAPSULE, DELAYED RELEASE ORAL DAILY
Qty: 90 CAPSULE | Refills: 2 | Status: SHIPPED | OUTPATIENT
Start: 2021-10-27

## 2021-11-03 ENCOUNTER — HOSPITAL ENCOUNTER (OUTPATIENT)
Dept: RADIOLOGY | Facility: HOSPITAL | Age: 56
Discharge: HOME | End: 2021-11-03
Payer: COMMERCIAL

## 2021-11-03 ENCOUNTER — APPOINTMENT (OUTPATIENT)
Dept: LAB | Facility: HOSPITAL | Age: 56
End: 2021-11-03
Payer: COMMERCIAL

## 2021-11-03 ENCOUNTER — TELEPHONE (OUTPATIENT)
Dept: INTERNAL MEDICINE CLINIC | Facility: CLINIC | Age: 56
End: 2021-11-03

## 2021-11-03 DIAGNOSIS — M25.511 ACUTE PAIN OF RIGHT SHOULDER: ICD-10-CM

## 2021-11-03 DIAGNOSIS — R30.0 DYSURIA: Primary | ICD-10-CM

## 2021-11-03 LAB
BILIRUB UR QL STRIP: NEGATIVE
CLARITY UR: CLEAR
COLOR UR: NORMAL
GLUCOSE UR STRIP-MCNC: NEGATIVE MG/DL
HGB UR QL STRIP.AUTO: NEGATIVE
KETONES UR STRIP-MCNC: NEGATIVE MG/DL
LEUKOCYTE ESTERASE UR QL STRIP: NEGATIVE
NITRITE UR QL STRIP: NEGATIVE
PH UR STRIP.AUTO: 5 [PH]
PROT UR STRIP-MCNC: NEGATIVE MG/DL
SP GR UR STRIP.AUTO: 1.01 (ref 1–1.03)
UROBILINOGEN UR QL STRIP.AUTO: 0.2 E.U./DL

## 2021-11-03 PROCEDURE — 81003 URINALYSIS AUTO W/O SCOPE: CPT

## 2021-11-03 PROCEDURE — 73030 X-RAY EXAM OF SHOULDER: CPT

## 2021-11-22 ENCOUNTER — TELEPHONE (OUTPATIENT)
Dept: INTERNAL MEDICINE CLINIC | Facility: CLINIC | Age: 56
End: 2021-11-22

## 2021-11-26 DIAGNOSIS — M54.16 LUMBAR RADICULOPATHY: ICD-10-CM

## 2021-11-26 DIAGNOSIS — M17.0 OSTEOARTHRITIS OF BOTH KNEES, UNSPECIFIED OSTEOARTHRITIS TYPE: ICD-10-CM

## 2021-11-26 DIAGNOSIS — M54.12 RIGHT CERVICAL RADICULOPATHY: ICD-10-CM

## 2021-12-01 ENCOUNTER — IMMUNIZATIONS (OUTPATIENT)
Dept: FAMILY MEDICINE CLINIC | Facility: HOSPITAL | Age: 56
End: 2021-12-01

## 2021-12-01 DIAGNOSIS — Z23 ENCOUNTER FOR IMMUNIZATION: Primary | ICD-10-CM

## 2021-12-01 PROCEDURE — 0064A COVID-19 MODERNA VACC 0.25 ML BOOSTER: CPT

## 2021-12-01 PROCEDURE — 91306 COVID-19 MODERNA VACC 0.25 ML BOOSTER: CPT

## 2021-12-03 ENCOUNTER — OFFICE VISIT (OUTPATIENT)
Dept: OBGYN CLINIC | Facility: CLINIC | Age: 56
End: 2021-12-03
Payer: COMMERCIAL

## 2021-12-03 VITALS
BODY MASS INDEX: 36.98 KG/M2 | WEIGHT: 244 LBS | HEIGHT: 68 IN | HEART RATE: 109 BPM | DIASTOLIC BLOOD PRESSURE: 82 MMHG | SYSTOLIC BLOOD PRESSURE: 122 MMHG

## 2021-12-03 DIAGNOSIS — R73.03 PREDIABETES: ICD-10-CM

## 2021-12-03 DIAGNOSIS — L98.9 SKIN LESION: ICD-10-CM

## 2021-12-03 DIAGNOSIS — J45.909 MODERATE ASTHMA WITHOUT COMPLICATION, UNSPECIFIED WHETHER PERSISTENT: ICD-10-CM

## 2021-12-03 DIAGNOSIS — K21.9 GASTROESOPHAGEAL REFLUX DISEASE: ICD-10-CM

## 2021-12-03 DIAGNOSIS — L65.9 ALOPECIA: ICD-10-CM

## 2021-12-03 DIAGNOSIS — I10 BENIGN ESSENTIAL HYPERTENSION: ICD-10-CM

## 2021-12-03 DIAGNOSIS — M54.16 LUMBAR RADICULOPATHY: ICD-10-CM

## 2021-12-03 DIAGNOSIS — M79.89 LEG SWELLING: ICD-10-CM

## 2021-12-03 DIAGNOSIS — I10 HYPERTENSION, UNSPECIFIED TYPE: ICD-10-CM

## 2021-12-03 DIAGNOSIS — J01.91 ACUTE RECURRENT SINUSITIS, UNSPECIFIED LOCATION: ICD-10-CM

## 2021-12-03 DIAGNOSIS — E78.5 HYPERLIPIDEMIA, UNSPECIFIED HYPERLIPIDEMIA TYPE: ICD-10-CM

## 2021-12-03 DIAGNOSIS — G57.01 PIRIFORMIS SYNDROME OF RIGHT SIDE: ICD-10-CM

## 2021-12-03 DIAGNOSIS — M75.101 TEAR OF RIGHT SUPRASPINATUS TENDON: Primary | ICD-10-CM

## 2021-12-03 PROCEDURE — 99213 OFFICE O/P EST LOW 20 MIN: CPT | Performed by: FAMILY MEDICINE

## 2021-12-03 RX ORDER — SIMVASTATIN 5 MG
TABLET ORAL
Qty: 90 TABLET | Refills: 2 | Status: SHIPPED | OUTPATIENT
Start: 2021-12-03

## 2021-12-09 ENCOUNTER — OFFICE VISIT (OUTPATIENT)
Dept: OBGYN CLINIC | Facility: MEDICAL CENTER | Age: 56
End: 2021-12-09
Payer: COMMERCIAL

## 2021-12-09 VITALS
BODY MASS INDEX: 36.98 KG/M2 | SYSTOLIC BLOOD PRESSURE: 130 MMHG | HEART RATE: 85 BPM | HEIGHT: 68 IN | WEIGHT: 244 LBS | DIASTOLIC BLOOD PRESSURE: 84 MMHG

## 2021-12-09 DIAGNOSIS — M25.511 ACUTE PAIN OF RIGHT SHOULDER: Primary | ICD-10-CM

## 2021-12-09 PROCEDURE — 20611 DRAIN/INJ JOINT/BURSA W/US: CPT | Performed by: PHYSICAL MEDICINE & REHABILITATION

## 2021-12-09 PROCEDURE — 99214 OFFICE O/P EST MOD 30 MIN: CPT | Performed by: PHYSICAL MEDICINE & REHABILITATION

## 2021-12-09 PROCEDURE — 3008F BODY MASS INDEX DOCD: CPT | Performed by: PHYSICAL MEDICINE & REHABILITATION

## 2021-12-09 PROCEDURE — 1036F TOBACCO NON-USER: CPT | Performed by: PHYSICAL MEDICINE & REHABILITATION

## 2021-12-09 RX ORDER — LIDOCAINE HYDROCHLORIDE 10 MG/ML
3 INJECTION, SOLUTION INFILTRATION; PERINEURAL
Status: COMPLETED | OUTPATIENT
Start: 2021-12-09 | End: 2021-12-09

## 2021-12-09 RX ORDER — TRIAMCINOLONE ACETONIDE 40 MG/ML
40 INJECTION, SUSPENSION INTRA-ARTICULAR; INTRAMUSCULAR
Status: COMPLETED | OUTPATIENT
Start: 2021-12-09 | End: 2021-12-09

## 2021-12-09 RX ADMIN — LIDOCAINE HYDROCHLORIDE 3 ML: 10 INJECTION, SOLUTION INFILTRATION; PERINEURAL at 08:59

## 2021-12-09 RX ADMIN — TRIAMCINOLONE ACETONIDE 40 MG: 40 INJECTION, SUSPENSION INTRA-ARTICULAR; INTRAMUSCULAR at 08:59

## 2021-12-22 ENCOUNTER — TELEPHONE (OUTPATIENT)
Dept: DERMATOLOGY | Facility: CLINIC | Age: 56
End: 2021-12-22

## 2022-02-02 ENCOUNTER — OFFICE VISIT (OUTPATIENT)
Dept: INTERNAL MEDICINE CLINIC | Facility: CLINIC | Age: 57
End: 2022-02-02
Payer: COMMERCIAL

## 2022-02-02 VITALS
OXYGEN SATURATION: 97 % | BODY MASS INDEX: 38.01 KG/M2 | WEIGHT: 250.8 LBS | DIASTOLIC BLOOD PRESSURE: 78 MMHG | SYSTOLIC BLOOD PRESSURE: 118 MMHG | RESPIRATION RATE: 20 BRPM | HEART RATE: 113 BPM | HEIGHT: 68 IN | TEMPERATURE: 96.9 F

## 2022-02-02 DIAGNOSIS — G44.89 OTHER HEADACHE SYNDROME: Primary | ICD-10-CM

## 2022-02-02 DIAGNOSIS — R68.89 FORGETFULNESS: ICD-10-CM

## 2022-02-02 DIAGNOSIS — J45.909 MODERATE ASTHMA WITHOUT COMPLICATION, UNSPECIFIED WHETHER PERSISTENT: ICD-10-CM

## 2022-02-02 PROCEDURE — 3008F BODY MASS INDEX DOCD: CPT | Performed by: NURSE PRACTITIONER

## 2022-02-02 PROCEDURE — 99213 OFFICE O/P EST LOW 20 MIN: CPT | Performed by: NURSE PRACTITIONER

## 2022-02-02 PROCEDURE — 1036F TOBACCO NON-USER: CPT | Performed by: NURSE PRACTITIONER

## 2022-02-02 NOTE — PROGRESS NOTES
BMI Counseling: Body mass index is 38 13 kg/m²  The BMI is above normal  Nutrition recommendations include decreasing portion sizes, encouraging healthy choices of fruits and vegetables, decreasing fast food intake, consuming healthier snacks, limiting drinks that contain sugar, moderation in carbohydrate intake, increasing intake of lean protein, reducing intake of saturated and trans fat and reducing intake of cholesterol  Exercise recommendations include exercising 3-5 times per week  No pharmacotherapy was ordered  Patient referred to PCP  Rationale for BMI follow-up plan is due to patient being overweight or obese  Assessment/Plan:    Headache- OK to continue Excedrin Migraine- take two tabs instead of one  Try to limit screen time  Consider blue light glasses  Does not seem to have migraine features  Referred to neurology  Follow up as scheduled  Diagnoses and all orders for this visit:    Other headache syndrome  -     Ambulatory Referral to Neurology; Future    Forgetfulness  -     Ambulatory Referral to Neurology; Future    Moderate asthma without complication, unspecified whether persistent        The patient was counseled regarding instructions for management, risk factor reductions, patient and family education,impressions, risks and benefits of treatment options, side effects of medications, importance of compliance with treatment  The treatment plan was reviewed with the patient/guardian and patient/guardian understands and agrees with the treatment plan          Current Outpatient Medications:     acetaminophen (TYLENOL) 500 mg tablet, Take by mouth, Disp: , Rfl:     Black Cohosh (REMIFEMIN MENOPAUSE PO), Take by mouth, Disp: , Rfl:     Cholecalciferol (VITAMIN D) 2000 units CAPS, Take by mouth, Disp: , Rfl:     cholestyramine (QUESTRAN) 4 GM/DOSE powder, Take 1 packet (4 g total) by mouth 2 (two) times a day As needed for diarrhea, Disp: 378 g, Rfl: 5    diclofenac sodium (VOLTAREN) 50 mg EC tablet, take 1 tablet by mouth twice a day, Disp: 180 tablet, Rfl: 1    Ginkgo Biloba 60 MG TABS, Take by mouth daily , Disp: , Rfl:     glucosamine-chondroitin 500-400 MG tablet, Take 1 tablet by mouth 3 (three) times a day, Disp: 100 tablet, Rfl: 2    mometasone (NASONEX) 50 mcg/act nasal spray, instill 1 spray into each nostril daily, Disp: 17 g, Rfl: 2    Naltrexone-buPROPion HCl ER 8-90 MG TB12, Take 1 tab po QAM x 1 week, then 1 tab po BID x 1 week, then 2 tab QAM, 1 tab QPM x 1 week, then 2 tabs BID thereafter , Disp: 360 tablet, Rfl: 2    naproxen (NAPROSYN) 375 mg tablet, Take 1 tablet (375 mg total) by mouth 2 (two) times a day with meals, Disp: 20 tablet, Rfl: 0    omeprazole (PriLOSEC) 10 mg delayed release capsule, Take 1 capsule (10 mg total) by mouth daily, Disp: 90 capsule, Rfl: 2    Probiotic Product (ALIGN) 4 MG CAPS, Take by mouth, Disp: , Rfl:     simvastatin (ZOCOR) 5 MG tablet, take 1 tablet by mouth at bedtime, Disp: 90 tablet, Rfl: 2    spironolactone (ALDACTONE) 50 mg tablet, Take 1 tablet (50 mg total) by mouth daily, Disp: 90 tablet, Rfl: 2    verapamil (VERELAN PM) 180 MG 24 hr capsule, Take 1 capsule (180 mg total) by mouth daily before breakfast, Disp: 90 capsule, Rfl: 2    vitamin E, tocopherol, 400 units capsule, Take 1 capsule (400 Units total) by mouth daily, Disp: , Rfl:     Subjective:      Patient ID: Lisa Ireland is a 64 y o  female  Five straight days of headaches, starts in the morning  She takes Excedrin Migraine and drinks tea which initially helps but the pain seems to continue  Pain is left occipital and rated 7-8/10 and is worse in the evening  She is able to distract herself from the pain during work days, but in the evening the pain seems to be more severe  Had one episode of nausea with headache, no visual changes, no photophobia or phonophobia  No aura   She is on the computer every day for hours at work, then plays about five hours per day on her tablet playing games  She is also quite forgetful, has been for a few years and was referred to neurology at that time  The following portions of the patient's history were reviewed and updated as appropriate:   She has a past medical history of Allergic, Allergic rhinitis, Arthritis, Asthma, Bleeding per rectum, Colon polyp, Diverticulitis of colon, GERD (gastroesophageal reflux disease), Hypercholesterolemia, Hypertension, Hyponatremia, and Obesity  ,  does not have any pertinent problems on file  ,   has a past surgical history that includes  section; Wrist surgery; Colonoscopy; [place] tooth extraction; and [place] injection right shoulder (arthrogram) (3/18/2021)  ,  family history includes No Known Problems in her mother  She was adopted  ,   reports that she has never smoked  She has never used smokeless tobacco  She reports current alcohol use  She reports that she does not use drugs  ,  is allergic to levofloxacin, sulfamethoxazole-trimethoprim, and hydrocodone bitartrate er       Review of Systems   Constitutional: Negative  Respiratory: Negative  Cardiovascular: Negative  Musculoskeletal: Negative  Neurological: Positive for headaches  Psychiatric/Behavioral: Negative  Forgetfulness       Objective:  /78 (BP Location: Left arm, Patient Position: Sitting, Cuff Size: Large)   Pulse (!) 113   Temp (!) 96 9 °F (36 1 °C) (Tympanic)   Resp 20   Ht 5' 8" (1 727 m)   Wt 114 kg (250 lb 12 8 oz)   SpO2 97%   BMI 38 13 kg/m²     Lab Review  No visits with results within 2 Month(s) from this visit     Latest known visit with results is:   Appointment on 2021   Component Date Value    Color, UA 2021 Light Yellow     Clarity, UA 2021 Clear     Specific [place], UA 2021 1 010     pH, UA 2021 5 0     Leukocytes, UA 2021 Negative     Nitrite, UA 2021 Negative     Protein, UA 2021 Negative     Glucose, UA 2021 Negative     Ketones, UA 11/03/2021 Negative     Urobilinogen, UA 11/03/2021 0 2     Bilirubin, UA 11/03/2021 Negative     Blood, UA 11/03/2021 Negative       Imaging: No results found  Physical Exam  Constitutional:       Appearance: She is well-developed  Cardiovascular:      Rate and Rhythm: Normal rate and regular rhythm  Heart sounds: Normal heart sounds  Pulmonary:      Effort: Pulmonary effort is normal       Breath sounds: Normal breath sounds  Musculoskeletal:         General: Normal range of motion  Neurological:      Mental Status: She is alert and oriented to person, place, and time  Deep Tendon Reflexes: Reflexes are normal and symmetric  Psychiatric:         Behavior: Behavior normal          Thought Content:  Thought content normal          Judgment: Judgment normal

## 2022-02-02 NOTE — PATIENT INSTRUCTIONS
Headache- OK to continue Excedrin Migraine- take two tabs instead of one  Try to limit screen time  Consider blue light glasses  Does not seem to have migraine features  Referred to neurology  Follow up as scheduled

## 2022-04-14 ENCOUNTER — OFFICE VISIT (OUTPATIENT)
Dept: OBGYN CLINIC | Facility: CLINIC | Age: 57
End: 2022-04-14
Payer: COMMERCIAL

## 2022-04-14 VITALS
WEIGHT: 252 LBS | BODY MASS INDEX: 38.19 KG/M2 | SYSTOLIC BLOOD PRESSURE: 118 MMHG | HEIGHT: 68 IN | HEART RATE: 94 BPM | DIASTOLIC BLOOD PRESSURE: 77 MMHG

## 2022-04-14 DIAGNOSIS — M54.16 RADICULOPATHY, LUMBAR REGION: Primary | ICD-10-CM

## 2022-04-14 DIAGNOSIS — M25.551 PAIN IN RIGHT HIP: ICD-10-CM

## 2022-04-14 DIAGNOSIS — M16.11 ARTHRITIS OF RIGHT HIP: ICD-10-CM

## 2022-04-14 PROCEDURE — 99213 OFFICE O/P EST LOW 20 MIN: CPT | Performed by: FAMILY MEDICINE

## 2022-04-14 PROCEDURE — 1036F TOBACCO NON-USER: CPT | Performed by: FAMILY MEDICINE

## 2022-04-14 NOTE — PROGRESS NOTES
Assessment/Plan:  Assessment/Plan   Diagnoses and all orders for this visit:    Radiculopathy, lumbar region  -     MRI lumbar spine wo contrast; Future    Pain in right hip  -     MRI hip right wo contrast; Future    Arthritis of right hip  -     diclofenac sodium (VOLTAREN) 50 mg EC tablet; Take 1 tablet (50 mg total) by mouth 2 (two) times a day        55-year-old female with low back and right hip pain more than few years duration  Discussed with patient physical exam, impression and plan  Physical exam lumbar spine noted for midline tenderness L4-S1 and right SI tenderness  She has limited range of motion with rotating to both sides  She has tenderness on the right at the gluteus medius, greater trochanter, and along the IT band  There is lateral hip pain with HODA and FADDIR maneuver the hip  Clinical impression is that she is symptomatic from combination of lumbar spine and hip pathology  She has had multiple injections to right hip and has been on NSAIDs for few years  At this time I will refer for MRI of lumbar spine to evaluate for disc pathology, stenosis, and impingement as more invasive management may be warranted  I will refer for MRI of the right hip to evaluate for muscle tear and occult osseous abnormality as more invasive management may be warranted  She will follow up after getting MRIs done  Subjective:   Patient ID: Sanjiv Cline is a 64 y o  female  Chief Complaint   Patient presents with    Right Hip - Follow-up, Pain       55-year-old female following up for right lateral hip pain more than few years duration  She was last seen by me in this regard 9 months ago at which point she was given right greater trochanter bursa corticosteroid injection  She states that for past few weeks she has been experiencing worsening pain of low back and right hip    Pain described as localized to the lumbosacral aspect spine but worse on the right, radiating to the right buttock and lateral hip and distally along the lateral aspect of thigh to the knee, achy and throbbing and sometimes sharp, worse with bending and twisting, worse with physical activity, and improved resting  She does report that taking diclofenac 50 mg does help decrease intensity of symptoms  She reports having been diagnosed with lumbar disc pathology  Tasks such as putting on socks and tying her shoes have become difficult  She has also been alternating between ice and heat to help with symptoms  Hip Pain  This is a chronic problem  The current episode started more than 1 year ago  The problem occurs daily  The problem has been gradually worsening  Associated symptoms include arthralgias  Pertinent negatives include no joint swelling, numbness or weakness  The symptoms are aggravated by twisting and bending  She has tried rest, NSAIDs, position changes, heat and ice for the symptoms  The treatment provided mild relief  Review of Systems   Musculoskeletal: Positive for arthralgias  Negative for joint swelling  Neurological: Negative for weakness and numbness  Objective:  Vitals:    04/14/22 1523   BP: 118/77   Pulse: 94   Weight: 114 kg (252 lb)   Height: 5' 8" (1 727 m)     Right Hip Exam     Muscle Strength   Flexion: 5/5     Tests   HODA: positive    Comments:  Positive FADDIR      Left Hip Exam     Muscle Strength   Flexion: 5/5     Tests   HODA: negative    Comments:  Negative FADDIR      Back Exam     Tenderness   The patient is experiencing tenderness in the lumbar and sacroiliac  Range of Motion   Extension: normal   Flexion: normal   Lateral bend right: normal   Lateral bend left: normal   Rotation right: abnormal   Rotation left: abnormal     Muscle Strength   Right Quadriceps:  5/5   Left Quadriceps:  5/5             Physical Exam  Vitals and nursing note reviewed  Constitutional:       General: She is not in acute distress  Appearance: She is well-developed   She is not ill-appearing or diaphoretic  HENT:      Head: Normocephalic  Right Ear: External ear normal       Left Ear: External ear normal    Eyes:      Conjunctiva/sclera: Conjunctivae normal    Neck:      Trachea: No tracheal deviation  Cardiovascular:      Rate and Rhythm: Normal rate  Pulmonary:      Effort: Pulmonary effort is normal  No respiratory distress  Abdominal:      General: There is no distension  Musculoskeletal:         General: Tenderness present  No swelling, deformity or signs of injury  Skin:     General: Skin is warm and dry  Coloration: Skin is not jaundiced or pale  Neurological:      Mental Status: She is alert and oriented to person, place, and time  Psychiatric:         Mood and Affect: Mood normal          Behavior: Behavior normal          Thought Content:  Thought content normal          Judgment: Judgment normal

## 2022-04-19 ENCOUNTER — TELEPHONE (OUTPATIENT)
Dept: NEUROLOGY | Facility: CLINIC | Age: 57
End: 2022-04-19

## 2022-04-26 ENCOUNTER — CONSULT (OUTPATIENT)
Dept: NEUROLOGY | Facility: CLINIC | Age: 57
End: 2022-04-26
Payer: COMMERCIAL

## 2022-04-26 VITALS
SYSTOLIC BLOOD PRESSURE: 116 MMHG | RESPIRATION RATE: 16 BRPM | WEIGHT: 255 LBS | HEIGHT: 68 IN | HEART RATE: 79 BPM | BODY MASS INDEX: 38.65 KG/M2 | DIASTOLIC BLOOD PRESSURE: 90 MMHG

## 2022-04-26 DIAGNOSIS — R51.9 HEADACHE: ICD-10-CM

## 2022-04-26 DIAGNOSIS — R68.89 FORGETFULNESS: ICD-10-CM

## 2022-04-26 PROCEDURE — 3008F BODY MASS INDEX DOCD: CPT | Performed by: FAMILY MEDICINE

## 2022-04-26 PROCEDURE — 99205 OFFICE O/P NEW HI 60 MIN: CPT | Performed by: PSYCHIATRY & NEUROLOGY

## 2022-04-26 NOTE — PROGRESS NOTES
Elif Cabrera is a 64 y o  female presents today with complaints of headache and memory difficulty    Assessment:  1  Headache    2  Forgetfulness        Plan:  MRI brain neuroquant   Follow-up 2 months    Discussion:  Ras Johnson reports headaches which I suspect are related to her caffeine intake  She also has some degenerative changes in the neck which may also be contributing to some of her symptoms  Have recommended limiting caffeine usage  Her neurologic exam is nonfocal   Will obtain an MRI brain neuroquant to rule out secondary etiologies regarding her headaches as well as her memory complaints  She did score in the normal range on [place] testing and seems to be having no problems with her day-to-day routine or her job  Recent TSH was normal   I will re-evaluate her when these are completed      Subjective:    HPI  Ras Johnson is a right-handed woman who presents with the above complaints  She states that she has been having headaches  She states that she has always had an issue with seasonal allergies associated with headaches  She states over the last few months she has been having more headaches  She states her headaches frequent localized to the posterior head region and sometimes into the frontal vertex region  She states the headaches are often a pounding and sometimes a pressure type pain  She denies any associated photophobia, sonophobia nausea with the headache  She states that she frequently wakes up with a mild headache almost every day  Occasionally she states her headache is more severe and will sometimes last for 2 days  She states she had been taking Excedrin migraine for the headache and had been taking it almost daily in addition to her regular caffeine intake which she states is quite high  She states that typically Excedrin or her black tea would result in a resolution of the headache    She states that more recently she was placed on Voltaren for musculoskeletal pain issues and was told not to take Excedrin due to the aspirin  She states that more recently she has just been taking Tylenol  She has found that the frequency of her headaches over the last couple weeks has decreased to some degree  She did have a 2 day headache Friday and a Saturday  In addition to headache she reports problems with her memory  She states she will go down to the basement to get a can of soup  She states that she will stop to clean the cat litter box is and when she completes this task she goes back up stairs realizing that she forgot to get the soup  She states sometimes when she goes grocery shopping she will have things on her list but she forgets to look at the list and does not remember all the items that she went to get  She states that sometimes her  talks about vacations they took many years ago and the details of these are not very clear all the time  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 and no criticism from her superiors  She has no problems maintaining tasks around the house does not have any problems navigating her vehicle  She states she started to notice issues with her memory about 7 years ago and started taking ginkgo biloba    She states she has been playing word games on her computer just try to stay stimulator      Past Medical History:   Diagnosis Date    Allergic     Allergic rhinitis     last assessed 11/17/14     Arthritis     Asthma     Bleeding per rectum     Last assessed 8/1/16     Colon polyp     Diverticulitis of colon     GERD (gastroesophageal reflux disease)     Hypercholesterolemia     Hypertension     last assessed 3/24/15     Hyponatremia     last assessed 9/17/15     Obesity        Family History:  Family History   Adopted: Yes   Problem Relation Age of Onset    No Known Problems Mother     Migraines Daughter        Past Surgical History:  Past Surgical History: Procedure Laterality Date     SECTION      COLONOSCOPY      FL INJECTION RIGHT SHOULDER (ARTHROGRAM)  3/18/2021    WISDOM TOOTH EXTRACTION      WRIST SURGERY         Social History:   reports that she has never smoked  She has never used smokeless tobacco  She reports current alcohol use  She reports that she does not use drugs      Allergies:  Levofloxacin, Sulfamethoxazole-trimethoprim, and Hydrocodone bitartrate er      Current Outpatient Medications:     acetaminophen (TYLENOL) 500 mg tablet, Take by mouth, Disp: , Rfl:     Black Cohosh (REMIFEMIN MENOPAUSE PO), Take by mouth, Disp: , Rfl:     Cholecalciferol (VITAMIN D) 2000 units CAPS, Take by mouth, Disp: , Rfl:     cholestyramine (QUESTRAN) 4 GM/DOSE powder, Take 1 packet (4 g total) by mouth 2 (two) times a day As needed for diarrhea, Disp: 378 g, Rfl: 5    diclofenac sodium (VOLTAREN) 50 mg EC tablet, take 1 tablet by mouth twice a day, Disp: 180 tablet, Rfl: 1    diclofenac sodium (VOLTAREN) 50 mg EC tablet, Take 1 tablet (50 mg total) by mouth 2 (two) times a day, Disp: 180 tablet, Rfl: 1    Ginkgo Biloba 60 MG TABS, Take by mouth daily , Disp: , Rfl:     glucosamine-chondroitin 500-400 MG tablet, Take 1 tablet by mouth 3 (three) times a day, Disp: 100 tablet, Rfl: 2    mometasone (NASONEX) 50 mcg/act nasal spray, instill 1 spray into each nostril daily, Disp: 17 g, Rfl: 2    Naltrexone-buPROPion HCl ER 8-90 MG TB12, Take 1 tab po QAM x 1 week, then 1 tab po BID x 1 week, then 2 tab QAM, 1 tab QPM x 1 week, then 2 tabs BID thereafter , Disp: 360 tablet, Rfl: 2    naproxen (NAPROSYN) 375 mg tablet, Take 1 tablet (375 mg total) by mouth 2 (two) times a day with meals (Patient taking differently: Take 375 mg by mouth if needed  ), Disp: 20 tablet, Rfl: 0    omeprazole (PriLOSEC) 10 mg delayed release capsule, Take 1 capsule (10 mg total) by mouth daily, Disp: 90 capsule, Rfl: 2    Probiotic Product (ALIGN) 4 MG CAPS, Take by mouth, Disp: , Rfl:     simvastatin (ZOCOR) 5 MG tablet, take 1 tablet by mouth at bedtime, Disp: 90 tablet, Rfl: 2    spironolactone (ALDACTONE) 50 mg tablet, Take 1 tablet (50 mg total) by mouth daily, Disp: 90 tablet, Rfl: 2    verapamil (VERELAN PM) 180 MG 24 hr capsule, Take 1 capsule (180 mg total) by mouth daily before breakfast, Disp: 90 capsule, Rfl: 2    vitamin E, tocopherol, 400 units capsule, Take 1 capsule (400 Units total) by mouth daily, Disp: , Rfl:     I have reviewed the past medical, social and family history, current medications, allergies, vitals, review of systems and updated this information as appropriate today     Objective:    Vitals:  Blood pressure 116/90, pulse 79, resp  rate 16, height 5' 8" (1 727 m), weight 116 kg (255 lb), not currently breastfeeding  Physical Exam    Neurological Exam    GENERAL:  Cooperative in no acute distress  Well-developed and well-nourished    HEAD and NECK   Head is atraumatic normocephalic with no lesions or masses  Neck is supple with full range of motion    CARDIOVASCULAR  Carotid Arteries-no carotid bruits  NEUROLOGIC:  Mental Status-the patient is awake alert and oriented without aphasia or apraxia  She scored a 27/30 on [place]  Cranial Nerves: Visual fields are full to confrontation  Extraocular movements are full without nystagmus  Pupils are 2-1/2 mm and reactive  Face is symmetrical to light touch  Movements of facial expression move symmetrically  Hearing is normal to finger rub bilaterally  Soft palate lifts symmetrically  Shoulder shrug is symmetrical  Tongue is midline without atrophy  Motor: No drift is noted on arm extension  Strength is full in the upper and lower extremities with normal bulk and tone  Sensory: Intact to temperature and vibratory sensation in the upper and lower extremities bilaterally  Cortical function is intact  Coordination: Finger to nose testing is performed accurately  Romberg is negative   Gait reveals a normal base with symmetrical arm swing  Tandem walk is performed with difficulty due to hip pain symptoms  Reflexes:  1+ in the biceps, triceps and brachioradialis regions, 1 at the knees and trace at the ankles  Toes are downgoing          ROS:    Review of Systems   Constitutional: Negative  Negative for appetite change and fever  HENT: Negative  Negative for hearing loss, tinnitus, trouble swallowing and voice change  Eyes: Negative  Negative for photophobia and pain  Respiratory: Negative  Negative for shortness of breath  Cardiovascular: Negative  Negative for palpitations  Gastrointestinal: Negative  Negative for nausea and vomiting  Endocrine: Negative  Negative for cold intolerance  Genitourinary: Negative  Negative for dysuria, frequency and urgency  Musculoskeletal: Positive for back pain, myalgias and neck pain  Skin: Negative  Negative for rash  Neurological: Positive for headaches  Negative for dizziness, tremors, seizures, syncope, facial asymmetry, speech difficulty, weakness, light-headedness and numbness  Patient states headaches 3x weekly  Hematological: Bruises/bleeds easily  Psychiatric/Behavioral: Negative  Negative for confusion, hallucinations and sleep disturbance

## 2022-04-26 NOTE — LETTER
April 26, 2022     Kiara Connell, [address] [facility] [place]  [facility] [id number]    Patient: Jabier Shahid   YOB: 1965   Date of Visit: 4/26/2022       Dear Dr Denae Vega: Thank you for referring Yimi Jimenez to me for evaluation  Below are my notes for this consultation  If you have questions, please do not hesitate to call me  I look forward to following your patient along with you  Sincerely,        Dameon Shukla MD        CC: No Recipients  Dameon Shukla MD  4/26/2022 10:50 AM  Incomplete  Jabier Shahid is a 64 y o  female presents today with complaints of headache and memory difficulty    Assessment:  1  Headache    2  Forgetfulness        Plan:  MRI brain neuroquant   Follow-up 2 months    Discussion:  Marcelino Justice reports headaches which I suspect are related to her caffeine intake  She also has some degenerative changes in the neck which may also be contributing to some of her symptoms  Have recommended limiting caffeine usage  Her neurologic exam is nonfocal   Will obtain an MRI brain neuroquant to rule out secondary etiologies regarding her headaches as well as her memory complaints  She did score in the normal range on [place] testing and seems to be having no problems with her day-to-day routine or her job  Recent TSH was normal   I will re-evaluate her when these are completed      Subjective:    HPI  Marcelino Justice is a right-handed woman who presents with the above complaints  She states that she has been having headaches  She states that she has always had an issue with seasonal allergies associated with headaches  She states over the last few months she has been having more headaches  She states her headaches frequent localized to the posterior head region and sometimes into the frontal vertex region  She states the headaches are often a pounding and sometimes a pressure type pain  She denies any associated photophobia, sonophobia nausea with the headache    She states that she frequently wakes up with a mild headache almost every day  Occasionally she states her headache is more severe and will sometimes last for 2 days  She states she had been taking Excedrin migraine for the headache and had been taking it almost daily in addition to her regular caffeine intake which she states is quite high  She states that typically Excedrin or her black tea would result in a resolution of the headache  She states that more recently she was placed on Voltaren for musculoskeletal pain issues and was told not to take Excedrin due to the aspirin  She states that more recently she has just been taking Tylenol  She has found that the frequency of her headaches over the last couple weeks has decreased to some degree  She did have a 2 day headache Friday and a Saturday  In addition to headache she reports problems with her memory  She states she will go down to the basement to get a can of soup  She states that she will stop to clean the cat litter box is and when she completes this task she goes back up stairs realizing that she forgot to get the soup  She states sometimes when she goes grocery shopping she will have things on her list but she forgets to look at the list and does not remember all the items that she went to get  She states that sometimes her  talks about vacations they took many years ago and the details of these are not very clear all the time  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 and no criticism from her superiors  She has no problems maintaining tasks around the house does not have any problems navigating her vehicle  She states she started to notice issues with her memory about 7 years ago and started taking ginkgo biloba    She states she has been playing word games on her computer just try to stay stimulator      Past Medical History:   Diagnosis Date    Allergic     Allergic rhinitis last assessed 14     Arthritis     Asthma     Bleeding per rectum     Last assessed 16     Colon polyp     Diverticulitis of colon     GERD (gastroesophageal reflux disease)     Hypercholesterolemia     Hypertension     last assessed 3/24/15     Hyponatremia     last assessed 9/17/15     Obesity        Family History:  Family History   Adopted: Yes   Problem Relation Age of Onset    No Known Problems Mother     Migraines Daughter        Past Surgical History:  Past Surgical History:   Procedure Laterality Date     SECTION      COLONOSCOPY      FL INJECTION RIGHT SHOULDER (ARTHROGRAM)  3/18/2021    WISDOM TOOTH EXTRACTION      WRIST SURGERY         Social History:   reports that she has never smoked  She has never used smokeless tobacco  She reports current alcohol use  She reports that she does not use drugs      Allergies:  Levofloxacin, Sulfamethoxazole-trimethoprim, and Hydrocodone bitartrate er      Current Outpatient Medications:     acetaminophen (TYLENOL) 500 mg tablet, Take by mouth, Disp: , Rfl:     Black Cohosh (REMIFEMIN MENOPAUSE PO), Take by mouth, Disp: , Rfl:     Cholecalciferol (VITAMIN D) 2000 units CAPS, Take by mouth, Disp: , Rfl:     cholestyramine (QUESTRAN) 4 GM/DOSE powder, Take 1 packet (4 g total) by mouth 2 (two) times a day As needed for diarrhea, Disp: 378 g, Rfl: 5    diclofenac sodium (VOLTAREN) 50 mg EC tablet, take 1 tablet by mouth twice a day, Disp: 180 tablet, Rfl: 1    diclofenac sodium (VOLTAREN) 50 mg EC tablet, Take 1 tablet (50 mg total) by mouth 2 (two) times a day, Disp: 180 tablet, Rfl: 1    Ginkgo Biloba 60 MG TABS, Take by mouth daily , Disp: , Rfl:     glucosamine-chondroitin 500-400 MG tablet, Take 1 tablet by mouth 3 (three) times a day, Disp: 100 tablet, Rfl: 2    mometasone (NASONEX) 50 mcg/act nasal spray, instill 1 spray into each nostril daily, Disp: 17 g, Rfl: 2    Naltrexone-buPROPion HCl ER 8-90 MG TB12, Take 1 tab po QAM x 1 week, then 1 tab po BID x 1 week, then 2 tab QAM, 1 tab QPM x 1 week, then 2 tabs BID thereafter , Disp: 360 tablet, Rfl: 2    naproxen (NAPROSYN) 375 mg tablet, Take 1 tablet (375 mg total) by mouth 2 (two) times a day with meals (Patient taking differently: Take 375 mg by mouth if needed  ), Disp: 20 tablet, Rfl: 0    omeprazole (PriLOSEC) 10 mg delayed release capsule, Take 1 capsule (10 mg total) by mouth daily, Disp: 90 capsule, Rfl: 2    Probiotic Product (ALIGN) 4 MG CAPS, Take by mouth, Disp: , Rfl:     simvastatin (ZOCOR) 5 MG tablet, take 1 tablet by mouth at bedtime, Disp: 90 tablet, Rfl: 2    spironolactone (ALDACTONE) 50 mg tablet, Take 1 tablet (50 mg total) by mouth daily, Disp: 90 tablet, Rfl: 2    verapamil (VERELAN PM) 180 MG 24 hr capsule, Take 1 capsule (180 mg total) by mouth daily before breakfast, Disp: 90 capsule, Rfl: 2    vitamin E, tocopherol, 400 units capsule, Take 1 capsule (400 Units total) by mouth daily, Disp: , Rfl:     I have reviewed the past medical, social and family history, current medications, allergies, vitals, review of systems and updated this information as appropriate today     Objective:    Vitals:  Blood pressure 116/90, pulse 79, resp  rate 16, height 5' 8" (1 727 m), weight 116 kg (255 lb), not currently breastfeeding  Physical Exam    Neurological Exam    GENERAL:  Cooperative in no acute distress  Well-developed and well-nourished    HEAD and NECK   Head is atraumatic normocephalic with no lesions or masses  Neck is supple with full range of motion    CARDIOVASCULAR  Carotid Arteries-no carotid bruits  NEUROLOGIC:  Mental Status-the patient is awake alert and oriented without aphasia or apraxia  She scored a 27/30 on [place]  Cranial Nerves: Visual fields are full to confrontation  Extraocular movements are full without nystagmus  Pupils are 2-1/2 mm and reactive  Face is symmetrical to light touch   Movements of facial expression move symmetrically  Hearing is normal to finger rub bilaterally  Soft palate lifts symmetrically  Shoulder shrug is symmetrical  Tongue is midline without atrophy  Motor: No drift is noted on arm extension  Strength is full in the upper and lower extremities with normal bulk and tone  Sensory: Intact to temperature and vibratory sensation in the upper and lower extremities bilaterally  Cortical function is intact  Coordination: Finger to nose testing is performed accurately  Romberg is negative  Gait reveals a normal base with symmetrical arm swing  Tandem walk is performed with difficulty due to hip pain symptoms  Reflexes:  1+ in the biceps, triceps and brachioradialis regions, 1 at the knees and trace at the ankles  Toes are downgoing          ROS:    Review of Systems   Constitutional: Negative  Negative for appetite change and fever  HENT: Negative  Negative for hearing loss, tinnitus, trouble swallowing and voice change  Eyes: Negative  Negative for photophobia and pain  Respiratory: Negative  Negative for shortness of breath  Cardiovascular: Negative  Negative for palpitations  Gastrointestinal: Negative  Negative for nausea and vomiting  Endocrine: Negative  Negative for cold intolerance  Genitourinary: Negative  Negative for dysuria, frequency and urgency  Musculoskeletal: Positive for back pain, myalgias and neck pain  Skin: Negative  Negative for rash  Neurological: Positive for headaches  Negative for dizziness, tremors, seizures, syncope, facial asymmetry, speech difficulty, weakness, light-headedness and numbness  Patient states headaches 3x weekly  Hematological: Bruises/bleeds easily  Psychiatric/Behavioral: Negative  Negative for confusion, hallucinations and sleep disturbance  Agusto Gomes MD  4/26/2022 10:33 AM  Val Pavon is a 64 y o  female [ ]    Assessment:  1  Other headache syndrome    2   Forgetfulness Plan:  [ ]    Discussion:  [ ]      Subjective:    HPI  [ ]      Past Medical History:   Diagnosis Date    Allergic     Allergic rhinitis     last assessed 14     Arthritis     Asthma     Bleeding per rectum     Last assessed 16     Colon polyp     Diverticulitis of colon     GERD (gastroesophageal reflux disease)     Hypercholesterolemia     Hypertension     last assessed 3/24/15     Hyponatremia     last assessed 9/17/15     Obesity        Family History:  Family History   Adopted: Yes   Problem Relation Age of Onset    No Known Problems Mother        Past Surgical History:  Past Surgical History:   Procedure Laterality Date     SECTION      COLONOSCOPY      FL INJECTION RIGHT SHOULDER (ARTHROGRAM)  3/18/2021    WISDOM TOOTH EXTRACTION      WRIST SURGERY         Social History:   reports that she has never smoked  She has never used smokeless tobacco  She reports current alcohol use  She reports that she does not use drugs      Allergies:  Levofloxacin, Sulfamethoxazole-trimethoprim, and Hydrocodone bitartrate er      Current Outpatient Medications:     acetaminophen (TYLENOL) 500 mg tablet, Take by mouth, Disp: , Rfl:     Black Cohosh (REMIFEMIN MENOPAUSE PO), Take by mouth, Disp: , Rfl:     Cholecalciferol (VITAMIN D) 2000 units CAPS, Take by mouth, Disp: , Rfl:     cholestyramine (QUESTRAN) 4 GM/DOSE powder, Take 1 packet (4 g total) by mouth 2 (two) times a day As needed for diarrhea, Disp: 378 g, Rfl: 5    diclofenac sodium (VOLTAREN) 50 mg EC tablet, take 1 tablet by mouth twice a day, Disp: 180 tablet, Rfl: 1    diclofenac sodium (VOLTAREN) 50 mg EC tablet, Take 1 tablet (50 mg total) by mouth 2 (two) times a day, Disp: 180 tablet, Rfl: 1    Ginkgo Biloba 60 MG TABS, Take by mouth daily , Disp: , Rfl:     glucosamine-chondroitin 500-400 MG tablet, Take 1 tablet by mouth 3 (three) times a day, Disp: 100 tablet, Rfl: 2    mometasone (NASONEX) 50 mcg/act nasal spray, instill 1 spray into each nostril daily, Disp: 17 g, Rfl: 2    Naltrexone-buPROPion HCl ER 8-90 MG TB12, Take 1 tab po QAM x 1 week, then 1 tab po BID x 1 week, then 2 tab QAM, 1 tab QPM x 1 week, then 2 tabs BID thereafter , Disp: 360 tablet, Rfl: 2    naproxen (NAPROSYN) 375 mg tablet, Take 1 tablet (375 mg total) by mouth 2 (two) times a day with meals (Patient taking differently: Take 375 mg by mouth if needed  ), Disp: 20 tablet, Rfl: 0    omeprazole (PriLOSEC) 10 mg delayed release capsule, Take 1 capsule (10 mg total) by mouth daily, Disp: 90 capsule, Rfl: 2    Probiotic Product (ALIGN) 4 MG CAPS, Take by mouth, Disp: , Rfl:     simvastatin (ZOCOR) 5 MG tablet, take 1 tablet by mouth at bedtime, Disp: 90 tablet, Rfl: 2    spironolactone (ALDACTONE) 50 mg tablet, Take 1 tablet (50 mg total) by mouth daily, Disp: 90 tablet, Rfl: 2    verapamil (VERELAN PM) 180 MG 24 hr capsule, Take 1 capsule (180 mg total) by mouth daily before breakfast, Disp: 90 capsule, Rfl: 2    vitamin E, tocopherol, 400 units capsule, Take 1 capsule (400 Units total) by mouth daily, Disp: , Rfl:     [  ]     Objective:    Vitals:  Resp  rate 16, height 5' 8" (1 727 m), weight 116 kg (255 lb), not currently breastfeeding  Physical Exam    Neurological Exam  [ ]      ROS:    Review of Systems   Constitutional: Negative  Negative for appetite change and fever  HENT: Negative  Negative for hearing loss, tinnitus, trouble swallowing and voice change  Eyes: Negative  Negative for photophobia and pain  Respiratory: Negative  Negative for shortness of breath  Cardiovascular: Negative  Negative for palpitations  Gastrointestinal: Negative  Negative for nausea and vomiting  Endocrine: Negative  Negative for cold intolerance  Genitourinary: Negative  Negative for dysuria, frequency and urgency  Musculoskeletal: Positive for back pain, myalgias and neck pain  Skin: Negative  Negative for rash  Neurological: Positive for headaches  Negative for dizziness, tremors, seizures, syncope, facial asymmetry, speech difficulty, weakness, light-headedness and numbness  Patient states headaches 3x weekly  Hematological: Bruises/bleeds easily  Psychiatric/Behavioral: Negative  Negative for confusion, hallucinations and sleep disturbance

## 2022-04-27 ENCOUNTER — APPOINTMENT (OUTPATIENT)
Dept: LAB | Facility: HOSPITAL | Age: 57
End: 2022-04-27
Payer: COMMERCIAL

## 2022-04-27 DIAGNOSIS — I10 BENIGN ESSENTIAL HYPERTENSION: ICD-10-CM

## 2022-04-27 DIAGNOSIS — R73.03 PREDIABETES: ICD-10-CM

## 2022-04-27 DIAGNOSIS — E78.5 HYPERLIPIDEMIA, UNSPECIFIED HYPERLIPIDEMIA TYPE: ICD-10-CM

## 2022-04-27 LAB
ALBUMIN SERPL BCP-MCNC: 3.7 G/DL (ref 3.5–5)
ALP SERPL-CCNC: 75 U/L (ref 46–116)
ALT SERPL W P-5'-P-CCNC: 22 U/L (ref 12–78)
ANION GAP SERPL CALCULATED.3IONS-SCNC: 8 MMOL/L (ref 4–13)
AST SERPL W P-5'-P-CCNC: 21 U/L (ref 5–45)
BASOPHILS # BLD AUTO: 0.06 THOUSANDS/ΜL (ref 0–0.1)
BASOPHILS NFR BLD AUTO: 1 % (ref 0–1)
BILIRUB SERPL-MCNC: 0.57 MG/DL (ref 0.2–1)
BUN SERPL-MCNC: 14 MG/DL (ref 5–25)
CALCIUM SERPL-MCNC: 9 MG/DL (ref 8.3–10.1)
CHLORIDE SERPL-SCNC: 104 MMOL/L (ref 100–108)
CHOLEST SERPL-MCNC: 180 MG/DL
CO2 SERPL-SCNC: 29 MMOL/L (ref 21–32)
CREAT SERPL-MCNC: 0.88 MG/DL (ref 0.6–1.3)
EOSINOPHIL # BLD AUTO: 0.29 THOUSAND/ΜL (ref 0–0.61)
EOSINOPHIL NFR BLD AUTO: 7 % (ref 0–6)
ERYTHROCYTE [DISTWIDTH] IN BLOOD BY AUTOMATED COUNT: 12.3 % (ref 11.6–15.1)
EST. AVERAGE GLUCOSE BLD GHB EST-MCNC: 111 MG/DL
GFR SERPL CREATININE-BSD FRML MDRD: 73 ML/MIN/1.73SQ M
GLUCOSE P FAST SERPL-MCNC: 97 MG/DL (ref 65–99)
HBA1C MFR BLD: 5.5 %
HCT VFR BLD AUTO: 44.5 % (ref 34.8–46.1)
HDLC SERPL-MCNC: 65 MG/DL
HGB BLD-MCNC: 14.4 G/DL (ref 11.5–15.4)
IMM GRANULOCYTES # BLD AUTO: 0.01 THOUSAND/UL (ref 0–0.2)
IMM GRANULOCYTES NFR BLD AUTO: 0 % (ref 0–2)
LDLC SERPL CALC-MCNC: 99 MG/DL (ref 0–100)
LYMPHOCYTES # BLD AUTO: 0.96 THOUSANDS/ΜL (ref 0.6–4.47)
LYMPHOCYTES NFR BLD AUTO: 22 % (ref 14–44)
MCH RBC QN AUTO: 30.7 PG (ref 26.8–34.3)
MCHC RBC AUTO-ENTMCNC: 32.4 G/DL (ref 31.4–37.4)
MCV RBC AUTO: 95 FL (ref 82–98)
MONOCYTES # BLD AUTO: 0.41 THOUSAND/ΜL (ref 0.17–1.22)
MONOCYTES NFR BLD AUTO: 10 % (ref 4–12)
NEUTROPHILS # BLD AUTO: 2.56 THOUSANDS/ΜL (ref 1.85–7.62)
NEUTS SEG NFR BLD AUTO: 60 % (ref 43–75)
NONHDLC SERPL-MCNC: 115 MG/DL
NRBC BLD AUTO-RTO: 0 /100 WBCS
PLATELET # BLD AUTO: 304 THOUSANDS/UL (ref 149–390)
PMV BLD AUTO: 9.3 FL (ref 8.9–12.7)
POTASSIUM SERPL-SCNC: 4.4 MMOL/L (ref 3.5–5.3)
PROT SERPL-MCNC: 7.1 G/DL (ref 6.4–8.2)
RBC # BLD AUTO: 4.69 MILLION/UL (ref 3.81–5.12)
SODIUM SERPL-SCNC: 141 MMOL/L (ref 136–145)
TRIGL SERPL-MCNC: 80 MG/DL
TSH SERPL DL<=0.05 MIU/L-ACNC: 1.82 UIU/ML (ref 0.45–4.5)
WBC # BLD AUTO: 4.29 THOUSAND/UL (ref 4.31–10.16)

## 2022-04-27 PROCEDURE — 83036 HEMOGLOBIN GLYCOSYLATED A1C: CPT

## 2022-04-27 PROCEDURE — 80053 COMPREHEN METABOLIC PANEL: CPT

## 2022-04-27 PROCEDURE — 80061 LIPID PANEL: CPT

## 2022-04-27 PROCEDURE — 84443 ASSAY THYROID STIM HORMONE: CPT

## 2022-04-27 PROCEDURE — 36415 COLL VENOUS BLD VENIPUNCTURE: CPT

## 2022-04-27 PROCEDURE — 85025 COMPLETE CBC W/AUTO DIFF WBC: CPT

## 2022-05-09 ENCOUNTER — TELEPHONE (OUTPATIENT)
Dept: ADMINISTRATIVE | Facility: OTHER | Age: 57
End: 2022-05-09

## 2022-05-09 ENCOUNTER — OFFICE VISIT (OUTPATIENT)
Dept: INTERNAL MEDICINE CLINIC | Facility: CLINIC | Age: 57
End: 2022-05-09
Payer: COMMERCIAL

## 2022-05-09 ENCOUNTER — CONSULT (OUTPATIENT)
Dept: DERMATOLOGY | Facility: CLINIC | Age: 57
End: 2022-05-09
Payer: COMMERCIAL

## 2022-05-09 VITALS
SYSTOLIC BLOOD PRESSURE: 112 MMHG | DIASTOLIC BLOOD PRESSURE: 80 MMHG | WEIGHT: 253 LBS | BODY MASS INDEX: 38.34 KG/M2 | RESPIRATION RATE: 18 BRPM | HEART RATE: 102 BPM | HEIGHT: 68 IN | OXYGEN SATURATION: 96 % | TEMPERATURE: 98.4 F

## 2022-05-09 VITALS — BODY MASS INDEX: 38.34 KG/M2 | WEIGHT: 253 LBS | TEMPERATURE: 97 F | HEIGHT: 68 IN

## 2022-05-09 DIAGNOSIS — J01.91 ACUTE RECURRENT SINUSITIS, UNSPECIFIED LOCATION: ICD-10-CM

## 2022-05-09 DIAGNOSIS — L82.1 SEBORRHEIC KERATOSIS: ICD-10-CM

## 2022-05-09 DIAGNOSIS — M54.16 LUMBAR RADICULOPATHY: ICD-10-CM

## 2022-05-09 DIAGNOSIS — M79.89 LEG SWELLING: ICD-10-CM

## 2022-05-09 DIAGNOSIS — E78.5 HYPERLIPIDEMIA, UNSPECIFIED HYPERLIPIDEMIA TYPE: ICD-10-CM

## 2022-05-09 DIAGNOSIS — L85.3 XEROSIS OF SKIN: ICD-10-CM

## 2022-05-09 DIAGNOSIS — R73.03 PREDIABETES: ICD-10-CM

## 2022-05-09 DIAGNOSIS — D22.9 MULTIPLE BENIGN MELANOCYTIC NEVI: ICD-10-CM

## 2022-05-09 DIAGNOSIS — M17.0 OSTEOARTHRITIS OF BOTH KNEES, UNSPECIFIED OSTEOARTHRITIS TYPE: ICD-10-CM

## 2022-05-09 DIAGNOSIS — I10 HYPERTENSION, UNSPECIFIED TYPE: ICD-10-CM

## 2022-05-09 DIAGNOSIS — D18.01 CHERRY ANGIOMA: ICD-10-CM

## 2022-05-09 DIAGNOSIS — I10 BENIGN ESSENTIAL HYPERTENSION: ICD-10-CM

## 2022-05-09 DIAGNOSIS — J45.909 MODERATE ASTHMA WITHOUT COMPLICATION, UNSPECIFIED WHETHER PERSISTENT: Primary | ICD-10-CM

## 2022-05-09 DIAGNOSIS — L65.9 ALOPECIA: ICD-10-CM

## 2022-05-09 DIAGNOSIS — L98.9 SKIN LESION: ICD-10-CM

## 2022-05-09 DIAGNOSIS — E88.09 HYPOALBUMINEMIA: ICD-10-CM

## 2022-05-09 DIAGNOSIS — K21.9 GASTROESOPHAGEAL REFLUX DISEASE: ICD-10-CM

## 2022-05-09 DIAGNOSIS — L81.4 LENTIGO: Primary | ICD-10-CM

## 2022-05-09 PROCEDURE — 3725F SCREEN DEPRESSION PERFORMED: CPT | Performed by: NURSE PRACTITIONER

## 2022-05-09 PROCEDURE — 99203 OFFICE O/P NEW LOW 30 MIN: CPT | Performed by: DERMATOLOGY

## 2022-05-09 PROCEDURE — 99214 OFFICE O/P EST MOD 30 MIN: CPT | Performed by: NURSE PRACTITIONER

## 2022-05-09 NOTE — PROGRESS NOTES
Depression Screening and Follow-up Plan: Patient was screened for depression during today's encounter  They screened negative with a PHQ-2 score of 0  Assessment/Plan:    1  Hypertension- At goal  Continue current medication  2  Prediabetes- At goal  A1c 5 5  Continue diet and lifestyle modifications  3  Hyperlipidemia- At goal  Continue Simvastatin  Lipid panel in 6 months  4  B/L ankle swelling- Wear compression stockings when sitting for long periods of time  Elevated legs throughout the day  5  Osteoarthritis- Follows sports medicine, Dr Kaushal Sánchez  Follow up in 6 months, labs prior  Diagnoses and all orders for this visit:    Moderate asthma without complication, unspecified whether persistent    Osteoarthritis of both knees, unspecified osteoarthritis type    Hyperlipidemia, unspecified hyperlipidemia type  -     Lipid panel; Future    Hypoalbuminemia    Prediabetes  -     HEMOGLOBIN A1C W/ EAG ESTIMATION; Future    Skin lesion    Leg swelling    Benign essential hypertension  -     CBC and differential; Future  -     Comprehensive metabolic panel; Future  -     TSH, 3rd generation with Free T4 reflex; Future    Gastroesophageal reflux disease    Lumbar radiculopathy    Acute recurrent sinusitis, unspecified location    Alopecia    Hypertension, unspecified type        The patient was counseled regarding instructions for management, risk factor reductions, patient and family education,impressions, risks and benefits of treatment options, side effects of medications, importance of compliance with treatment  The treatment plan was reviewed with the patient/guardian and patient/guardian understands and agrees with the treatment plan          Current Outpatient Medications:     acetaminophen (TYLENOL) 500 mg tablet, Take by mouth, Disp: , Rfl:     Black Cohosh (REMIFEMIN MENOPAUSE PO), Take by mouth, Disp: , Rfl:     Cholecalciferol (VITAMIN D) 2000 units CAPS, Take by mouth, Disp: , Rfl:     cholestyramine (QUESTRAN) 4 GM/DOSE powder, Take 1 packet (4 g total) by mouth 2 (two) times a day As needed for diarrhea, Disp: 378 g, Rfl: 5    diclofenac sodium (VOLTAREN) 50 mg EC tablet, take 1 tablet by mouth twice a day, Disp: 180 tablet, Rfl: 1    diclofenac sodium (VOLTAREN) 50 mg EC tablet, Take 1 tablet (50 mg total) by mouth 2 (two) times a day, Disp: 180 tablet, Rfl: 1    Ginkgo Biloba 60 MG TABS, Take by mouth daily , Disp: , Rfl:     glucosamine-chondroitin 500-400 MG tablet, Take 1 tablet by mouth 3 (three) times a day, Disp: 100 tablet, Rfl: 2    mometasone (NASONEX) 50 mcg/act nasal spray, instill 1 spray into each nostril daily, Disp: 17 g, Rfl: 2    Naltrexone-buPROPion HCl ER 8-90 MG TB12, Take 1 tab po QAM x 1 week, then 1 tab po BID x 1 week, then 2 tab QAM, 1 tab QPM x 1 week, then 2 tabs BID thereafter , Disp: 360 tablet, Rfl: 2    naproxen (NAPROSYN) 375 mg tablet, Take 1 tablet (375 mg total) by mouth 2 (two) times a day with meals (Patient taking differently: Take 375 mg by mouth if needed  ), Disp: 20 tablet, Rfl: 0    omeprazole (PriLOSEC) 10 mg delayed release capsule, Take 1 capsule (10 mg total) by mouth daily, Disp: 90 capsule, Rfl: 2    Probiotic Product (ALIGN) 4 MG CAPS, Take by mouth, Disp: , Rfl:     simvastatin (ZOCOR) 5 MG tablet, take 1 tablet by mouth at bedtime, Disp: 90 tablet, Rfl: 2    spironolactone (ALDACTONE) 50 mg tablet, Take 1 tablet (50 mg total) by mouth daily, Disp: 90 tablet, Rfl: 2    verapamil (VERELAN PM) 180 MG 24 hr capsule, Take 1 capsule (180 mg total) by mouth daily before breakfast, Disp: 90 capsule, Rfl: 2    vitamin E, tocopherol, 400 units capsule, Take 1 capsule (400 Units total) by mouth daily, Disp: , Rfl:     Subjective:      Patient ID: Paxton Reynaga is a 64 y o  female  Here for follow up  Complaining of intermittent B/L ankle swelling that has been going on for about a month   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  Denies chest pain or shortness of breath  The following portions of the patient's history were reviewed and updated as appropriate:   She has a past medical history of Allergic, Allergic rhinitis, Arthritis, Asthma, Bleeding per rectum, Colon polyp, Diverticulitis of colon, GERD (gastroesophageal reflux disease), Hypercholesterolemia, Hypertension, Hyponatremia, and Obesity  ,  does not have any pertinent problems on file  ,   has a past surgical history that includes  section; Wrist surgery; Colonoscopy; Rochelle tooth extraction; and [place] injection right shoulder (arthrogram) (3/18/2021)  ,  family history includes Migraines in her daughter; No Known Problems in her mother  She was adopted  ,   reports that she has never smoked  She has never used smokeless tobacco  She reports current alcohol use  She reports that she does not use drugs  ,  is allergic to levofloxacin, sulfamethoxazole-trimethoprim, and hydrocodone bitartrate er       Review of Systems   Constitutional: Negative  Respiratory: Negative  Cardiovascular: Positive for leg swelling (B/L)  Musculoskeletal: Negative  Psychiatric/Behavioral: Negative            Objective:  /80 (BP Location: Left arm, Patient Position: Sitting, Cuff Size: Large)   Pulse 102   Temp 98 4 °F (36 9 °C) (Tympanic)   Resp 18   Ht 5' 8" (1 727 m)   Wt 115 kg (253 lb)   SpO2 96%   BMI 38 47 kg/m²     Lab Review  Appointment on 2022   Component Date Value    WBC 2022 4 29*    RBC 2022 4 69     Hemoglobin 2022 14 4     Hematocrit 2022 44 5     MCV 2022 95     MCH 2022 30 7     MCHC 2022 32 4     RDW 2022 12 3     MPV 2022 9 3     Platelets  304     nRBC 2022 0     Neutrophils Relative 2022 60     Immat GRANS % 2022 0     Lymphocytes Relative 2022 22     Monocytes Relative 2022 10     Eosinophils Relative 04/27/2022 7*    Basophils Relative 04/27/2022 1     Neutrophils Absolute 04/27/2022 2 56     Immature Grans Absolute 04/27/2022 0 01     Lymphocytes Absolute 04/27/2022 0 96     Monocytes Absolute 04/27/2022 0 41     Eosinophils Absolute 04/27/2022 0 29     Basophils Absolute 04/27/2022 0 06     Sodium 04/27/2022 141     Potassium 04/27/2022 4 4     Chloride 04/27/2022 104     CO2 04/27/2022 29     ANION GAP 04/27/2022 8     BUN 04/27/2022 14     Creatinine 04/27/2022 0 88     Glucose, Fasting 04/27/2022 97     Calcium 04/27/2022 9 0     AST 04/27/2022 21     ALT 04/27/2022 22     Alkaline Phosphatase 04/27/2022 75     Total Protein 04/27/2022 7 1     Albumin 04/27/2022 3 7     Total Bilirubin 04/27/2022 0 57     eGFR 04/27/2022 73     Hemoglobin A1C 04/27/2022 5 5     EAG 04/27/2022 111     TSH 3RD GENERATON 04/27/2022 1 818     Cholesterol 04/27/2022 180     Triglycerides 04/27/2022 80     HDL, Direct 04/27/2022 65     LDL Calculated 04/27/2022 99     Non-HDL-Chol (CHOL-HDL) 04/27/2022 115         Imaging: No results found  Physical Exam  Constitutional:       Appearance: She is well-developed  Cardiovascular:      Rate and Rhythm: Normal rate and regular rhythm  Heart sounds: Normal heart sounds  Pulmonary:      Effort: Pulmonary effort is normal       Breath sounds: Normal breath sounds  Musculoskeletal:         General: Normal range of motion  Neurological:      Mental Status: She is alert and oriented to person, place, and time  Deep Tendon Reflexes: Reflexes are normal and symmetric  Psychiatric:         Behavior: Behavior normal          Thought Content:  Thought content normal          Judgment: Judgment normal

## 2022-05-09 NOTE — PATIENT INSTRUCTIONS
MELANOCYTIC NEVI ("Moles")    Assessment and Plan:  Based on a thorough discussion of this condition and the management approach to it (including a comprehensive discussion of the known risks, side effects and potential benefits of treatment), the patient (family) agrees to implement the following specific plan:   When outside we recommend using a wide brim hat, sunglasses, long sleeve and pants, sunscreen with SPF 36+ with reapplication every 2 hours, or SPF specific clothing    Benign, reassured   Annual skin check     Melanocytic Nevi  Melanocytic nevi ("moles") are tan or brown, raised or flat areas of the skin which have an increased number of melanocytes  Melanocytes are the cells in our body which make pigment and account for skin color  Some moles are present at birth (I e , "congenital nevi"), while others come up later in life (i e , "acquired nevi")  The sun can stimulate the body to make more moles  Sunburns are not the only thing that triggers more moles  Chronic sun exposure can do it too  Clinically distinguishing a healthy mole from melanoma may be difficult, even for experienced dermatologists  The "ABCDE's" of moles have been suggested as a means of helping to alert a person to a suspicious mole and the possible increased risk of melanoma  The suggestions for raising alert are as follows:    Asymmetry: Healthy moles tend to be symmetric, while melanomas are often asymmetric  Asymmetry means if you draw a line through the mole, the two halves do not match in color, size, shape, or surface texture  Asymmetry can be a result of rapid enlargement of a mole, the development of a raised area on a previously flat lesion, scaling, ulceration, bleeding or scabbing within the mole  Any mole that starts to demonstrate "asymmetry" should be examined promptly by a board certified dermatologist      Border: Healthy moles tend to have discrete, even borders    The border of a melanoma often blends into the normal skin and does not sharply delineate the mole from normal skin  Any mole that starts to demonstrate "uneven borders" should be examined promptly by a board certified dermatologist      Color: Healthy moles tend to be one color throughout  Melanomas tend to be made up of different colors ranging from dark black, blue, white, or red  Any mole that demonstrates a color change should be examined promptly by a board certified dermatologist      Diameter: Healthy moles tend to be smaller than 0 6 cm in size; an exception are "congenital nevi" that can be larger  Melanomas tend to grow and can often be greater than 0 6 cm (1/4 of an inch, or the size of a pencil eraser)  This is only a guideline, and many normal moles may be larger than 0 6 cm without being unhealthy  Any mole that starts to change in size (small to bigger or bigger to smaller) should be examined promptly by a board certified dermatologist      Evolving: Healthy moles tend to "stay the same "  Melanomas may often show signs of change or evolution such as a change in size, shape, color, or elevation  Any mole that starts to itch, bleed, crust, burn, hurt, or ulcerate or demonstrate a change or evolution should be examined promptly by a board certified dermatologist         Ileana Rowe and Plan:  Based on a thorough discussion of this condition and the management approach to it (including a comprehensive discussion of the known risks, side effects and potential benefits of treatment), the patient (family) agrees to implement the following specific plan:   When outside we recommend using a wide brim hat, sunglasses, long sleeve and pants, sunscreen with SPF 54+ with reapplication every 2 hours, or SPF specific clothing     What is a lentigo? A lentigo is a pigmented flat or slightly raised lesion with a clearly defined edge  Unlike an ephelis (freckle), it does not fade in the winter months   There are several kinds of lentigo  The name lentigo originally referred to its appearance resembling a small lentil  The plural of lentigo is lentigines, although lentigos is also in common use  Who gets lentigines? Lentigines can affect males and females of all ages and races  Solar lentigines are especially prevalent in fair skinned adults  Lentigines associated with syndromes are present at birth or arise during childhood  What causes lentigines? Common forms of lentigo are due to exposure to ultraviolet radiation:   Sun damage including sunburn    Indoor tanning    Phototherapy, especially photochemotherapy (PUVA)    Ionizing radiation, eg radiation therapy, can also cause lentigines  Several familial syndromes associated with widespread lentigines originate from mutations in Enmanuel-MAP kinase, mTOR signaling and PTEN pathways  What is the treatment for lentigines? Most lentigines are left alone  Attempts to lighten them may not be successful  The following approaches are used:   SPF 50+ broad-spectrum sunscreen    Hydroquinone bleaching cream    Alpha hydroxy acids    Vitamin C    Retinoids    Azelaic acid    Chemical peels  Individual lesions can be permanently removed using:   Cryotherapy    Intense pulsed light    Pigment lasers    How can lentigines be prevented? Lentigines associated with exposure ultraviolet radiation can be prevented by very careful sun protection  Clothing is more successful at preventing new lentigines than are sunscreens  What is the outlook for lentigines? Lentigines usually persist  They may increase in number with age and sun exposure  Some in sun-protected sites may fade and disappear      TREVINO ANGIOMAS    Assessment and Plan:  Based on a thorough discussion of this condition and the management approach to it (including a comprehensive discussion of the known risks, side effects and potential benefits of treatment), the patient (family) agrees to implement the following specific plan:   Monitor for changes   Benign, reassured    Assessment and Plan:    Cherry angioma, also known as [employer] spots, are benign vascular skin lesions  A "cherry angioma" is a firm red, blue or purple papule, 0 1-1 cm in diameter  When thrombosed, they can appear black in colour until evaluated with a dermatoscope when the red or purple colour is more easily seen  Cherry angioma may develop on any part of the body but most often appear on the scalp, face, lips and trunk  An angioma is due to proliferating endothelial cells; these are the cells that line the inside of a blood vessel  Angiomas can arise in early life or later in life; the most common type of angioma is a cherry angioma  Cherry angiomas are very common in males and females of any age or race  They are more noticeable in white skin than in skin of colour  They markedly increase in number from about the age of 36  There may be a family history of similar lesions  Eruptive cherry angiomas have been rarely reported to be associated with internal malignancy  The cause of angiomas is unknown  Genetic analysis of cherry angiomas has shown that they frequently carry specific somatic missense mutations in the GNAQ and GNA11 (Q209H) genes, which are involved in other vascular and melanocytic proliferations  SEBORRHEIC KERATOSIS; NON-INFLAMED    Assessment and Plan:  Based on a thorough discussion of this condition and the management approach to it (including a comprehensive discussion of the known risks, side effects and potential benefits of treatment), the patient (family) agrees to implement the following specific plan:   Monitor for changes   Benign, reassured    Seborrheic Keratosis  A seborrheic keratosis is a harmless warty spot that appears during adult life as a common sign of skin aging  Seborrheic keratoses can arise on any area of skin, covered or uncovered, with the usual exception of the palms and soles   They do not arise from mucous membranes  Seborrheic keratoses can have highly variable appearance  Seborrheic keratoses are extremely common  It has been estimated that over 90% of adults over the age of 61 years have one or more of them  They occur in males and females of all races, typically beginning to erupt in the 35s or 45s  They are uncommon under the age of 21 years  The precise cause of seborrhoeic keratoses is not known  Seborrhoeic keratoses are considered degenerative in nature  As time goes by, seborrheic keratoses tend to become more numerous  Some people inherit a tendency to develop a very large number of them; some people may have hundreds of them  There is no easy way to remove multiple lesions on a single occasion  Unless a specific lesion is "inflamed" and is causing pain or stinging/burning or is bleeding, most insurance companies do not authorize treatment  XEROSIS ("DRY SKIN")    Assessment and Plan:  Based on a thorough discussion of this condition and the management approach to it (including a comprehensive discussion of the known risks, side effects and potential benefits of treatment), the patient (family) agrees to implement the following specific plan:  Use moisturizer like Eucerin,Cerave, Vanicream or Aveeno Cream 3 times a day for the dry skin                   Dry skin refers to skin that feels dry to touch  Dry skin has a dull surface with a rough, scaly quality  The skin is less pliable and cracked  When dryness is severe, the skin may become inflamed and fissured  Although any body site can be dry, dry skin tends to affect the shins more than any other site  Dry skin is lacking moisture in the outer horny cell layer (stratum corneum) and this results in cracks in the skin surface  Dry skin is also called xerosis, xeroderma or asteatosis (lack of fat)  It can affect males and females of all ages   There is some racial variability in water and lipid content of the skin    Dry skin that starts in early childhood may be one of about 20 types of ichthyosis (fish-scale skin)  There is often a family history of dry skin   Dry skin is commonly seen in people with atopic dermatitis   Nearly everyone > 60 years has dry skin  Dry skin that begins later may be seen in people with certain diseases and conditions   Postmenopausal women   Hypothyroidism   Chronic renal disease    Malnutrition and weight loss    Subclinical dermatitis    Treatment with certain drugs such as oral retinoids, diuretics and epidermal growth factor receptor inhibitors      What is the treatment for dry skin? The mainstay of treatment of dry skin and ichthyosis is moisturisers/emollients  They should be applied liberally and often enough to:   Reduce itch    Improve the barrier function    Prevent entry of irritants, bacteria    Reduce transepidermal water loss  How can dry skin be prevented? Eliminate aggravating factors:   Reduce the frequency of bathing   A humidifier in winter and air conditioner in summer    Compare having a short shower with a prolonged soak in a bath   Use lukewarm, not hot, water   Replace standard soap with a substitute such as a synthetic detergent cleanser, water-miscible emollient, bath oil, anti-pruritic tar oil, colloidal oatmeal etc     Apply an emollient liberally and often, particularly shortly after bathing, and when itchy  The drier the skin, the thicker this should be, especially on the hands  What is the outlook for dry skin? A tendency to dry skin may persist life-long, or it may improve once contributing factors are controlled

## 2022-05-09 NOTE — TELEPHONE ENCOUNTER
----- Message from Sofie Velázquez LPN sent at 1/9/4423 12:09 PM EDT -----  Regarding: Mammogram  05/06/22 12:09 PM    Hello, our patient Luz Lemos has had Mammogram completed/performed  Please assist in updating the patient chart by pulling the Care Everywhere (CE) document  The date of service is 02/25/2022       Thank you,  Sofie Velázquez LPN  [facility] Cheryle Conrad

## 2022-05-09 NOTE — PATIENT INSTRUCTIONS
1  Hypertension- At goal  Continue current medication  2  Prediabetes- At goal  A1c 5 5  Continue diet and lifestyle modifications  3  Hyperlipidemia- At goal  Continue Simvastatin  Lipid panel in 6 months  4  B/L ankle swelling- Wear compression stockings when sitting for long periods of time  Elevated legs throughout the day  5  Osteoarthritis- Follows sports medicine, Dr Amita Mary  Follow up in 6 months, labs prior

## 2022-05-09 NOTE — PROGRESS NOTES
[facility] Note     Patient Name: Sade Fish  Encounter Date: 5/9/2022     Have you been cared for by a [facility] Dermatologist in the last 3 years and, if so, which one? No    · Have you traveled outside of the [address] [place] [address] in the past 3 months or outside of the [facility] area in the last 2 weeks? No     May we call your Preferred Phone number to discuss your specific medical information? Yes     May we leave a detailed message that includes your specific medical information? Yes      Today's Chief Concerns:   Concern #1:  Spot of concern   Concern #2:      Past Medical History:  Have you personally ever had or currently have any of the following? · Skin cancer (such as Melanoma, Basal Cell Carcinoma, Squamous Cell Carcinoma? (If Yes, please provide more detail)- No  · Eczema: No  · Psoriasis: No  · HIV/AIDS: No  · Hepatitis B or C: No  · Tuberculosis: No  · Systemic Immunosuppression such as Diabetes, Biologic or Immunotherapy, Chemotherapy, Organ Transplantation, Bone Marrow Transplantation (If YES, please provide more detail): No  · Radiation Treatment (If YES, please provide more detail): No  · Any other major medical conditions/concerns? (If Yes, which types)- No    Social History:     What is/was your primary occupation? [employer]      What are your hobbies/past-times? Family History:  Have any of your "first degree relatives" (parent, brother, sister, or child) had any of the following       · Skin cancer such as Melanoma or Merkel Cell Carcinoma or Pancreatic Cancer? No  · Eczema, Asthma, Hay Fever or Seasonal Allergies: No  · Psoriasis or Psoriatic Arthritis: No  · Do any other medical conditions seem to run in your family? If Yes, what condition and which relatives?   No    Current Medications:   (please update all dermatological medications before printing patient's AVS!)      Current Outpatient Medications:     acetaminophen (TYLENOL) 500 mg tablet, Take by mouth, Disp: , Rfl:     Black Cohosh (REMIFEMIN MENOPAUSE PO), Take by mouth, Disp: , Rfl:     Cholecalciferol (VITAMIN D) 2000 units CAPS, Take by mouth, Disp: , Rfl:     cholestyramine (QUESTRAN) 4 GM/DOSE powder, Take 1 packet (4 g total) by mouth 2 (two) times a day As needed for diarrhea, Disp: 378 g, Rfl: 5    diclofenac sodium (VOLTAREN) 50 mg EC tablet, take 1 tablet by mouth twice a day, Disp: 180 tablet, Rfl: 1    diclofenac sodium (VOLTAREN) 50 mg EC tablet, Take 1 tablet (50 mg total) by mouth 2 (two) times a day, Disp: 180 tablet, Rfl: 1    Ginkgo Biloba 60 MG TABS, Take by mouth daily , Disp: , Rfl:     glucosamine-chondroitin 500-400 MG tablet, Take 1 tablet by mouth 3 (three) times a day, Disp: 100 tablet, Rfl: 2    mometasone (NASONEX) 50 mcg/act nasal spray, instill 1 spray into each nostril daily, Disp: 17 g, Rfl: 2    Naltrexone-buPROPion HCl ER 8-90 MG TB12, Take 1 tab po QAM x 1 week, then 1 tab po BID x 1 week, then 2 tab QAM, 1 tab QPM x 1 week, then 2 tabs BID thereafter , Disp: 360 tablet, Rfl: 2    naproxen (NAPROSYN) 375 mg tablet, Take 1 tablet (375 mg total) by mouth 2 (two) times a day with meals (Patient taking differently: Take 375 mg by mouth if needed  ), Disp: 20 tablet, Rfl: 0    omeprazole (PriLOSEC) 10 mg delayed release capsule, Take 1 capsule (10 mg total) by mouth daily, Disp: 90 capsule, Rfl: 2    Probiotic Product (ALIGN) 4 MG CAPS, Take by mouth, Disp: , Rfl:     simvastatin (ZOCOR) 5 MG tablet, take 1 tablet by mouth at bedtime, Disp: 90 tablet, Rfl: 2    spironolactone (ALDACTONE) 50 mg tablet, Take 1 tablet (50 mg total) by mouth daily, Disp: 90 tablet, Rfl: 2    verapamil (VERELAN PM) 180 MG 24 hr capsule, Take 1 capsule (180 mg total) by mouth daily before breakfast, Disp: 90 capsule, Rfl: 2    vitamin E, tocopherol, 400 units capsule, Take 1 capsule (400 Units total) by mouth daily, Disp: , Rfl:       Review of Systems:  Have you recently had or currently have any of the following? If YES, what are you doing for the problem? · Fever, chills or unintended weight loss: No  · Sudden loss or change in your vision: No  · Nausea, vomiting or blood in your stool: No  · Painful or swollen joints: No  · Wheezing or cough: No  · Changing mole or non-healing wound: No  · Nosebleeds: No  · Excessive sweating: No  · Easy or prolonged bleeding? No  · Over the last 2 weeks, how often have you been bothered by the following problems? · Taking little interest or pleasure in doing things: 1 - Not at All  · Feeling down, depressed, or hopeless: 1 - Not at All  · Rapid heartbeat with epinephrine:  No    · FEMALES ONLY:    · Are you pregnant or planning to become pregnant? No  · Are you currently or planning to be nursing or breast feeding? No    · Any known allergies? Allergies   Allergen Reactions    Levofloxacin Hives and Tachycardia     Annotation - [id number]: LIGHTHEADED    Sulfamethoxazole-Trimethoprim     Hydrocodone Bitartrate Er      Annotation - [id number]: FLUSHING    CHEST/RIB PAIN   ·       Physical Exam:     Was a chaperone (Derm Clinical Assistant) present throughout the entire Physical Exam? Yes     Did the Dermatology Team specifically  the patient on the importance of a Full Skin Exam to be sure that nothing is missed clinically? Yes}  o Did the patient ultimately request or accept a Full Skin Exam?  Yes  o Did the patient specifically refuse to have the areas "under-the-bra" examined by the Dermatologist? No  o Did the patient specifically refuse to have the areas "under-the-underwear" examined by the Dermatologist? No    CONSTITUTIONAL:   There were no vitals filed for this visit      PSYCH: Normal mood and affect  EYES: Normal conjunctiva  ENT: Normal lips and oral mucosa  CARDIOVASCULAR: No edema  RESPIRATORY: Normal respirations  HEME/LYMPH/IMMUNO:  No regional lymphadenopathy except as noted below in "ASSESSMENT AND PLAN BY DIAGNOSIS"    SKIN:  FULL ORGAN SYSTEM EXAM   Hair, Scalp, Ears, Face Normal except as noted below in Assessment   Neck, Cervical Chain Nodes Normal except as noted below in Assessment   Right Arm/Hand/Fingers Normal except as noted below in Assessment   Left Arm/Hand/Fingers Normal except as noted below in Assessment   Chest/Breasts/Axillae Viewed areas Normal except as noted below in Assessment   Abdomen, Umbilicus Normal except as noted below in Assessment   Back/Spine Normal except as noted below in Assessment   Groin/Genitalia/Buttocks NOT EXAMINED   Right Leg, Foot, Toes Normal except as noted below in Assessment   Left Leg, Foot, Toes Normal except as noted below in Assessment        Assessment and Plan by Diagnosis:    MELANOCYTIC NEVI ("Moles")    Physical Exam:   Anatomic Location Affected:   Mostly on sun-exposed areas of the trunk and extremities   Morphological Description:  Scattered, 1-4mm round to ovoid, symmetrical-appearing, even bordered, skin colored to dark brown macules/papules, mostly in sun-exposed areas   Pertinent Positives:   Pertinent Negatives: Additional History of Present Condition:  Found on exam today    Assessment and Plan:  Based on a thorough discussion of this condition and the management approach to it (including a comprehensive discussion of the known risks, side effects and potential benefits of treatment), the patient (family) agrees to implement the following specific plan:   When outside we recommend using a wide brim hat, sunglasses, long sleeve and pants, sunscreen with SPF 01+ with reapplication every 2 hours, or SPF specific clothing    Benign, reassured   Annual skin check     Melanocytic Nevi  Melanocytic nevi ("moles") are tan or brown, raised or flat areas of the skin which have an increased number of melanocytes  Melanocytes are the cells in our body which make pigment and account for skin color      Some moles are present at birth (I e , "congenital nevi"), while others come up later in life (i e , "acquired nevi")  The sun can stimulate the body to make more moles  Sunburns are not the only thing that triggers more moles  Chronic sun exposure can do it too  Clinically distinguishing a healthy mole from melanoma may be difficult, even for experienced dermatologists  The "ABCDE's" of moles have been suggested as a means of helping to alert a person to a suspicious mole and the possible increased risk of melanoma  The suggestions for raising alert are as follows:    Asymmetry: Healthy moles tend to be symmetric, while melanomas are often asymmetric  Asymmetry means if you draw a line through the mole, the two halves do not match in color, size, shape, or surface texture  Asymmetry can be a result of rapid enlargement of a mole, the development of a raised area on a previously flat lesion, scaling, ulceration, bleeding or scabbing within the mole  Any mole that starts to demonstrate "asymmetry" should be examined promptly by a board certified dermatologist      Border: Healthy moles tend to have discrete, even borders  The border of a melanoma often blends into the normal skin and does not sharply delineate the mole from normal skin  Any mole that starts to demonstrate "uneven borders" should be examined promptly by a board certified dermatologist      Color: Healthy moles tend to be one color throughout  Melanomas tend to be made up of different colors ranging from dark black, blue, white, or red  Any mole that demonstrates a color change should be examined promptly by a board certified dermatologist      Diameter: Healthy moles tend to be smaller than 0 6 cm in size; an exception are "congenital nevi" that can be larger  Melanomas tend to grow and can often be greater than 0 6 cm (1/4 of an inch, or the size of a pencil eraser)  This is only a guideline, and many normal moles may be larger than 0 6 cm without being unhealthy    Any mole that starts to change in size (small to bigger or bigger to smaller) should be examined promptly by a board certified dermatologist      Evolving: Healthy moles tend to "stay the same "  Melanomas may often show signs of change or evolution such as a change in size, shape, color, or elevation  Any mole that starts to itch, bleed, crust, burn, hurt, or ulcerate or demonstrate a change or evolution should be examined promptly by a board certified dermatologist         LENTIGO    Physical Exam:   Anatomic Location Affected:  Trunk and bilateral arms   Morphological Description:  Light brown macules   Pertinent Positives:   Pertinent Negatives: Additional History of Present Condition:  Found on exam today    Assessment and Plan:  Based on a thorough discussion of this condition and the management approach to it (including a comprehensive discussion of the known risks, side effects and potential benefits of treatment), the patient (family) agrees to implement the following specific plan:   When outside we recommend using a wide brim hat, sunglasses, long sleeve and pants, sunscreen with SPF 53+ with reapplication every 2 hours, or SPF specific clothing     What is a lentigo? A lentigo is a pigmented flat or slightly raised lesion with a clearly defined edge  Unlike an ephelis (freckle), it does not fade in the winter months  There are several kinds of lentigo  The name lentigo originally referred to its appearance resembling a small lentil  The plural of lentigo is lentigines, although lentigos is also in common use  Who gets lentigines? Lentigines can affect males and females of all ages and races  Solar lentigines are especially prevalent in fair skinned adults  Lentigines associated with syndromes are present at birth or arise during childhood  What causes lentigines?   Common forms of lentigo are due to exposure to ultraviolet radiation:   Sun damage including sunburn    Indoor tanning  Phototherapy, especially photochemotherapy (PUVA)    Ionizing radiation, eg radiation therapy, can also cause lentigines  Several familial syndromes associated with widespread lentigines originate from mutations in Enmanuel-MAP kinase, mTOR signaling and PTEN pathways  What is the treatment for lentigines? Most lentigines are left alone  Attempts to lighten them may not be successful  The following approaches are used:   SPF 50+ broad-spectrum sunscreen    Hydroquinone bleaching cream    Alpha hydroxy acids    Vitamin C    Retinoids    Azelaic acid    Chemical peels  Individual lesions can be permanently removed using:   Cryotherapy    Intense pulsed light    Pigment lasers    How can lentigines be prevented? Lentigines associated with exposure ultraviolet radiation can be prevented by very careful sun protection  Clothing is more successful at preventing new lentigines than are sunscreens  What is the outlook for lentigines? Lentigines usually persist  They may increase in number with age and sun exposure  Some in sun-protected sites may fade and disappear  TREVINO ANGIOMAS    Physical Exam:   Anatomic Location Affected:  trunk   Morphological Description:  Scattered cherry red, 1-4 mm papules   Pertinent Positives:   Pertinent Negatives: Additional History of Present Condition:  Found on exam today    Assessment and Plan:  Based on a thorough discussion of this condition and the management approach to it (including a comprehensive discussion of the known risks, side effects and potential benefits of treatment), the patient (family) agrees to implement the following specific plan:   Monitor for changes   Benign, reassured    Assessment and Plan:    Cherry angioma, also known as [employer] spots, are benign vascular skin lesions  A "cherry angioma" is a firm red, blue or purple papule, 0 1-1 cm in diameter   When thrombosed, they can appear black in colour until evaluated with a dermatoscope when the red or purple colour is more easily seen  Cherry angioma may develop on any part of the body but most often appear on the scalp, face, lips and trunk  An angioma is due to proliferating endothelial cells; these are the cells that line the inside of a blood vessel  Angiomas can arise in early life or later in life; the most common type of angioma is a cherry angioma  Cherry angiomas are very common in males and females of any age or race  They are more noticeable in white skin than in skin of colour  They markedly increase in number from about the age of 36  There may be a family history of similar lesions  Eruptive cherry angiomas have been rarely reported to be associated with internal malignancy  The cause of angiomas is unknown  Genetic analysis of cherry angiomas has shown that they frequently carry specific somatic missense mutations in the GNAQ and GNA11 (Q209H) genes, which are involved in other vascular and melanocytic proliferations  SEBORRHEIC KERATOSIS; NON-INFLAMED    Physical Exam:   Anatomic Location Affected:  Trunk, right cheek, arms, legs   Morphological Description:  Flat and raised, waxy, smooth to warty textured, yellow to brownish-grey to dark brown to blackish, discrete, "stuck-on" appearing papules   Pertinent Positives:   Pertinent Negatives: Additional History of Present Condition:  Found on exam today    Assessment and Plan:  Based on a thorough discussion of this condition and the management approach to it (including a comprehensive discussion of the known risks, side effects and potential benefits of treatment), the patient (family) agrees to implement the following specific plan:   Monitor for changes   Benign, reassured    Seborrheic Keratosis  A seborrheic keratosis is a harmless warty spot that appears during adult life as a common sign of skin aging    Seborrheic keratoses can arise on any area of skin, covered or uncovered, with the usual exception of the palms and soles  They do not arise from mucous membranes  Seborrheic keratoses can have highly variable appearance  Seborrheic keratoses are extremely common  It has been estimated that over 90% of adults over the age of 61 years have one or more of them  They occur in males and females of all races, typically beginning to erupt in the 35s or 45s  They are uncommon under the age of 21 years  The precise cause of seborrhoeic keratoses is not known  Seborrhoeic keratoses are considered degenerative in nature  As time goes by, seborrheic keratoses tend to become more numerous  Some people inherit a tendency to develop a very large number of them; some people may have hundreds of them  There is no easy way to remove multiple lesions on a single occasion  Unless a specific lesion is "inflamed" and is causing pain or stinging/burning or is bleeding, most insurance companies do not authorize treatment  XEROSIS ("DRY SKIN")    Physical Exam:   Anatomic Location Affected:  diffuse   Morphological Description:  xerosis   Pertinent Positives:   Pertinent Negatives: Additional History of Present Condition:      Assessment and Plan:  Based on a thorough discussion of this condition and the management approach to it (including a comprehensive discussion of the known risks, side effects and potential benefits of treatment), the patient (family) agrees to implement the following specific plan:  Use moisturizer like Eucerin,Cerave, Vanicream or Aveeno Cream 3 times a day for the dry skin                   Dry skin refers to skin that feels dry to touch  Dry skin has a dull surface with a rough, scaly quality  The skin is less pliable and cracked  When dryness is severe, the skin may become inflamed and fissured  Although any body site can be dry, dry skin tends to affect the shins more than any other site      Dry skin is lacking moisture in the outer horny cell layer (stratum corneum) and this results in cracks in the skin surface  Dry skin is also called xerosis, xeroderma or asteatosis (lack of fat)  It can affect males and females of all ages  There is some racial variability in water and lipid content of the skin   Dry skin that starts in early childhood may be one of about 20 types of ichthyosis (fish-scale skin)  There is often a family history of dry skin   Dry skin is commonly seen in people with atopic dermatitis   Nearly everyone > 60 years has dry skin  Dry skin that begins later may be seen in people with certain diseases and conditions   Postmenopausal women   Hypothyroidism   Chronic renal disease    Malnutrition and weight loss    Subclinical dermatitis    Treatment with certain drugs such as oral retinoids, diuretics and epidermal growth factor receptor inhibitors      What is the treatment for dry skin? The mainstay of treatment of dry skin and ichthyosis is moisturisers/emollients  They should be applied liberally and often enough to:   Reduce itch    Improve the barrier function    Prevent entry of irritants, bacteria    Reduce transepidermal water loss  How can dry skin be prevented? Eliminate aggravating factors:   Reduce the frequency of bathing   A humidifier in winter and air conditioner in summer    Compare having a short shower with a prolonged soak in a bath   Use lukewarm, not hot, water   Replace standard soap with a substitute such as a synthetic detergent cleanser, water-miscible emollient, bath oil, anti-pruritic tar oil, colloidal oatmeal etc     Apply an emollient liberally and often, particularly shortly after bathing, and when itchy  The drier the skin, the thicker this should be, especially on the hands  What is the outlook for dry skin? A tendency to dry skin may persist life-long, or it may improve once contributing factors are controlled          Scribe Attestation    I,:  Carol Castañeda am acting as a scribe while in the presence of the attending physician :       I,:  Kiera St MD personally performed the services described in this documentation    as scribed in my presence :

## 2022-05-09 NOTE — TELEPHONE ENCOUNTER
Upon review of the In Basket request we were able to locate, review, and update the patient chart as requested for Mammogram     Any additional questions or concerns should be emailed to the Practice Liaisons via [email or web address] email, please do not reply via In Basket      Thank you  Damita Skiff, MA

## 2022-05-17 ENCOUNTER — HOSPITAL ENCOUNTER (OUTPATIENT)
Dept: MRI IMAGING | Facility: CLINIC | Age: 57
Discharge: HOME | End: 2022-05-17
Payer: COMMERCIAL

## 2022-05-17 DIAGNOSIS — M54.16 RADICULOPATHY, LUMBAR REGION: ICD-10-CM

## 2022-05-17 DIAGNOSIS — M25.551 PAIN IN RIGHT HIP: ICD-10-CM

## 2022-05-17 PROCEDURE — G1004 CDSM NDSC: HCPCS

## 2022-05-17 PROCEDURE — 73721 MRI JNT OF LWR EXTRE W/O DYE: CPT

## 2022-05-17 PROCEDURE — 72148 MRI LUMBAR SPINE W/O DYE: CPT

## 2022-05-18 ENCOUNTER — TELEPHONE (OUTPATIENT)
Dept: OBGYN CLINIC | Facility: CLINIC | Age: 57
End: 2022-05-18

## 2022-05-18 NOTE — TELEPHONE ENCOUNTER
Dr Gan   RE: MRI findings  CB#: [phone]       Esha from radiology called with significant findings on patients MRI of lumber spine       Please assist

## 2022-05-20 ENCOUNTER — OFFICE VISIT (OUTPATIENT)
Dept: OBGYN CLINIC | Facility: CLINIC | Age: 57
End: 2022-05-20
Payer: COMMERCIAL

## 2022-05-20 VITALS
HEIGHT: 68 IN | DIASTOLIC BLOOD PRESSURE: 82 MMHG | BODY MASS INDEX: 38.55 KG/M2 | SYSTOLIC BLOOD PRESSURE: 133 MMHG | WEIGHT: 254.4 LBS | HEART RATE: 64 BPM

## 2022-05-20 DIAGNOSIS — M48.061 LUMBAR FORAMINAL STENOSIS: ICD-10-CM

## 2022-05-20 DIAGNOSIS — M16.11 ARTHRITIS OF RIGHT HIP: Primary | ICD-10-CM

## 2022-05-20 DIAGNOSIS — M47.816 LUMBAR FACET ARTHROPATHY: ICD-10-CM

## 2022-05-20 DIAGNOSIS — M70.61 TROCHANTERIC BURSITIS OF RIGHT HIP: ICD-10-CM

## 2022-05-20 DIAGNOSIS — M51.36 DEGENERATIVE DISC DISEASE, LUMBAR: ICD-10-CM

## 2022-05-20 DIAGNOSIS — M43.16 SPONDYLOLISTHESIS OF LUMBAR REGION: ICD-10-CM

## 2022-05-20 PROCEDURE — 99213 OFFICE O/P EST LOW 20 MIN: CPT | Performed by: FAMILY MEDICINE

## 2022-05-20 PROCEDURE — 1036F TOBACCO NON-USER: CPT | Performed by: FAMILY MEDICINE

## 2022-05-20 PROCEDURE — 3008F BODY MASS INDEX DOCD: CPT | Performed by: FAMILY MEDICINE

## 2022-05-20 PROCEDURE — 20610 DRAIN/INJ JOINT/BURSA W/O US: CPT | Performed by: FAMILY MEDICINE

## 2022-05-20 RX ORDER — LIDOCAINE HYDROCHLORIDE 10 MG/ML
5 INJECTION, SOLUTION INFILTRATION; PERINEURAL
Status: COMPLETED | OUTPATIENT
Start: 2022-05-20 | End: 2022-05-20

## 2022-05-20 RX ORDER — TRIAMCINOLONE ACETONIDE 40 MG/ML
40 INJECTION, SUSPENSION INTRA-ARTICULAR; INTRAMUSCULAR
Status: COMPLETED | OUTPATIENT
Start: 2022-05-20 | End: 2022-05-20

## 2022-05-20 RX ORDER — DICLOFENAC SODIUM 75 MG/1
75 TABLET, DELAYED RELEASE ORAL 2 TIMES DAILY
Qty: 60 TABLET | Refills: 1 | Status: SHIPPED | OUTPATIENT
Start: 2022-05-20

## 2022-05-20 RX ORDER — LIDOCAINE HYDROCHLORIDE 10 MG/ML
2 INJECTION, SOLUTION INFILTRATION; PERINEURAL
Status: COMPLETED | OUTPATIENT
Start: 2022-05-20 | End: 2022-05-20

## 2022-05-20 RX ORDER — BUPIVACAINE HYDROCHLORIDE 2.5 MG/ML
2 INJECTION, SOLUTION INFILTRATION; PERINEURAL
Status: COMPLETED | OUTPATIENT
Start: 2022-05-20 | End: 2022-05-20

## 2022-05-20 RX ADMIN — LIDOCAINE HYDROCHLORIDE 2 ML: 10 INJECTION, SOLUTION INFILTRATION; PERINEURAL at 11:30

## 2022-05-20 RX ADMIN — BUPIVACAINE HYDROCHLORIDE 2 ML: 2.5 INJECTION, SOLUTION INFILTRATION; PERINEURAL at 11:30

## 2022-05-20 RX ADMIN — TRIAMCINOLONE ACETONIDE 40 MG: 40 INJECTION, SUSPENSION INTRA-ARTICULAR; INTRAMUSCULAR at 11:30

## 2022-05-20 RX ADMIN — LIDOCAINE HYDROCHLORIDE 5 ML: 10 INJECTION, SOLUTION INFILTRATION; PERINEURAL at 11:30

## 2022-05-20 NOTE — PROGRESS NOTES
Assessment/Plan:  Assessment/Plan   Diagnoses and all orders for this visit:    Arthritis of right hip  -     diclofenac (VOLTAREN) 75 mg EC tablet; Take 1 tablet (75 mg total) by mouth in the morning and 1 tablet (75 mg total) in the evening  Lumbar facet arthropathy  -     diclofenac (VOLTAREN) 75 mg EC tablet; Take 1 tablet (75 mg total) by mouth in the morning and 1 tablet (75 mg total) in the evening  Spondylolisthesis of lumbar region    Lumbar foraminal stenosis    Degenerative disc disease, lumbar    Trochanteric bursitis of right hip  -     diclofenac (VOLTAREN) 75 mg EC tablet; Take 1 tablet (75 mg total) by mouth in the morning and 1 tablet (75 mg total) in the evening   -     Large joint arthrocentesis: R greater trochanteric bursa        77-year-old female with low back and right hip pain of more than few years duration  Discussed with patient MRI results, impression and plan  MRI of the right hip noted for osteoarthritis with degenerative fraying of the acetabular brim  There is mild edema adjacent to the greater trochanter bilaterally, without bursitis  MRI lumbar spine noted for retrolisthesis L1-L2 through L4-5, multilevel bulging, L2-L3 moderate facet hypertrophy, 1 4 cm focus of marrow edema left pedicle L1  She has had good response to greater trochanter bursa injection the past so I offered repeat injection to which she agreed  I administered mixture of 2 cc 1% lidocaine, 2 cc 0 25% bupivacaine, and 1 cc Kenalog to right greater trochanter bursa without complication  She will follow up as needed in this regard  In regard to the abnormal focus and lumbar spine will repeat MRI in 3 months  She is to discontinue diclofenac 50 mg and start diclofenac 75 mg twice daily with food for 30 days  Subjective:   Patient ID: Leora Adhikari is a 64 y o  female    Chief Complaint   Patient presents with    Right Hip - Follow-up    Lower Back - Follow-up       77-year-old female following up for low back and right hip pain of more than few years duration  She was last seen by me weeks ago which point she was referred for MRIs of the lumbar spine and right hip  She was prescribed diclofenac 50 mg twice daily  She denies any changes in symptoms since last visit  She has been symptomatic with pain described as localized to the right lateral hip, radiating to right buttock and distally along lateral aspect of thigh to the knee, achy and throbbing and sometimes sharp, worse with ambulation and direct pressure, and improved with resting  She reports that when she had MRI done with her legs fully extended and hips internally rotated, caused worsening of hip pain  Hip Pain  This is a chronic problem  The current episode started more than 1 year ago  The problem occurs daily  The problem has been gradually worsening  Associated symptoms include arthralgias  Pertinent negatives include no joint swelling, numbness or weakness  Exacerbated by: Direct pressure  She has tried rest, position changes and NSAIDs for the symptoms  The treatment provided mild relief  Review of Systems   Musculoskeletal: Positive for arthralgias  Negative for joint swelling  Neurological: Negative for weakness and numbness  Objective:  Vitals:    05/20/22 1058   BP: 133/82   Pulse: 64   Weight: 115 kg (254 lb 6 4 oz)   Height: 5' 8" (1 727 m)     Right Hip Exam     Tenderness   The patient is experiencing tenderness in the greater trochanter  Physical Exam  Vitals and nursing note reviewed  Exam conducted with a chaperone present  Constitutional:       General: She is not in acute distress  Appearance: She is well-developed  She is not ill-appearing or diaphoretic  HENT:      Head: Normocephalic  Right Ear: External ear normal       Left Ear: External ear normal    Eyes:      Conjunctiva/sclera: Conjunctivae normal    Neck:      Trachea: No tracheal deviation     Cardiovascular: Rate and Rhythm: Normal rate  Pulmonary:      Effort: Pulmonary effort is normal  No respiratory distress  Abdominal:      General: There is no distension  Musculoskeletal:         General: Tenderness present  Skin:     General: Skin is warm and dry  Coloration: Skin is not jaundiced or pale  Neurological:      Mental Status: She is alert and oriented to person, place, and time  Psychiatric:         Mood and Affect: Mood normal          Behavior: Behavior normal          Thought Content: Thought content normal          Judgment: Judgment normal            I have personally reviewed pertinent films in PACS and my interpretation is L1 pedicle focus abnormality  Large joint arthrocentesis: R greater trochanteric bursa  Universal Protocol:  Consent: Verbal consent obtained  Risks and benefits: risks, benefits and alternatives were discussed  Consent given by: patient  Time out: Immediately prior to procedure a "time out" was called to verify the correct patient, procedure, equipment, support staff and site/side marked as required  Patient understanding: patient states understanding of the procedure being performed  Patient consent: the patient's understanding of the procedure matches consent given  Procedure consent: procedure consent matches procedure scheduled  Relevant documents: relevant documents present and verified  Test results: test results available and properly labeled  Site marked: the operative site was marked  Radiology Images displayed and confirmed   If images not available, report reviewed: imaging studies available  Required items: required blood products, implants, devices, and special equipment available  Patient identity confirmed: verbally with patient    Supporting Documentation  Indications: pain   Procedure Details  Location: hip - R greater trochanteric bursa  Preparation: Patient was prepped and draped in the usual sterile fashion  Needle gauge: 22G 3 5"  Ultrasound guidance: no  Approach: lateral  Medications administered: 2 mL bupivacaine 0 25 %; 2 mL lidocaine 1 %; 5 mL lidocaine 1 %; 40 mg triamcinolone acetonide 40 mg/mL    Patient tolerance: patient tolerated the procedure well with no immediate complications  Dressing:  Sterile dressing applied

## 2022-05-31 DIAGNOSIS — M79.89 LEG SWELLING: ICD-10-CM

## 2022-05-31 DIAGNOSIS — I10 HYPERTENSION, UNSPECIFIED TYPE: ICD-10-CM

## 2022-05-31 DIAGNOSIS — J45.909 MODERATE ASTHMA WITHOUT COMPLICATION, UNSPECIFIED WHETHER PERSISTENT: ICD-10-CM

## 2022-05-31 DIAGNOSIS — I10 BENIGN ESSENTIAL HYPERTENSION: ICD-10-CM

## 2022-05-31 DIAGNOSIS — L65.9 ALOPECIA: ICD-10-CM

## 2022-06-01 ENCOUNTER — HOSPITAL ENCOUNTER (OUTPATIENT)
Dept: MRI IMAGING | Facility: HOSPITAL | Age: 57
Discharge: HOME | End: 2022-06-01
Attending: PSYCHIATRY & NEUROLOGY
Payer: COMMERCIAL

## 2022-06-01 DIAGNOSIS — R51.9 HEADACHE: ICD-10-CM

## 2022-06-01 DIAGNOSIS — R68.89 FORGETFULNESS: ICD-10-CM

## 2022-06-01 PROCEDURE — 70551 MRI BRAIN STEM W/O DYE: CPT

## 2022-06-01 PROCEDURE — G1004 CDSM NDSC: HCPCS

## 2022-06-01 RX ORDER — SPIRONOLACTONE 50 MG/1
TABLET, FILM COATED ORAL
Qty: 90 TABLET | Refills: 2 | Status: SHIPPED | OUTPATIENT
Start: 2022-06-01

## 2022-07-20 ENCOUNTER — TELEPHONE (OUTPATIENT)
Dept: NEUROLOGY | Facility: CLINIC | Age: 57
End: 2022-07-20

## 2022-07-22 DIAGNOSIS — L65.9 ALOPECIA: ICD-10-CM

## 2022-07-22 DIAGNOSIS — L98.9 SKIN LESION: ICD-10-CM

## 2022-07-22 DIAGNOSIS — I10 BENIGN ESSENTIAL HTN: ICD-10-CM

## 2022-07-22 DIAGNOSIS — M79.89 LEG SWELLING: ICD-10-CM

## 2022-07-22 DIAGNOSIS — J45.909 MODERATE ASTHMA WITHOUT COMPLICATION, UNSPECIFIED WHETHER PERSISTENT: ICD-10-CM

## 2022-07-22 DIAGNOSIS — M54.16 LUMBAR RADICULOPATHY: ICD-10-CM

## 2022-07-22 DIAGNOSIS — J01.91 ACUTE RECURRENT SINUSITIS, UNSPECIFIED LOCATION: ICD-10-CM

## 2022-07-22 DIAGNOSIS — I10 BENIGN ESSENTIAL HYPERTENSION: ICD-10-CM

## 2022-07-22 DIAGNOSIS — E78.5 HYPERLIPIDEMIA, UNSPECIFIED HYPERLIPIDEMIA TYPE: ICD-10-CM

## 2022-07-22 DIAGNOSIS — I10 HYPERTENSION, UNSPECIFIED TYPE: ICD-10-CM

## 2022-07-22 DIAGNOSIS — R73.03 PREDIABETES: ICD-10-CM

## 2022-07-22 RX ORDER — VERAPAMIL HYDROCHLORIDE 180 MG/1
CAPSULE, EXTENDED RELEASE ORAL
Qty: 90 CAPSULE | Refills: 2 | Status: SHIPPED | OUTPATIENT
Start: 2022-07-22

## 2022-07-26 ENCOUNTER — OFFICE VISIT (OUTPATIENT)
Dept: NEUROLOGY | Facility: CLINIC | Age: 57
End: 2022-07-26
Payer: COMMERCIAL

## 2022-07-26 VITALS
SYSTOLIC BLOOD PRESSURE: 112 MMHG | WEIGHT: 249 LBS | HEIGHT: 68 IN | BODY MASS INDEX: 37.74 KG/M2 | DIASTOLIC BLOOD PRESSURE: 78 MMHG | HEART RATE: 94 BPM

## 2022-07-26 DIAGNOSIS — R68.89 FORGETFULNESS: ICD-10-CM

## 2022-07-26 DIAGNOSIS — R51.9 HEADACHE: Primary | ICD-10-CM

## 2022-07-26 PROCEDURE — 99214 OFFICE O/P EST MOD 30 MIN: CPT | Performed by: PSYCHIATRY & NEUROLOGY

## 2022-07-26 NOTE — PROGRESS NOTES
Adam Young is a 64 y o  female returns in follow-up today with history of headaches and memory difficulty    Assessment:  1  Headache    2  Forgetfulness        Plan:  Follow-up as needed    Discussion:  Faina Hinkle reports that since here last her headache frequency has improved  She continues to use Excedrin periodically for her headaches but is using it much less  MRI brain neuroquant demonstrated no evidence of a neuro degenerative process  Mild microangiopathic ischemic changes were noted as well as degenerative changes in the cervical spine  At present she feels comfortable that her memory issues are not significant and she will stay physically, socially and cognitively active we also discussed dietary changes that can be helpful  If headache frequency worsens she will notify me otherwise I will plan to see her back in follow-up at her request on an as-needed basis      Subjective:    HPI  Faina Hinkle returns in follow-up today  She reports that since here last she has noticed a definite improvement in her headache frequency  She has tried to modify her caffeine intake and feels this has contributed to her improvement  She states that she continues use Excedrin migraine when she gets a headache  She states this is happening less often  She is not as concerned about her memory issues  She scored in the normal range on [place] testing at her initial evaluation and MRI brain neuroquant failed to support a neuro degenerative process    She does report being physically, socially and cognitively active and is considering senior living in the next 6 months      Past Medical History:   Diagnosis Date    Allergic     Allergic rhinitis     last assessed 11/17/14     Arthritis     Asthma     Bleeding per rectum     Last assessed 8/1/16     Colon polyp     Diverticulitis of colon     GERD (gastroesophageal reflux disease)     Hypercholesterolemia     Hypertension     last assessed 3/24/15     Hyponatremia     last assessed 9/17/15     Obesity        Family History:  Family History   Adopted: Yes   Problem Relation Age of Onset    No Known Problems Mother    [facility] Migraines Daughter        Past Surgical History:  Past Surgical History:   Procedure Laterality Date     SECTION      COLONOSCOPY      FL INJECTION RIGHT SHOULDER (ARTHROGRAM)  3/18/2021    WISDOM TOOTH EXTRACTION      WRIST SURGERY         Social History:   reports that she has never smoked  She has never used smokeless tobacco  She reports current alcohol use  She reports that she does not use drugs      Allergies:  Levofloxacin, Sulfamethoxazole-trimethoprim, and Hydrocodone bitartrate er      Current Outpatient Medications:     acetaminophen (TYLENOL) 500 mg tablet, Take by mouth, Disp: , Rfl:     Black Cohosh (REMIFEMIN MENOPAUSE PO), Take by mouth, Disp: , Rfl:     Cholecalciferol (VITAMIN D) 2000 units CAPS, Take by mouth, Disp: , Rfl:     cholestyramine (QUESTRAN) 4 GM/DOSE powder, Take 1 packet (4 g total) by mouth 2 (two) times a day As needed for diarrhea, Disp: 378 g, Rfl: 5    diclofenac (VOLTAREN) 75 mg EC tablet, Take 1 tablet (75 mg total) by mouth in the morning and 1 tablet (75 mg total) in the evening , Disp: 60 tablet, Rfl: 1    diclofenac sodium (VOLTAREN) 50 mg EC tablet, take 1 tablet by mouth twice a day, Disp: 180 tablet, Rfl: 1    diclofenac sodium (VOLTAREN) 50 mg EC tablet, Take 1 tablet (50 mg total) by mouth 2 (two) times a day, Disp: 180 tablet, Rfl: 1    Ginkgo Biloba 60 MG TABS, Take by mouth daily , Disp: , Rfl:     glucosamine-chondroitin 500-400 MG tablet, Take 1 tablet by mouth 3 (three) times a day, Disp: 100 tablet, Rfl: 2    mometasone (NASONEX) 50 mcg/act nasal spray, instill 1 spray into each nostril daily, Disp: 17 g, Rfl: 2    Naltrexone-buPROPion HCl ER 8-90 MG TB12, Take 1 tab po QAM x 1 week, then 1 tab po BID x 1 week, then 2 tab QAM, 1 tab QPM x 1 week, then 2 tabs BID thereafter , Disp: 508 tablet, Rfl: 2    naproxen (NAPROSYN) 375 mg tablet, Take 1 tablet (375 mg total) by mouth 2 (two) times a day with meals (Patient taking differently: Take 375 mg by mouth if needed), Disp: 20 tablet, Rfl: 0    omeprazole (PriLOSEC) 10 mg delayed release capsule, Take 1 capsule (10 mg total) by mouth daily, Disp: 90 capsule, Rfl: 2    Probiotic Product (ALIGN) 4 MG CAPS, Take by mouth, Disp: , Rfl:     simvastatin (ZOCOR) 5 MG tablet, take 1 tablet by mouth at bedtime, Disp: 90 tablet, Rfl: 2    spironolactone (ALDACTONE) 50 mg tablet, take 1 tablet by mouth once daily, Disp: 90 tablet, Rfl: 2    verapamil (VERELAN PM) 180 MG 24 hr capsule, take 1 capsule by mouth once daily before breakfast, Disp: 90 capsule, Rfl: 2    vitamin E, tocopherol, 400 units capsule, Take 1 capsule (400 Units total) by mouth daily, Disp: , Rfl:     I have reviewed the past medical, social and family history, current medications, allergies, vitals, review of systems and updated this information as appropriate today     Objective:    Vitals:  Blood pressure 112/78, pulse 94, height 5' 8" (1 727 m), weight 113 kg (249 lb), not currently breastfeeding  Physical Exam    Neurological Exam  GENERAL:  Well-developed well-nourished woman in no acute distress  HEENT/NECK: Head is atraumatic normocephalic, neck is supple  NEUROLOGIC:  Mental Status: Awake and alert without aphasia  Cranial Nerves: Extraocular movements are full  Face is symmetrical  Coordination:  Gait is stable            ROS:    Review of Systems   Constitutional: Negative  Negative for appetite change and fever  HENT: Negative  Negative for hearing loss, tinnitus, trouble swallowing and voice change  Eyes: Negative  Negative for photophobia and pain  Respiratory: Negative  Negative for shortness of breath  Cardiovascular: Negative  Negative for palpitations  Gastrointestinal: Negative  Negative for nausea and vomiting  Endocrine: Negative    Negative for cold intolerance  Genitourinary: Negative  Negative for dysuria, frequency and urgency  Musculoskeletal: Negative  Negative for myalgias and neck pain  Skin: Negative  Negative for rash  Neurological: Negative for dizziness, tremors, seizures, syncope, facial asymmetry, speech difficulty, weakness, light-headedness, numbness and headaches  Patient states she has had a decrease in headaches since last visit   Hematological: Negative  Does not bruise/bleed easily  Psychiatric/Behavioral: Negative  Negative for confusion, hallucinations and sleep disturbance

## 2022-08-01 DIAGNOSIS — R73.03 PREDIABETES: ICD-10-CM

## 2022-08-01 DIAGNOSIS — I10 BENIGN ESSENTIAL HYPERTENSION: ICD-10-CM

## 2022-08-01 DIAGNOSIS — M79.89 LEG SWELLING: ICD-10-CM

## 2022-08-01 DIAGNOSIS — L65.9 ALOPECIA: ICD-10-CM

## 2022-08-01 DIAGNOSIS — K21.9 GASTROESOPHAGEAL REFLUX DISEASE: ICD-10-CM

## 2022-08-01 DIAGNOSIS — J45.909 MODERATE ASTHMA WITHOUT COMPLICATION, UNSPECIFIED WHETHER PERSISTENT: ICD-10-CM

## 2022-08-01 DIAGNOSIS — M54.16 LUMBAR RADICULOPATHY: ICD-10-CM

## 2022-08-01 DIAGNOSIS — I10 HYPERTENSION, UNSPECIFIED TYPE: ICD-10-CM

## 2022-08-01 DIAGNOSIS — E78.5 HYPERLIPIDEMIA, UNSPECIFIED HYPERLIPIDEMIA TYPE: ICD-10-CM

## 2022-08-01 DIAGNOSIS — J01.91 ACUTE RECURRENT SINUSITIS, UNSPECIFIED LOCATION: ICD-10-CM

## 2022-08-01 DIAGNOSIS — L98.9 SKIN LESION: ICD-10-CM

## 2022-08-01 RX ORDER — MOMETASONE FUROATE 50 UG/1
2 SPRAY, METERED NASAL DAILY
Qty: 17 G | Refills: 1 | Status: SHIPPED | OUTPATIENT
Start: 2022-08-01

## 2022-08-16 ENCOUNTER — OFFICE VISIT (OUTPATIENT)
Dept: OBGYN CLINIC | Facility: CLINIC | Age: 57
End: 2022-08-16
Payer: COMMERCIAL

## 2022-08-16 VITALS — BODY MASS INDEX: 37.74 KG/M2 | WEIGHT: 249 LBS | HEIGHT: 68 IN

## 2022-08-16 DIAGNOSIS — M25.559 HIP PAIN: Primary | ICD-10-CM

## 2022-08-16 PROCEDURE — 20611 DRAIN/INJ JOINT/BURSA W/US: CPT | Performed by: PHYSICAL MEDICINE & REHABILITATION

## 2022-08-16 PROCEDURE — 99214 OFFICE O/P EST MOD 30 MIN: CPT | Performed by: PHYSICAL MEDICINE & REHABILITATION

## 2022-08-16 RX ORDER — TRIAMCINOLONE ACETONIDE 40 MG/ML
80 INJECTION, SUSPENSION INTRA-ARTICULAR; INTRAMUSCULAR
Status: COMPLETED | OUTPATIENT
Start: 2022-08-16 | End: 2022-08-16

## 2022-08-16 RX ORDER — LIDOCAINE HYDROCHLORIDE 10 MG/ML
3 INJECTION, SOLUTION INFILTRATION; PERINEURAL
Status: COMPLETED | OUTPATIENT
Start: 2022-08-16 | End: 2022-08-16

## 2022-08-16 RX ADMIN — TRIAMCINOLONE ACETONIDE 80 MG: 40 INJECTION, SUSPENSION INTRA-ARTICULAR; INTRAMUSCULAR at 12:13

## 2022-08-16 RX ADMIN — LIDOCAINE HYDROCHLORIDE 3 ML: 10 INJECTION, SOLUTION INFILTRATION; PERINEURAL at 12:13

## 2022-08-16 NOTE — PROGRESS NOTES
1  Hip pain       Orders Placed This Encounter   Procedures    Large joint arthrocentesis        Impression: This is a patient referred by Dr Dany Walker with right hip pain likely multifactorial and 2/2 to greater trochanteric pain syndrome and hip intraarticular derangement due to labral tear/mild OA  The patient is a good candidate for ultrasound guided hip joint injection for both therapeutic and diagnostic reasons  Please see procedure note below  Patient tolerated the procedure but did not notice significant relief in typical symptoms  We will have her return in 4 weeks to reassess and for possible USG greater trochanteric bursa injection  If she continued to be symptomatic after this, would consider referral to pain management  She should have a repeat MRI of her lumbar spine due to the left L1 pedicle abnormality  Patient is aware  Imaging Studies (I personally reviewed images in PACS and report):  Right hip MRI reviewed  Return in about 4 weeks (around 9/13/2022) for Right hip USG of greater trochanteric bursa  Patient is in agreement with the above plan  HPI:  Ashley Aguilera is a 64 y o  female  who presents for evaluation of   Chief Complaint   Patient presents with    Right Hip - Follow-up       History of present illness from referring clinician's notes reviewed  Chronic in the lateral hip which has responded well to steroid injections in the past the last injection only helped for a month or two  Her lateral hip injection helped for a lot longer  She has difficulty with pivoting at times      Following history reviewed and updated:  Past Medical History:   Diagnosis Date    Allergic     Allergic rhinitis     last assessed 11/17/14     Arthritis     Asthma     Bleeding per rectum     Last assessed 8/1/16     Colon polyp     Diverticulitis of colon     GERD (gastroesophageal reflux disease)     Hypercholesterolemia     Hypertension     last assessed 3/24/15     Hyponatremia     last assessed 9/17/15     Obesity      Past Surgical History:   Procedure Laterality Date     SECTION      COLONOSCOPY      FL INJECTION RIGHT SHOULDER (ARTHROGRAM)  3/18/2021    WISDOM TOOTH EXTRACTION      WRIST SURGERY       Social History   Social History     Substance and Sexual Activity   Alcohol Use Yes    Comment: rare / never drank alcohol per allscript      Social History     Substance and Sexual Activity   Drug Use No     Social History     Tobacco Use   Smoking Status Never Smoker   Smokeless Tobacco Never Used     Family History   Adopted: Yes   Problem Relation Age of Onset    No Known Problems Mother     Migraines Daughter      Allergies   Allergen Reactions    Levofloxacin Hives and Tachycardia     Annotation - [id number]: LIGHTHEADED    Sulfamethoxazole-Trimethoprim     Hydrocodone Bitartrate Er      Annotation - [id number]: FLUSHING    CHEST/RIB PAIN        Constitutional:  Ht 5' 8" (1 727 m)   Wt 113 kg (249 lb)   BMI 37 86 kg/m²    General: NAD  Eyes: Anicteric sclerae  Neck: Supple  Lungs: Unlabored breathing  Cardiovascular: No lower extremity edema  Skin: Intact without erythema  Neurologic: Sensation intact to light touch  Psychiatric: Mood and affect are appropriate  Right Hip Exam     Tenderness   The patient is experiencing tenderness in the greater trochanter  Range of Motion   External rotation: normal   Internal rotation: normal     Other   Erythema: absent  Scars: absent  Sensation: normal  Pulse: present    Comments:  No pain with hip internal rotation  Positive log roll  Normal Stinchfield  Somewhat equivocal hip joint exam              Large joint arthrocentesis: R hip joint  Universal Protocol:  Procedure performed by:  Consent: Verbal consent obtained  Written consent not obtained    Consent given by: patient  Timeout called at: 2022 12:12 PM   Patient understanding: patient states understanding of the procedure being performed  Site marked: the operative site was marked  Radiology Images displayed and confirmed  If images not available, report reviewed: imaging studies available  Patient identity confirmed: verbally with patient    Supporting Documentation  Indications: pain and diagnostic evaluation   Procedure Details  Location: hip - R hip joint  Ultrasound guidance: yes (US guidance was used to find the area of interest )  Approach: anterolateral  Medications administered: 3 mL lidocaine 1 %; 80 mg triamcinolone acetonide 40 mg/mL    Patient tolerance: patient tolerated the procedure well with no immediate complications  Dressing:  Sterile dressing applied    The right hip joint was visualized with ultrasound and injected with steroid/anesthetic solution as indicated  Prior to the injection, the ultrasound was used to evaluate for any neural or vascular structures  Care was taken to avoid these structures  The images (and video if taken) were saved to the [employer] ultrasound system  Prior to the procedure, the patient was informed of the following risks in layman terms:    - Risk of bleeding since a needle is involved  - Risk of infection (1/10,000 chance as per recent studies)  Signs/symptoms were discussed and they would prompt an urgent evaluation at an emergency department   - Risk of pigmentation or skin dimpling in the skin (2-3% chance as per recent studies) from the steroid  - Risk of increased pain from steroid flare (1% chance as per recent studies) that typically lasts 24-48 hours  - Risk of increased blood sugars from the steroid medication that can last for a few weeks  If the patient is a diabetic or pre-diabetic, they were encouraged to closely monitor their blood sugars and discuss with PCP if elevated more than usual or if having symptoms  After going over these risks, we decided that the benefits outweigh the risks and proceeded with the procedure

## 2022-08-25 DIAGNOSIS — J45.909 MODERATE ASTHMA WITHOUT COMPLICATION, UNSPECIFIED WHETHER PERSISTENT: ICD-10-CM

## 2022-08-25 DIAGNOSIS — K21.9 GASTROESOPHAGEAL REFLUX DISEASE: ICD-10-CM

## 2022-08-25 DIAGNOSIS — L65.9 ALOPECIA: ICD-10-CM

## 2022-08-25 DIAGNOSIS — L98.9 SKIN LESION: ICD-10-CM

## 2022-08-25 DIAGNOSIS — I10 BENIGN ESSENTIAL HYPERTENSION: ICD-10-CM

## 2022-08-25 DIAGNOSIS — J01.91 ACUTE RECURRENT SINUSITIS, UNSPECIFIED LOCATION: ICD-10-CM

## 2022-08-25 DIAGNOSIS — R73.03 PREDIABETES: ICD-10-CM

## 2022-08-25 DIAGNOSIS — E78.5 HYPERLIPIDEMIA, UNSPECIFIED HYPERLIPIDEMIA TYPE: ICD-10-CM

## 2022-08-25 DIAGNOSIS — M54.16 LUMBAR RADICULOPATHY: ICD-10-CM

## 2022-08-25 DIAGNOSIS — M79.89 LEG SWELLING: ICD-10-CM

## 2022-08-25 DIAGNOSIS — I10 HYPERTENSION, UNSPECIFIED TYPE: ICD-10-CM

## 2022-08-25 RX ORDER — SIMVASTATIN 5 MG
TABLET ORAL
Qty: 90 TABLET | Refills: 2 | Status: SHIPPED | OUTPATIENT
Start: 2022-08-25

## 2022-10-03 DIAGNOSIS — K21.9 GASTROESOPHAGEAL REFLUX DISEASE, UNSPECIFIED WHETHER ESOPHAGITIS PRESENT: ICD-10-CM

## 2022-10-03 DIAGNOSIS — M54.16 LUMBAR RADICULOPATHY: ICD-10-CM

## 2022-10-03 DIAGNOSIS — R73.03 PREDIABETES: ICD-10-CM

## 2022-10-03 DIAGNOSIS — K21.9 GASTROESOPHAGEAL REFLUX DISEASE: ICD-10-CM

## 2022-10-03 DIAGNOSIS — J45.909 MODERATE ASTHMA WITHOUT COMPLICATION, UNSPECIFIED WHETHER PERSISTENT: ICD-10-CM

## 2022-10-03 DIAGNOSIS — J01.91 ACUTE RECURRENT SINUSITIS, UNSPECIFIED LOCATION: ICD-10-CM

## 2022-10-03 DIAGNOSIS — L98.9 SKIN LESION: ICD-10-CM

## 2022-10-03 DIAGNOSIS — I10 BENIGN ESSENTIAL HYPERTENSION: ICD-10-CM

## 2022-10-03 DIAGNOSIS — I10 HYPERTENSION, UNSPECIFIED TYPE: ICD-10-CM

## 2022-10-03 DIAGNOSIS — L65.9 ALOPECIA: ICD-10-CM

## 2022-10-03 DIAGNOSIS — M79.89 LEG SWELLING: ICD-10-CM

## 2022-10-03 DIAGNOSIS — E78.5 HYPERLIPIDEMIA, UNSPECIFIED HYPERLIPIDEMIA TYPE: ICD-10-CM

## 2022-10-03 RX ORDER — OMEPRAZOLE 10 MG/1
CAPSULE, DELAYED RELEASE ORAL
Qty: 90 CAPSULE | Refills: 2 | Status: SHIPPED | OUTPATIENT
Start: 2022-10-03

## 2022-10-17 ENCOUNTER — APPOINTMENT (OUTPATIENT)
Dept: LAB | Facility: CLINIC | Age: 57
End: 2022-10-17
Payer: COMMERCIAL

## 2022-10-17 DIAGNOSIS — E78.5 HYPERLIPIDEMIA, UNSPECIFIED HYPERLIPIDEMIA TYPE: ICD-10-CM

## 2022-10-17 DIAGNOSIS — I10 BENIGN ESSENTIAL HYPERTENSION: ICD-10-CM

## 2022-10-17 DIAGNOSIS — R73.03 PREDIABETES: ICD-10-CM

## 2022-10-17 LAB
ALBUMIN SERPL BCP-MCNC: 3.8 G/DL (ref 3.5–5)
ALP SERPL-CCNC: 71 U/L (ref 46–116)
ALT SERPL W P-5'-P-CCNC: 21 U/L (ref 12–78)
ANION GAP SERPL CALCULATED.3IONS-SCNC: 5 MMOL/L (ref 4–13)
AST SERPL W P-5'-P-CCNC: 15 U/L (ref 5–45)
BASOPHILS # BLD AUTO: 0.05 THOUSANDS/ΜL (ref 0–0.1)
BASOPHILS NFR BLD AUTO: 1 % (ref 0–1)
BILIRUB SERPL-MCNC: 0.51 MG/DL (ref 0.2–1)
BUN SERPL-MCNC: 11 MG/DL (ref 5–25)
CALCIUM SERPL-MCNC: 9.2 MG/DL (ref 8.3–10.1)
CHLORIDE SERPL-SCNC: 106 MMOL/L (ref 96–108)
CHOLEST SERPL-MCNC: 158 MG/DL
CO2 SERPL-SCNC: 27 MMOL/L (ref 21–32)
CREAT SERPL-MCNC: 0.97 MG/DL (ref 0.6–1.3)
EOSINOPHIL # BLD AUTO: 0.2 THOUSAND/ΜL (ref 0–0.61)
EOSINOPHIL NFR BLD AUTO: 4 % (ref 0–6)
ERYTHROCYTE [DISTWIDTH] IN BLOOD BY AUTOMATED COUNT: 13.6 % (ref 11.6–15.1)
EST. AVERAGE GLUCOSE BLD GHB EST-MCNC: 114 MG/DL
GFR SERPL CREATININE-BSD FRML MDRD: 65 ML/MIN/1.73SQ M
GLUCOSE P FAST SERPL-MCNC: 99 MG/DL (ref 65–99)
HBA1C MFR BLD: 5.6 %
HCT VFR BLD AUTO: 43.9 % (ref 34.8–46.1)
HDLC SERPL-MCNC: 69 MG/DL
HGB BLD-MCNC: 13.4 G/DL (ref 11.5–15.4)
IMM GRANULOCYTES # BLD AUTO: 0.01 THOUSAND/UL (ref 0–0.2)
IMM GRANULOCYTES NFR BLD AUTO: 0 % (ref 0–2)
LDLC SERPL CALC-MCNC: 75 MG/DL (ref 0–100)
LYMPHOCYTES # BLD AUTO: 1.25 THOUSANDS/ΜL (ref 0.6–4.47)
LYMPHOCYTES NFR BLD AUTO: 25 % (ref 14–44)
MCH RBC QN AUTO: 28.1 PG (ref 26.8–34.3)
MCHC RBC AUTO-ENTMCNC: 30.5 G/DL (ref 31.4–37.4)
MCV RBC AUTO: 92 FL (ref 82–98)
MONOCYTES # BLD AUTO: 0.51 THOUSAND/ΜL (ref 0.17–1.22)
MONOCYTES NFR BLD AUTO: 10 % (ref 4–12)
NEUTROPHILS # BLD AUTO: 2.91 THOUSANDS/ΜL (ref 1.85–7.62)
NEUTS SEG NFR BLD AUTO: 60 % (ref 43–75)
NONHDLC SERPL-MCNC: 89 MG/DL
NRBC BLD AUTO-RTO: 0 /100 WBCS
PLATELET # BLD AUTO: 332 THOUSANDS/UL (ref 149–390)
PMV BLD AUTO: 9.2 FL (ref 8.9–12.7)
POTASSIUM SERPL-SCNC: 4.2 MMOL/L (ref 3.5–5.3)
PROT SERPL-MCNC: 7.5 G/DL (ref 6.4–8.4)
RBC # BLD AUTO: 4.77 MILLION/UL (ref 3.81–5.12)
SODIUM SERPL-SCNC: 138 MMOL/L (ref 135–147)
TRIGL SERPL-MCNC: 70 MG/DL
TSH SERPL DL<=0.05 MIU/L-ACNC: 1.77 UIU/ML (ref 0.45–4.5)
WBC # BLD AUTO: 4.93 THOUSAND/UL (ref 4.31–10.16)

## 2022-10-17 PROCEDURE — 80061 LIPID PANEL: CPT

## 2022-10-17 PROCEDURE — 83036 HEMOGLOBIN GLYCOSYLATED A1C: CPT

## 2022-10-17 PROCEDURE — 84443 ASSAY THYROID STIM HORMONE: CPT

## 2022-10-17 PROCEDURE — 80053 COMPREHEN METABOLIC PANEL: CPT

## 2022-10-17 PROCEDURE — 36415 COLL VENOUS BLD VENIPUNCTURE: CPT

## 2022-10-17 PROCEDURE — 85025 COMPLETE CBC W/AUTO DIFF WBC: CPT

## 2022-11-09 ENCOUNTER — OFFICE VISIT (OUTPATIENT)
Dept: INTERNAL MEDICINE CLINIC | Facility: CLINIC | Age: 57
End: 2022-11-09

## 2022-11-09 VITALS
HEIGHT: 68 IN | DIASTOLIC BLOOD PRESSURE: 78 MMHG | SYSTOLIC BLOOD PRESSURE: 120 MMHG | WEIGHT: 257 LBS | BODY MASS INDEX: 38.95 KG/M2 | OXYGEN SATURATION: 97 % | HEART RATE: 95 BPM

## 2022-11-09 DIAGNOSIS — M54.16 LUMBAR RADICULOPATHY: ICD-10-CM

## 2022-11-09 DIAGNOSIS — K21.9 GASTROESOPHAGEAL REFLUX DISEASE, UNSPECIFIED WHETHER ESOPHAGITIS PRESENT: Primary | ICD-10-CM

## 2022-11-09 DIAGNOSIS — E78.5 HYPERLIPIDEMIA, UNSPECIFIED HYPERLIPIDEMIA TYPE: ICD-10-CM

## 2022-11-09 DIAGNOSIS — M25.559 HIP PAIN: ICD-10-CM

## 2022-11-09 DIAGNOSIS — I10 BENIGN ESSENTIAL HYPERTENSION: ICD-10-CM

## 2022-11-09 NOTE — PATIENT INSTRUCTIONS
Hypertension- At goal  Continue present medication  Hyperlipidemia- At goal  Continue low-dose simvastatin  Prediabetes- A1c 5 6  Continue lifestyle changes with diet and exercise  Lumbar radiculopathy, right hip pain- Follows with orthopedics, Dr Elieser Escalona  Continue current medications  GERD- Stable  Continue omeprazole

## 2022-11-09 NOTE — PROGRESS NOTES
Assessment/Plan:    1  Hypertension- At goal  Continue present medication  2  Hyperlipidemia- At goal  Continue low-dose simvastatin  3  Prediabetes- A1c 5 6  Continue lifestyle changes with diet and exercise  4  Lumbar radiculopathy, right hip pain- Follows with orthopedics, Dr Lauryn Adam  Continue current medications  5  GERD- Stable  Continue omeprazole  Diagnoses and all orders for this visit:    Gastroesophageal reflux disease, unspecified whether esophagitis present    Benign essential hypertension    Lumbar radiculopathy    Hip pain    Hyperlipidemia, unspecified hyperlipidemia type        The patient was counseled regarding instructions for management, risk factor reductions, patient and family education,impressions, risks and benefits of treatment options, side effects of medications, importance of compliance with treatment  The treatment plan was reviewed with the patient/guardian and patient/guardian understands and agrees with the treatment plan          Current Outpatient Medications:   •  acetaminophen (TYLENOL) 500 mg tablet, Take by mouth, Disp: , Rfl:   •  Black Cohosh (REMIFEMIN MENOPAUSE PO), Take by mouth, Disp: , Rfl:   •  Cholecalciferol (VITAMIN D) 2000 units CAPS, Take by mouth, Disp: , Rfl:   •  cholestyramine (QUESTRAN) 4 GM/DOSE powder, Take 1 packet (4 g total) by mouth 2 (two) times a day As needed for diarrhea, Disp: 378 g, Rfl: 5  •  diclofenac (VOLTAREN) 75 mg EC tablet, Take 1 tablet (75 mg total) by mouth in the morning and 1 tablet (75 mg total) in the evening , Disp: 60 tablet, Rfl: 1  •  diclofenac sodium (VOLTAREN) 50 mg EC tablet, take 1 tablet by mouth twice a day, Disp: 180 tablet, Rfl: 1  •  diclofenac sodium (VOLTAREN) 50 mg EC tablet, Take 1 tablet (50 mg total) by mouth 2 (two) times a day, Disp: 180 tablet, Rfl: 1  •  Ginkgo Biloba 60 MG TABS, Take by mouth daily , Disp: , Rfl:   •  glucosamine-chondroitin 500-400 MG tablet, Take 1 tablet by mouth 3 (three) times a day, Disp: 100 tablet, Rfl: 2  •  mometasone (NASONEX) 50 mcg/act nasal spray, 2 sprays into each nostril daily, Disp: 17 g, Rfl: 1  •  Naltrexone-buPROPion HCl ER 8-90 MG TB12, Take 1 tab po QAM x 1 week, then 1 tab po BID x 1 week, then 2 tab QAM, 1 tab QPM x 1 week, then 2 tabs BID thereafter , Disp: 360 tablet, Rfl: 2  •  naproxen (NAPROSYN) 375 mg tablet, Take 1 tablet (375 mg total) by mouth 2 (two) times a day with meals (Patient taking differently: Take 375 mg by mouth if needed), Disp: 20 tablet, Rfl: 0  •  omeprazole (PriLOSEC) 10 mg delayed release capsule, take 1 capsule by mouth once daily, Disp: 90 capsule, Rfl: 2  •  Probiotic Product (ALIGN) 4 MG CAPS, Take by mouth, Disp: , Rfl:   •  simvastatin (ZOCOR) 5 MG tablet, take 1 tablet by mouth at bedtime, Disp: 90 tablet, Rfl: 2  •  spironolactone (ALDACTONE) 50 mg tablet, take 1 tablet by mouth once daily, Disp: 90 tablet, Rfl: 2  •  verapamil (VERELAN PM) 180 MG 24 hr capsule, take 1 capsule by mouth once daily before breakfast, Disp: 90 capsule, Rfl: 2  •  vitamin E, tocopherol, 400 units capsule, Take 1 capsule (400 Units total) by mouth daily, Disp: , Rfl:     Subjective:      Patient ID: Ash Zhang is a 62 y o  female  Here for follow up  Continues to have low back discomfort  She is followed by sports medicine  The following portions of the patient's history were reviewed and updated as appropriate:   She has a past medical history of Allergic, Allergic rhinitis, Arthritis, Asthma, Bleeding per rectum, Colon polyp, Diverticulitis of colon, GERD (gastroesophageal reflux disease), Hypercholesterolemia, Hypertension, Hyponatremia, and Obesity  ,  does not have any pertinent problems on file  ,   has a past surgical history that includes  section; Wrist surgery; Colonoscopy; [place] tooth extraction; and FL injection right shoulder (arthrogram) (3/18/2021)  ,  family history includes Migraines in her daughter; No Known Problems in her mother  She was adopted  ,   reports that she has never smoked  She has never used smokeless tobacco  She reports current alcohol use  She reports that she does not use drugs  ,  is allergic to levofloxacin, sulfamethoxazole-trimethoprim, and hydrocodone bitartrate er       Review of Systems   Constitutional: Negative  Respiratory: Negative  Cardiovascular: Negative  Musculoskeletal: Positive for arthralgias and back pain  Psychiatric/Behavioral: Negative            Objective:  /78 (BP Location: Left arm, Patient Position: Sitting, Cuff Size: Adult)   Pulse 95   Ht 5' 8" (1 727 m)   Wt 117 kg (257 lb)   SpO2 97%   BMI 39 08 kg/m²     Lab Review  Appointment on 10/17/2022   Component Date Value   • WBC 10/17/2022 4 93    • RBC 10/17/2022 4 77    • Hemoglobin 10/17/2022 13 4    • Hematocrit 10/17/2022 43 9    • MCV 10/17/2022 92    • MCH 10/17/2022 28 1    • MCHC 10/17/2022 30 5 (A)   • RDW 10/17/2022 13 6    • MPV 10/17/2022 9 2    • Platelets 20/99/3938 332    • nRBC 10/17/2022 0    • Neutrophils Relative 10/17/2022 60    • Immat GRANS % 10/17/2022 0    • Lymphocytes Relative 10/17/2022 25    • Monocytes Relative 10/17/2022 10    • Eosinophils Relative 10/17/2022 4    • Basophils Relative 10/17/2022 1    • Neutrophils Absolute 10/17/2022 2 91    • Immature Grans Absolute 10/17/2022 0 01    • Lymphocytes Absolute 10/17/2022 1 25    • Monocytes Absolute 10/17/2022 0 51    • Eosinophils Absolute 10/17/2022 0 20    • Basophils Absolute 10/17/2022 0 05    • Sodium 10/17/2022 138    • Potassium 10/17/2022 4 2    • Chloride 10/17/2022 106    • CO2 10/17/2022 27    • ANION GAP 10/17/2022 5    • BUN 10/17/2022 11    • Creatinine 10/17/2022 0 97    • Glucose, Fasting 10/17/2022 99    • Calcium 10/17/2022 9 2    • AST 10/17/2022 15    • ALT 10/17/2022 21    • Alkaline Phosphatase 10/17/2022 71    • Total Protein 10/17/2022 7 5    • Albumin 10/17/2022 3 8    • Total Bilirubin 10/17/2022 0 51    • eGFR 10/17/2022 65    • Cholesterol 10/17/2022 158    • Triglycerides 10/17/2022 70    • HDL, Direct 10/17/2022 69    • LDL Calculated 10/17/2022 75    • Non-HDL-Chol (CHOL-HDL) 10/17/2022 89    • TSH 3RD GENERATON 10/17/2022 1 770    • Hemoglobin A1C 10/17/2022 5 6    • EAG 10/17/2022 114         Imaging: No results found  Physical Exam  Constitutional:       Appearance: She is well-developed  Cardiovascular:      Rate and Rhythm: Normal rate and regular rhythm  Heart sounds: Normal heart sounds  Pulmonary:      Effort: Pulmonary effort is normal       Breath sounds: Normal breath sounds  Musculoskeletal:         General: Normal range of motion  Neurological:      Mental Status: She is alert and oriented to person, place, and time  Deep Tendon Reflexes: Reflexes are normal and symmetric  Psychiatric:         Behavior: Behavior normal          Thought Content:  Thought content normal          Judgment: Judgment normal

## 2022-11-15 ENCOUNTER — TELEPHONE (OUTPATIENT)
Dept: OBGYN CLINIC | Facility: HOSPITAL | Age: 57
End: 2022-11-15

## 2022-11-18 ENCOUNTER — OFFICE VISIT (OUTPATIENT)
Dept: OBGYN CLINIC | Facility: CLINIC | Age: 57
End: 2022-11-18

## 2022-11-18 VITALS
WEIGHT: 257 LBS | BODY MASS INDEX: 38.95 KG/M2 | HEART RATE: 63 BPM | DIASTOLIC BLOOD PRESSURE: 73 MMHG | HEIGHT: 68 IN | SYSTOLIC BLOOD PRESSURE: 129 MMHG

## 2022-11-18 DIAGNOSIS — M25.551 PAIN IN RIGHT HIP: Primary | ICD-10-CM

## 2022-11-18 DIAGNOSIS — M89.8X8 MASS OF SPINE: ICD-10-CM

## 2022-11-18 RX ORDER — ROPIVACAINE HYDROCHLORIDE 5 MG/ML
10 INJECTION, SOLUTION EPIDURAL; INFILTRATION; PERINEURAL
Status: COMPLETED | OUTPATIENT
Start: 2022-11-18 | End: 2022-11-18

## 2022-11-18 RX ORDER — TRIAMCINOLONE ACETONIDE 40 MG/ML
80 INJECTION, SUSPENSION INTRA-ARTICULAR; INTRAMUSCULAR
Status: COMPLETED | OUTPATIENT
Start: 2022-11-18 | End: 2022-11-18

## 2022-11-18 RX ADMIN — TRIAMCINOLONE ACETONIDE 80 MG: 40 INJECTION, SUSPENSION INTRA-ARTICULAR; INTRAMUSCULAR at 10:35

## 2022-11-18 RX ADMIN — ROPIVACAINE HYDROCHLORIDE 10 ML: 5 INJECTION, SOLUTION EPIDURAL; INFILTRATION; PERINEURAL at 10:35

## 2022-11-18 NOTE — PROGRESS NOTES
1  Pain in right hip  Large joint arthrocentesis: R hip joint      2  Mass of spine  MRI lumbar spine wo contrast        Orders Placed This Encounter   Procedures   • Large joint arthrocentesis: R hip joint   • MRI lumbar spine wo contrast        Impression:  Patient is here in follow up of right hip pain likely multifactorial and 2/2 to greater trochanteric pain syndrome and hip intraarticular derangement due to labral tear/mild OA  She was originally referred by Dr Magy Steward  The patient is a good candidate for repeat ultrasound guided hip joint injection  Please see procedure note below  If she continued to be symptomatic after this, would consider referral to pain management      I have ordered a repeat MRI of her lumbar spine due to the left L1 pedicle abnormality  She will follow up for MRI review with Dr Magy Steward or myself  We will proceed with right greater trochanteric injection on follow up if still having lateral hip pain  No follow-ups on file  Patient is in agreement with the above plan  HPI:  Isha Chew is a 62 y o  female  who presents in follow up  Here for   Chief Complaint   Patient presents with   • Right Hip - Injections, Pain, Follow-up       Since last visit: See above      Following history reviewed and updated:  Past Medical History:   Diagnosis Date   • Allergic    • Allergic rhinitis     last assessed 14    • Arthritis    • Asthma    • Bleeding per rectum     Last assessed 16    • Colon polyp    • Diverticulitis of colon    • GERD (gastroesophageal reflux disease)    • Hypercholesterolemia    • Hypertension     last assessed 3/24/15    • Hyponatremia     last assessed 9/17/15    • Obesity      Past Surgical History:   Procedure Laterality Date   •  SECTION     • COLONOSCOPY     • [place] INJECTION RIGHT SHOULDER (ARTHROGRAM)  3/18/2021   • WISDOM TOOTH EXTRACTION     • WRIST SURGERY       Social History   Social History     Substance and Sexual Activity Alcohol Use Yes    Comment: rare / never drank alcohol per [employer]script      Social History     Substance and Sexual Activity   Drug Use No     Social History     Tobacco Use   Smoking Status Never   Smokeless Tobacco Never     Family History   Adopted: Yes   Problem Relation Age of Onset   • No Known Problems Mother    • Migraines Daughter      Allergies   Allergen Reactions   • Levofloxacin Hives and Tachycardia     Annotation - [id number]: LIGHTHEADED   • Sulfamethoxazole-Trimethoprim    • Hydrocodone Bitartrate Er      Annotation - [id number]: FLUSHING    CHEST/RIB PAIN        Constitutional:  /73   Pulse 63   Ht 5' 8" (1 727 m)   Wt 117 kg (257 lb)   BMI 39 08 kg/m²    General: NAD  Eyes: Clear sclerae  ENT: No inflammation, lesion, or mass of lips  No tracheal deviation  Musculoskeletal: As mentioned below  Integumentary: No visible rashes or skin lesions  Pulmonary/Chest: Effort normal  No respiratory distress  Neuro: CN's grossly intact, PALACIOS  Psych: Normal affect and judgement  Vascular: WWP  Right Hip Exam     Tenderness   The patient is experiencing tenderness in the greater trochanter  Range of Motion   Internal rotation: abnormal     Tests   HODA: positive    Other   Erythema: absent  Scars: absent  Sensation: normal  Pulse: present             Large joint arthrocentesis: R hip joint  Universal Protocol:  Procedure performed by:  Consent: Verbal consent obtained  Written consent not obtained  Consent given by: patient  Timeout called at: 11/18/2022 10:34 AM   Patient understanding: patient states understanding of the procedure being performed  Site marked: the operative site was marked  Radiology Images displayed and confirmed   If images not available, report reviewed: imaging studies available  Patient identity confirmed: verbally with patient    Supporting Documentation  Indications: pain and diagnostic evaluation   Procedure Details  Location: hip - R hip joint  Ultrasound guidance: yes (US guidance was used to find the area of interest )  Medications administered: 80 mg triamcinolone acetonide 40 mg/mL; 10 mL ropivacaine 0 5 %    Patient tolerance: patient tolerated the procedure well with no immediate complications  Dressing:  Sterile dressing applied    The right hip joint was visualized with ultrasound and injected with steroid/anesthetic solution as indicated  Prior to the injection, the ultrasound was used to evaluate for any neural or vascular structures  Care was taken to avoid these structures  The images (and video if taken) were saved to the [employer] ultrasound system  Risks of this procedure include:    - Risk of bleeding since a needle is involved  - Risk of infection (1/10,000 chance as per recent studies)  Signs/symptoms were discussed and they would prompt an urgent evaluation at an emergency department   - Risk of pigmentation or skin dimpling in the skin (2-3% chance as per recent studies) from the steroid  - Risk of increased pain from steroid flare (1% chance as per recent studies) that typically lasts 24-48 hours  - Risk of increased blood sugars from the steroid medication that can last for a few weeks  If the patient is a diabetic or pre-diabetic, they were encouraged to closely monitor their blood sugars and discuss with PCP if elevated more than usual or if having symptoms  We decided that the benefits outweigh the risks and so we proceeded with the procedure

## 2022-12-08 ENCOUNTER — HOSPITAL ENCOUNTER (OUTPATIENT)
Dept: MRI IMAGING | Facility: CLINIC | Age: 57
Discharge: HOME | End: 2022-12-08

## 2022-12-08 DIAGNOSIS — M89.8X8 MASS OF SPINE: ICD-10-CM

## 2022-12-13 ENCOUNTER — TELEPHONE (OUTPATIENT)
Dept: OBGYN CLINIC | Facility: HOSPITAL | Age: 57
End: 2022-12-13

## 2022-12-13 NOTE — TELEPHONE ENCOUNTER
Caller: Dameon Medrano    Doctor/Office : [facility]    Radiology CB# : [id number] option [id number] [facility] called to report on   MRI lumbar spine on 12/8/22

## 2022-12-14 ENCOUNTER — OFFICE VISIT (OUTPATIENT)
Dept: OBGYN CLINIC | Facility: MEDICAL CENTER | Age: 57
End: 2022-12-14

## 2022-12-14 VITALS
SYSTOLIC BLOOD PRESSURE: 119 MMHG | BODY MASS INDEX: 39.25 KG/M2 | WEIGHT: 259 LBS | HEIGHT: 68 IN | HEART RATE: 93 BPM | DIASTOLIC BLOOD PRESSURE: 80 MMHG

## 2022-12-14 DIAGNOSIS — M70.62 TROCHANTERIC BURSITIS OF BOTH HIPS: ICD-10-CM

## 2022-12-14 DIAGNOSIS — M89.8X8 MASS OF SPINE: ICD-10-CM

## 2022-12-14 DIAGNOSIS — M70.61 TROCHANTERIC BURSITIS OF BOTH HIPS: ICD-10-CM

## 2022-12-14 DIAGNOSIS — M54.16 LUMBAR RADICULOPATHY: Primary | ICD-10-CM

## 2022-12-14 NOTE — PROGRESS NOTES
1  Lumbar radiculopathy  Ambulatory referral to Physical Therapy      2  Trochanteric bursitis of both hips  Ambulatory referral to Physical Therapy      3  Mass of spine          Orders Placed This Encounter   Procedures   • Ambulatory referral to Physical Therapy     Impression:  Patient is here in follow up of right hip pain likely multifactorial and 2/2 to greater trochanteric pain syndrome and hip intraarticular derangement due to labral tear/mild OA  She was originally referred by Dr Jefferson Phillips previously had an ultrasound guided hip joint injection with good relief   We reviewed her MRI which shows no change in the left L1 pedicle mass  She can start physical therapy  We will have her follow up in 6 months to repeat MRI of lumbar spine with and without contrast   Can return earlier for greater trochanteric injection or if symptoms worsen        Imaging:  Lumbar MRI:  VERTEBRAL BODIES:  There are 5 lumbar type vertebral bodies  Trace grade 1 retrolisthesis of L4 on L5 and L3 on L4 and L2 on L3  Spinal alignment is similar to the previous study  The marrow signal abnormality in the left pedicle which is hypointense   on T1-weighted images and hyperintense on T2 and STIR images is unchanged and series 8, image 6, measuring approximately 1 2 cm AP by 1 5 cm cranial caudal dimension  No additional discrete marrow lesions noted      Scattered other spondylotic changes noted  Hemangioma in the L4 vertebra noted      SACRUM:  Type II Modic endplate changes B7-Q3 noted      DISTAL CORD AND CONUS:  Normal size and signal within the distal cord and conus  The conus medullaris terminates at the L1 level      PARASPINAL SOFT TISSUES:  Paraspinal soft tissues are unremarkable      LOWER THORACIC DISC SPACES:  Mild noncompressive lower thoracic degenerative change      LUMBAR DISC SPACES:     L1-L2:  There is bilateral facet hypertrophy  There is a central disc herniation, protrusion type    Mild to moderate central canal narrowing  Neural foramina patent bilaterally  This level is similar to the prior study      L2-L3:  There is uncovering the intervertebral disc space  There is loss of disc height and signal   There is bilateral facet hypertrophy  There is a central /right paracentral disc protrusion  Mild to moderate right neural foraminal narrowing  Mild central canal narrowing  Mild left neural foraminal narrowing  This level is similar to the prior study      L3-L4:  There is loss of disc height and signal   There is uncovering the intervertebral disc space  There is a disc osteophyte complex with a superimposed left neural foraminal disc protrusion  Mild central canal narrowing  Mild to moderate left neural foraminal narrowing  Mild right neural foraminal narrowing  This level is similar to the prior study      L4-L5:  There is bilateral facet hypertrophy  There is a right neural foraminal disc protrusion  There is moderate right neural foraminal narrowing  Mild central canal narrowing  Mild left neural foraminal narrowing  This level is similar to the   prior study      L5-S1:  There is loss of disc height and signal   There is bilateral facet hypertrophy  There is a right neural foraminal disc protrusion  Mild right neural foraminal narrowing  Central canal patent  Mild left neural foraminal narrowing      OTHER FINDINGS:  None      IMPRESSION:  1  Indeterminate marrow lesion in the left L1 pedicle is unchanged from the study from May, demonstrating approximately 7 months of imaging stability  Recommend follow-up repeat MRI of the lumbar spine in 6 months to assess for stability  Differential diagnosis remains unchanged including atypical hemangioma versus other neoplastic etiologies  The rounded, stable morphology argues against marrow edema related to contusion or fracture  2   Scattered multilevel spondylosis redemonstrated  No follow-ups on file      Patient is in agreement with the above plan  HPI:  Nicholas Frazier is a 62 y o  female  who presents in follow up  Here for   Chief Complaint   Patient presents with   • Back Pain     MRi Review-        Since last visit: See above  Following history reviewed and updated:  Past Medical History:   Diagnosis Date   • Allergic    • Allergic rhinitis     last assessed 14    • Arthritis    • Asthma    • Bleeding per rectum     Last assessed 16    • Colon polyp    • Diverticulitis of colon    • GERD (gastroesophageal reflux disease)    • Hypercholesterolemia    • Hypertension     last assessed 3/24/15    • Hyponatremia     last assessed 9/17/15    • Obesity      Past Surgical History:   Procedure Laterality Date   •  SECTION     • COLONOSCOPY     • [place] INJECTION RIGHT SHOULDER (ARTHROGRAM)  3/18/2021   • WISDOM TOOTH EXTRACTION     • WRIST SURGERY       Social History   Social History     Substance and Sexual Activity   Alcohol Use Yes    Comment: rare / never drank alcohol per [employer]script      Social History     Substance and Sexual Activity   Drug Use No     Social History     Tobacco Use   Smoking Status Never   Smokeless Tobacco Never     Family History   Adopted: Yes   Problem Relation Age of Onset   • No Known Problems Mother    • Migraines Daughter      Allergies   Allergen Reactions   • Levofloxacin Hives and Tachycardia     Annotation - [id number]: LIGHTHEADED   • Sulfamethoxazole-Trimethoprim    • Hydrocodone Bitartrate Er      Annotation - [id number]: FLUSHING    CHEST/RIB PAIN        Constitutional:  /80   Pulse 93   Ht 5' 8" (1 727 m)   Wt 117 kg (259 lb)   BMI 39 38 kg/m²    General: NAD  Eyes: Clear sclerae  ENT: No inflammation, lesion, or mass of lips  No tracheal deviation  Musculoskeletal: As mentioned below  Integumentary: No visible rashes or skin lesions  Pulmonary/Chest: Effort normal  No respiratory distress  Neuro: CN's grossly intact, PAALCIOS  Psych: Normal affect and judgement    Vascular: WWP     Right Hip Exam     Tenderness   The patient is experiencing tenderness in the greater trochanter  Range of Motion   The patient has normal right hip ROM  Tests   HODA: positive      Left Hip Exam     Tenderness   The patient is experiencing tenderness in the greater trochanter  Range of Motion   The patient has normal left hip ROM      Tests   HODA: positive             Procedures

## 2023-01-10 ENCOUNTER — TELEPHONE (OUTPATIENT)
Dept: OBGYN CLINIC | Facility: HOSPITAL | Age: 58
End: 2023-01-10

## 2023-01-10 DIAGNOSIS — M54.16 LUMBAR RADICULOPATHY: Primary | ICD-10-CM

## 2023-01-10 NOTE — TELEPHONE ENCOUNTER
Caller: Patient    Doctor: Tramaine Early    Reason for call: Patient is having a lot of lower back pain  She would like to know if she should  start physical therapy or should she come in for another appointment?     Call back#: [phone]

## 2023-01-23 ENCOUNTER — EVALUATION (OUTPATIENT)
Dept: PHYSICAL THERAPY | Facility: CLINIC | Age: 58
End: 2023-01-23

## 2023-01-23 DIAGNOSIS — M54.16 LUMBAR RADICULOPATHY: Primary | ICD-10-CM

## 2023-01-23 NOTE — PROGRESS NOTES
PT Evaluation     Today's date: 2023  Patient name: Ham Steven  : 1965  MRN: [id number]  Referring provider: Marshall Aponte DO  Dx:   Encounter Diagnosis     ICD-10-CM    1  Lumbar radiculopathy  M54 16 Ambulatory referral to Physical Therapy          Start Time: 1500  Stop Time: 1565  Total time in clinic (min): 45 minutes    Assessment  Assessment details: Pt is a 63 y/o female presenting to physical therapy with B (R>L) lumbar pain that radiates into her buttocks  Upon examination, pt presents with impairments of decreased strength, decreased ROM, and increased pain of pt's (body part) resulting in limitations of self-care/ADLs, household activities, benidng, and lifting objects  Pt plan of care will focus on improving strength and ROM of pt's lumbar spine as well as decreasing pain and improving tolerance to functional activities  Pt would benefit from skilled physical therapy to facilitate a return to her prior level of function  Impairments: abnormal or restricted ROM, activity intolerance, impaired physical strength, lacks appropriate home exercise program, pain with function, poor posture  and poor body mechanics  Functional limitations: self-care/ADLs, household activities, benidng, and lifting objects  Symptom irritability: moderateUnderstanding of Dx/Px/POC: good   Prognosis: good    Goals  STG 4 - weeks  1  Patient will demonstrate increased core MMT to 4/5 or better to facilitate functional ability  2  Patient will demonstrate improved lumbar ROM to [facility] in all planes to facilitate functional ability  LTG 8 - weeks  1  Patient will demonstrate improved B hip strength to 4/5 or better to facilitate functional ability  2  Patient will demonstrate increased FOTO score from 69 at baseline to predicted score of 71 or higher to facilitate functional ability      Plan  Patient would benefit from: PT eval and skilled physical therapy  Planned modality interventions: cryotherapy, thermotherapy: hydrocollator packs and unattended electrical stimulation  Planned therapy interventions: abdominal trunk stabilization, functional ROM exercises, gait training, home exercise program, joint mobilization, manual therapy, massage, muscle pump exercises, neuromuscular re-education, patient education, postural training, strengthening, stretching, therapeutic activities and therapeutic exercise  Frequency: 2x week  Duration in weeks: 8  Plan of Care beginning date: 2023  Plan of Care expiration date: 3/20/2023  Treatment plan discussed with: patient        Subjective Evaluation    History of Present Illness  Mechanism of injury: Pt is a 63 y/o female presenting to physical therapy with B (R>L) lumbar pain that radiates into her buttocks  Pt reports having chronic low back pain for many years, however, she had an exacerbation around  but is unsure what caused it  Pt reports she had a lot of stress and driving around that time which may have contributed  Pt repots she also had an exacerbation in R buttock pain when stepping wrong when walking with her   Pt reports her pain is aggravated to a 6-9 with bending and vacuuming after 5-10 minutes and will need to use her heating and sitting for 20 minutes to ease the symptoms  Pt reports going to see her physician who referred her for therapy  Pt received MRI of lumbar spine which revealed indeterminate marrow lesion in the left L1 pedicle and scattered multilevel spondylosis            Recurrent probem    Pain  Current pain rating: 3  At best pain rating: 3  At worst pain ratin  Location: lumbar  Quality: dull ache and throbbing  Relieving factors: heat, rest and medications  Aggravating factors: standing, walking and lifting  Progression: no change    Treatments  Previous treatment: medication  Current treatment: medication and physical therapy  Patient Goals  Patient goal: be able to manage the pain        Objective     Palpation   Left Tenderness of the lumbar paraspinals  Right   Tenderness of the lumbar paraspinals  Tenderness     Left Hip   Tenderness in the PSIS  Right Hip   Tenderness in the PSIS and greater trochanter       Additional Tenderness Details  Tenderness to palpation of pt's IT band    Active Range of Motion     Lumbar   Flexion: 50 degrees  Restriction level: minimal  Extension: 20 degrees  Restriction level: moderate  Left lateral flexion: 15 degrees    Restriction level: moderate  Right lateral flexion: 15 degrees  Restriction level: moderate  Left Hip   Flexion: 90 degrees   Abduction: 20 degrees     Right Hip   Flexion: 90 degrees   Abduction: 20 degrees     Strength/Myotome Testing     Left Hip   Planes of Motion   Flexion: 4-  Extension: 4-  Abduction: 4-  Adduction: 4-  External rotation: 4-  Internal rotation: 4-    Right Hip   Planes of Motion   Flexion: 4-  Extension: 4-  Abduction: 4-  Adduction: 4-  External rotation: 4-  Internal rotation: 4-    Left Knee   Flexion: 4  Extension: 4    Right Knee   Flexion: 4  Extension: 4    Additional Strength Details  Core MMT 3/5    General Comments:      Lumbar Comments  Lumbar flexion gross strength: 4-/5  Lumbar extension gross strength: 4-/5  Lumbar L lateral flexion gross strength: 4-/5  Lumbar R lateral flexion gross strength: 4-/5             Precautions: HTN      Manuals 1/23            IT band stretch 1x5 20"                                                   Neuro Re-Ed             Education on POC, diagnosis, and HEP 5'            PPT 1x10 5"            bridges 1x10                                                                Ther Ex             LTR 1x10 ea            Prone press up on elbows 1x10            Standing lumbar extension 1x10            Supine IT band stretch 1x5 20"                                                                Ther Activity                                       Gait Training                                       Modalities

## 2023-01-26 ENCOUNTER — OFFICE VISIT (OUTPATIENT)
Dept: PHYSICAL THERAPY | Facility: CLINIC | Age: 58
End: 2023-01-26

## 2023-01-26 DIAGNOSIS — M54.16 LUMBAR RADICULOPATHY: Primary | ICD-10-CM

## 2023-01-26 DIAGNOSIS — L65.9 ALOPECIA: ICD-10-CM

## 2023-01-26 DIAGNOSIS — M79.89 LEG SWELLING: ICD-10-CM

## 2023-01-26 DIAGNOSIS — I10 HYPERTENSION, UNSPECIFIED TYPE: ICD-10-CM

## 2023-01-26 DIAGNOSIS — J45.909 MODERATE ASTHMA WITHOUT COMPLICATION, UNSPECIFIED WHETHER PERSISTENT: ICD-10-CM

## 2023-01-26 DIAGNOSIS — I10 BENIGN ESSENTIAL HYPERTENSION: ICD-10-CM

## 2023-01-26 RX ORDER — SPIRONOLACTONE 50 MG/1
TABLET, FILM COATED ORAL
Qty: 90 TABLET | Refills: 0 | Status: SHIPPED | OUTPATIENT
Start: 2023-01-26

## 2023-01-26 NOTE — PROGRESS NOTES
Daily Note     Today's date: 2023  Patient name: Mercedes Arnett  : 1965  MRN: [id number]  Referring provider: Nicolasa Hogan DO  Dx:   Encounter Diagnosis     ICD-10-CM    1  Lumbar radiculopathy  M54 16           Start Time: 8400  Stop Time: 1225  Total time in clinic (min): 40 minutes    Subjective: Pt reports having a significant increase in pain of her R hip and low back after her initial evaluation  Pt reports having difficulty with the stairs at her home  Objective: See treatment diary below      Assessment: Tolerated treatment well  Patient demonstrated fatigue post treatment, exhibited good technique with therapeutic exercises and would benefit from continued PT  Initiated hip flexor stretching today secondary to tightness of the area, pt noted slight relief during performance  Continued with glute and core strengthening to stabilize pt's lumbar spine  Pt had significant increase in pain of her R hip during trial of PPT c march and prone hip extension today  Pt required min verbal cues to facilitate performance of proper technique  Assess pt response to treatment at next visit  Plan: Continue per plan of care  Precautions: HTN      Manuals            IT band stretch 1x5 20" 1x5 20"           Hip flexor stretching  1x5 20"           Hamstring stretching  1x5 20"                        Neuro Re-Ed             Pt education 5' 5'           PPT 1x10 5" 2x10 5"           bridges 1x10 2x10           Prone hip extension   1x5 5" p!                                                   Ther Ex             LTR 1x10 ea 1x15           Prone press up on elbows 1x10 2x10           Standing lumbar extension 1x10            Supine IT band stretch 1x5 20"            Reverse clamshell  2x10 orange                                                  Ther Activity                                       Gait Training                                       Modalities

## 2023-01-30 ENCOUNTER — OFFICE VISIT (OUTPATIENT)
Dept: PHYSICAL THERAPY | Facility: CLINIC | Age: 58
End: 2023-01-30

## 2023-01-30 DIAGNOSIS — M54.16 LUMBAR RADICULOPATHY: Primary | ICD-10-CM

## 2023-01-30 NOTE — PROGRESS NOTES
Daily Note     Today's date: 2023  Patient name: Saúl Mckenzie  : 1965  MRN: [id number]  Referring provider: Pepper Leone DO  Dx:   Encounter Diagnosis     ICD-10-CM    1  Lumbar radiculopathy  M54 16           Start Time: 930  Stop Time: 1010  Total time in clinic (min): 40 minutes    Subjective: Pt reports she was able to perform her home exercise program to success over the weekend and even cross her legs at [facility]  Objective: See treatment diary below      Assessment: Tolerated treatment well  Patient demonstrated fatigue post treatment, exhibited good technique with therapeutic exercises and would benefit from continued PT  Pt was able to progress today with inclusion of increased core and hip strengthening into pt's exercise program  Pt continues to have limitations in flexibility of her LEs  Pt required min verbal cues to facilitate performance of proper technique  Assess pt response to treatment at next visit  Plan: Continue per plan of care        Precautions: HTN      Manuals           IT band stretch 1x5 20" 1x5 20" 1x5 20"          Hip flexor stretching  1x5 20" 1x5 20"          Hamstring stretching  1x5 20" 1x5 20"                       Neuro Re-Ed             Pt education 5' 5' 5'          PPT 1x10 5" 2x10 5" 2x10 5"          bridges 1x10 2x10 2x10          Prone hip extension   1x5 5" p! 1x5          Seated PPT c march   1x10 ea          paloff press   1x10 5" blue                       Ther Ex             LTR 1x10 ea 1x15 1x15          Prone press up on elbows 1x10 2x10 2x10          Standing lumbar extension 1x10            sidelying hip abduction   2x10          Reverse clamshell  2x10 orange 2x10 orange                                                 Ther Activity                                       Gait Training                                       Modalities

## 2023-02-01 ENCOUNTER — OFFICE VISIT (OUTPATIENT)
Dept: PHYSICAL THERAPY | Facility: CLINIC | Age: 58
End: 2023-02-01

## 2023-02-01 DIAGNOSIS — M54.16 LUMBAR RADICULOPATHY: Primary | ICD-10-CM

## 2023-02-01 NOTE — PROGRESS NOTES
Daily Note     Today's date: 2023  Patient name: Imani Holguin  : 1965  MRN: [id number]  Referring provider: Simone Brown DO  Dx:   Encounter Diagnosis     ICD-10-CM    1  Lumbar radiculopathy  M54 16                      Subjective: Pt reports not feeling too bad today, she reports taking her medication this morning so it already kicked in        Objective: See treatment diary below      Assessment: Tolerated treatment well  Some slight cramping with bridges and when stretches were performed  Good effort noted with all exercises, without complaints of increased back pain  She demonstrates good recall of current program with minimal cueing for correct exercise technique and performance  Patient demonstrated fatigue post treatment, exhibited good technique with therapeutic exercises and would benefit from continued PT      Plan: Continue per plan of care        Precautions: HTN      Manuals          IT band stretch 1x5 20" 1x5 20" 1x5 20" 1x5 20"         Hip flexor stretching  1x5 20" 1x5 20" 1x5 20"         Hamstring stretching  1x5 20" 1x5 20" 1x5 20"                      Neuro Re-Ed             Pt education 5' 5' 5'          PPT 1x10 5" 2x10 5" 2x10 5" 2x10 5"         bridges 1x10 2x10 2x10 2x10         Prone hip extension   1x5 5" p! 1x5 1x5         Seated PPT c march   1x10 ea 1x10 ea         paloff press   1x10 5" blue 1x10 5" blue                      Ther Ex             LTR 1x10 ea 1x15 1x15 1x15         Prone press up on elbows 1x10 2x10 2x10 2x10         Standing lumbar extension 1x10            sidelying hip abduction   2x10 2x10         Reverse clamshell  2x10 orange 2x10 orange 2x10 orange                                                Ther Activity                                       Gait Training                                       Modalities

## 2023-02-06 ENCOUNTER — OFFICE VISIT (OUTPATIENT)
Dept: PHYSICAL THERAPY | Facility: CLINIC | Age: 58
End: 2023-02-06

## 2023-02-06 DIAGNOSIS — M54.16 LUMBAR RADICULOPATHY: Primary | ICD-10-CM

## 2023-02-06 NOTE — PROGRESS NOTES
Daily Note     Today's date: 2023  Patient name: Heidi Mane  : 1965  MRN: [id number]  Referring provider: Sukhdev Booker DO  Dx:   Encounter Diagnosis     ICD-10-CM    1  Lumbar radiculopathy  M54 16           Start Time: 930  Stop Time: 85  Total time in clinic (min): 46 minutes    Subjective: Pt reports she was feeling good after her last therapy session and over the weekend, however on , she had an exacerbation of pain in her low back and R hip  Objective: See treatment diary below      Assessment: Tolerated treatment well  Patient demonstrated fatigue post treatment, exhibited good technique with therapeutic exercises and would benefit from continued PT  Continued with manual therapy to decrease pain and improve motion of pt's lumbar spine and R hip  Pt required min verbal cues to facilitate performance of proper technique  Assess pt response to treatment at next visit  Plan: Continue per plan of care        Precautions: HTN      Manuals         IT band stretch 1x5 20" 1x5 20" 1x5 20" 1x5 20" 1x5 20"        Hip flexor stretching  1x5 20" 1x5 20" 1x5 20" 1x5 20"        Hamstring stretching  1x5 20" 1x5 20" 1x5 20" 1x5 20"                     Neuro Re-Ed             Pt education 5' 5' 5'  3'        PPT 1x10 5" 2x10 5" 2x10 5" 2x10 5" 2x10 5"        bridges 1x10 2x10 2x10 2x10 2x10        Prone hip extension   1x5 5" p! 1x5 1x5 1x5        Seated PPT c march   1x10 ea 1x10 ea 1x10 ea        paloff press   1x10 5" blue 1x10 5" blue 1x10 5" blue                     Ther Ex             LTR 1x10 ea 1x15 1x15 1x15 1x15        Prone press up on elbows 1x10 2x10 2x10 2x10 2x10        Standing lumbar extension 1x10            sidelying hip abduction   2x10 2x10 2x10        Reverse clamshell  2x10 orange 2x10 orange 2x10 orange 2x10 orange                                               Ther Activity                                       Gait Training Modalities

## 2023-02-08 ENCOUNTER — OFFICE VISIT (OUTPATIENT)
Dept: PHYSICAL THERAPY | Facility: CLINIC | Age: 58
End: 2023-02-08

## 2023-02-08 DIAGNOSIS — M54.16 LUMBAR RADICULOPATHY: Primary | ICD-10-CM

## 2023-02-08 NOTE — PROGRESS NOTES
Daily Note     Today's date: 2023  Patient name: Mercedes Arnett  : 1965  MRN: [id number]  Referring provider: Nciolasa Hogan DO  Dx:   Encounter Diagnosis     ICD-10-CM    1  Lumbar radiculopathy  M54 16                      Subjective: Pt reports she's a little sore from the weekend  Objective: See treatment diary below      Assessment: Tolerated treatment well  She tolerates all exercises well without complaints  Increased reps for prone hip ext this visit, she reports this is her most difficult exercise  Good recall of current exercise program demonstrated today  Patient demonstrated fatigue post treatment, exhibited good technique with therapeutic exercises and would benefit from continued PT      Plan: Continue per plan of care  Precautions: HTN      Manuals        IT band stretch 1x5 20" 1x5 20" 1x5 20" 1x5 20" 1x5 20" 1x5 20"       Hip flexor stretching  1x5 20" 1x5 20" 1x5 20" 1x5 20" self       Hamstring stretching  1x5 20" 1x5 20" 1x5 20" 1x5 20" self                    Neuro Re-Ed             Pt education 5' 5' 5'  3'        PPT 1x10 5" 2x10 5" 2x10 5" 2x10 5" 2x10 5" 2x10 5"       bridges 1x10 2x10 2x10 2x10 2x10 2x10       Prone hip extension   1x5 5" p! 1x5 1x5 1x5 1x10 5"       Seated PPT c march   1x10 ea 1x10 ea 1x10 ea 1x10 ea       paloff press   1x10 5" blue 1x10 5" blue 1x10 5" blue Held shoulder p!        SLR      2x10       Ther Ex             LTR 1x10 ea 1x15 1x15 1x15 1x15 1x15       Prone press up on elbows 1x10 2x10 2x10 2x10 2x10 2x10       Standing lumbar extension 1x10            sidelying hip abduction   2x10 2x10 2x10 2x10       Reverse clamshell  2x10 orange 2x10 orange 2x10 orange 2x10 orange 2x10 orange                                              Ther Activity                                       Gait Training                                       Modalities

## 2023-02-23 DIAGNOSIS — M79.89 LEG SWELLING: ICD-10-CM

## 2023-02-23 DIAGNOSIS — L65.9 ALOPECIA: ICD-10-CM

## 2023-02-23 DIAGNOSIS — J45.909 MODERATE ASTHMA WITHOUT COMPLICATION, UNSPECIFIED WHETHER PERSISTENT: ICD-10-CM

## 2023-02-23 DIAGNOSIS — I10 BENIGN ESSENTIAL HYPERTENSION: ICD-10-CM

## 2023-02-23 DIAGNOSIS — I10 HYPERTENSION, UNSPECIFIED TYPE: ICD-10-CM

## 2023-02-23 RX ORDER — SPIRONOLACTONE 50 MG/1
TABLET, FILM COATED ORAL
Qty: 90 TABLET | Refills: 0 | Status: SHIPPED | OUTPATIENT
Start: 2023-02-23

## 2023-03-20 DIAGNOSIS — L98.9 SKIN LESION: ICD-10-CM

## 2023-03-20 DIAGNOSIS — R73.03 PREDIABETES: ICD-10-CM

## 2023-03-20 DIAGNOSIS — I10 HYPERTENSION, UNSPECIFIED TYPE: ICD-10-CM

## 2023-03-20 DIAGNOSIS — J01.91 ACUTE RECURRENT SINUSITIS, UNSPECIFIED LOCATION: ICD-10-CM

## 2023-03-20 DIAGNOSIS — M54.16 LUMBAR RADICULOPATHY: ICD-10-CM

## 2023-03-20 DIAGNOSIS — I10 BENIGN ESSENTIAL HYPERTENSION: ICD-10-CM

## 2023-03-20 DIAGNOSIS — E78.5 HYPERLIPIDEMIA, UNSPECIFIED HYPERLIPIDEMIA TYPE: ICD-10-CM

## 2023-03-20 DIAGNOSIS — M79.89 LEG SWELLING: ICD-10-CM

## 2023-03-20 DIAGNOSIS — J45.909 MODERATE ASTHMA WITHOUT COMPLICATION, UNSPECIFIED WHETHER PERSISTENT: ICD-10-CM

## 2023-03-20 DIAGNOSIS — L65.9 ALOPECIA: ICD-10-CM

## 2023-03-20 DIAGNOSIS — K21.9 GASTROESOPHAGEAL REFLUX DISEASE: ICD-10-CM

## 2023-03-20 RX ORDER — MOMETASONE FUROATE 50 UG/1
2 SPRAY, METERED NASAL DAILY
Qty: 17 G | Refills: 0 | Status: SHIPPED | OUTPATIENT
Start: 2023-03-20

## 2023-05-15 ENCOUNTER — TELEPHONE (OUTPATIENT)
Dept: ADMINISTRATIVE | Facility: OTHER | Age: 58
End: 2023-05-15

## 2023-05-15 NOTE — TELEPHONE ENCOUNTER
Upon review of the In Basket request we were able to locate, review, and update the patient chart as requested for Mammogram     Any additional questions or concerns should be emailed to the Practice Liaisons via the appropriate education email address, please do not reply via In Basket      Thank you  Erasmo Rojo

## 2023-05-15 NOTE — TELEPHONE ENCOUNTER
----- Message from Gab Navarro sent at 5/12/2023 11:11 AM EDT -----  Regarding: mammo  05/12/23 11:11 AM    Hello, our patient attached above has had Mammogram completed/performed  Please assist in updating the patient chart by pulling the Care Everywhere (CE) document  The date of service is 2/28/23       Thank you,  Geeta [facility] Juliana Coelho

## 2023-05-16 ENCOUNTER — OFFICE VISIT (OUTPATIENT)
Dept: INTERNAL MEDICINE CLINIC | Facility: CLINIC | Age: 58
End: 2023-05-16

## 2023-05-16 VITALS
DIASTOLIC BLOOD PRESSURE: 80 MMHG | HEIGHT: 68 IN | BODY MASS INDEX: 39.71 KG/M2 | HEART RATE: 68 BPM | WEIGHT: 262 LBS | SYSTOLIC BLOOD PRESSURE: 128 MMHG | RESPIRATION RATE: 16 BRPM

## 2023-05-16 DIAGNOSIS — S43.409S: ICD-10-CM

## 2023-05-16 DIAGNOSIS — J01.91 ACUTE RECURRENT SINUSITIS, UNSPECIFIED LOCATION: ICD-10-CM

## 2023-05-16 DIAGNOSIS — K21.9 GASTROESOPHAGEAL REFLUX DISEASE: ICD-10-CM

## 2023-05-16 DIAGNOSIS — I10 BENIGN ESSENTIAL HTN: ICD-10-CM

## 2023-05-16 DIAGNOSIS — E78.5 HYPERLIPIDEMIA, UNSPECIFIED HYPERLIPIDEMIA TYPE: Primary | ICD-10-CM

## 2023-05-16 DIAGNOSIS — M54.16 LUMBAR RADICULOPATHY: ICD-10-CM

## 2023-05-16 DIAGNOSIS — K21.9 GASTROESOPHAGEAL REFLUX DISEASE, UNSPECIFIED WHETHER ESOPHAGITIS PRESENT: ICD-10-CM

## 2023-05-16 DIAGNOSIS — L98.9 SKIN LESION: ICD-10-CM

## 2023-05-16 DIAGNOSIS — I10 HYPERTENSION, UNSPECIFIED TYPE: ICD-10-CM

## 2023-05-16 DIAGNOSIS — I10 BENIGN ESSENTIAL HYPERTENSION: ICD-10-CM

## 2023-05-16 DIAGNOSIS — M79.89 LEG SWELLING: ICD-10-CM

## 2023-05-16 DIAGNOSIS — L65.9 ALOPECIA: ICD-10-CM

## 2023-05-16 DIAGNOSIS — E66.01 MORBID OBESITY (HCC): ICD-10-CM

## 2023-05-16 DIAGNOSIS — R73.03 PREDIABETES: ICD-10-CM

## 2023-05-16 DIAGNOSIS — M16.11 ARTHRITIS OF RIGHT HIP: ICD-10-CM

## 2023-05-16 DIAGNOSIS — J45.909 MODERATE ASTHMA WITHOUT COMPLICATION, UNSPECIFIED WHETHER PERSISTENT: ICD-10-CM

## 2023-05-16 PROBLEM — K92.1 HEMATOCHEZIA: Status: RESOLVED | Noted: 2020-05-12 | Resolved: 2023-05-16

## 2023-05-16 PROBLEM — K62.5 RECTAL BLEEDING: Status: RESOLVED | Noted: 2020-06-16 | Resolved: 2023-05-16

## 2023-05-16 PROBLEM — R07.89 ATYPICAL CHEST PAIN: Status: RESOLVED | Noted: 2020-10-30 | Resolved: 2023-05-16

## 2023-05-16 PROBLEM — M25.511 ACUTE PAIN OF RIGHT SHOULDER: Status: RESOLVED | Noted: 2021-10-27 | Resolved: 2023-05-16

## 2023-05-16 PROBLEM — M79.601 UPPER EXTREMITY PAIN, DIFFUSE, RIGHT: Status: RESOLVED | Noted: 2017-04-20 | Resolved: 2023-05-16

## 2023-05-16 PROBLEM — M25.572 LEFT ANKLE PAIN: Status: RESOLVED | Noted: 2017-11-27 | Resolved: 2023-05-16

## 2023-05-16 PROBLEM — D70.8 OTHER NEUTROPENIA (HCC): Status: RESOLVED | Noted: 2019-11-14 | Resolved: 2023-05-16

## 2023-05-16 RX ORDER — OMEPRAZOLE 10 MG/1
10 CAPSULE, DELAYED RELEASE ORAL DAILY
Qty: 90 CAPSULE | Refills: 2 | Status: SHIPPED | OUTPATIENT
Start: 2023-05-16

## 2023-05-16 RX ORDER — SPIRONOLACTONE 50 MG/1
50 TABLET, FILM COATED ORAL DAILY
Qty: 90 TABLET | Refills: 2 | Status: SHIPPED | OUTPATIENT
Start: 2023-05-16

## 2023-05-16 RX ORDER — SIMVASTATIN 5 MG
5 TABLET ORAL
Qty: 90 TABLET | Refills: 2 | Status: SHIPPED | OUTPATIENT
Start: 2023-05-16 | End: 2023-05-17

## 2023-05-16 RX ORDER — NAPROXEN 375 MG/1
375 TABLET ORAL AS NEEDED
Status: SHIPPED
Start: 2023-05-16

## 2023-05-16 RX ORDER — VERAPAMIL HYDROCHLORIDE 180 MG/1
180 CAPSULE, EXTENDED RELEASE ORAL
Qty: 90 CAPSULE | Refills: 2 | Status: SHIPPED | OUTPATIENT
Start: 2023-05-16

## 2023-05-16 NOTE — PROGRESS NOTES
Assessment/Plan:     Stanton Mcknight is a very pleasant 62 here to establish care; she is a retired ; she is  and has a daughter down in [place]; she has a PMH for controlled HTN, morbid obesity, chronic pain to her hips, knees, HLD, and alopecia; she reports she needs few refills needed; she follows closely with pain management, ortho, and PT; she is up to date with screenings; Will obtain labs; pain is controlled with naproxen and diclofenac prn (knows not to take them together) and takes PPI with this; she was on contrave in the past but is not taking it; on spironolactone for alopecia but low dose of 50 mg- therapeutic dose is 100-200 mg;     Quality Measures:     BMI Counseling: Body mass index is 39 84 kg/m²  The BMI is above normal  Nutrition recommendations include decreasing portion sizes and encouraging healthy choices of fruits and vegetables  Exercise recommendations include moderate physical activity 150 minutes/week  Rationale for BMI follow-up plan is due to patient being overweight or obese  Depression Screening and Follow-up Plan: Patient was screened for depression during today's encounter  They screened negative with a PHQ-2 score of 0  Return in about 6 months (around 11/16/2023)  No problem-specific Assessment & Plan notes found for this encounter  Diagnoses and all orders for this visit:    Hyperlipidemia, unspecified hyperlipidemia type  -     Lipid Panel with Direct LDL reflex; Future  -     verapamil (VERELAN PM) 180 MG 24 hr capsule; Take 1 capsule (180 mg total) by mouth daily before breakfast  -     simvastatin (ZOCOR) 5 MG tablet; Take 1 tablet (5 mg total) by mouth daily at bedtime  -     omeprazole (PriLOSEC) 10 mg delayed release capsule; Take 1 capsule (10 mg total) by mouth daily  -     Lipid Panel with Direct LDL reflex; Future    Prediabetes  -     Hemoglobin A1C; Future  -     verapamil (VERELAN PM) 180 MG 24 hr capsule;  Take 1 capsule (180 mg total) by mouth daily before breakfast  -     simvastatin (ZOCOR) 5 MG tablet; Take 1 tablet (5 mg total) by mouth daily at bedtime  -     omeprazole (PriLOSEC) 10 mg delayed release capsule; Take 1 capsule (10 mg total) by mouth daily  -     Hemoglobin A1C; Future    Benign essential hypertension  -     CBC and differential; Future  -     Comprehensive metabolic panel; Future  -     verapamil (VERELAN PM) 180 MG 24 hr capsule; Take 1 capsule (180 mg total) by mouth daily before breakfast  -     simvastatin (ZOCOR) 5 MG tablet; Take 1 tablet (5 mg total) by mouth daily at bedtime  -     spironolactone (ALDACTONE) 50 mg tablet; Take 1 tablet (50 mg total) by mouth daily  -     omeprazole (PriLOSEC) 10 mg delayed release capsule; Take 1 capsule (10 mg total) by mouth daily  -     CBC; Future  -     Comprehensive metabolic panel; Future    Alopecia  -     TSH, 3rd generation with Free T4 reflex; Future  -     verapamil (VERELAN PM) 180 MG 24 hr capsule; Take 1 capsule (180 mg total) by mouth daily before breakfast  -     simvastatin (ZOCOR) 5 MG tablet; Take 1 tablet (5 mg total) by mouth daily at bedtime  -     spironolactone (ALDACTONE) 50 mg tablet; Take 1 tablet (50 mg total) by mouth daily  -     omeprazole (PriLOSEC) 10 mg delayed release capsule; Take 1 capsule (10 mg total) by mouth daily  -     TSH, 3rd generation; Future    Gastroesophageal reflux disease, unspecified whether esophagitis present  -     omeprazole (PriLOSEC) 10 mg delayed release capsule; Take 1 capsule (10 mg total) by mouth daily    Morbid obesity (HCC)    Moderate asthma without complication, unspecified whether persistent  -     verapamil (VERELAN PM) 180 MG 24 hr capsule; Take 1 capsule (180 mg total) by mouth daily before breakfast  -     simvastatin (ZOCOR) 5 MG tablet; Take 1 tablet (5 mg total) by mouth daily at bedtime  -     spironolactone (ALDACTONE) 50 mg tablet;  Take 1 tablet (50 mg total) by mouth daily  -     omeprazole (PriLOSEC) 10 mg delayed release capsule; Take 1 capsule (10 mg total) by mouth daily    Hypertension, unspecified type  -     verapamil (VERELAN PM) 180 MG 24 hr capsule; Take 1 capsule (180 mg total) by mouth daily before breakfast  -     simvastatin (ZOCOR) 5 MG tablet; Take 1 tablet (5 mg total) by mouth daily at bedtime  -     spironolactone (ALDACTONE) 50 mg tablet; Take 1 tablet (50 mg total) by mouth daily  -     omeprazole (PriLOSEC) 10 mg delayed release capsule; Take 1 capsule (10 mg total) by mouth daily    Skin lesion  -     verapamil (VERELAN PM) 180 MG 24 hr capsule; Take 1 capsule (180 mg total) by mouth daily before breakfast  -     simvastatin (ZOCOR) 5 MG tablet; Take 1 tablet (5 mg total) by mouth daily at bedtime  -     omeprazole (PriLOSEC) 10 mg delayed release capsule; Take 1 capsule (10 mg total) by mouth daily    Leg swelling  -     verapamil (VERELAN PM) 180 MG 24 hr capsule; Take 1 capsule (180 mg total) by mouth daily before breakfast  -     simvastatin (ZOCOR) 5 MG tablet; Take 1 tablet (5 mg total) by mouth daily at bedtime  -     spironolactone (ALDACTONE) 50 mg tablet; Take 1 tablet (50 mg total) by mouth daily  -     omeprazole (PriLOSEC) 10 mg delayed release capsule; Take 1 capsule (10 mg total) by mouth daily    Benign essential HTN  -     verapamil (VERELAN PM) 180 MG 24 hr capsule; Take 1 capsule (180 mg total) by mouth daily before breakfast    Lumbar radiculopathy  -     verapamil (VERELAN PM) 180 MG 24 hr capsule; Take 1 capsule (180 mg total) by mouth daily before breakfast  -     simvastatin (ZOCOR) 5 MG tablet; Take 1 tablet (5 mg total) by mouth daily at bedtime  -     omeprazole (PriLOSEC) 10 mg delayed release capsule; Take 1 capsule (10 mg total) by mouth daily    Acute recurrent sinusitis, unspecified location  -     verapamil (VERELAN PM) 180 MG 24 hr capsule; Take 1 capsule (180 mg total) by mouth daily before breakfast  -     simvastatin (ZOCOR) 5 MG tablet;  Take 1 tablet (5 mg total) by mouth daily at bedtime  -     omeprazole (PriLOSEC) 10 mg delayed release capsule; Take 1 capsule (10 mg total) by mouth daily    Gastroesophageal reflux disease  -     simvastatin (ZOCOR) 5 MG tablet; Take 1 tablet (5 mg total) by mouth daily at bedtime  -     omeprazole (PriLOSEC) 10 mg delayed release capsule; Take 1 capsule (10 mg total) by mouth daily    Sprain of shoulder, unspecified laterality, unspecified shoulder sprain type, sequela  -     naproxen (NAPROSYN) 375 mg tablet; Take 1 tablet (375 mg total) by mouth if needed    Arthritis of right hip  -     diclofenac sodium (VOLTAREN) 50 mg EC tablet; Take 1 tablet (50 mg total) by mouth 2 (two) times a day    Other orders  -     Rhubarb (ESTROVEN COMPLETE PO); Estroven          Subjective:      Patient ID: Renetta Simmonds is a 62 y o  female  Here for routine f/u        ALLERGIES:  Allergies   Allergen Reactions   • Levofloxacin Hives and Tachycardia     Annotation - [id number]: LIGHTHEADED   • Sulfamethoxazole-Trimethoprim    • Hydrocodone Bitartrate Er      Annotation - [id number]: FLUSHING    CHEST/RIB PAIN       CURRENT MEDICATIONS:    Current Outpatient Medications:   •  acetaminophen (TYLENOL) 500 mg tablet, Take by mouth, Disp: , Rfl:   •  Black Cohosh (REMIFEMIN MENOPAUSE PO), Take by mouth, Disp: , Rfl:   •  Cholecalciferol (VITAMIN D) 2000 units CAPS, Take by mouth, Disp: , Rfl:   •  cholestyramine (QUESTRAN) 4 GM/DOSE powder, Take 1 packet (4 g total) by mouth 2 (two) times a day As needed for diarrhea, Disp: 378 g, Rfl: 5  •  diclofenac sodium (VOLTAREN) 50 mg EC tablet, Take 1 tablet (50 mg total) by mouth 2 (two) times a day, Disp: 180 tablet, Rfl: 1  •  Ginkgo Biloba 60 MG TABS, Take by mouth daily , Disp: , Rfl:   •  glucosamine-chondroitin 500-400 MG tablet, Take 1 tablet by mouth 3 (three) times a day, Disp: 100 tablet, Rfl: 2  •  mometasone (NASONEX) 50 mcg/act nasal spray, 2 sprays into each nostril daily, Disp: 17 g, Rfl: 0  • Naltrexone-buPROPion HCl ER 8-90 MG TB12, Take 1 tab po QAM x 1 week, then 1 tab po BID x 1 week, then 2 tab QAM, 1 tab QPM x 1 week, then 2 tabs BID thereafter , Disp: 360 tablet, Rfl: 2  •  naproxen (NAPROSYN) 375 mg tablet, Take 1 tablet (375 mg total) by mouth if needed, Disp: , Rfl:   •  omeprazole (PriLOSEC) 10 mg delayed release capsule, Take 1 capsule (10 mg total) by mouth daily, Disp: 90 capsule, Rfl: 2  •  Probiotic Product (ALIGN) 4 MG CAPS, Take by mouth, Disp: , Rfl:   •  Rhubarb (ESTROVEN COMPLETE PO), Estroven, Disp: , Rfl:   •  simvastatin (ZOCOR) 5 MG tablet, Take 1 tablet (5 mg total) by mouth daily at bedtime, Disp: 90 tablet, Rfl: 2  •  spironolactone (ALDACTONE) 50 mg tablet, Take 1 tablet (50 mg total) by mouth daily, Disp: 90 tablet, Rfl: 2  •  verapamil (VERELAN PM) 180 MG 24 hr capsule, Take 1 capsule (180 mg total) by mouth daily before breakfast, Disp: 90 capsule, Rfl: 2  •  vitamin E, tocopherol, 400 units capsule, Take 1 capsule (400 Units total) by mouth daily, Disp: , Rfl:     ACTIVE PROBLEM LIST:  Patient Active Problem List   Diagnosis   • Alopecia mucinosa   • Arthralgia of both ankles   • Asthma   • Benign essential hypertension   • Cervical spinal stenosis   • Chronic sinusitis   • Colon polyp   • Foraminal stenosis of cervical region   • Greater trochanteric bursitis, left   • Hyperlipidemia   • Lumbar radiculopathy   • Morbid obesity (HCC)   • Osteoarthritis of knees, bilateral   • Prediabetes   • Right cervical radiculopathy   • Trochanteric bursitis of both hips   • PMB (postmenopausal bleeding)   • Chondromalacia of right patella   • Popliteal cyst, right   • GERD (gastroesophageal reflux disease)   • Hand joint stiff, right PIP   • Plantar fasciitis, left   • Actinic keratosis   • Other headache syndrome   • Pain in right hip   • Mass of spine       PAST MEDICAL HISTORY:  Past Medical History:   Diagnosis Date   • Allergic    • Allergic rhinitis     last assessed 11/17/14 • Arthritis    • Asthma    • Bleeding per rectum     Last assessed 16    • Colon polyp    • Diverticulitis of colon    • GERD (gastroesophageal reflux disease)    • Hypercholesterolemia    • Hypertension     last assessed 3/24/15    • Hyponatremia     last assessed 9/17/15    • Obesity        PAST SURGICAL HISTORY:  Past Surgical History:   Procedure Laterality Date   •  SECTION     • COLONOSCOPY     • FL INJECTION RIGHT SHOULDER (ARTHROGRAM)  3/18/2021   • WISDOM TOOTH EXTRACTION     • WRIST SURGERY         FAMILY HISTORY:  Family History   Adopted: Yes   Problem Relation Age of Onset   • No Known Problems Mother    • Migraines Daughter        SOCIAL HISTORY:  Social History     Socioeconomic History   • Marital status: /Civil Union     Spouse name: Not on file   • Number of children: Not on file   • Years of education: Not on file   • Highest education level: Not on file   Occupational History     Comment: full time employment    Tobacco Use   • Smoking status: Never   • Smokeless tobacco: Never   Vaping Use   • Vaping Use: Never used   Substance and Sexual Activity   • Alcohol use: Yes     Comment: rare / never drank alcohol per allscript    • Drug use: No   • Sexual activity: Yes   Other Topics Concern   • Not on file   Social History Narrative    Lives with spouse     Social Determinants of Health     Financial Resource Strain: Not on file   Food Insecurity: Not on file   Transportation Needs: Not on file   Physical Activity: Not on file   Stress: Not on file   Social Connections: Not on file   Intimate Partner Violence: Not on file   Housing Stability: Not on file       Review of Systems   Constitutional: Negative for chills and fever  HENT: Negative for ear pain and sore throat  Eyes: Negative for pain and visual disturbance  Respiratory: Negative for cough and shortness of breath  Cardiovascular: Negative for chest pain and palpitations     Gastrointestinal: Negative for "abdominal pain and vomiting  Genitourinary: Negative for dysuria and hematuria  Musculoskeletal: Positive for arthralgias and back pain  Skin: Negative for color change and rash  Neurological: Negative for seizures and syncope  All other systems reviewed and are negative  Objective:  Vitals:    05/16/23 1040   BP: 128/80   BP Location: Left arm   Patient Position: Sitting   Pulse: 68   Resp: 16   Weight: 119 kg (262 lb)   Height: 5' 8\" (1 727 m)     Body mass index is 39 84 kg/m²  Physical Exam  Vitals and nursing note reviewed  Constitutional:       Appearance: Normal appearance  She is obese  HENT:      Head: Normocephalic and atraumatic  Cardiovascular:      Rate and Rhythm: Normal rate and regular rhythm  Heart sounds: Normal heart sounds  Pulmonary:      Effort: Pulmonary effort is normal       Breath sounds: Normal breath sounds  Musculoskeletal:         General: Normal range of motion  Cervical back: Normal range of motion  Right lower leg: Edema present  Left lower leg: Edema present  Comments: Varicose veins b/l LE   Lymphadenopathy:      Cervical: No cervical adenopathy  Skin:     General: Skin is warm and dry  Neurological:      General: No focal deficit present  Mental Status: She is alert and oriented to person, place, and time  Mental status is at baseline  Psychiatric:         Mood and Affect: Mood normal          Behavior: Behavior normal            RESULTS:    In chart    This note was created with voice recognition software  Phonic, grammatical and spelling errors may be present within the note as a result      "

## 2023-05-17 ENCOUNTER — APPOINTMENT (OUTPATIENT)
Dept: LAB | Facility: CLINIC | Age: 58
End: 2023-05-17

## 2023-05-17 DIAGNOSIS — L98.9 SKIN LESION: ICD-10-CM

## 2023-05-17 DIAGNOSIS — I10 BENIGN ESSENTIAL HYPERTENSION: ICD-10-CM

## 2023-05-17 DIAGNOSIS — J45.909 MODERATE ASTHMA WITHOUT COMPLICATION, UNSPECIFIED WHETHER PERSISTENT: ICD-10-CM

## 2023-05-17 DIAGNOSIS — L65.9 ALOPECIA: ICD-10-CM

## 2023-05-17 DIAGNOSIS — R73.03 PREDIABETES: ICD-10-CM

## 2023-05-17 DIAGNOSIS — M54.16 LUMBAR RADICULOPATHY: ICD-10-CM

## 2023-05-17 DIAGNOSIS — M79.89 LEG SWELLING: ICD-10-CM

## 2023-05-17 DIAGNOSIS — K21.9 GASTROESOPHAGEAL REFLUX DISEASE: ICD-10-CM

## 2023-05-17 DIAGNOSIS — E78.5 HYPERLIPIDEMIA, UNSPECIFIED HYPERLIPIDEMIA TYPE: ICD-10-CM

## 2023-05-17 DIAGNOSIS — J01.91 ACUTE RECURRENT SINUSITIS, UNSPECIFIED LOCATION: ICD-10-CM

## 2023-05-17 DIAGNOSIS — I10 HYPERTENSION, UNSPECIFIED TYPE: ICD-10-CM

## 2023-05-17 LAB
ALBUMIN SERPL BCP-MCNC: 3.6 G/DL (ref 3.5–5)
ALP SERPL-CCNC: 69 U/L (ref 46–116)
ALT SERPL W P-5'-P-CCNC: 21 U/L (ref 12–78)
ANION GAP SERPL CALCULATED.3IONS-SCNC: 0 MMOL/L (ref 4–13)
AST SERPL W P-5'-P-CCNC: 18 U/L (ref 5–45)
BASOPHILS # BLD AUTO: 0.07 THOUSANDS/ÂΜL (ref 0–0.1)
BASOPHILS NFR BLD AUTO: 2 % (ref 0–1)
BILIRUB SERPL-MCNC: 0.56 MG/DL (ref 0.2–1)
BUN SERPL-MCNC: 11 MG/DL (ref 5–25)
CALCIUM SERPL-MCNC: 9 MG/DL (ref 8.3–10.1)
CHLORIDE SERPL-SCNC: 107 MMOL/L (ref 96–108)
CHOLEST SERPL-MCNC: 160 MG/DL
CO2 SERPL-SCNC: 28 MMOL/L (ref 21–32)
CREAT SERPL-MCNC: 0.96 MG/DL (ref 0.6–1.3)
EOSINOPHIL # BLD AUTO: 0.31 THOUSAND/ÂΜL (ref 0–0.61)
EOSINOPHIL NFR BLD AUTO: 7 % (ref 0–6)
ERYTHROCYTE [DISTWIDTH] IN BLOOD BY AUTOMATED COUNT: 14.9 % (ref 11.6–15.1)
GFR SERPL CREATININE-BSD FRML MDRD: 65 ML/MIN/1.73SQ M
GLUCOSE P FAST SERPL-MCNC: 92 MG/DL (ref 65–99)
HCT VFR BLD AUTO: 40.7 % (ref 34.8–46.1)
HDLC SERPL-MCNC: 78 MG/DL
HGB BLD-MCNC: 12.7 G/DL (ref 11.5–15.4)
IMM GRANULOCYTES # BLD AUTO: 0.01 THOUSAND/UL (ref 0–0.2)
IMM GRANULOCYTES NFR BLD AUTO: 0 % (ref 0–2)
LDLC SERPL CALC-MCNC: 66 MG/DL (ref 0–100)
LYMPHOCYTES # BLD AUTO: 1.01 THOUSANDS/ÂΜL (ref 0.6–4.47)
LYMPHOCYTES NFR BLD AUTO: 23 % (ref 14–44)
MCH RBC QN AUTO: 27.1 PG (ref 26.8–34.3)
MCHC RBC AUTO-ENTMCNC: 31.2 G/DL (ref 31.4–37.4)
MCV RBC AUTO: 87 FL (ref 82–98)
MONOCYTES # BLD AUTO: 0.38 THOUSAND/ÂΜL (ref 0.17–1.22)
MONOCYTES NFR BLD AUTO: 9 % (ref 4–12)
NEUTROPHILS # BLD AUTO: 2.59 THOUSANDS/ÂΜL (ref 1.85–7.62)
NEUTS SEG NFR BLD AUTO: 59 % (ref 43–75)
NRBC BLD AUTO-RTO: 0 /100 WBCS
PLATELET # BLD AUTO: 335 THOUSANDS/UL (ref 149–390)
PMV BLD AUTO: 9.6 FL (ref 8.9–12.7)
POTASSIUM SERPL-SCNC: 4.2 MMOL/L (ref 3.5–5.3)
PROT SERPL-MCNC: 7.2 G/DL (ref 6.4–8.4)
RBC # BLD AUTO: 4.69 MILLION/UL (ref 3.81–5.12)
SODIUM SERPL-SCNC: 135 MMOL/L (ref 135–147)
TRIGL SERPL-MCNC: 78 MG/DL
TSH SERPL DL<=0.05 MIU/L-ACNC: 2.16 UIU/ML (ref 0.45–4.5)
WBC # BLD AUTO: 4.37 THOUSAND/UL (ref 4.31–10.16)

## 2023-05-17 RX ORDER — SIMVASTATIN 5 MG
TABLET ORAL
Qty: 90 TABLET | Refills: 2 | Status: SHIPPED | OUTPATIENT
Start: 2023-05-17

## 2023-05-18 DIAGNOSIS — M54.16 LUMBAR RADICULOPATHY: ICD-10-CM

## 2023-05-18 DIAGNOSIS — I10 HYPERTENSION, UNSPECIFIED TYPE: ICD-10-CM

## 2023-05-18 DIAGNOSIS — E78.5 HYPERLIPIDEMIA, UNSPECIFIED HYPERLIPIDEMIA TYPE: ICD-10-CM

## 2023-05-18 DIAGNOSIS — L65.9 ALOPECIA: ICD-10-CM

## 2023-05-18 DIAGNOSIS — J45.909 MODERATE ASTHMA WITHOUT COMPLICATION, UNSPECIFIED WHETHER PERSISTENT: ICD-10-CM

## 2023-05-18 DIAGNOSIS — M79.89 LEG SWELLING: ICD-10-CM

## 2023-05-18 DIAGNOSIS — J01.91 ACUTE RECURRENT SINUSITIS, UNSPECIFIED LOCATION: ICD-10-CM

## 2023-05-18 DIAGNOSIS — K21.9 GASTROESOPHAGEAL REFLUX DISEASE: ICD-10-CM

## 2023-05-18 DIAGNOSIS — R73.03 PREDIABETES: ICD-10-CM

## 2023-05-18 DIAGNOSIS — I10 BENIGN ESSENTIAL HYPERTENSION: ICD-10-CM

## 2023-05-18 DIAGNOSIS — L98.9 SKIN LESION: ICD-10-CM

## 2023-05-18 LAB
EST. AVERAGE GLUCOSE BLD GHB EST-MCNC: 108 MG/DL
HBA1C MFR BLD: 5.4 %

## 2023-05-18 RX ORDER — MOMETASONE FUROATE 50 UG/1
2 SPRAY, METERED NASAL DAILY
Qty: 17 G | Refills: 0 | Status: SHIPPED | OUTPATIENT
Start: 2023-05-18

## 2023-06-14 ENCOUNTER — OFFICE VISIT (OUTPATIENT)
Dept: OBGYN CLINIC | Facility: MEDICAL CENTER | Age: 58
End: 2023-06-14
Payer: COMMERCIAL

## 2023-06-14 VITALS
HEIGHT: 68 IN | SYSTOLIC BLOOD PRESSURE: 117 MMHG | DIASTOLIC BLOOD PRESSURE: 76 MMHG | WEIGHT: 257 LBS | HEART RATE: 125 BPM | BODY MASS INDEX: 38.95 KG/M2

## 2023-06-14 DIAGNOSIS — M70.62 TROCHANTERIC BURSITIS OF BOTH HIPS: ICD-10-CM

## 2023-06-14 DIAGNOSIS — M43.16 SPONDYLOLISTHESIS OF LUMBAR REGION: ICD-10-CM

## 2023-06-14 DIAGNOSIS — M89.8X8 MASS OF SPINE: Primary | ICD-10-CM

## 2023-06-14 DIAGNOSIS — M70.61 TROCHANTERIC BURSITIS OF BOTH HIPS: ICD-10-CM

## 2023-06-14 PROCEDURE — 99214 OFFICE O/P EST MOD 30 MIN: CPT | Performed by: PHYSICAL MEDICINE & REHABILITATION

## 2023-06-14 NOTE — PROGRESS NOTES
1  Mass of spine  MRI lumbar spine w wo contrast      2  Trochanteric bursitis of both hips        3  Spondylolisthesis of lumbar region  MRI lumbar spine w wo contrast        Orders Placed This Encounter   Procedures   • MRI lumbar spine w wo contrast     Impression:  Patient is here in follow up of right hip pain likely multifactorial and 2/2 to greater trochanteric pain syndrome and hip intraarticular derangement due to labral tear/mild OA   She was originally referred by Dr Edgar Coughlin previously had an ultrasound guided hip joint injection with good relief  We will repeat MRI of lumbar spine with and without contrast to reassess the L1 pedicle mass  When patient returns for MRI review, we will consider ultrasound-guided right hip joint versus bursa injection    She has had hip joint injections with me but never a bursa injection      Imaging:  Lumbar MRI:  VERTEBRAL BODIES:  There are 5 lumbar type vertebral bodies   Trace grade 1 retrolisthesis of L4 on L5 and L3 on L4 and L2 on L3   Spinal alignment is similar to the previous study   The marrow signal abnormality in the left pedicle which is hypointense   on T1-weighted images and hyperintense on T2 and STIR images is unchanged and series 8, image 6, measuring approximately 1 2 cm AP by 1 5 cm cranial caudal dimension   No additional discrete marrow lesions noted      Scattered other spondylotic changes noted   Hemangioma in the L4 vertebra noted      SACRUM:  Type II Modic endplate changes Q4-K7 noted      DISTAL CORD AND CONUS:  Normal size and signal within the distal cord and conus   The conus medullaris terminates at the L1 level      PARASPINAL SOFT TISSUES:  Paraspinal soft tissues are unremarkable      LOWER THORACIC DISC SPACES:  Mild noncompressive lower thoracic degenerative change      LUMBAR DISC SPACES:     L1-L2:  There is bilateral facet hypertrophy  Vandana Nanas is a central disc herniation, protrusion type   Mild to moderate central canal narrowing   Neural foramina patent bilaterally  This level is similar to the prior study      L2-L3:  There is uncovering the intervertebral disc space   There is loss of disc height and signal  Ephraim Hilda is bilateral facet hypertrophy  Ephraim Hilda is a central /right paracentral disc protrusion   Mild to moderate right neural foraminal narrowing   Mild central canal narrowing   Mild left neural foraminal narrowing  This level is similar to the prior study      L3-L4:  There is loss of disc height and signal  Ephraim Hilda is uncovering the intervertebral disc space   There is a disc osteophyte complex with a superimposed left neural foraminal disc protrusion   Mild central canal narrowing   Mild to moderate left neural foraminal narrowing   Mild right neural foraminal narrowing  This level is similar to the prior study      L4-L5:  There is bilateral facet hypertrophy  Ephraim Hilda is a right neural foraminal disc protrusion   There is moderate right neural foraminal narrowing   Mild central canal narrowing   Mild left neural foraminal narrowing  This level is similar to the   prior study      L5-S1:  There is loss of disc height and signal  Ephraim Hilda is bilateral facet hypertrophy  Ephraim Hilda is a right neural foraminal disc protrusion   Mild right neural foraminal narrowing   Central canal patent   Mild left neural foraminal narrowing      OTHER FINDINGS:  None      IMPRESSION:  1   Indeterminate marrow lesion in the left L1 pedicle is unchanged from the study from May, demonstrating approximately 7 months of imaging stability   Recommend follow-up repeat MRI of the lumbar spine in 6 months to assess for stability   Differential diagnosis remains unchanged including atypical hemangioma versus other neoplastic etiologies   The rounded, stable morphology argues against marrow edema related to contusion or fracture  2   Scattered multilevel spondylosis redemonstrated  No follow-ups on file      Patient is in agreement with the above "plan     HPI:  Jordan Jennings is a 62 y o  female  who presents in follow up  Here for   Chief Complaint   Patient presents with   • Right Hip - Follow-up   • Left Hip - Follow-up       Since last visit: See above  Following history reviewed and updated:  Past Medical History:   Diagnosis Date   • Allergic    • Allergic rhinitis     last assessed 14    • Arthritis    • Asthma    • Bleeding per rectum     Last assessed 16    • Colon polyp    • Diverticulitis of colon    • GERD (gastroesophageal reflux disease)    • Hypercholesterolemia    • Hypertension     last assessed 3/24/15    • Hyponatremia     last assessed 9/17/15    • Obesity      Past Surgical History:   Procedure Laterality Date   •  SECTION     • COLONOSCOPY     • FL INJECTION RIGHT SHOULDER (ARTHROGRAM)  3/18/2021   • WISDOM TOOTH EXTRACTION     • WRIST SURGERY       Social History   Social History     Substance and Sexual Activity   Alcohol Use Yes    Comment: rare / never drank alcohol per allscript      Social History     Substance and Sexual Activity   Drug Use No     Social History     Tobacco Use   Smoking Status Never   Smokeless Tobacco Never     Family History   Adopted: Yes   Problem Relation Age of Onset   • No Known Problems Mother    • Migraines Daughter      Allergies   Allergen Reactions   • Levofloxacin Hives and Tachycardia     Annotation - [id number]: LIGHTHEADED   • Sulfamethoxazole-Trimethoprim    • Hydrocodone Bitartrate Er      Annotation - [id number]: FLUSHING    CHEST/RIB PAIN        Constitutional:  /76   Pulse (!) 125   Ht 5' 8\" (1 727 m)   Wt 117 kg (257 lb)   BMI 39 08 kg/m²    General: NAD  Eyes: Clear sclerae  ENT: No inflammation, lesion, or mass of lips  No tracheal deviation  Musculoskeletal: As mentioned below  Integumentary: No visible rashes or skin lesions  Pulmonary/Chest: Effort normal  No respiratory distress  Neuro: CN's grossly intact, PALACIOS    Psych: Normal affect and " judgement  Vascular: WWP  Right Hip Exam     Tenderness   The patient is experiencing tenderness in the greater trochanter  Range of Motion   Internal rotation: abnormal     Tests   HODA: positive    Other   Erythema: absent  Scars: absent  Sensation: normal  Pulse: present    Comments:  Positive Stinchfield and hip scour               Procedures

## 2023-07-03 ENCOUNTER — HOSPITAL ENCOUNTER (OUTPATIENT)
Dept: MRI IMAGING | Facility: HOSPITAL | Age: 58
Discharge: HOME | End: 2023-07-03
Attending: PHYSICAL MEDICINE & REHABILITATION
Payer: COMMERCIAL

## 2023-07-03 DIAGNOSIS — M43.16 SPONDYLOLISTHESIS OF LUMBAR REGION: ICD-10-CM

## 2023-07-03 DIAGNOSIS — M89.8X8 MASS OF SPINE: ICD-10-CM

## 2023-07-03 PROCEDURE — A9585 GADOBUTROL INJECTION: HCPCS | Performed by: PHYSICAL MEDICINE & REHABILITATION

## 2023-07-03 PROCEDURE — G1004 CDSM NDSC: HCPCS

## 2023-07-03 PROCEDURE — 72158 MRI LUMBAR SPINE W/O & W/DYE: CPT

## 2023-07-03 RX ADMIN — GADOBUTROL 11 ML: 604.72 INJECTION INTRAVENOUS at 16:56

## 2023-07-19 ENCOUNTER — TELEPHONE (OUTPATIENT)
Dept: OBGYN CLINIC | Facility: MEDICAL CENTER | Age: 58
End: 2023-07-19

## 2023-07-19 NOTE — TELEPHONE ENCOUNTER
Caller: Patient     Doctor: Nandini More  Reason for call: Patient was told to reschedule her appointment, This appointment was suppose to be US appointment.  Please call patient on cell to reschedule     Call back#: [phone]

## 2023-07-19 NOTE — TELEPHONE ENCOUNTER
Attempted to call and reschedule appt with Dr. Clyde Ellis on 7/20/23 due to provider being out of office. Left call back number to call and reschedule.

## 2023-07-28 ENCOUNTER — OFFICE VISIT (OUTPATIENT)
Dept: OBGYN CLINIC | Facility: CLINIC | Age: 58
End: 2023-07-28
Payer: COMMERCIAL

## 2023-07-28 VITALS — SYSTOLIC BLOOD PRESSURE: 117 MMHG | HEART RATE: 70 BPM | DIASTOLIC BLOOD PRESSURE: 77 MMHG

## 2023-07-28 DIAGNOSIS — M25.551 PAIN IN RIGHT HIP: Primary | ICD-10-CM

## 2023-07-28 PROCEDURE — 99213 OFFICE O/P EST LOW 20 MIN: CPT | Performed by: PHYSICAL MEDICINE & REHABILITATION

## 2023-07-28 PROCEDURE — 20611 DRAIN/INJ JOINT/BURSA W/US: CPT | Performed by: PHYSICAL MEDICINE & REHABILITATION

## 2023-07-28 RX ORDER — ROPIVACAINE HYDROCHLORIDE 5 MG/ML
10 INJECTION, SOLUTION EPIDURAL; INFILTRATION; PERINEURAL
Status: COMPLETED | OUTPATIENT
Start: 2023-07-28 | End: 2023-07-28

## 2023-07-28 RX ORDER — TRIAMCINOLONE ACETONIDE 40 MG/ML
80 INJECTION, SUSPENSION INTRA-ARTICULAR; INTRAMUSCULAR
Status: COMPLETED | OUTPATIENT
Start: 2023-07-28 | End: 2023-07-28

## 2023-07-28 RX ADMIN — ROPIVACAINE HYDROCHLORIDE 10 ML: 5 INJECTION, SOLUTION EPIDURAL; INFILTRATION; PERINEURAL at 08:15

## 2023-07-28 RX ADMIN — TRIAMCINOLONE ACETONIDE 80 MG: 40 INJECTION, SUSPENSION INTRA-ARTICULAR; INTRAMUSCULAR at 08:15

## 2023-07-28 NOTE — PATIENT INSTRUCTIONS
You had a cortisone injection today. Some people also refer to this as steroid or corticosteroid. There are two medicines in this injection, a numbing medicine (anesthetic) and a steroid. Most people get relief immediately but it can take up to two weeks. Rarely, some people can get a flushing reaction where they feel warm and flushed in the face. This is a side effect and resolves on its own. If you experience any drainage, redness or fevers, please give us a call or go to the nearest emergency room.

## 2023-07-28 NOTE — PROGRESS NOTES
1. Pain in right hip  Large joint arthrocentesis: R hip joint        Orders Placed This Encounter   Procedures   • Large joint arthrocentesis: R hip joint     Impression:  Patient is here in follow up of right hip pain likely multifactorial and 2/2 to greater trochanteric pain syndrome and hip intraarticular derangement due to labral tear/mild OA.  She was originally referred by Dr. Prisca Alberto previously had an ultrasound guided hip joint injection with good relief.       We reviewed the MRI of lumbar spine with and without contrast to reassess the L1 pedicle mass.  Continue symptom monitoring.     We decided to proceed with right ultrasound-guided right hip joint injection. She reported some improvement in her symptoms. We will have her return in 4 weeks for possible right greater trochanteric injection. Imaging:  Lumbar MRI with and without contrast:  "VERTEBRAL BODIES:  There are 5 lumbar type vertebral bodies. There is retrolisthesis of L1-L2 and L2-L3.     There is a stable enhancing lesion within the left pedicle at L1. This measures 1.5 x 1.5 cm. Given interval stability over the course of 1 year this is most consistent with an atypical hemangioma.     SACRUM:  Normal signal within the sacrum. No evidence of insufficiency or stress fracture.     DISTAL CORD AND CONUS:  Normal size and signal within the distal cord and conus.     PARASPINAL SOFT TISSUES:  Paraspinal soft tissues are unremarkable.     LOWER THORACIC DISC SPACES:  Normal disc height and signal.  No disc herniation, canal stenosis or foraminal narrowing.     LUMBAR DISC SPACES:     L1-L2: There is disc space narrowing. There is a bulging annulus. There is facet arthrosis. There is mild mass effect on the thecal sac. There is mild left foraminal encroachment.     L2-L3: There is a bulging annulus. There is facet arthrosis with ligamentum flavum thickening. There is mild mass effect on the thecal sac.  There is mild bilateral foraminal narrowing.     L3-L4: There is a bulging annulus. There is facet arthrosis. There is mild mass effect on thecal sac. There is mild to moderate bilateral foraminal narrowing.     L4-L5: There is mild bulge, asymmetric to the right. There is facet arthrosis. There is mild mass effect on the thecal sac. There is moderate right foraminal narrowing with near abutment of the exiting nerve root.     L5-S1: There is disc degeneration and narrowing. There is a bulging annulus. There is dorsal and marginal osteophytosis. There is mild bilateral foraminal narrowing. There is mild mass effect on the thecal sac.     POSTCONTRAST IMAGING:  No abnormal enhancement.     OTHER FINDINGS:  None.     IMPRESSION:     Stable lesion within the left pedicle at L1 dating back to prior examinations since 2022. Stability over the course of 1 year favors the presence of an atypical hemangioma. No new suspicious marrow placing lesions identified.     Degenerative changes of the lumbar spine, as described above."    Return in about 4 weeks (around 2023) for at [facility]. Patient is in agreement with the above plan. HPI:  Eder Menendez is a 62 y.o. female  who presents in follow up. Here for   Chief Complaint   Patient presents with   • Right Hip - Pain, Follow-up   • Lower Back - Pain, Follow-up     MRI Review       Since last visit: See above.     Following history reviewed and updated:  Past Medical History:   Diagnosis Date   • Allergic    • Allergic rhinitis     last assessed 14    • Arthritis    • Asthma    • Bleeding per rectum     Last assessed 16    • Colon polyp    • Diverticulitis of colon    • GERD (gastroesophageal reflux disease)    • Hypercholesterolemia    • Hypertension     last assessed 3/24/15    • Hyponatremia     last assessed 9/17/15    • Obesity      Past Surgical History:   Procedure Laterality Date   •  SECTION     • COLONOSCOPY     • FL INJECTION RIGHT SHOULDER (ARTHROGRAM)  3/18/2021   • WISDOM TOOTH EXTRACTION     • WRIST SURGERY       Social History   Social History     Substance and Sexual Activity   Alcohol Use Yes    Comment: rare / never drank alcohol per allscript      Social History     Substance and Sexual Activity   Drug Use No     Social History     Tobacco Use   Smoking Status Never   Smokeless Tobacco Never     Family History   Adopted: Yes   Problem Relation Age of Onset   • No Known Problems Mother    • Migraines Daughter      Allergies   Allergen Reactions   • Levofloxacin Hives and Tachycardia     Annotation - [id number]: LIGHTHEADED   • Sulfamethoxazole-Trimethoprim    • Hydrocodone Bitartrate Er      Annotation - [id number]: FLUSHING    CHEST/RIB PAIN        Constitutional:  /77   Pulse 70    General: NAD. Eyes: Clear sclerae. ENT: No inflammation, lesion, or mass of lips. No tracheal deviation. Musculoskeletal: As mentioned below. Integumentary: No visible rashes or skin lesions. Pulmonary/Chest: Effort normal. No respiratory distress. Neuro: CN's grossly intact, PALACIOS. Psych: Normal affect and judgement. Vascular: WWP. Right Hip Exam     Tenderness   The patient is experiencing tenderness in the greater trochanter. Other   Erythema: absent  Scars: absent  Sensation: normal  Pulse: present    Comments:  Pain with passive hip IR. Large joint arthrocentesis: R hip joint  Universal Protocol:  Procedure performed by:  Consent: Verbal consent obtained. Written consent not obtained. Consent given by: patient  Timeout called at: 7/28/2023 8:11 AM.  Patient understanding: patient states understanding of the procedure being performed  Site marked: the operative site was marked  Radiology Images displayed and confirmed.  If images not available, report reviewed: imaging studies available  Patient identity confirmed: verbally with patient    Supporting Documentation  Indications: pain and diagnostic evaluation   Procedure Details  Location: hip - R hip joint  Ultrasound guidance: yes (US guidance was used to find the area of interest.)  Medications administered: 80 mg triamcinolone acetonide 40 mg/mL; 10 mL ropivacaine 0.5 %    Patient tolerance: patient tolerated the procedure well with no immediate complications  Dressing:  Sterile dressing applied    The right hip joint was visualized with ultrasound and injected with steroid/anesthetic solution as indicated. Prior to the injection, the ultrasound was used to evaluate for any neural or vascular structures. Care was taken to avoid these structures. The images (and video if taken) were saved to the [employer] ultrasound system. Risks of this procedure include:    - Risk of bleeding since a needle is involved. - Risk of infection (1/10,000 chance as per recent studies). Signs/symptoms were discussed and they would prompt an urgent evaluation at an emergency department.  - Risk of pigmentation or skin dimpling in the skin (2-3% chance as per recent studies) from the steroid. - Risk of increased pain from steroid flare (1% chance as per recent studies) that typically lasts 24-48 hours. - Risk of increased blood sugars from the steroid medication that can last for a few weeks. If the patient is a diabetic or pre-diabetic, they were encouraged to closely monitor their blood sugars and discuss with PCP if elevated more than usual or if having symptoms. We decided that the benefits outweigh the risks and so we proceeded with the procedure.

## 2023-08-17 ENCOUNTER — OFFICE VISIT (OUTPATIENT)
Dept: OBGYN CLINIC | Facility: MEDICAL CENTER | Age: 58
End: 2023-08-17
Payer: COMMERCIAL

## 2023-08-17 VITALS — HEART RATE: 62 BPM | SYSTOLIC BLOOD PRESSURE: 112 MMHG | DIASTOLIC BLOOD PRESSURE: 73 MMHG

## 2023-08-17 DIAGNOSIS — M70.61 TROCHANTERIC BURSITIS OF BOTH HIPS: Primary | ICD-10-CM

## 2023-08-17 DIAGNOSIS — M70.62 TROCHANTERIC BURSITIS OF BOTH HIPS: Primary | ICD-10-CM

## 2023-08-17 DIAGNOSIS — M25.551 PAIN IN RIGHT HIP: ICD-10-CM

## 2023-08-17 PROCEDURE — 99213 OFFICE O/P EST LOW 20 MIN: CPT | Performed by: PHYSICAL MEDICINE & REHABILITATION

## 2023-08-17 PROCEDURE — 20610 DRAIN/INJ JOINT/BURSA W/O US: CPT | Performed by: PHYSICAL MEDICINE & REHABILITATION

## 2023-08-17 RX ORDER — TRIAMCINOLONE ACETONIDE 40 MG/ML
80 INJECTION, SUSPENSION INTRA-ARTICULAR; INTRAMUSCULAR
Status: COMPLETED | OUTPATIENT
Start: 2023-08-17 | End: 2023-08-17

## 2023-08-17 RX ORDER — ROPIVACAINE HYDROCHLORIDE 5 MG/ML
10 INJECTION, SOLUTION EPIDURAL; INFILTRATION; PERINEURAL
Status: COMPLETED | OUTPATIENT
Start: 2023-08-17 | End: 2023-08-17

## 2023-08-17 RX ADMIN — ROPIVACAINE HYDROCHLORIDE 10 ML: 5 INJECTION, SOLUTION EPIDURAL; INFILTRATION; PERINEURAL at 09:30

## 2023-08-17 RX ADMIN — TRIAMCINOLONE ACETONIDE 80 MG: 40 INJECTION, SUSPENSION INTRA-ARTICULAR; INTRAMUSCULAR at 09:30

## 2023-08-17 NOTE — PROGRESS NOTES
1. Trochanteric bursitis of both hips  Large joint arthrocentesis: R greater trochanteric bursa      2. Pain in right hip          Orders Placed This Encounter   Procedures   • Large joint arthrocentesis: R greater trochanteric bursa      Impression:  Patient is here in follow up of right hip pain likely multifactorial and 2/2 to greater trochanteric pain syndrome and hip intraarticular derangement due to labral tear/mild OA.  She was originally referred by Dr. Neha Zuñiga previously had an ultrasound guided hip joint injection with good relief.       We reviewed the MRI of lumbar spine with and without contrast to reassess the L1 pedicle mass.  Continue symptom monitoring.     She recently had a right ultrasound-guided right hip joint injection. She reported good improvement in her symptoms. Today we proceeded with a right greater trochanteric injection. I will see her back if needed.     Imaging:  Lumbar MRI with and without contrast:  "VERTEBRAL BODIES:  There are 5 lumbar type vertebral bodies. There is retrolisthesis of L1-L2 and L2-L3.     There is a stable enhancing lesion within the left pedicle at L1. This measures 1.5 x 1.5 cm. Given interval stability over the course of 1 year this is most consistent with an atypical hemangioma.     SACRUM:  Normal signal within the sacrum. No evidence of insufficiency or stress fracture.     DISTAL CORD AND CONUS:  Normal size and signal within the distal cord and conus.     PARASPINAL SOFT TISSUES:  Paraspinal soft tissues are unremarkable.     LOWER THORACIC DISC SPACES:  Normal disc height and signal.  No disc herniation, canal stenosis or foraminal narrowing.     LUMBAR DISC SPACES:     L1-L2: There is disc space narrowing. There is a bulging annulus. There is facet arthrosis. There is mild mass effect on the thecal sac. There is mild left foraminal encroachment.     L2-L3: There is a bulging annulus. There is facet arthrosis with ligamentum flavum thickening. There is mild mass effect on the thecal sac. There is mild bilateral foraminal narrowing.     L3-L4: There is a bulging annulus. There is facet arthrosis. There is mild mass effect on thecal sac. There is mild to moderate bilateral foraminal narrowing.     L4-L5: There is mild bulge, asymmetric to the right. There is facet arthrosis. There is mild mass effect on the thecal sac. There is moderate right foraminal narrowing with near abutment of the exiting nerve root.     L5-S1: There is disc degeneration and narrowing. There is a bulging annulus. There is dorsal and marginal osteophytosis. There is mild bilateral foraminal narrowing. There is mild mass effect on the thecal sac.     POSTCONTRAST IMAGING:  No abnormal enhancement.     OTHER FINDINGS:  None.     IMPRESSION:     Stable lesion within the left pedicle at L1 dating back to prior examinations since 2022. Stability over the course of 1 year favors the presence of an atypical hemangioma. No new suspicious marrow placing lesions identified.     Degenerative changes of the lumbar spine, as described above."    No follow-ups on file. Patient is in agreement with the above plan. HPI:  Rocky House is a 62 y.o. female  who presents in follow up. Here for   Chief Complaint   Patient presents with   • Right Hip - Pain, Follow-up       Since last visit: See above.     Following history reviewed and updated:  Past Medical History:   Diagnosis Date   • Allergic    • Allergic rhinitis     last assessed 14    • Arthritis    • Asthma    • Bleeding per rectum     Last assessed 16    • Colon polyp    • Diverticulitis of colon    • GERD (gastroesophageal reflux disease)    • Hypercholesterolemia    • Hypertension     last assessed 3/24/15    • Hyponatremia     last assessed 9/17/15    • Obesity      Past Surgical History:   Procedure Laterality Date   •  SECTION     • COLONOSCOPY     • FL INJECTION RIGHT SHOULDER (ARTHROGRAM)  3/18/2021   • WISDOM TOOTH EXTRACTION     • WRIST SURGERY       Social History   Social History     Substance and Sexual Activity   Alcohol Use Yes    Comment: rare / never drank alcohol per allscript      Social History     Substance and Sexual Activity   Drug Use No     Social History     Tobacco Use   Smoking Status Never   Smokeless Tobacco Never     Family History   Adopted: Yes   Problem Relation Age of Onset   • No Known Problems Mother    • Migraines Daughter      Allergies   Allergen Reactions   • Levofloxacin Hives and Tachycardia     Annotation - [id number]: LIGHTHEADED   • Sulfamethoxazole-Trimethoprim    • Hydrocodone Bitartrate Er      Annotation - [id number]: FLUSHING    CHEST/RIB PAIN        Constitutional:  /73   Pulse 62    General: NAD. Eyes: Clear sclerae. ENT: No inflammation, lesion, or mass of lips. No tracheal deviation. Musculoskeletal: As mentioned below. Integumentary: No visible rashes or skin lesions. Pulmonary/Chest: Effort normal. No respiratory distress. Neuro: CN's grossly intact, PALACIOS. Psych: Normal affect and judgement. Vascular: WWP. Right Hip Exam     Tenderness   The patient is experiencing tenderness in the greater trochanter. Range of Motion   Internal rotation: abnormal     Tests   HODA: positive    Other   Erythema: absent  Scars: absent  Sensation: normal  Pulse: present             Large joint arthrocentesis: R greater trochanteric bursa  Universal Protocol:  Procedure performed by: (Chaperone present)  Consent: Verbal consent obtained. Written consent not obtained. Risks and benefits: risks, benefits and alternatives were discussed  Consent given by: patient  Timeout called at: 8/17/2023 9:29 AM.  Patient understanding: patient states understanding of the procedure being performed  Site marked: the operative site was marked  Radiology Images displayed and confirmed.  If images not available, report reviewed: imaging studies available  Patient identity confirmed: verbally with patient    Supporting Documentation  Indications: pain   Procedure Details  Location: hip - R greater trochanteric bursa  Needle size: 20 G (20G 3.5'')  Ultrasound guidance: no  Approach: Lateral to medial approach. Medications administered: 80 mg triamcinolone acetonide 40 mg/mL; 10 mL ropivacaine 0.5 %    Patient tolerance: patient tolerated the procedure well with no immediate complications  Dressing:  Sterile dressing applied    A modified tenotomy was performed by redirecting the needle in three directions and dispersing the medication equally in all three directions. There was little to no resistance encountered during the injection. Risks of this procedure include:    - Risk of bleeding since a needle is involved. - Risk of infection (1/10,000 chance as per recent studies). Signs/symptoms were discussed and they would prompt an urgent evaluation at an emergency department.  - Risk of pigmentation or skin dimpling in the skin (2-3% chance as per recent studies) from the steroid. - Risk of increased pain from steroid flare (1% chance as per recent studies) that typically lasts 24-48 hours. - Risk of increased blood sugars from the steroid medication that can last for a few weeks. If the patient is a diabetic or pre-diabetic, they were encouraged to closely monitor their blood sugars and discuss with PCP if elevated more than usual or if having symptoms. The benefits outweigh the risks and so the procedure was completed.

## 2023-10-19 ENCOUNTER — APPOINTMENT (OUTPATIENT)
Dept: RADIOLOGY | Facility: MEDICAL CENTER | Age: 58
End: 2023-10-19
Payer: COMMERCIAL

## 2023-10-19 ENCOUNTER — OFFICE VISIT (OUTPATIENT)
Dept: OBGYN CLINIC | Facility: MEDICAL CENTER | Age: 58
End: 2023-10-19

## 2023-10-19 DIAGNOSIS — M70.61 TROCHANTERIC BURSITIS OF BOTH HIPS: Primary | ICD-10-CM

## 2023-10-19 DIAGNOSIS — M25.551 PAIN IN RIGHT HIP: ICD-10-CM

## 2023-10-19 DIAGNOSIS — M70.62 GREATER TROCHANTERIC BURSITIS, LEFT: ICD-10-CM

## 2023-10-19 DIAGNOSIS — M70.62 TROCHANTERIC BURSITIS OF BOTH HIPS: Primary | ICD-10-CM

## 2023-10-19 PROCEDURE — 73502 X-RAY EXAM HIP UNI 2-3 VIEWS: CPT

## 2023-10-19 RX ORDER — PREDNISONE 20 MG/1
40 TABLET ORAL
Qty: 10 TABLET | Refills: 0 | Status: SHIPPED | OUTPATIENT
Start: 2023-10-19 | End: 2023-10-24

## 2023-10-19 NOTE — PROGRESS NOTES
1. Trochanteric bursitis of both hips  predniSONE 20 mg tablet      2. Greater trochanteric bursitis, left  predniSONE 20 mg tablet      3. Pain in right hip  XR hip/pelv 2-3 vws right if performed    predniSONE 20 mg tablet        Orders Placed This Encounter   Procedures    XR hip/pelv 2-3 vws right if performed        Impression:  Patient is here in follow up of right hip pain likely multifactorial and 2/2 to greater trochanteric pain syndrome and hip intraarticular derangement due to labral tear/mild OA. She was originally referred by Dr. Norman Kingsley. Treatment has included greater trochanteric injection and ultrasound-guided hip joint injection. The patient was doing well up until recently. She felt a pop in her hip. She is very tender along her greater trochanter and also has hip intra-articular signs on exam.  We obtained updated x-rays as below. These x-rays show worsening of her hip osteoarthritis. We provided her with a straight cane to offload her right lower extremity. We will also have her return in a few weeks for a greater trochanteric injection. Once it has been 3 months from her last hip joint injection, we will try this as well. She will likely need a total hip replacement. We previously reviewed the MRI of lumbar spine with and without contrast to reassess the L1 pedicle mass. Continue symptom monitoring. Imaging:  Updated right hip x-rays from 10/19/2023 show interval increase in the right hip joint osteoarthritis. It was previously mild and now it is severe. Lumbar MRI with and without contrast:  "VERTEBRAL BODIES:  There are 5 lumbar type vertebral bodies. There is retrolisthesis of L1-L2 and L2-L3. There is a stable enhancing lesion within the left pedicle at L1. This measures 1.5 x 1.5 cm. Given interval stability over the course of 1 year this is most consistent with an atypical hemangioma. SACRUM:  Normal signal within the sacrum.  No evidence of insufficiency or stress fracture. DISTAL CORD AND CONUS:  Normal size and signal within the distal cord and conus. PARASPINAL SOFT TISSUES:  Paraspinal soft tissues are unremarkable. LOWER THORACIC DISC SPACES:  Normal disc height and signal.  No disc herniation, canal stenosis or foraminal narrowing. LUMBAR DISC SPACES:     L1-L2: There is disc space narrowing. There is a bulging annulus. There is facet arthrosis. There is mild mass effect on the thecal sac. There is mild left foraminal encroachment. L2-L3: There is a bulging annulus. There is facet arthrosis with ligamentum flavum thickening. There is mild mass effect on the thecal sac. There is mild bilateral foraminal narrowing. L3-L4: There is a bulging annulus. There is facet arthrosis. There is mild mass effect on thecal sac. There is mild to moderate bilateral foraminal narrowing. L4-L5: There is mild bulge, asymmetric to the right. There is facet arthrosis. There is mild mass effect on the thecal sac. There is moderate right foraminal narrowing with near abutment of the exiting nerve root. L5-S1: There is disc degeneration and narrowing. There is a bulging annulus. There is dorsal and marginal osteophytosis. There is mild bilateral foraminal narrowing. There is mild mass effect on the thecal sac. POSTCONTRAST IMAGING:  No abnormal enhancement. OTHER FINDINGS:  None. IMPRESSION:     Stable lesion within the left pedicle at L1 dating back to prior examinations since 5/17/2022. Stability over the course of 1 year favors the presence of an atypical hemangioma. No new suspicious marrow placing lesions identified. Degenerative changes of the lumbar spine, as described above."    No follow-ups on file. Patient is in agreement with the above plan. HPI:  Desiree Merino is a 62 y.o. female  who presents in follow up.   Here for   Chief Complaint   Patient presents with    Right Hip - Follow-up, Pain     Pt reports increased right hip and thigh pain that began ~ 3 weeks ago while she was walking up a slight incline in the park where she walks daily. She states as she was walking up the hill she felt a pop and sharp pain on the side of her right hip. Pt states the pain did resolve throughout the rest of her walk. Pt states she has now had continuous pain in groin and upper thigh area. Lower Back - Pain       Since last visit: See above. The above happened on . Following history reviewed and updated:  Past Medical History:   Diagnosis Date    Allergic     Allergic rhinitis     last assessed 14     Arthritis     Asthma     Bleeding per rectum     Last assessed 16     Colon polyp     Diverticulitis of colon     GERD (gastroesophageal reflux disease)     Hypercholesterolemia     Hypertension     last assessed 3/24/15     Hyponatremia     last assessed 9/17/15     Obesity      Past Surgical History:   Procedure Laterality Date     SECTION      COLONOSCOPY      FL INJECTION RIGHT SHOULDER (ARTHROGRAM)  3/18/2021    WISDOM TOOTH EXTRACTION      WRIST SURGERY       Social History   Social History     Substance and Sexual Activity   Alcohol Use Yes    Comment: rare / never drank alcohol per allscript      Social History     Substance and Sexual Activity   Drug Use No     Social History     Tobacco Use   Smoking Status Never   Smokeless Tobacco Never     Family History   Adopted: Yes   Problem Relation Age of Onset    No Known Problems Mother     Migraines Daughter      Allergies   Allergen Reactions    Levofloxacin Hives and Tachycardia     Annotation - [id number]: LIGHTHEADED    Sulfamethoxazole-Trimethoprim     Hydrocodone Bitartrate Er      Annotation - [id number]: FLUSHING    CHEST/RIB PAIN        Constitutional:  There were no vitals taken for this visit. General: NAD. Eyes: Clear sclerae. ENT: No inflammation, lesion, or mass of lips. No tracheal deviation.   Musculoskeletal: As mentioned below.  Integumentary: No visible rashes or skin lesions. Pulmonary/Chest: Effort normal. No respiratory distress. Neuro: CN's grossly intact, PALACIOS. Psych: Normal affect and judgement. Vascular: WWP. Right Hip Exam     Tenderness   The patient is experiencing tenderness in the greater trochanter. Range of Motion   Internal rotation:  abnormal     Tests   HODA: positive  Chelsea: positive    Other   Erythema: absent  Scars: absent  Sensation: normal  Pulse: present    Comments:  Positive [place]inchfield and log roll. Positive FADDIR.              Procedures

## 2023-11-02 ENCOUNTER — OFFICE VISIT (OUTPATIENT)
Dept: OBGYN CLINIC | Facility: MEDICAL CENTER | Age: 58
End: 2023-11-02
Payer: COMMERCIAL

## 2023-11-02 DIAGNOSIS — M70.61 TROCHANTERIC BURSITIS OF BOTH HIPS: Primary | ICD-10-CM

## 2023-11-02 DIAGNOSIS — M70.62 TROCHANTERIC BURSITIS OF BOTH HIPS: Primary | ICD-10-CM

## 2023-11-02 PROCEDURE — 99213 OFFICE O/P EST LOW 20 MIN: CPT | Performed by: PHYSICAL MEDICINE & REHABILITATION

## 2023-11-02 PROCEDURE — 20610 DRAIN/INJ JOINT/BURSA W/O US: CPT | Performed by: PHYSICAL MEDICINE & REHABILITATION

## 2023-11-02 RX ORDER — ROPIVACAINE HYDROCHLORIDE 5 MG/ML
10 INJECTION, SOLUTION EPIDURAL; INFILTRATION; PERINEURAL
Status: COMPLETED | OUTPATIENT
Start: 2023-11-02 | End: 2023-11-02

## 2023-11-02 RX ORDER — KETOROLAC TROMETHAMINE 30 MG/ML
60 INJECTION, SOLUTION INTRAMUSCULAR; INTRAVENOUS
Status: COMPLETED | OUTPATIENT
Start: 2023-11-02 | End: 2023-11-02

## 2023-11-02 RX ADMIN — KETOROLAC TROMETHAMINE 60 MG: 30 INJECTION, SOLUTION INTRAMUSCULAR; INTRAVENOUS at 14:15

## 2023-11-02 RX ADMIN — ROPIVACAINE HYDROCHLORIDE 10 ML: 5 INJECTION, SOLUTION EPIDURAL; INFILTRATION; PERINEURAL at 14:15

## 2023-11-02 NOTE — PROGRESS NOTES
1. Trochanteric bursitis of both hips  Large joint arthrocentesis: R greater trochanteric bursa        Orders Placed This Encounter   Procedures    Large joint arthrocentesis: R greater trochanteric bursa        Impression:  Patient is here in follow up of right hip pain likely multifactorial and 2/2 to greater trochanteric pain syndrome and hip intraarticular derangement due to labral tear/mild OA. She was originally referred by Dr. Norman Kingsley. Treatment has included greater trochanteric injection and ultrasound-guided hip joint injection. The patient was doing well up until recently. She felt a pop in her hip. She is very tender along her greater trochanter and also has hip intra-articular signs on exam.  We obtained updated x-rays as below. These x-rays show worsening of her hip osteoarthritis. We provided her with a straight cane to offload her right lower extremity. Today, we proceeded with a right greater trochanteric ketorolac/anesthetic injection. Return for hip USGI if still symptomatic. She will likely need a total hip replacement. We previously reviewed the MRI of lumbar spine with and without contrast to reassess the L1 pedicle mass. Continue symptom monitoring. Imaging:  Updated right hip x-rays from 10/19/2023 show interval increase in the right hip joint osteoarthritis. It was previously mild and now it is severe. Lumbar MRI with and without contrast:  "VERTEBRAL BODIES:  There are 5 lumbar type vertebral bodies. There is retrolisthesis of L1-L2 and L2-L3. There is a stable enhancing lesion within the left pedicle at L1. This measures 1.5 x 1.5 cm. Given interval stability over the course of 1 year this is most consistent with an atypical hemangioma. SACRUM:  Normal signal within the sacrum. No evidence of insufficiency or stress fracture. DISTAL CORD AND CONUS:  Normal size and signal within the distal cord and conus.      PARASPINAL SOFT TISSUES:  Paraspinal soft tissues are unremarkable. LOWER THORACIC DISC SPACES:  Normal disc height and signal.  No disc herniation, canal stenosis or foraminal narrowing. LUMBAR DISC SPACES:     L1-L2: There is disc space narrowing. There is a bulging annulus. There is facet arthrosis. There is mild mass effect on the thecal sac. There is mild left foraminal encroachment. L2-L3: There is a bulging annulus. There is facet arthrosis with ligamentum flavum thickening. There is mild mass effect on the thecal sac. There is mild bilateral foraminal narrowing. L3-L4: There is a bulging annulus. There is facet arthrosis. There is mild mass effect on thecal sac. There is mild to moderate bilateral foraminal narrowing. L4-L5: There is mild bulge, asymmetric to the right. There is facet arthrosis. There is mild mass effect on the thecal sac. There is moderate right foraminal narrowing with near abutment of the exiting nerve root. L5-S1: There is disc degeneration and narrowing. There is a bulging annulus. There is dorsal and marginal osteophytosis. There is mild bilateral foraminal narrowing. There is mild mass effect on the thecal sac. POSTCONTRAST IMAGING:  No abnormal enhancement. OTHER FINDINGS:  None. IMPRESSION:     Stable lesion within the left pedicle at L1 dating back to prior examinations since 5/17/2022. Stability over the course of 1 year favors the presence of an atypical hemangioma. No new suspicious marrow placing lesions identified. Degenerative changes of the lumbar spine, as described above."    No follow-ups on file. Patient is in agreement with the above plan. HPI:  Alina Johnson is a 62 y.o. female  who presents in follow up. Here for   Chief Complaint   Patient presents with    Right Hip - Pain, Follow-up       Since last visit: See above.     Following history reviewed and updated:  Past Medical History:   Diagnosis Date    Allergic     Allergic rhinitis     last assessed 11/17/14 Arthritis     Asthma     Bleeding per rectum     Last assessed 16     Colon polyp     Diverticulitis of colon     GERD (gastroesophageal reflux disease)     Hypercholesterolemia     Hypertension     last assessed 3/24/15     Hyponatremia     last assessed 9/17/15     Obesity      Past Surgical History:   Procedure Laterality Date     SECTION      COLONOSCOPY      FL INJECTION RIGHT SHOULDER (ARTHROGRAM)  3/18/2021    WISDOM TOOTH EXTRACTION      WRIST SURGERY       Social History   Social History     Substance and Sexual Activity   Alcohol Use Yes    Comment: rare / never drank alcohol per [employer]script      Social History     Substance and Sexual Activity   Drug Use No     Social History     Tobacco Use   Smoking Status Never   Smokeless Tobacco Never     Family History   Adopted: Yes   Problem Relation Age of Onset    No Known Problems Mother     Migraines Daughter      Allergies   Allergen Reactions    Levofloxacin Hives and Tachycardia     Annotation - [id number]: LIGHTHEADED    Sulfamethoxazole-Trimethoprim     Hydrocodone Bitartrate Er      Annotation - [id number]: FLUSHING    CHEST/RIB PAIN        Constitutional:  There were no vitals taken for this visit. General: NAD. Eyes: Clear sclerae. ENT: No inflammation, lesion, or mass of lips. No tracheal deviation. Musculoskeletal: As mentioned below. Integumentary: No visible rashes or skin lesions. Pulmonary/Chest: Effort normal. No respiratory distress. Neuro: CN's grossly intact, PALACIOS. Psych: Normal affect and judgement. Vascular: WWP. Right Hip Exam     Tenderness   The patient is experiencing tenderness in the greater trochanter. Other   Erythema: absent  Scars: absent  Sensation: normal  Pulse: present             Large joint arthrocentesis: R greater trochanteric bursa  Universal Protocol:  Procedure performed by: (Chaperone present)  Consent: Verbal consent obtained. Written consent not obtained.   Risks and benefits: risks, benefits and alternatives were discussed  Consent given by: patient  Timeout called at: 11/2/2023 2:17 PM.  Patient understanding: patient states understanding of the procedure being performed  Site marked: the operative site was marked  Radiology Images displayed and confirmed. If images not available, report reviewed: imaging studies available  Patient identity confirmed: verbally with patient  Supporting Documentation  Indications: pain   Procedure Details  Location: hip - R greater trochanteric bursa  Needle size: 20 G (20G 3.5'')  Ultrasound guidance: no  Approach: Lateral to medial approach. Medications administered: 10 mL ropivacaine 0.5 %; 60 mg ketorolac 60 mg/2 mL    Patient tolerance: patient tolerated the procedure well with no immediate complications  Dressing:  Sterile dressing applied    A modified tenotomy was performed by redirecting the needle in three directions and dispersing the medication equally in all three directions. There was little to no resistance encountered during the injection. Risks of this procedure include:    - Risk of bleeding since a needle is involved. - Risk of infection (1/10,000 chance as per recent studies). Signs/symptoms were discussed and they would prompt an urgent evaluation at an emergency department.  - Risk of pigmentation or skin dimpling in the skin (2-3% chance as per recent studies) from the steroid. - Risk of increased pain from steroid flare (1% chance as per recent studies) that typically lasts 24-48 hours. - Risk of increased blood sugars from the steroid medication that can last for a few weeks. If the patient is a diabetic or pre-diabetic, they were encouraged to closely monitor their blood sugars and discuss with PCP if elevated more than usual or if having symptoms. The benefits outweigh the risks and so the procedure was completed.

## 2023-11-16 ENCOUNTER — OFFICE VISIT (OUTPATIENT)
Dept: OBGYN CLINIC | Facility: MEDICAL CENTER | Age: 58
End: 2023-11-16
Payer: COMMERCIAL

## 2023-11-16 DIAGNOSIS — M43.16 SPONDYLOLISTHESIS OF LUMBAR REGION: Primary | ICD-10-CM

## 2023-11-16 DIAGNOSIS — M16.11 ARTHRITIS OF RIGHT HIP: ICD-10-CM

## 2023-11-16 PROCEDURE — 99214 OFFICE O/P EST MOD 30 MIN: CPT | Performed by: PHYSICAL MEDICINE & REHABILITATION

## 2023-11-16 NOTE — PROGRESS NOTES
1. Spondylolisthesis of lumbar region  Ambulatory referral to Spine & Pain Management      2. Arthritis of right hip  diclofenac sodium (VOLTAREN) 50 mg EC tablet        Orders Placed This Encounter   Procedures    Ambulatory referral to Spine & Pain Management        Impression:  Patient is here in follow up of right hip pain likely multifactorial and 2/2 to osteoarthritis as below. Treatment has included greater trochanteric injection and ultrasound-guided hip joint injections. Evidence he is scheduled for a repeat ultrasound-guided hip joint injection. If she does not get lasting relief, we will have her see orthopedic surgery. The patient also presents with axial low back pain likely multifactorial and 2/2 to facet arthropathy and degenerative disc disease. She had a reaggravation of her symptoms a couple of days ago. She had some improvement with oral steroids but it was not long-lasting. She has a positive Rios's maneuver on exam today. She previously had physical therapy and continues a home exercise program.  I have refilled her diclofenac and we discussed the risks of this medication. No concerning neurological deficits. At this juncture, we will have Kelsey Orourke go for consultation with spine and pain management. We previously reviewed the MRI of lumbar spine with and without contrast to reassess the L1 pedicle mass. Continue symptom monitoring. Imaging:  Updated right hip x-rays from 10/19/2023 show interval increase in the right hip joint osteoarthritis. It was previously mild and now it is severe. Lumbar MRI with and without contrast:  "VERTEBRAL BODIES:  There are 5 lumbar type vertebral bodies. There is retrolisthesis of L1-L2 and L2-L3. There is a stable enhancing lesion within the left pedicle at L1. This measures 1.5 x 1.5 cm. Given interval stability over the course of 1 year this is most consistent with an atypical hemangioma. SACRUM:  Normal signal within the sacrum. No evidence of insufficiency or stress fracture. DISTAL CORD AND CONUS:  Normal size and signal within the distal cord and conus. PARASPINAL SOFT TISSUES:  Paraspinal soft tissues are unremarkable. LOWER THORACIC DISC SPACES:  Normal disc height and signal.  No disc herniation, canal stenosis or foraminal narrowing. LUMBAR DISC SPACES:     L1-L2: There is disc space narrowing. There is a bulging annulus. There is facet arthrosis. There is mild mass effect on the thecal sac. There is mild left foraminal encroachment. L2-L3: There is a bulging annulus. There is facet arthrosis with ligamentum flavum thickening. There is mild mass effect on the thecal sac. There is mild bilateral foraminal narrowing. L3-L4: There is a bulging annulus. There is facet arthrosis. There is mild mass effect on thecal sac. There is mild to moderate bilateral foraminal narrowing. L4-L5: There is mild bulge, asymmetric to the right. There is facet arthrosis. There is mild mass effect on the thecal sac. There is moderate right foraminal narrowing with near abutment of the exiting nerve root. L5-S1: There is disc degeneration and narrowing. There is a bulging annulus. There is dorsal and marginal osteophytosis. There is mild bilateral foraminal narrowing. There is mild mass effect on the thecal sac. POSTCONTRAST IMAGING:  No abnormal enhancement. OTHER FINDINGS:  None. IMPRESSION:     Stable lesion within the left pedicle at L1 dating back to prior examinations since 5/17/2022. Stability over the course of 1 year favors the presence of an atypical hemangioma. No new suspicious marrow placing lesions identified. Degenerative changes of the lumbar spine, as described above."    No follow-ups on file. Patient is in agreement with the above plan. HPI:  Lizbeth Amado is a 62 y.o. female  who presents in follow up.   Here for   Chief Complaint   Patient presents with    Lower Back - Pain, Follow-up Since last visit: See above. She is now having pain on the left side. She continues to have pain on the right side from her hip. She needed help putting her socks on that day. That has been getting somewhat better. She is using diclofenac gel on her lower back. The pain is localized to the lower back and into the buttocks. No radiating pain. She still has pain in her right groin from her hip. Following history reviewed and updated:  Past Medical History:   Diagnosis Date    Allergic     Allergic rhinitis     last assessed 14     Arthritis     Asthma     Bleeding per rectum     Last assessed 16     Colon polyp     Diverticulitis of colon     GERD (gastroesophageal reflux disease)     Hypercholesterolemia     Hypertension     last assessed 3/24/15     Hyponatremia     last assessed 9/17/15     Obesity      Past Surgical History:   Procedure Laterality Date     SECTION      COLONOSCOPY      FL INJECTION RIGHT SHOULDER (ARTHROGRAM)  3/18/2021    WISDOM TOOTH EXTRACTION      WRIST SURGERY       Social History   Social History     Substance and Sexual Activity   Alcohol Use Yes    Comment: rare / never drank alcohol per [employer]      Social History     Substance and Sexual Activity   Drug Use No     Social History     Tobacco Use   Smoking Status Never   Smokeless Tobacco Never     Family History   Adopted: Yes   Problem Relation Age of Onset    No Known Problems Mother     Migraines Daughter      Allergies   Allergen Reactions    Levofloxacin Hives and Tachycardia     Annotation - [id number]: LIGHTHEADED    Sulfamethoxazole-Trimethoprim     Hydrocodone Bitartrate Er      Annotation - [id number]: FLUSHING    CHEST/RIB PAIN        Constitutional:  There were no vitals taken for this visit. General: NAD. Eyes: Clear sclerae. ENT: No inflammation, lesion, or mass of lips. No tracheal deviation. Musculoskeletal: As mentioned below.   Integumentary: No visible rashes or skin lesions. Pulmonary/Chest: Effort normal. No respiratory distress. Neuro: CN's grossly intact, PALACIOS. Psych: Normal affect and judgement. Vascular: WWP. Right Hip Exam     Range of Motion   Internal rotation:  abnormal     Tests   HODA: positive    Other   Erythema: absent  Scars: absent  Sensation: normal  Pulse: present      Back Exam     Tenderness   The patient is experiencing tenderness in the lumbar and sacroiliac. Muscle Strength   The patient has normal back strength. Tests   Straight leg raise right: negative  Straight leg raise left: negative    Other   Sensation: normal  Gait: antalgic (Uses a straight cane for ambulation assistance.   No scissoring or myelopathic gait)   Erythema: no back redness  Scars: absent    Comments:  Positive oblique hyperextension test.             Procedures

## 2023-11-20 ENCOUNTER — APPOINTMENT (OUTPATIENT)
Dept: LAB | Facility: HOSPITAL | Age: 58
End: 2023-11-20
Payer: COMMERCIAL

## 2023-11-20 DIAGNOSIS — I10 BENIGN ESSENTIAL HYPERTENSION: ICD-10-CM

## 2023-11-20 DIAGNOSIS — E78.5 HYPERLIPIDEMIA, UNSPECIFIED HYPERLIPIDEMIA TYPE: ICD-10-CM

## 2023-11-20 DIAGNOSIS — R73.03 PREDIABETES: ICD-10-CM

## 2023-11-20 DIAGNOSIS — L65.9 ALOPECIA: ICD-10-CM

## 2023-11-20 LAB
ALBUMIN SERPL BCP-MCNC: 4.4 G/DL (ref 3.5–5)
ALP SERPL-CCNC: 62 U/L (ref 34–104)
ALT SERPL W P-5'-P-CCNC: 13 U/L (ref 7–52)
ANION GAP SERPL CALCULATED.3IONS-SCNC: 5 MMOL/L
AST SERPL W P-5'-P-CCNC: 18 U/L (ref 13–39)
BILIRUB SERPL-MCNC: 0.5 MG/DL (ref 0.2–1)
BUN SERPL-MCNC: 13 MG/DL (ref 5–25)
CALCIUM SERPL-MCNC: 9.4 MG/DL (ref 8.4–10.2)
CHLORIDE SERPL-SCNC: 106 MMOL/L (ref 96–108)
CHOLEST SERPL-MCNC: 155 MG/DL
CO2 SERPL-SCNC: 29 MMOL/L (ref 21–32)
CREAT SERPL-MCNC: 0.88 MG/DL (ref 0.6–1.3)
ERYTHROCYTE [DISTWIDTH] IN BLOOD BY AUTOMATED COUNT: 14.6 % (ref 11.6–15.1)
EST. AVERAGE GLUCOSE BLD GHB EST-MCNC: 128 MG/DL
GFR SERPL CREATININE-BSD FRML MDRD: 72 ML/MIN/1.73SQ M
GLUCOSE P FAST SERPL-MCNC: 91 MG/DL (ref 65–99)
HBA1C MFR BLD: 6.1 %
HCT VFR BLD AUTO: 39.3 % (ref 34.8–46.1)
HDLC SERPL-MCNC: 63 MG/DL
HGB BLD-MCNC: 12 G/DL (ref 11.5–15.4)
LDLC SERPL CALC-MCNC: 75 MG/DL (ref 0–100)
MCH RBC QN AUTO: 25.8 PG (ref 26.8–34.3)
MCHC RBC AUTO-ENTMCNC: 30.5 G/DL (ref 31.4–37.4)
MCV RBC AUTO: 84 FL (ref 82–98)
PLATELET # BLD AUTO: 381 THOUSANDS/UL (ref 149–390)
PMV BLD AUTO: 9.3 FL (ref 8.9–12.7)
POTASSIUM SERPL-SCNC: 4.5 MMOL/L (ref 3.5–5.3)
PROT SERPL-MCNC: 7 G/DL (ref 6.4–8.4)
RBC # BLD AUTO: 4.66 MILLION/UL (ref 3.81–5.12)
SODIUM SERPL-SCNC: 140 MMOL/L (ref 135–147)
TRIGL SERPL-MCNC: 84 MG/DL
TSH SERPL DL<=0.05 MIU/L-ACNC: 1.95 UIU/ML (ref 0.45–4.5)
WBC # BLD AUTO: 4.43 THOUSAND/UL (ref 4.31–10.16)

## 2023-11-20 PROCEDURE — 83036 HEMOGLOBIN GLYCOSYLATED A1C: CPT

## 2023-11-20 PROCEDURE — 36415 COLL VENOUS BLD VENIPUNCTURE: CPT

## 2023-11-20 PROCEDURE — 80061 LIPID PANEL: CPT

## 2023-11-20 PROCEDURE — 84443 ASSAY THYROID STIM HORMONE: CPT

## 2023-11-20 PROCEDURE — 85027 COMPLETE CBC AUTOMATED: CPT

## 2023-11-20 PROCEDURE — 80053 COMPREHEN METABOLIC PANEL: CPT

## 2023-11-21 ENCOUNTER — OFFICE VISIT (OUTPATIENT)
Dept: OBGYN CLINIC | Facility: CLINIC | Age: 58
End: 2023-11-21
Payer: COMMERCIAL

## 2023-11-21 DIAGNOSIS — M70.62 TROCHANTERIC BURSITIS OF BOTH HIPS: ICD-10-CM

## 2023-11-21 DIAGNOSIS — M16.11 PRIMARY OSTEOARTHRITIS OF ONE HIP, RIGHT: Primary | ICD-10-CM

## 2023-11-21 DIAGNOSIS — M70.62 GREATER TROCHANTERIC BURSITIS, LEFT: ICD-10-CM

## 2023-11-21 DIAGNOSIS — M25.551 PAIN IN RIGHT HIP: ICD-10-CM

## 2023-11-21 DIAGNOSIS — M54.16 LUMBAR RADICULOPATHY: ICD-10-CM

## 2023-11-21 DIAGNOSIS — M70.61 TROCHANTERIC BURSITIS OF BOTH HIPS: ICD-10-CM

## 2023-11-21 PROCEDURE — 20611 DRAIN/INJ JOINT/BURSA W/US: CPT | Performed by: PHYSICAL MEDICINE & REHABILITATION

## 2023-11-21 PROCEDURE — 99212 OFFICE O/P EST SF 10 MIN: CPT | Performed by: PHYSICAL MEDICINE & REHABILITATION

## 2023-11-21 RX ORDER — TRIAMCINOLONE ACETONIDE 40 MG/ML
80 INJECTION, SUSPENSION INTRA-ARTICULAR; INTRAMUSCULAR
Status: COMPLETED | OUTPATIENT
Start: 2023-11-21 | End: 2023-11-21

## 2023-11-21 RX ORDER — ROPIVACAINE HYDROCHLORIDE 5 MG/ML
10 INJECTION, SOLUTION EPIDURAL; INFILTRATION; PERINEURAL
Status: COMPLETED | OUTPATIENT
Start: 2023-11-21 | End: 2023-11-21

## 2023-11-21 RX ADMIN — TRIAMCINOLONE ACETONIDE 80 MG: 40 INJECTION, SUSPENSION INTRA-ARTICULAR; INTRAMUSCULAR at 11:30

## 2023-11-21 RX ADMIN — ROPIVACAINE HYDROCHLORIDE 10 ML: 5 INJECTION, SOLUTION EPIDURAL; INFILTRATION; PERINEURAL at 11:30

## 2023-11-21 NOTE — PROGRESS NOTES
1. Primary osteoarthritis of one hip, right  Large joint arthrocentesis: R hip joint      2. Pain in right hip        3. Lumbar radiculopathy        4. Greater trochanteric bursitis, left        5. Trochanteric bursitis of both hips          Orders Placed This Encounter   Procedures    Large joint arthrocentesis: R hip joint        Impression:  Patient is here in follow up of right hip pain likely multifactorial and 2/2 to osteoarthritis as below. Treatment has included greater trochanteric injection and ultrasound-guided hip joint injections which we repeated today. If she does not get lasting relief, we will have her see orthopedic surgery. The patient also presents with axial low back pain likely multifactorial and 2/2 to facet arthropathy and degenerative disc disease. She had a reaggravation of her symptoms a couple of days ago. She had some improvement with oral steroids but it was not long-lasting. She has a positive Rios's maneuver on exam today. She previously had physical therapy and continues a home exercise program.  I have refilled her diclofenac and we discussed the risks of this medication. No concerning neurological deficits. At this juncture, we will have Nataliia polanco for consultation with spine and pain management. We previously reviewed the MRI of lumbar spine with and without contrast to reassess the L1 pedicle mass. Continue symptom monitoring. Imaging:  Updated right hip x-rays from 10/19/2023 show interval increase in the right hip joint osteoarthritis. It was previously mild and now it is severe. Lumbar MRI with and without contrast:  "VERTEBRAL BODIES:  There are 5 lumbar type vertebral bodies. There is retrolisthesis of L1-L2 and L2-L3. There is a stable enhancing lesion within the left pedicle at L1. This measures 1.5 x 1.5 cm. Given interval stability over the course of 1 year this is most consistent with an atypical hemangioma.      SACRUM:  Normal signal within the sacrum. No evidence of insufficiency or stress fracture. DISTAL CORD AND CONUS:  Normal size and signal within the distal cord and conus. PARASPINAL SOFT TISSUES:  Paraspinal soft tissues are unremarkable. LOWER THORACIC DISC SPACES:  Normal disc height and signal.  No disc herniation, canal stenosis or foraminal narrowing. LUMBAR DISC SPACES:     L1-L2: There is disc space narrowing. There is a bulging annulus. There is facet arthrosis. There is mild mass effect on the thecal sac. There is mild left foraminal encroachment. L2-L3: There is a bulging annulus. There is facet arthrosis with ligamentum flavum thickening. There is mild mass effect on the thecal sac. There is mild bilateral foraminal narrowing. L3-L4: There is a bulging annulus. There is facet arthrosis. There is mild mass effect on thecal sac. There is mild to moderate bilateral foraminal narrowing. L4-L5: There is mild bulge, asymmetric to the right. There is facet arthrosis. There is mild mass effect on the thecal sac. There is moderate right foraminal narrowing with near abutment of the exiting nerve root. L5-S1: There is disc degeneration and narrowing. There is a bulging annulus. There is dorsal and marginal osteophytosis. There is mild bilateral foraminal narrowing. There is mild mass effect on the thecal sac. POSTCONTRAST IMAGING:  No abnormal enhancement. OTHER FINDINGS:  None. IMPRESSION:     Stable lesion within the left pedicle at L1 dating back to prior examinations since 5/17/2022. Stability over the course of 1 year favors the presence of an atypical hemangioma. No new suspicious marrow placing lesions identified. Degenerative changes of the lumbar spine, as described above."    Return in about 5 weeks (around 12/26/2023). Patient is in agreement with the above plan. HPI:  Dinesh Good is a 62 y.o. female  who presents in follow up.   Here for   Chief Complaint   Patient presents with    Right Hip - Pain, Injections       Since last visit: See above. Following history reviewed and updated:  Past Medical History:   Diagnosis Date    Allergic     Allergic rhinitis     last assessed 14     Arthritis     Asthma     Bleeding per rectum     Last assessed 16     Colon polyp     Diverticulitis of colon     GERD (gastroesophageal reflux disease)     Hypercholesterolemia     Hypertension     last assessed 3/24/15     Hyponatremia     last assessed 9/17/15     Obesity      Past Surgical History:   Procedure Laterality Date     SECTION      COLONOSCOPY      FL INJECTION RIGHT SHOULDER (ARTHROGRAM)  3/18/2021    WISDOM TOOTH EXTRACTION      WRIST SURGERY       Social History   Social History     Substance and Sexual Activity   Alcohol Use Yes    Comment: rare / never drank alcohol per [employer]      Social History     Substance and Sexual Activity   Drug Use No     Social History     Tobacco Use   Smoking Status Never   Smokeless Tobacco Never     Family History   Adopted: Yes   Problem Relation Age of Onset    No Known Problems Mother     Migraines Daughter      Allergies   Allergen Reactions    Levofloxacin Hives and Tachycardia     Annotation - [id number]: LIGHTHEADED    Sulfamethoxazole-Trimethoprim     Hydrocodone Bitartrate Er      Annotation - [id number]: FLUSHING    CHEST/RIB PAIN        Constitutional:  There were no vitals taken for this visit. General: NAD. Eyes: Clear sclerae. ENT: No inflammation, lesion, or mass of lips. No tracheal deviation. Musculoskeletal: As mentioned below. Integumentary: No visible rashes or skin lesions. Pulmonary/Chest: Effort normal. No respiratory distress. Neuro: CN's grossly intact, PALACIOS. Psych: Normal affect and judgement. Vascular: WWP.     Right Hip Exam     Range of Motion   Internal rotation:  abnormal     Tests   HODA: positive    Other   Erythema: absent  Scars: absent  Sensation: normal  Pulse: present             Large joint arthrocentesis: R hip joint  Universal Protocol:  Procedure performed by:  Consent: Verbal consent obtained. Written consent not obtained. Consent given by: patient  Timeout called at: 11/21/2023 11:46 AM.  Patient understanding: patient states understanding of the procedure being performed  Site marked: the operative site was marked  Radiology Images displayed and confirmed. If images not available, report reviewed: imaging studies available  Patient identity confirmed: verbally with patient  Supporting Documentation  Indications: pain and diagnostic evaluation   Procedure Details  Location: hip - R hip joint  Ultrasound guidance: yes (US guidance was used to find the area of interest.)  Medications administered: 80 mg triamcinolone acetonide 40 mg/mL; 10 mL ropivacaine 0.5 %    Patient tolerance: patient tolerated the procedure well with no immediate complications  Dressing:  Sterile dressing applied    The right hip joint was visualized with ultrasound and injected with steroid/anesthetic solution as indicated. Prior to the injection, the ultrasound was used to evaluate for any neural or vascular structures. Care was taken to avoid these structures. The images (and video if taken) were saved to the [employer] ultrasound system. Risks of this procedure include:    - Risk of bleeding since a needle is involved. - Risk of infection (1/10,000 chance as per recent studies). Signs/symptoms were discussed and they would prompt an urgent evaluation at an emergency department.  - Risk of pigmentation or skin dimpling in the skin (2-3% chance as per recent studies) from the steroid. - Risk of increased pain from steroid flare (1% chance as per recent studies) that typically lasts 24-48 hours. - Risk of increased blood sugars from the steroid medication that can last for a few weeks.   If the patient is a diabetic or pre-diabetic, they were encouraged to closely monitor their blood sugars and discuss with PCP if elevated more than usual or if having symptoms. We decided that the benefits outweigh the risks and so we proceeded with the procedure.

## 2023-11-29 ENCOUNTER — OFFICE VISIT (OUTPATIENT)
Dept: INTERNAL MEDICINE CLINIC | Facility: CLINIC | Age: 58
End: 2023-11-29
Payer: COMMERCIAL

## 2023-11-29 VITALS
HEART RATE: 71 BPM | WEIGHT: 256 LBS | DIASTOLIC BLOOD PRESSURE: 82 MMHG | RESPIRATION RATE: 16 BRPM | BODY MASS INDEX: 38.8 KG/M2 | HEIGHT: 68 IN | SYSTOLIC BLOOD PRESSURE: 116 MMHG | OXYGEN SATURATION: 95 %

## 2023-11-29 DIAGNOSIS — E66.01 MORBID OBESITY (HCC): ICD-10-CM

## 2023-11-29 DIAGNOSIS — L65.9 ALOPECIA: ICD-10-CM

## 2023-11-29 DIAGNOSIS — L57.0 ACTINIC KERATOSIS: ICD-10-CM

## 2023-11-29 DIAGNOSIS — M54.16 LUMBAR RADICULOPATHY: ICD-10-CM

## 2023-11-29 DIAGNOSIS — G44.89 OTHER HEADACHE SYNDROME: ICD-10-CM

## 2023-11-29 DIAGNOSIS — I10 BENIGN ESSENTIAL HYPERTENSION: Primary | ICD-10-CM

## 2023-11-29 DIAGNOSIS — R73.03 PREDIABETES: ICD-10-CM

## 2023-11-29 DIAGNOSIS — J45.909 MODERATE ASTHMA WITHOUT COMPLICATION, UNSPECIFIED WHETHER PERSISTENT: ICD-10-CM

## 2023-11-29 DIAGNOSIS — L65.2 ALOPECIA MUCINOSA: ICD-10-CM

## 2023-11-29 DIAGNOSIS — E78.5 HYPERLIPIDEMIA, UNSPECIFIED HYPERLIPIDEMIA TYPE: ICD-10-CM

## 2023-11-29 DIAGNOSIS — E78.2 MODERATE MIXED HYPERLIPIDEMIA NOT REQUIRING STATIN THERAPY: ICD-10-CM

## 2023-11-29 DIAGNOSIS — J01.91 ACUTE RECURRENT SINUSITIS, UNSPECIFIED LOCATION: ICD-10-CM

## 2023-11-29 DIAGNOSIS — L98.9 SKIN LESION: ICD-10-CM

## 2023-11-29 DIAGNOSIS — I10 HYPERTENSION, UNSPECIFIED TYPE: ICD-10-CM

## 2023-11-29 DIAGNOSIS — M79.89 LEG SWELLING: ICD-10-CM

## 2023-11-29 DIAGNOSIS — I10 BENIGN ESSENTIAL HTN: ICD-10-CM

## 2023-11-29 DIAGNOSIS — K21.9 GASTROESOPHAGEAL REFLUX DISEASE: ICD-10-CM

## 2023-11-29 PROBLEM — N95.0 PMB (POSTMENOPAUSAL BLEEDING): Status: RESOLVED | Noted: 2018-01-23 | Resolved: 2023-11-29

## 2023-11-29 PROCEDURE — 99214 OFFICE O/P EST MOD 30 MIN: CPT | Performed by: INTERNAL MEDICINE

## 2023-11-29 RX ORDER — SIMVASTATIN 5 MG
5 TABLET ORAL
Qty: 90 TABLET | Refills: 2 | Status: SHIPPED | OUTPATIENT
Start: 2023-11-29

## 2023-11-29 RX ORDER — SPIRONOLACTONE 50 MG/1
50 TABLET, FILM COATED ORAL DAILY
Qty: 90 TABLET | Refills: 2 | Status: SHIPPED | OUTPATIENT
Start: 2023-11-29

## 2023-11-29 RX ORDER — VERAPAMIL HYDROCHLORIDE 180 MG/1
180 CAPSULE, EXTENDED RELEASE ORAL
Qty: 90 CAPSULE | Refills: 2 | Status: SHIPPED | OUTPATIENT
Start: 2023-11-29

## 2023-11-29 NOTE — PROGRESS NOTES
Assessment/Plan:      Routine follow-up. We reviewed her chronic medical problems. Reviewed recent blood work. Ordered labs for next time including CBC CMP T's A1c lipid. Hypertension is controlled. Medication refill sent for verapamil. Spironolactone for alopecia. Statin for hyperlipidemia. Discussed diet and exercise. Limited x-rays given her arthritis. Contrave sent for weight loss. Rash to right cheek; f/u derm needed from 5/2023;     Quality Measures:     BMI Counseling: Body mass index is 38.92 kg/m². The BMI is above normal. Nutrition recommendations include decreasing portion sizes and encouraging healthy choices of fruits and vegetables. Exercise recommendations include moderate physical activity 150 minutes/week. Rationale for BMI follow-up plan is due to patient being overweight or obese. Depression Screening and Follow-up Plan: Patient was screened for depression during today's encounter. They screened negative with a PHQ-2 score of 0. Return in about 6 months (around 5/29/2024). No problem-specific Assessment & Plan notes found for this encounter. Diagnoses and all orders for this visit:    Benign essential hypertension  -     CBC and differential; Future  -     Comprehensive metabolic panel; Future  -     TSH, 3rd generation with Free T4 reflex; Future    Alopecia mucinosa    Actinic keratosis    Morbid obesity (HCC)  -     Naltrexone-buPROPion HCl ER 8-90 MG TB12; Take 1 tab po QAM x 1 week, then 1 tab po BID x 1 week, then 2 tab QAM, 1 tab QPM x 1 week, then 2 tabs BID thereafter. Prediabetes  -     Hemoglobin A1C; Future    Moderate mixed hyperlipidemia not requiring statin therapy  -     Lipid Panel with Direct LDL reflex; Future    Other headache syndrome          Subjective:      Patient ID: Soumya Clark is a 62 y.o. female. Here for follow up.       ALLERGIES:  Allergies   Allergen Reactions    Levofloxacin Hives and Tachycardia     [facility] - [id number]: LIGHTHEADED    Sulfamethoxazole-Trimethoprim     Hydrocodone Bitartrate Er      Annotation - [id number]: FLUSHING    CHEST/RIB PAIN       CURRENT MEDICATIONS:    Current Outpatient Medications:     acetaminophen (TYLENOL) 500 mg tablet, Take by mouth, Disp: , Rfl:     Black Cohosh (REMIFEMIN MENOPAUSE PO), Take by mouth, Disp: , Rfl:     Cholecalciferol (VITAMIN D) 2000 units CAPS, Take by mouth, Disp: , Rfl:     cholestyramine (QUESTRAN) 4 GM/DOSE powder, Take 1 packet (4 g total) by mouth 2 (two) times a day As needed for diarrhea, Disp: 378 g, Rfl: 5    diclofenac sodium (VOLTAREN) 50 mg EC tablet, Take 1 tablet (50 mg total) by mouth 2 (two) times a day as needed (Pain > 5/10), Disp: 120 tablet, Rfl: 0    Ginkgo Biloba 60 MG TABS, Take by mouth daily , Disp: , Rfl:     glucosamine-chondroitin 500-400 MG tablet, Take 1 tablet by mouth 3 (three) times a day, Disp: 100 tablet, Rfl: 2    mometasone (NASONEX) 50 mcg/act nasal spray, 2 sprays into each nostril daily, Disp: 17 g, Rfl: 0    Naltrexone-buPROPion HCl ER 8-90 MG TB12, Take 1 tab po QAM x 1 week, then 1 tab po BID x 1 week, then 2 tab QAM, 1 tab QPM x 1 week, then 2 tabs BID thereafter., Disp: 360 tablet, Rfl: 2    omeprazole (PriLOSEC) 10 mg delayed release capsule, Take 1 capsule (10 mg total) by mouth daily, Disp: 90 capsule, Rfl: 2    Probiotic Product (ALIGN) 4 MG CAPS, Take by mouth, Disp: , Rfl:     Rhubarb (ESTROVEN COMPLETE PO), Estroven, Disp: , Rfl:     simvastatin (ZOCOR) 5 MG tablet, take 1 tablet by mouth at bedtime, Disp: 90 tablet, Rfl: 2    spironolactone (ALDACTONE) 50 mg tablet, Take 1 tablet (50 mg total) by mouth daily, Disp: 90 tablet, Rfl: 2    verapamil (VERELAN PM) 180 MG 24 hr capsule, Take 1 capsule (180 mg total) by mouth daily before breakfast, Disp: 90 capsule, Rfl: 2    vitamin E, tocopherol, 400 units capsule, Take 1 capsule (400 Units total) by mouth daily, Disp: , Rfl:     ACTIVE PROBLEM LIST:  Patient Active Problem List Diagnosis    Alopecia mucinosa    Arthralgia of both ankles    Asthma    Benign essential hypertension    Cervical spinal stenosis    Chronic sinusitis    Colon polyp    Foraminal stenosis of cervical region    Greater trochanteric bursitis, left    Hyperlipidemia    Lumbar radiculopathy    Morbid obesity (HCC)    Osteoarthritis of knees, bilateral    Prediabetes    Right cervical radiculopathy    Trochanteric bursitis of both hips    Chondromalacia of right patella    Popliteal cyst, right    GERD (gastroesophageal reflux disease)    Hand joint stiff, right PIP    Plantar fasciitis, left    Actinic keratosis    Other headache syndrome    Pain in right hip    Mass of spine    Primary osteoarthritis of one hip, right       PAST MEDICAL HISTORY:  Past Medical History:   Diagnosis Date    Allergic     Allergic rhinitis     last assessed 14     Arthritis     Asthma     Bleeding per rectum     Last assessed 16     Colon polyp     Diverticulitis of colon     GERD (gastroesophageal reflux disease)     Hypercholesterolemia     Hypertension     last assessed 3/24/15     Hyponatremia     last assessed 9/17/15     Obesity        PAST SURGICAL HISTORY:  Past Surgical History:   Procedure Laterality Date     SECTION      COLONOSCOPY      FL INJECTION RIGHT SHOULDER (ARTHROGRAM)  3/18/2021    WISDOM TOOTH EXTRACTION      WRIST SURGERY         FAMILY HISTORY:  Family History   Adopted: Yes   Problem Relation Age of Onset    No Known Problems Mother     Migraines Daughter        SOCIAL HISTORY:  Social History     Socioeconomic History    Marital status: /Civil Union     Spouse name: Not on file    Number of children: Not on file    Years of education: Not on file    Highest education level: Not on file   Occupational History     Comment: full time employment    Tobacco Use    Smoking status: Never    Smokeless tobacco: Never   Vaping Use    Vaping Use: Never used   Substance and Sexual Activity Alcohol use: Yes     Comment: rare / never drank alcohol per allscript     Drug use: No    Sexual activity: Yes   Other Topics Concern    Not on file   Social History Narrative    Lives with spouse     Social Determinants of Health     Financial Resource Strain: Not on file   Food Insecurity: Not on file   Transportation Needs: Not on file   Physical Activity: Not on file   Stress: Not on file   Social Connections: Not on file   Intimate Partner Violence: Not on file   Housing Stability: Not on file       Review of Systems   Constitutional:  Negative for chills and fever. HENT:  Negative for ear pain and sore throat. Eyes:  Negative for pain and visual disturbance. Respiratory:  Negative for cough and shortness of breath. Cardiovascular:  Negative for chest pain and palpitations. Gastrointestinal:  Negative for abdominal pain and vomiting. Genitourinary:  Negative for dysuria and hematuria. Musculoskeletal:  Positive for arthralgias and gait problem. Negative for back pain. Skin:  Positive for color change. Negative for rash. Neurological:  Negative for seizures and syncope. All other systems reviewed and are negative. Objective:  Vitals:    11/29/23 1002   BP: 116/82   BP Location: Left arm   Patient Position: Sitting   Cuff Size: Adult   Pulse: 71   Resp: 16   SpO2: 95%   Weight: 116 kg (256 lb)   Height: 5' 8" (1.727 m)     Body mass index is 38.92 kg/m². Physical Exam  Vitals and nursing note reviewed. Constitutional:       Appearance: Normal appearance. She is obese. HENT:      Head: Normocephalic and atraumatic. Cardiovascular:      Rate and Rhythm: Normal rate and regular rhythm. Heart sounds: Normal heart sounds. Pulmonary:      Effort: Pulmonary effort is normal.      Breath sounds: Normal breath sounds. Musculoskeletal:         General: Normal range of motion. Cervical back: Normal range of motion. Right lower leg: No edema.       Left lower leg: No edema.   Skin:     General: Skin is warm and dry. Findings: Erythema (faint erythematous discoloration to right cheek) present. Neurological:      General: No focal deficit present. Mental Status: She is alert and oriented to person, place, and time. Mental status is at baseline. Psychiatric:         Mood and Affect: Mood normal.         RESULTS:  Hemoglobin A1C   Date/Time Value Ref Range Status   11/20/2023 09:24 AM 6.1 (H) Normal 4.0-5.6%; PreDiabetic 5.7-6.4%; Diabetic >=6.5%; Glycemic control for adults with diabetes <7.0% % Final   04/20/2019 09:42 AM 5.7 (H) <5.7 % Final     Comment:     Reference Range  Non-diabetic                     <5.7  Pre-diabetic                     5.7-6.4  Diabetic                         >=6.5  ADA target for diabetic control  <=7     Cholesterol   Date/Time Value Ref Range Status   11/20/2023 09:24  See Comment mg/dL Final     Comment:     Cholesterol:         Pediatric <18 Years        Desirable          <170 mg/dL      Borderline High    170-199 mg/dL      High               >=200 mg/dL        Adult >=18 Years            Desirable        <200 mg/dL      Borderline High  200-239 mg/dL      High             >239 mg/dL       Triglycerides   Date/Time Value Ref Range Status   11/20/2023 09:24 AM 84 See Comment mg/dL Final     Comment:     Triglyceride:     0-9Y            <75mg/dL     10Y-17Y         <90 mg/dL       >=18Y     Normal          <150 mg/dL     Borderline High 150-199 mg/dL     High            200-499 mg/dL        Very High       >499 mg/dL    Specimen collection should occur prior to Metamizole administration due to the potential for falsely depressed results.    02/10/2017 11:08 AM 89  Final     HDL   Date/Time Value Ref Range Status   02/10/2017 11:08 AM 69  Final     Comment:       Performed at: *Other  ,       HDL, Direct   Date/Time Value Ref Range Status   11/20/2023 09:24 AM 63 >=50 mg/dL Final     LDL Calculated   Date/Time Value Ref Range Status   11/20/2023 09:24 AM 75 0 - 100 mg/dL Final     Comment:     LDL Cholesterol:     Optimal           <100 mg/dl     Near Optimal      100-129 mg/dl     Above Optimal       Borderline High 130-159 mg/dl       High            160-189 mg/dl       Very High       >189 mg/dl         This screening LDL is a calculated result. It does not have the accuracy of the Direct Measured LDL in the monitoring of patients with hyperlipidemia and/or statin therapy. Direct Measure LDL (DXH345) must be ordered separately in these patients. Hemoglobin   Date/Time Value Ref Range Status   11/20/2023 09:24 AM 12.0 11.5 - 15.4 g/dL Final     Hematocrit   Date/Time Value Ref Range Status   11/20/2023 09:24 AM 39.3 34.8 - 46.1 % Final     Platelets   Date/Time Value Ref Range Status   11/20/2023 09:24  149 - 390 Thousands/uL Final     TSH 3RD GENERATON   Date/Time Value Ref Range Status   11/20/2023 09:24 AM 1.946 0.450 - 4.500 uIU/mL Final     Comment:     The recommended reference ranges for TSH during pregnancy are as follows:   First trimester 0.100 to 2.500 uIU/mL   Second trimester  0.200 to 3.000 uIU/mL   Third trimester 0.300 to 3.000 uIU/m    Note: Normal ranges may not apply to patients who are transgender, non-binary, or whose legal sex, sex at birth, and gender identity differ. Adult TSH (3rd generation) reference range follows the recommended guidelines of the American Thyroid Association, January, 2020.      Sodium   Date/Time Value Ref Range Status   11/20/2023 09:24  135 - 147 mmol/L Final     BUN   Date/Time Value Ref Range Status   11/20/2023 09:24 AM 13 5 - 25 mg/dL Final   09/21/2015 03:37 PM 9 5 - 25 mg/dL Final     Creatinine   Date/Time Value Ref Range Status   11/20/2023 09:24 AM 0.88 0.60 - 1.30 mg/dL Final     Comment:     Standardized to IDMS reference method   09/21/2015 03:37 PM 0.78 0.60 - 1.30 mg/dL Final     Comment:     Standardized to IDMS reference method      In chart    This note was created with voice recognition software. Phonic, grammatical and spelling errors may be present within the note as a result.

## 2023-12-12 NOTE — PROGRESS NOTES
Assessment:  1. Sacroiliac joint dysfunction    2. Chronic right-sided low back pain, unspecified whether sciatica present    3. Lumbar degenerative disc disease        Plan:  Orders Placed This Encounter   Procedures    FL spine and pain procedure     Standing Status:   Future     Standing Expiration Date:   12/15/2027     Order Specific Question:   Reason for Exam:     Answer:   right SI joint injection     Order Specific Question:   Anticoagulant hold needed? Answer:   no     Order Specific Question:   Is the patient pregnant? Answer:   Unknown       No orders of the defined types were placed in this encounter. My impressions and treatment recommendations were discussed in detail with the patient, who verbalized understanding and had no further questions. This is a 43-year-old female presents her office with chief complaint of right lower back pain, right lateral hip pain. She has been seen by sports medicine for hip issues for over a year and has had multiple greater trochanteric bursa injection and intra-articular hip injections with significant relief early on but less so recently. On exam today, she has notable tenderness over the right SI joint with pain with provocative maneuvers notable low. Upon HODA testing of the right leg she also has notable lateral hip pain as well. She does not appear to have significant pain with extension or facet loading. Though facet mediated pain is on the differential.  It actually appears that more of her symptoms are related to the SI joint. We discussed right SI joint injection fluoroscopic guidance over the symptoms. She will continue follow-up with orthopedics for her hip pathology as she has notable primary osteoarthritis of the right hip.       [place] Prescription Drug Monitoring Program report was reviewed and was appropriate     Complete risks and benefits including bleeding, infection, tissue reaction, nerve injury and allergic reaction were discussed. The approach was demonstrated using models and literature was provided. Verbal and written consent was obtained. Discharge instructions were provided. I personally saw and examined the patient and I agree with the above discussed plan of care. History of Present Illness:    Alina Johnson is a 62 y.o. female who presents to [address] [place] [address] and [facility] for initial evaluation of the above stated pain complaints. The patient has a past medical and chronic pain history as outlined in the assessment section. She was referred by Stacey Mcclain, DO  [address] [facility]  [address] [place] [address],  [address] [place] [address]. Patient is here with chief complaint of right hip pain. It is moderate over the past month. 4-5 out of 10. Nearly constant. No typical pattern. Described as shooting, pressure-like, dull/aching. Patient is well-known to orthopedic sports medicine. She has received injections from their offices which included trochanteric bursa injection and ultrasound-guided intra-articular hip injection. Has had multiple injections over the last year. She reports these are no longer helpful. They would helpful 6-9 months at a time previously. She reports right>left low back pain and right lateral hip pain. She used to have shooting pain into the thigh which is no longer there. She was doing well until this past fall when she felt a pop in her right hip. She has had bursa shot and joint injection with no relief. She reports moderate relief with heat/ice therapy. Has a notable past medical history includes stomach ulcer. Patient is currently using diclofenac, lidocaine patch, acetaminophen, ibuprofen, naproxen. In the past has used Ativan. Review of Systems:    Review of Systems   Musculoskeletal:  Positive for arthralgias. Neurological:  Positive for headaches.            Past Medical History:   Diagnosis Date    Allergic     Allergic rhinitis     last assessed 11/17/14 Arthritis     Asthma     Bleeding per rectum     Last assessed 16     Colon polyp     Diverticulitis of colon     GERD (gastroesophageal reflux disease)     Hypercholesterolemia     Hypertension     last assessed 3/24/15     Hyponatremia     last assessed 9/17/15     Obesity        Past Surgical History:   Procedure Laterality Date     SECTION      COLONOSCOPY      FL INJECTION RIGHT SHOULDER (ARTHROGRAM)  3/18/2021    WISDOM TOOTH EXTRACTION      WRIST SURGERY         Family History   Adopted: Yes   Problem Relation Age of Onset    No Known Problems Mother     Migraines Daughter        Social History     Occupational History     Comment: full time employment    Tobacco Use    Smoking status: Never    Smokeless tobacco: Never   Vaping Use    Vaping status: Never Used   Substance and Sexual Activity    Alcohol use: Yes     Comment: rare / never drank alcohol per allscript     Drug use: No    Sexual activity: Yes         Current Outpatient Medications:     acetaminophen (TYLENOL) 500 mg tablet, Take by mouth, Disp: , Rfl:     Cholecalciferol (VITAMIN D) 2000 units CAPS, Take by mouth, Disp: , Rfl:     cholestyramine (QUESTRAN) 4 GM/DOSE powder, Take 1 packet (4 g total) by mouth 2 (two) times a day As needed for diarrhea, Disp: 378 g, Rfl: 5    diclofenac sodium (VOLTAREN) 50 mg EC tablet, Take 1 tablet (50 mg total) by mouth 2 (two) times a day as needed (Pain > 5/10), Disp: 120 tablet, Rfl: 0    mometasone (NASONEX) 50 mcg/act nasal spray, 2 sprays into each nostril daily, Disp: 17 g, Rfl: 0    Naltrexone-buPROPion HCl ER 8-90 MG TB12, Take 1 tab po QAM x 1 week, then 1 tab po BID x 1 week, then 2 tab QAM, 1 tab QPM x 1 week, then 2 tabs BID thereafter., Disp: 360 tablet, Rfl: 2    omeprazole (PriLOSEC) 10 mg delayed release capsule, Take 1 capsule (10 mg total) by mouth daily, Disp: 90 capsule, Rfl: 2    Probiotic Product (ALIGN) 4 MG CAPS, Take by mouth, Disp: , Rfl:     Rhubarb (ESTROVEN COMPLETE PO), Estroven, Disp: , Rfl:     simvastatin (ZOCOR) 5 MG tablet, Take 1 tablet (5 mg total) by mouth daily at bedtime, Disp: 90 tablet, Rfl: 2    spironolactone (ALDACTONE) 50 mg tablet, Take 1 tablet (50 mg total) by mouth daily, Disp: 90 tablet, Rfl: 2    verapamil (VERELAN PM) 180 MG 24 hr capsule, Take 1 capsule (180 mg total) by mouth daily before breakfast, Disp: 90 capsule, Rfl: 2    vitamin E, tocopherol, 400 units capsule, Take 1 capsule (400 Units total) by mouth daily, Disp: , Rfl:     Black Cohosh (REMIFEMIN MENOPAUSE PO), Take by mouth, Disp: , Rfl:     Ginkgo Biloba 60 MG TABS, Take by mouth daily  (Patient not taking: Reported on 12/15/2023), Disp: , Rfl:     glucosamine-chondroitin 500-400 MG tablet, Take 1 tablet by mouth 3 (three) times a day, Disp: 100 tablet, Rfl: 2    Allergies   Allergen Reactions    Levofloxacin Hives and Tachycardia     Annotation - [id number]: LIGHTHEADED    Sulfamethoxazole-Trimethoprim     Hydrocodone Bitartrate Er      Annotation - [id number]: FLUSHING    CHEST/RIB PAIN       Physical Exam:    /88   Pulse 80   Ht 5' 8" (1.727 m)   Wt 117 kg (258 lb 4.8 oz)   BMI 39.27 kg/m²     Constitutional: normal, well developed, well nourished, alert, in no distress and non-toxic and no overt pain behavior.   Eyes: anicteric  HEENT: grossly intact  Neck: supple, symmetric, trachea midline and no masses   Pulmonary:even and unlabored  Cardiovascular:No edema or pitting edema present  Skin:Normal without rashes or lesions and well hydrated  Psychiatric:Mood and affect appropriate  Neurologic:Cranial Nerves II-XII grossly intact  Musculoskeletal:normal    Lumbar Spine Exam    Appearance:  Normal lordosis  Palpation/Tenderness:  right sacroiliac joint tenderness  Sensory:  no sensory deficits noted  Range of Motion:  Extension:  No limitation  without pain  Motor Strength:  Left hip flexion:  5/5  Left hip extension:  5/5  Right hip flexion:  5/5  Right hip extension:  5/5  Left knee flexion:  5/5  Left knee extension:  5/5  Right knee flexion:  5/5  Right knee extension:  5/5  Left foot dorsiflexion:  5/5  Left foot plantar flexion:  5/5  Right foot dorsiflexion:  5/5  Right foot plantar flexion:  5/5  Reflexes:  Left Patellar:  2+   Right Patellar:  2+   Left Achilles:  2+   Right Achilles:  2+   Special Tests:  Right Straight Leg Test:  negative  Right Calvin's Maneuver:  positive  Right Gaenslen's Test:  positive  Right Pelvic Distraction Test:  positive  Facet loading negative bilaterally    Imaging      RIGHT HIP  10/19/23     INDICATION:   M25.551: Pain in right hip. COMPARISON: Right hip x-ray dated January 19, 2021. VIEWS:  XR HIP/PELV 2-3 VWS RIGHT W PELVIS IF PERFORMED        FINDINGS:     There is no acute fracture or dislocation. There are severe osteoarthritic degenerative changes of the right hip joint which have progressed from the prior exam.     No lytic or blastic osseous lesion. Soft tissues are unremarkable. Degenerative changes visualized lower lumbar spine. IMPRESSION:     No acute osseous abnormality. Severe osteoarthritic degenerative changes of the right hip joint. MRI LUMBAR SPINE WITH AND WITHOUT CONTRAST  7/3/23     INDICATION: M89.8X8: Other specified disorders of bone, other site  M43.16: Spondylolisthesis, lumbar region. COMPARISON: 12/8/2022 and 5/17/2022     TECHNIQUE:  Multiplanar, multisequence imaging of the lumbar spine was performed before and after gadolinium administration. .        IV Contrast:  11 mL of Gadobutrol injection (SINGLE-DOSE)     IMAGE QUALITY:  Diagnostic     FINDINGS:     VERTEBRAL BODIES:  There are 5 lumbar type vertebral bodies. There is retrolisthesis of L1-L2 and L2-L3. There is a stable enhancing lesion within the left pedicle at L1. This measures 1.5 x 1.5 cm. Given interval stability over the course of 1 year this is most consistent with an atypical hemangioma.      SACRUM:  Normal signal within the sacrum. No evidence of insufficiency or stress fracture. DISTAL CORD AND CONUS:  Normal size and signal within the distal cord and conus. PARASPINAL SOFT TISSUES:  Paraspinal soft tissues are unremarkable. LOWER THORACIC DISC SPACES:  Normal disc height and signal.  No disc herniation, canal stenosis or foraminal narrowing. LUMBAR DISC SPACES:     L1-L2: There is disc space narrowing. There is a bulging annulus. There is facet arthrosis. There is mild mass effect on the thecal sac. There is mild left foraminal encroachment. L2-L3: There is a bulging annulus. There is facet arthrosis with ligamentum flavum thickening. There is mild mass effect on the thecal sac. There is mild bilateral foraminal narrowing. L3-L4: There is a bulging annulus. There is facet arthrosis. There is mild mass effect on thecal sac. There is mild to moderate bilateral foraminal narrowing. L4-L5: There is mild bulge, asymmetric to the right. There is facet arthrosis. There is mild mass effect on the thecal sac. There is moderate right foraminal narrowing with near abutment of the exiting nerve root. L5-S1: There is disc degeneration and narrowing. There is a bulging annulus. There is dorsal and marginal osteophytosis. There is mild bilateral foraminal narrowing. There is mild mass effect on the thecal sac. POSTCONTRAST IMAGING:  No abnormal enhancement. OTHER FINDINGS:  None. IMPRESSION:     Stable lesion within the left pedicle at L1 dating back to prior examinations since 5/17/2022. Stability over the course of 1 year favors the presence of an atypical hemangioma. No new suspicious marrow placing lesions identified. Degenerative changes of the lumbar spine, as described above.          FL spine and pain procedure    (Results Pending)       Orders Placed This Encounter   Procedures    FL spine and pain procedure

## 2023-12-15 ENCOUNTER — CONSULT (OUTPATIENT)
Dept: PAIN MEDICINE | Facility: CLINIC | Age: 58
End: 2023-12-15
Payer: COMMERCIAL

## 2023-12-15 VITALS
HEART RATE: 80 BPM | BODY MASS INDEX: 39.15 KG/M2 | HEIGHT: 68 IN | SYSTOLIC BLOOD PRESSURE: 133 MMHG | DIASTOLIC BLOOD PRESSURE: 88 MMHG | WEIGHT: 258.3 LBS

## 2023-12-15 DIAGNOSIS — M53.3 SACROILIAC JOINT DYSFUNCTION: Primary | ICD-10-CM

## 2023-12-15 DIAGNOSIS — G89.29 CHRONIC RIGHT-SIDED LOW BACK PAIN, UNSPECIFIED WHETHER SCIATICA PRESENT: ICD-10-CM

## 2023-12-15 DIAGNOSIS — M51.36 LUMBAR DEGENERATIVE DISC DISEASE: ICD-10-CM

## 2023-12-15 DIAGNOSIS — M54.50 CHRONIC RIGHT-SIDED LOW BACK PAIN, UNSPECIFIED WHETHER SCIATICA PRESENT: ICD-10-CM

## 2023-12-15 PROCEDURE — 99204 OFFICE O/P NEW MOD 45 MIN: CPT | Performed by: STUDENT IN AN ORGANIZED HEALTH CARE EDUCATION/TRAINING PROGRAM

## 2023-12-15 NOTE — PATIENT INSTRUCTIONS
Sacroiliitis   WHAT YOU NEED TO KNOW:   Sacroiliitis is a painful swelling of one or both of your sacroiliac joints that lasts at least 3 months. The sacroiliac joint connects your pelvis to the base of your spine. DISCHARGE INSTRUCTIONS:   Medicines:  Ask for more information about these and other medicines you may need to treat sacroiliitis:  Pain medicine: You may be given medicine to take away or decrease pain. Do not wait until the pain is severe before you take your medicine. You may be given the medicine as a pill to swallow or as a lotion that you put on the painful area. NSAIDs  help decrease swelling and pain or fever. This medicine is available with or without a doctor's order. NSAIDs can cause stomach bleeding or kidney problems in certain people. If you take blood thinner medicine, always ask your healthcare provider if NSAIDs are safe for you. Always read the medicine label and follow directions. Muscle relaxers  help decrease pain and muscle spasms. Take your medicine as directed. Contact your healthcare provider if you think your medicine is not helping or if you have side effects. Tell your provider if you are allergic to any medicine. Keep a list of the medicines, vitamins, and herbs you take. Include the amounts, and when and why you take them. Bring the list or the pill bottles to follow-up visits. Carry your medicine list with you in case of an emergency. Physical therapy:  Your healthcare provider may suggest physical therapy. Your physical therapist may teach you exercises to improve posture (the way you stand and sit), flexibility, and strength in your lower back. The therapist may also teach you how to remain safely active and prevent more injury. Follow the exercise plan given to you by your physical therapist.  Rest:  Follow your healthcare provider's instructions about how much rest you should get. Avoid activity that worsens your pain.   Ice or heat packs:  Use ice or heat packs on the sore area of your body to decrease the pain and swelling. Put ice in a plastic bag covered with a towel on your low back. Cover heated items with a towel to avoid burns. Use ice and heat as directed. Follow up with your healthcare provider or spine specialist within 1 to 2 weeks:  Write down your questions so you remember to ask them during your visits. Contact your healthcare provider or spine specialist if:   Your pain makes it hard for you to do your daily activities, such as work or school. Your pain does not go away after treatment. You feel depressed or anxious. You have questions about your condition or care. Return to the emergency department if:   You have a fever. Your pain is worse than before. Your pain prevents you from sleeping. © Copyright [employer] 2023 Information is for End User's use only and may not be sold, redistributed or otherwise used for commercial purposes. The above information is an  only. It is not intended as medical advice for individual conditions or treatments. Talk to your doctor, nurse or pharmacist before following any medical regimen to see if it is safe and effective for you.

## 2023-12-18 ENCOUNTER — TELEPHONE (OUTPATIENT)
Dept: RADIOLOGY | Facility: CLINIC | Age: 58
End: 2023-12-18

## 2023-12-20 ENCOUNTER — OFFICE VISIT (OUTPATIENT)
Dept: OBGYN CLINIC | Facility: MEDICAL CENTER | Age: 58
End: 2023-12-20
Payer: COMMERCIAL

## 2023-12-20 DIAGNOSIS — M16.11 PRIMARY OSTEOARTHRITIS OF ONE HIP, RIGHT: Primary | ICD-10-CM

## 2023-12-20 DIAGNOSIS — M54.16 LUMBAR RADICULOPATHY: ICD-10-CM

## 2023-12-20 PROCEDURE — 99214 OFFICE O/P EST MOD 30 MIN: CPT | Performed by: PHYSICAL MEDICINE & REHABILITATION

## 2023-12-20 NOTE — PROGRESS NOTES
"1. Primary osteoarthritis of one hip, right  Ambulatory referral to Orthopedic Surgery      2. Lumbar radiculopathy          Orders Placed This Encounter   Procedures    Ambulatory referral to Orthopedic Surgery        Impression:  Patient is here in follow up of right hip pain likely multifactorial and 2/2 to osteoarthritis as below. Treatment has included greater trochanteric injection and ultrasound-guided hip joint injections. Her last hip joint injection was in late November.  It provided relief for only a month.  We will have Teena go for consultation with orthopaedic surgery if she does not improve after her SIJ injection in mid-January.  I will see her back after February 20 for possible repeat right hip joint injection.     The patient also presents with axial low back pain likely multifactorial and 2/2 to facet arthropathy and degenerative disc disease.  She had a reaggravation of her symptoms a couple of days ago.  She had some improvement with oral steroids but it was not long-lasting.  She has a positive Rios's maneuver on exam today.  She previously had physical therapy and continues a home exercise program.  She is using diclofenac as well.  Teena is scheduled for SIJ injection with Dr. Whatley from pain management.      We previously reviewed the MRI of lumbar spine with and without contrast to reassess the L1 pedicle mass.  Continue symptom monitoring.     Imaging:  Updated right hip x-rays from 10/19/2023 show interval increase in the right hip joint osteoarthritis.  It was previously mild and now it is severe.     Lumbar MRI with and without contrast:  \"VERTEBRAL BODIES:  There are 5 lumbar type vertebral bodies. There is retrolisthesis of L1-L2 and L2-L3.     There is a stable enhancing lesion within the left pedicle at L1. This measures 1.5 x 1.5 cm. Given interval stability over the course of 1 year this is most consistent with an atypical hemangioma.     SACRUM:  Normal signal within the " "sacrum. No evidence of insufficiency or stress fracture.     DISTAL CORD AND CONUS:  Normal size and signal within the distal cord and conus.     PARASPINAL SOFT TISSUES:  Paraspinal soft tissues are unremarkable.     LOWER THORACIC DISC SPACES:  Normal disc height and signal.  No disc herniation, canal stenosis or foraminal narrowing.     LUMBAR DISC SPACES:     L1-L2: There is disc space narrowing. There is a bulging annulus. There is facet arthrosis. There is mild mass effect on the thecal sac. There is mild left foraminal encroachment.     L2-L3: There is a bulging annulus. There is facet arthrosis with ligamentum flavum thickening. There is mild mass effect on the thecal sac. There is mild bilateral foraminal narrowing.     L3-L4: There is a bulging annulus. There is facet arthrosis. There is mild mass effect on thecal sac. There is mild to moderate bilateral foraminal narrowing.     L4-L5: There is mild bulge, asymmetric to the right. There is facet arthrosis. There is mild mass effect on the thecal sac. There is moderate right foraminal narrowing with near abutment of the exiting nerve root.     L5-S1: There is disc degeneration and narrowing. There is a bulging annulus. There is dorsal and marginal osteophytosis. There is mild bilateral foraminal narrowing. There is mild mass effect on the thecal sac.     POSTCONTRAST IMAGING:  No abnormal enhancement.     OTHER FINDINGS:  None.     IMPRESSION:     Stable lesion within the left pedicle at L1 dating back to prior examinations since 5/17/2022. Stability over the course of 1 year favors the presence of an atypical hemangioma. No new suspicious marrow placing lesions identified.     Degenerative changes of the lumbar spine, as described above.\"    No follow-ups on file.    Patient is in agreement with the above plan.    HPI:  Teena Staples is a 58 y.o. female  who presents in follow up.  Here for   Chief Complaint   Patient presents with    Right Hip - Pain, " Follow-up       Since last visit: See above.    Following history reviewed and updated:  Past Medical History:   Diagnosis Date    Allergic     Allergic rhinitis     last assessed 14     Arthritis     Asthma     Bleeding per rectum     Last assessed 16     Colon polyp     Diverticulitis of colon     GERD (gastroesophageal reflux disease)     Hypercholesterolemia     Hypertension     last assessed 3/24/15     Hyponatremia     last assessed 9/17/15     Obesity      Past Surgical History:   Procedure Laterality Date     SECTION      COLONOSCOPY      FL INJECTION RIGHT SHOULDER (ARTHROGRAM)  3/18/2021    WISDOM TOOTH EXTRACTION      WRIST SURGERY       Social History   Social History     Substance and Sexual Activity   Alcohol Use Yes    Comment: rare / never drank alcohol per [employer]      Social History     Substance and Sexual Activity   Drug Use No     Social History     Tobacco Use   Smoking Status Never   Smokeless Tobacco Never     Family History   Adopted: Yes   Problem Relation Age of Onset    No Known Problems Mother     Migraines Daughter      Allergies   Allergen Reactions    Levofloxacin Hives and Tachycardia     Annotation - 2013: LIGHTHEADED    Sulfamethoxazole-Trimethoprim     Hydrocodone Bitartrate Er      Annotation - 2013: FLUSHING    CHEST/RIB PAIN        Constitutional:  There were no vitals taken for this visit.   General: NAD.  Eyes: Clear sclerae.  ENT: No inflammation, lesion, or mass of lips.  No tracheal deviation.  Musculoskeletal: As mentioned below.  Integumentary: No visible rashes or skin lesions.  Pulmonary/Chest: Effort normal. No respiratory distress.   Neuro: CN's grossly intact, PALACIOS.  Psych: Normal affect and judgement.  Vascular: WWP.    Right Hip Exam     Tenderness   The patient is experiencing tenderness in the greater trochanter.    Range of Motion   Internal rotation:  abnormal     Tests   HODA: positive  Chelsea: positive    Other   Erythema:  absent  Scars: absent  Sensation: normal  Pulse: present    Comments:  Pain with passive hip IR.             Procedures

## 2023-12-28 ENCOUNTER — TELEPHONE (OUTPATIENT)
Dept: INTERNAL MEDICINE CLINIC | Facility: CLINIC | Age: 58
End: 2023-12-28

## 2023-12-28 NOTE — TELEPHONE ENCOUNTER
Pt is covid positive today,  sxs started Sunday.   I told her most people are getting by on otc for covid , so she's managing her cough and such with otc for now.   Appt needed for prescriptions.     She did say her worst sx is an ear ache.  Anything in particular that can be done to help that would be helpful advice ....

## 2024-01-18 ENCOUNTER — HOSPITAL ENCOUNTER (OUTPATIENT)
Dept: RADIOLOGY | Facility: CLINIC | Age: 59
End: 2024-01-18
Payer: COMMERCIAL

## 2024-01-18 ENCOUNTER — TELEPHONE (OUTPATIENT)
Age: 59
End: 2024-01-18

## 2024-01-18 VITALS
DIASTOLIC BLOOD PRESSURE: 88 MMHG | HEART RATE: 91 BPM | SYSTOLIC BLOOD PRESSURE: 136 MMHG | TEMPERATURE: 97.3 F | OXYGEN SATURATION: 96 % | RESPIRATION RATE: 18 BRPM

## 2024-01-18 DIAGNOSIS — M53.3 SACROILIAC JOINT DYSFUNCTION: ICD-10-CM

## 2024-01-18 PROCEDURE — 27096 INJECT SACROILIAC JOINT: CPT | Performed by: STUDENT IN AN ORGANIZED HEALTH CARE EDUCATION/TRAINING PROGRAM

## 2024-01-18 RX ORDER — METHYLPREDNISOLONE ACETATE 40 MG/ML
40 INJECTION, SUSPENSION INTRA-ARTICULAR; INTRALESIONAL; INTRAMUSCULAR; PARENTERAL; SOFT TISSUE ONCE
Status: COMPLETED | OUTPATIENT
Start: 2024-01-18 | End: 2024-01-18

## 2024-01-18 RX ORDER — BUPIVACAINE HCL/PF 2.5 MG/ML
1 VIAL (ML) INJECTION ONCE
Status: COMPLETED | OUTPATIENT
Start: 2024-01-18 | End: 2024-01-18

## 2024-01-18 RX ADMIN — METHYLPREDNISOLONE ACETATE 40 MG: 40 INJECTION, SUSPENSION INTRA-ARTICULAR; INTRALESIONAL; INTRAMUSCULAR; PARENTERAL; SOFT TISSUE at 15:19

## 2024-01-18 RX ADMIN — Medication 1 ML: at 15:19

## 2024-01-18 RX ADMIN — IOHEXOL 0.5 ML: 300 INJECTION, SOLUTION INTRAVENOUS at 15:19

## 2024-01-18 NOTE — TELEPHONE ENCOUNTER
Caller: [place] [employer]    Doctor: Chacorta    Reason for call: [place] called with the pt's new insurance info.  Insurance has been updated    Pt will need a PA for her next injection through the new insurance    Call back#: n/a

## 2024-01-18 NOTE — H&P
History of Present Illness: The patient is a 58 y.o. female who presents with complaints of right back pain    Past Medical History:   Diagnosis Date    Allergic     Allergic rhinitis     last assessed 14     Arthritis     Asthma     Bleeding per rectum     Last assessed 16     Colon polyp     Diverticulitis of colon     GERD (gastroesophageal reflux disease)     Hypercholesterolemia     Hypertension     last assessed 3/24/15     Hyponatremia     last assessed 9/17/15     Obesity        Past Surgical History:   Procedure Laterality Date     SECTION      COLONOSCOPY      FL INJECTION RIGHT SHOULDER (ARTHROGRAM)  3/18/2021    WISDOM TOOTH EXTRACTION      WRIST SURGERY           Current Outpatient Medications:     acetaminophen (TYLENOL) 500 mg tablet, Take by mouth, Disp: , Rfl:     Black Cohosh (REMIFEMIN MENOPAUSE PO), Take by mouth, Disp: , Rfl:     Cholecalciferol (VITAMIN D) 2000 units CAPS, Take by mouth, Disp: , Rfl:     cholestyramine (QUESTRAN) 4 GM/DOSE powder, Take 1 packet (4 g total) by mouth 2 (two) times a day As needed for diarrhea, Disp: 378 g, Rfl: 5    diclofenac sodium (VOLTAREN) 50 mg EC tablet, Take 1 tablet (50 mg total) by mouth 2 (two) times a day as needed (Pain > 5/10), Disp: 120 tablet, Rfl: 0    Ginkgo Biloba 60 MG TABS, Take by mouth daily  (Patient not taking: Reported on 2023), Disp: , Rfl:     glucosamine-chondroitin 500-400 MG tablet, Take 1 tablet by mouth 3 (three) times a day, Disp: 100 tablet, Rfl: 2    mometasone (NASONEX) 50 mcg/act nasal spray, 2 sprays into each nostril daily, Disp: 17 g, Rfl: 0    Naltrexone-buPROPion HCl ER 8-90 MG TB12, Take 1 tab po QAM x 1 week, then 1 tab po BID x 1 week, then 2 tab QAM, 1 tab QPM x 1 week, then 2 tabs BID thereafter., Disp: 360 tablet, Rfl: 2    omeprazole (PriLOSEC) 10 mg delayed release capsule, Take 1 capsule (10 mg total) by mouth daily, Disp: 90 capsule, Rfl: 2    Probiotic Product (ALIGN) 4 MG CAPS, Take by  mouth, Disp: , Rfl:     Rhubarb (ESTROVEN COMPLETE PO), Estroven, Disp: , Rfl:     simvastatin (ZOCOR) 5 MG tablet, Take 1 tablet (5 mg total) by mouth daily at bedtime, Disp: 90 tablet, Rfl: 2    spironolactone (ALDACTONE) 50 mg tablet, Take 1 tablet (50 mg total) by mouth daily, Disp: 90 tablet, Rfl: 2    verapamil (VERELAN PM) 180 MG 24 hr capsule, Take 1 capsule (180 mg total) by mouth daily before breakfast, Disp: 90 capsule, Rfl: 2    vitamin E, tocopherol, 400 units capsule, Take 1 capsule (400 Units total) by mouth daily, Disp: , Rfl:     Allergies   Allergen Reactions    Levofloxacin Hives and Tachycardia     Annotation - 15Jan2013: LIGHTHEADED    Sulfamethoxazole-Trimethoprim     Hydrocodone Bitartrate Er      Annotation - 15Jan2013: FLUSHING    CHEST/RIB PAIN       Physical Exam: There were no vitals filed for this visit.  General: Awake, Alert, Oriented x 3, Mood and affect appropriate  Respiratory: Respirations even and unlabored  Cardiovascular: Peripheral pulses intact; no edema  Musculoskeletal Exam: ttp right SI joint    ASA Score: 3    Patient/Chart Verification  Patient ID Verified: Verbal  ID Band Applied: No  Consents Confirmed: To be obtained in the Pre-Procedure area  H&P( within 30 days) Verified: To be obtained in the Pre-Procedure area  Allergies Reviewed: Yes  Anticoag/NSAID held?: NA  Currently on antibiotics?: No  Pregnancy denied?: NA    Assessment:   1. Sacroiliac joint dysfunction        Plan: right SI joint injection

## 2024-01-18 NOTE — DISCHARGE INSTR - LAB

## 2024-01-25 ENCOUNTER — TELEPHONE (OUTPATIENT)
Dept: PAIN MEDICINE | Facility: CLINIC | Age: 59
End: 2024-01-25

## 2024-01-25 NOTE — TELEPHONE ENCOUNTER
Pt reports no improvement post inj   Pain level she said is very high today  Pt aware I will call next week for an update  She said she is struggling to sleep.

## 2024-02-01 NOTE — TELEPHONE ENCOUNTER
It is possible that most of her issues are related to severe arthritis int he right hip. She should continue follow up with orthopedics. She is going to be getting another hip injection with Dr. Bonilla and will be seeing Dr. Campbell the surgeon

## 2024-02-01 NOTE — TELEPHONE ENCOUNTER
Patient Reports       barely  10 %     improvement post injection    Pain Level     pain is  a little better than last week/10

## 2024-02-02 NOTE — TELEPHONE ENCOUNTER
S/w pt and advised same. Advised pt proceed with hip injection as scheduled. If no relief, pt will cb for f/u ov

## 2024-02-02 NOTE — TELEPHONE ENCOUNTER
Caller: Patient     Doctor: Chacorta     Reason for call: Returning nurse call     Call back#: [phone]

## 2024-02-22 ENCOUNTER — OFFICE VISIT (OUTPATIENT)
Dept: OBGYN CLINIC | Facility: MEDICAL CENTER | Age: 59
End: 2024-02-22
Payer: COMMERCIAL

## 2024-02-22 VITALS
BODY MASS INDEX: 39.23 KG/M2 | WEIGHT: 258 LBS | HEART RATE: 72 BPM | DIASTOLIC BLOOD PRESSURE: 78 MMHG | SYSTOLIC BLOOD PRESSURE: 138 MMHG

## 2024-02-22 DIAGNOSIS — M16.11 ARTHRITIS OF RIGHT HIP: ICD-10-CM

## 2024-02-22 DIAGNOSIS — M16.11 PRIMARY OSTEOARTHRITIS OF ONE HIP, RIGHT: Primary | ICD-10-CM

## 2024-02-22 PROCEDURE — 99213 OFFICE O/P EST LOW 20 MIN: CPT | Performed by: PHYSICAL MEDICINE & REHABILITATION

## 2024-02-22 PROCEDURE — 20611 DRAIN/INJ JOINT/BURSA W/US: CPT | Performed by: PHYSICAL MEDICINE & REHABILITATION

## 2024-02-22 RX ORDER — TRIAMCINOLONE ACETONIDE 40 MG/ML
80 INJECTION, SUSPENSION INTRA-ARTICULAR; INTRAMUSCULAR
Status: COMPLETED | OUTPATIENT
Start: 2024-02-22 | End: 2024-02-22

## 2024-02-22 RX ORDER — ROPIVACAINE HYDROCHLORIDE 5 MG/ML
10 INJECTION, SOLUTION EPIDURAL; INFILTRATION; PERINEURAL
Status: COMPLETED | OUTPATIENT
Start: 2024-02-22 | End: 2024-02-22

## 2024-02-22 RX ADMIN — ROPIVACAINE HYDROCHLORIDE 10 ML: 5 INJECTION, SOLUTION EPIDURAL; INFILTRATION; PERINEURAL at 10:30

## 2024-02-22 RX ADMIN — TRIAMCINOLONE ACETONIDE 80 MG: 40 INJECTION, SUSPENSION INTRA-ARTICULAR; INTRAMUSCULAR at 10:30

## 2024-02-22 NOTE — PROGRESS NOTES
"1. Primary osteoarthritis of one hip, right  Large joint arthrocentesis: R hip joint    Ambulatory referral to Orthopedic Surgery      2. Arthritis of right hip  diclofenac sodium (VOLTAREN) 50 mg EC tablet        Orders Placed This Encounter   Procedures    Large joint arthrocentesis: R hip joint    Ambulatory referral to Orthopedic Surgery     Impression:  Patient is here in follow up of right hip pain likely 2/2 to severe osteoarthritis as below.     Treatment has included greater trochanteric injection and ultrasound-guided hip joint injections. She was evaluated by pain management and had an SIJ injection which did not provide any relief.      Her hip joint injections provide great relief but recently, only last for about 1 month.  We proceeded with a repeat hip joint injection today.  Will have Teena go for consultation with formidable orthopaedic surgeon, Dr. Spann.     We previously reviewed the MRI of lumbar spine with and without contrast to reassess the L1 pedicle mass.  Continue symptom monitoring.     Imaging:  Updated right hip x-rays from 10/19/2023 show interval increase in the right hip joint osteoarthritis.  It was previously mild and now it is severe.     Lumbar MRI with and without contrast:  \"VERTEBRAL BODIES:  There are 5 lumbar type vertebral bodies. There is retrolisthesis of L1-L2 and L2-L3.     There is a stable enhancing lesion within the left pedicle at L1. This measures 1.5 x 1.5 cm. Given interval stability over the course of 1 year this is most consistent with an atypical hemangioma.     SACRUM:  Normal signal within the sacrum. No evidence of insufficiency or stress fracture.     DISTAL CORD AND CONUS:  Normal size and signal within the distal cord and conus.     PARASPINAL SOFT TISSUES:  Paraspinal soft tissues are unremarkable.     LOWER THORACIC DISC SPACES:  Normal disc height and signal.  No disc herniation, canal stenosis or foraminal narrowing.     LUMBAR DISC SPACES:   " "  L1-L2: There is disc space narrowing. There is a bulging annulus. There is facet arthrosis. There is mild mass effect on the thecal sac. There is mild left foraminal encroachment.     L2-L3: There is a bulging annulus. There is facet arthrosis with ligamentum flavum thickening. There is mild mass effect on the thecal sac. There is mild bilateral foraminal narrowing.     L3-L4: There is a bulging annulus. There is facet arthrosis. There is mild mass effect on thecal sac. There is mild to moderate bilateral foraminal narrowing.     L4-L5: There is mild bulge, asymmetric to the right. There is facet arthrosis. There is mild mass effect on the thecal sac. There is moderate right foraminal narrowing with near abutment of the exiting nerve root.     L5-S1: There is disc degeneration and narrowing. There is a bulging annulus. There is dorsal and marginal osteophytosis. There is mild bilateral foraminal narrowing. There is mild mass effect on the thecal sac.     POSTCONTRAST IMAGING:  No abnormal enhancement.     OTHER FINDINGS:  None.     IMPRESSION:     Stable lesion within the left pedicle at L1 dating back to prior examinations since 5/17/2022. Stability over the course of 1 year favors the presence of an atypical hemangioma. No new suspicious marrow placing lesions identified.     Degenerative changes of the lumbar spine, as described above.\"    No follow-ups on file.    Patient is in agreement with the above plan.    HPI:  Teena Staples is a 58 y.o. female  who presents in follow up.  Here for   Chief Complaint   Patient presents with    Right Hip - Pain, Follow-up, Injections       Since last visit: See above.    Following history reviewed and updated:  Past Medical History:   Diagnosis Date    Allergic     Allergic rhinitis     last assessed 11/17/14     Arthritis     Asthma     Bleeding per rectum     Last assessed 8/1/16     Colon polyp     Diverticulitis of colon     GERD (gastroesophageal reflux disease)     " Hypercholesterolemia     Hypertension     last assessed 3/24/15     Hyponatremia     last assessed 9/17/15     Obesity      Past Surgical History:   Procedure Laterality Date     SECTION      COLONOSCOPY      FL INJECTION RIGHT SHOULDER (ARTHROGRAM)  3/18/2021    WISDOM TOOTH EXTRACTION      WRIST SURGERY       Social History   Social History     Substance and Sexual Activity   Alcohol Use Yes    Comment: rare / never drank alcohol per [employer]      Social History     Substance and Sexual Activity   Drug Use No     Social History     Tobacco Use   Smoking Status Never   Smokeless Tobacco Never     Family History   Adopted: Yes   Problem Relation Age of Onset    No Known Problems Mother     Migraines Daughter      Allergies   Allergen Reactions    Levofloxacin Hives and Tachycardia     Annotation - 2013: LIGHTHEADED    Sulfamethoxazole-Trimethoprim     Hydrocodone Bitartrate Er      Annotation - 2013: FLUSHING    CHEST/RIB PAIN        Constitutional:  /78   Pulse 72   Wt 117 kg (258 lb)   BMI 39.23 kg/m²    General: NAD.  Eyes: Clear sclerae.  ENT: No inflammation, lesion, or mass of lips.  No tracheal deviation.  Musculoskeletal: As mentioned below.  Integumentary: No visible rashes or skin lesions.  Pulmonary/Chest: Effort normal. No respiratory distress.   Neuro: CN's grossly intact, PALACIOS.  Psych: Normal affect and judgement.  Vascular: WWP.    Right Hip Exam     Range of Motion   Internal rotation:  abnormal     Other   Erythema: absent  Scars: absent  Sensation: normal  Pulse: present             Large joint arthrocentesis: R hip joint  Universal Protocol:  Procedure performed by:  Consent: Verbal consent obtained. Written consent not obtained.  Consent given by: patient  Timeout called at: 2024 10:27 AM.  Patient understanding: patient states understanding of the procedure being performed  Site marked: the operative site was marked  Radiology Images displayed and confirmed. If  images not available, report reviewed: imaging studies available  Patient identity confirmed: verbally with patient  Supporting Documentation  Indications: pain and diagnostic evaluation   Procedure Details  Location: hip - R hip joint  Ultrasound guidance: yes (US guidance was used to find the area of interest.)  Medications administered: 80 mg triamcinolone acetonide 40 mg/mL; 10 mL ropivacaine 0.5 %    Patient tolerance: patient tolerated the procedure well with no immediate complications  Dressing:  Sterile dressing applied    The right hip joint was visualized with ultrasound and injected with steroid/anesthetic solution as indicated.    Prior to the injection, the ultrasound was used to evaluate for any neural or vascular structures.  Care was taken to avoid these structures.    The images (and video if taken) were saved to the [employer] ultrasound system.    Risks of this procedure include:    - Risk of bleeding since a needle is involved.  - Risk of infection (1/10,000 chance as per recent studies).  Signs/symptoms were discussed and they would prompt an urgent evaluation at an emergency department.  - Risk of pigmentation or skin dimpling in the skin (2-3% chance as per recent studies) from the steroid.  - Risk of increased pain from steroid flare (1% chance as per recent studies) that typically lasts 24-48 hours.  - Risk of increased blood sugars from the steroid medication that can last for a few weeks.  If the patient is a diabetic or pre-diabetic, they were encouraged to closely monitor their blood sugars and discuss with PCP if elevated more than usual or if having symptoms.    We decided that the benefits outweigh the risks and so we proceeded with the procedure.

## 2024-02-28 ENCOUNTER — OFFICE VISIT (OUTPATIENT)
Dept: OBGYN CLINIC | Facility: CLINIC | Age: 59
End: 2024-02-28
Payer: COMMERCIAL

## 2024-02-28 VITALS — BODY MASS INDEX: 39.1 KG/M2 | WEIGHT: 258 LBS | HEIGHT: 68 IN

## 2024-02-28 DIAGNOSIS — M16.11 PRIMARY OSTEOARTHRITIS OF RIGHT HIP: Primary | ICD-10-CM

## 2024-02-28 PROCEDURE — 99214 OFFICE O/P EST MOD 30 MIN: CPT | Performed by: ORTHOPAEDIC SURGERY

## 2024-02-28 NOTE — PROGRESS NOTES
Assessment:  1. Primary osteoarthritis of right hip  Ambulatory referral to Orthopedic Surgery    Ambulatory referral to Orthopedic Surgery    CANCELED: Ambulatory Referral to Orthopedic Surgery        Patient Active Problem List   Diagnosis    Alopecia mucinosa    Arthralgia of both ankles    Asthma    Benign essential hypertension    Cervical spinal stenosis    Chronic sinusitis    Colon polyp    Foraminal stenosis of cervical region    Greater trochanteric bursitis, left    Hyperlipidemia    Lumbar radiculopathy    Morbid obesity (HCC)    Osteoarthritis of knees, bilateral    Prediabetes    Right cervical radiculopathy    Trochanteric bursitis of both hips    Chondromalacia of right patella    Popliteal cyst, right    GERD (gastroesophageal reflux disease)    Hand joint stiff, right PIP    Plantar fasciitis, left    Actinic keratosis    Other headache syndrome    Pain in right hip    Mass of spine    Primary osteoarthritis of one hip, right       Plan:    58 y.o. female with severe osteoarthritis of right hip    Discussed x-ray findings of right hip  Discussed patient would require anterior approach to right ADAN secondary to age.  Patient declined referral to Dr. Mccann at this time.  Discussed would provide patient referral at later date if desired.  Discussed patient would require BMI below 40 and hemoglobin A1c below 7 to proceed with surgical intervention as well as cardiology and PCP clearance.  Patient to follow-up with the office if she would like to proceed with referral for anterior approach to right ADAN    The patient was seen and examined by Dr. Spann and myself. The assessment and plan were formulated by Dr. Spann and I assisted in carrying it out.      Subjective:   Patient ID: Teena Staples is a 58 y.o. female .    HPI    Patient comes in today with regards to right hip.  Patient is referred to us by Benita Bonilla DO for further evaluation.  Patient notes right hip pain for several years with  increase in the last several months.  Patient has underwent several injections to right hip most recent 1 being a week ago.  Patient notes moderate relief with injections but states they have been lasting for shorter period of time.  She notes she has been trying to be active with walking but has had to discontinue these activities secondary to increase in hip pain.  Patient here to discuss ADAN.       The following portions of the patient's history were reviewed and updated as appropriate: allergies, current medications, past family history, past social history, past surgical history and problem list.    Social History     Socioeconomic History    Marital status: /Civil Union     Spouse name: Not on file    Number of children: Not on file    Years of education: Not on file    Highest education level: Not on file   Occupational History     Comment: full time employment    Tobacco Use    Smoking status: Never    Smokeless tobacco: Never   Vaping Use    Vaping status: Never Used   Substance and Sexual Activity    Alcohol use: Yes     Comment: rare / never drank alcohol per allscript     Drug use: No    Sexual activity: Yes   Other Topics Concern    Not on file   Social History Narrative    Lives with spouse     Social Determinants of Health     Financial Resource Strain: Not on file   Food Insecurity: Not on file   Transportation Needs: Not on file   Physical Activity: Not on file   Stress: Not on file   Social Connections: Not on file   Intimate Partner Violence: Not on file   Housing Stability: Not on file     Past Medical History:   Diagnosis Date    Allergic     Allergic rhinitis     last assessed 11/17/14     Arthritis     Asthma     Bleeding per rectum     Last assessed 8/1/16     Colon polyp     Diverticulitis of colon     GERD (gastroesophageal reflux disease)     Hypercholesterolemia     Hypertension     last assessed 3/24/15     Hyponatremia     last assessed 9/17/15     Obesity      Past Surgical  History:   Procedure Laterality Date     SECTION      COLONOSCOPY      FL INJECTION RIGHT SHOULDER (ARTHROGRAM)  3/18/2021    WISDOM TOOTH EXTRACTION      WRIST SURGERY       Allergies   Allergen Reactions    Levofloxacin Hives and Tachycardia     Annotation - 2013: LIGHTHEADED    Sulfamethoxazole-Trimethoprim     Hydrocodone Bitartrate Er      Annotation - 2013: FLUSHING    CHEST/RIB PAIN     Current Outpatient Medications on File Prior to Visit   Medication Sig Dispense Refill    acetaminophen (TYLENOL) 500 mg tablet Take by mouth      Black Cohosh (REMIFEMIN MENOPAUSE PO) Take by mouth      Cholecalciferol (VITAMIN D) 2000 units CAPS Take by mouth      cholestyramine (QUESTRAN) 4 GM/DOSE powder Take 1 packet (4 g total) by mouth 2 (two) times a day As needed for diarrhea 378 g 5    diclofenac sodium (VOLTAREN) 50 mg EC tablet Take 1 tablet (50 mg total) by mouth 2 (two) times a day as needed (Pain > 5/10) 120 tablet 0    glucosamine-chondroitin 500-400 MG tablet Take 1 tablet by mouth 3 (three) times a day 100 tablet 2    mometasone (NASONEX) 50 mcg/act nasal spray 2 sprays into each nostril daily 17 g 0    Naltrexone-buPROPion HCl ER 8-90 MG TB12 Take 1 tab po QAM x 1 week, then 1 tab po BID x 1 week, then 2 tab QAM, 1 tab QPM x 1 week, then 2 tabs BID thereafter. 360 tablet 2    omeprazole (PriLOSEC) 10 mg delayed release capsule Take 1 capsule (10 mg total) by mouth daily 90 capsule 2    Probiotic Product (ALIGN) 4 MG CAPS Take by mouth      Rhubarb (ESTROVEN COMPLETE PO) Estroven      simvastatin (ZOCOR) 5 MG tablet Take 1 tablet (5 mg total) by mouth daily at bedtime 90 tablet 2    spironolactone (ALDACTONE) 50 mg tablet Take 1 tablet (50 mg total) by mouth daily 90 tablet 2    verapamil (VERELAN PM) 180 MG 24 hr capsule Take 1 capsule (180 mg total) by mouth daily before breakfast 90 capsule 2    vitamin E, tocopherol, 400 units capsule Take 1 capsule (400 Units total) by mouth daily       Ginkgo Biloba 60 MG TABS Take by mouth daily  (Patient not taking: Reported on 12/20/2023)       No current facility-administered medications on file prior to visit.       Review of Systems   Constitutional:  Negative for chills and fever.   HENT:  Negative for ear pain and sore throat.    Eyes:  Negative for pain and visual disturbance.   Respiratory:  Negative for cough and shortness of breath.    Cardiovascular:  Negative for chest pain and palpitations.   Gastrointestinal:  Negative for abdominal pain and vomiting.   Genitourinary:  Negative for dysuria and hematuria.   Musculoskeletal:  Positive for arthralgias. Negative for back pain.   Skin:  Negative for color change and rash.   Neurological:  Negative for seizures and syncope.   All other systems reviewed and are negative.        Objective:    There were no vitals filed for this visit.    Physical Exam  Vitals and nursing note reviewed.   Constitutional:       General: She is not in acute distress.     Appearance: She is well-developed.   HENT:      Head: Normocephalic and atraumatic.   Eyes:      Conjunctiva/sclera: Conjunctivae normal.   Cardiovascular:      Rate and Rhythm: Normal rate and regular rhythm.      Heart sounds: No murmur heard.  Pulmonary:      Effort: Pulmonary effort is normal. No respiratory distress.      Breath sounds: Normal breath sounds.   Abdominal:      Palpations: Abdomen is soft.      Tenderness: There is no abdominal tenderness.   Musculoskeletal:         General: No swelling.      Cervical back: Neck supple.   Skin:     General: Skin is warm and dry.      Capillary Refill: Capillary refill takes less than 2 seconds.   Neurological:      Mental Status: She is alert.   Psychiatric:         Mood and Affect: Mood normal.         Right Hip Exam     Muscle Strength   Abduction: 5/5   Adduction: 5/5     Comments:  Pain with internal rotation   Pain with external rotation             I have personally reviewed pertinent films in PACS  "and my interpretation is x-ray right hip reveals severe osteoarthritis consistent with Kellgren 3-4.    Procedures  No Procedures performed today    Scribe Attestation      I,:  Kaitlynn Zamora PA-C am acting as a scribe while in the presence of the attending physician.:       I,:  Anant Spann, DO personally performed the services described in this documentation    as scribed in my presence.:               Portions of the record may have been created with voice recognition software.  Occasional wrong word or \"sound a like\" substitutions may have occurred due to the inherent limitations of voice recognition software.  Read the chart carefully and recognize, using context, where substitutions have occurred.   "

## 2024-03-05 ENCOUNTER — TELEPHONE (OUTPATIENT)
Age: 59
End: 2024-03-05

## 2024-03-05 NOTE — TELEPHONE ENCOUNTER
Pt of Dr Jarrett calling for annual exam    Pt was last seen May 2022 and is due    Pt states she has a SOC on her right hand, red in color    Advised pt Dr Jarrett is pretty booked, but would send a note to see if we can squeeze her in    Please call pt back at [phone]

## 2024-03-18 DIAGNOSIS — K21.9 GASTROESOPHAGEAL REFLUX DISEASE: ICD-10-CM

## 2024-03-18 DIAGNOSIS — I10 BENIGN ESSENTIAL HYPERTENSION: ICD-10-CM

## 2024-03-18 DIAGNOSIS — M54.16 LUMBAR RADICULOPATHY: ICD-10-CM

## 2024-03-18 DIAGNOSIS — M79.89 LEG SWELLING: ICD-10-CM

## 2024-03-18 DIAGNOSIS — L98.9 SKIN LESION: ICD-10-CM

## 2024-03-18 DIAGNOSIS — L65.9 ALOPECIA: ICD-10-CM

## 2024-03-18 DIAGNOSIS — J45.909 MODERATE ASTHMA WITHOUT COMPLICATION, UNSPECIFIED WHETHER PERSISTENT: ICD-10-CM

## 2024-03-18 DIAGNOSIS — I10 HYPERTENSION, UNSPECIFIED TYPE: ICD-10-CM

## 2024-03-18 DIAGNOSIS — E78.5 HYPERLIPIDEMIA, UNSPECIFIED HYPERLIPIDEMIA TYPE: ICD-10-CM

## 2024-03-18 DIAGNOSIS — J01.91 ACUTE RECURRENT SINUSITIS, UNSPECIFIED LOCATION: ICD-10-CM

## 2024-03-18 DIAGNOSIS — K21.9 GASTROESOPHAGEAL REFLUX DISEASE, UNSPECIFIED WHETHER ESOPHAGITIS PRESENT: ICD-10-CM

## 2024-03-18 DIAGNOSIS — R73.03 PREDIABETES: ICD-10-CM

## 2024-03-18 RX ORDER — OMEPRAZOLE 10 MG/1
10 CAPSULE, DELAYED RELEASE ORAL DAILY
Qty: 90 CAPSULE | Refills: 2 | Status: SHIPPED | OUTPATIENT
Start: 2024-03-18

## 2024-04-02 ENCOUNTER — TELEPHONE (OUTPATIENT)
Age: 59
End: 2024-04-02

## 2024-04-02 NOTE — TELEPHONE ENCOUNTER
Caller: Patient    Doctor: Chad    Reason for call: Patient sees Dr Bonilla Routinely for Ultrasound Guided Injections in the right hip.  Patient would like to know if she could schedule her next USGI ahead of time, due for late May (previous on 2/22/24).    Please call patient back to coordinate if this can be scheduled at this time.    Call back#: [phone]

## 2024-04-10 ENCOUNTER — TELEPHONE (OUTPATIENT)
Dept: OBGYN CLINIC | Facility: HOSPITAL | Age: 59
End: 2024-04-10

## 2024-04-10 NOTE — TELEPHONE ENCOUNTER
The referral is in for her to see Dr Mccann. She does have an apt several days prior of seeing Dr Mccann to see Dr Bonilla for a USGI for the hip. Patient will need to be called to let her know it will cause a delay of her having the surgery, depending on how far out Dr Mccann is scheduling.

## 2024-04-10 NOTE — TELEPHONE ENCOUNTER
Caller: Patient    Doctor: Maria Ines    Reason for call: Patient has decided to proceed with referral for anterior approach to right ADAN  to Dr. Mccann. She is now experiencing quite a bit of pain. She did make an appt for May 28th (first available in [place])  but would like you to put in the referral for her. Thank you.    Call back#: [phone]

## 2024-04-15 DIAGNOSIS — M79.89 LEG SWELLING: ICD-10-CM

## 2024-04-15 DIAGNOSIS — E78.5 HYPERLIPIDEMIA, UNSPECIFIED HYPERLIPIDEMIA TYPE: ICD-10-CM

## 2024-04-15 DIAGNOSIS — J01.91 ACUTE RECURRENT SINUSITIS, UNSPECIFIED LOCATION: ICD-10-CM

## 2024-04-15 DIAGNOSIS — M54.16 LUMBAR RADICULOPATHY: ICD-10-CM

## 2024-04-15 DIAGNOSIS — L98.9 SKIN LESION: ICD-10-CM

## 2024-04-15 DIAGNOSIS — R73.03 PREDIABETES: ICD-10-CM

## 2024-04-15 DIAGNOSIS — I10 HYPERTENSION, UNSPECIFIED TYPE: ICD-10-CM

## 2024-04-15 DIAGNOSIS — J45.909 MODERATE ASTHMA WITHOUT COMPLICATION, UNSPECIFIED WHETHER PERSISTENT: ICD-10-CM

## 2024-04-15 DIAGNOSIS — L65.9 ALOPECIA: ICD-10-CM

## 2024-04-15 DIAGNOSIS — I10 BENIGN ESSENTIAL HYPERTENSION: ICD-10-CM

## 2024-04-15 DIAGNOSIS — K21.9 GASTROESOPHAGEAL REFLUX DISEASE: ICD-10-CM

## 2024-04-15 DIAGNOSIS — I10 BENIGN ESSENTIAL HTN: ICD-10-CM

## 2024-04-15 RX ORDER — MOMETASONE FUROATE 50 UG/1
2 SPRAY, METERED NASAL DAILY
Qty: 17 G | Refills: 0 | Status: SHIPPED | OUTPATIENT
Start: 2024-04-15

## 2024-04-15 RX ORDER — VERAPAMIL HYDROCHLORIDE 180 MG/1
180 CAPSULE, EXTENDED RELEASE ORAL
Qty: 90 CAPSULE | Refills: 2 | Status: SHIPPED | OUTPATIENT
Start: 2024-04-15

## 2024-04-15 RX ORDER — SPIRONOLACTONE 50 MG/1
50 TABLET, FILM COATED ORAL DAILY
Qty: 90 TABLET | Refills: 0 | Status: SHIPPED | OUTPATIENT
Start: 2024-04-15

## 2024-04-15 RX ORDER — SIMVASTATIN 5 MG
5 TABLET ORAL
Qty: 90 TABLET | Refills: 0 | Status: SHIPPED | OUTPATIENT
Start: 2024-04-15

## 2024-04-16 ENCOUNTER — OFFICE VISIT (OUTPATIENT)
Dept: OBGYN CLINIC | Facility: CLINIC | Age: 59
End: 2024-04-16
Payer: COMMERCIAL

## 2024-04-16 VITALS
HEIGHT: 68 IN | BODY MASS INDEX: 38.95 KG/M2 | WEIGHT: 257 LBS | HEART RATE: 83 BPM | SYSTOLIC BLOOD PRESSURE: 125 MMHG | DIASTOLIC BLOOD PRESSURE: 82 MMHG

## 2024-04-16 DIAGNOSIS — Z01.818 PREOPERATIVE TESTING: ICD-10-CM

## 2024-04-16 DIAGNOSIS — M16.11 PRIMARY OSTEOARTHRITIS OF RIGHT HIP: Primary | ICD-10-CM

## 2024-04-16 DIAGNOSIS — E66.01 MORBID OBESITY (HCC): ICD-10-CM

## 2024-04-16 PROCEDURE — 99214 OFFICE O/P EST MOD 30 MIN: CPT | Performed by: ORTHOPAEDIC SURGERY

## 2024-04-16 RX ORDER — ACETAMINOPHEN 325 MG/1
975 TABLET ORAL ONCE
OUTPATIENT
Start: 2024-04-16 | End: 2024-04-16

## 2024-04-16 RX ORDER — MULTIVIT-MIN/IRON FUM/FOLIC AC 7.5 MG-4
1 TABLET ORAL DAILY
Qty: 30 TABLET | Refills: 0 | Status: SHIPPED | OUTPATIENT
Start: 2024-04-16

## 2024-04-16 RX ORDER — SODIUM CHLORIDE, SODIUM LACTATE, POTASSIUM CHLORIDE, CALCIUM CHLORIDE 600; 310; 30; 20 MG/100ML; MG/100ML; MG/100ML; MG/100ML
125 INJECTION, SOLUTION INTRAVENOUS CONTINUOUS
OUTPATIENT
Start: 2024-04-16

## 2024-04-16 RX ORDER — FOLIC ACID 1 MG/1
1 TABLET ORAL DAILY
Qty: 30 TABLET | Refills: 1 | Status: SHIPPED | OUTPATIENT
Start: 2024-04-16

## 2024-04-16 RX ORDER — CHLORHEXIDINE GLUCONATE 4 G/100ML
SOLUTION TOPICAL DAILY PRN
OUTPATIENT
Start: 2024-04-16

## 2024-04-16 RX ORDER — ASCORBIC ACID 500 MG
500 TABLET ORAL 2 TIMES DAILY
Qty: 60 TABLET | Refills: 1 | Status: SHIPPED | OUTPATIENT
Start: 2024-04-16

## 2024-04-16 RX ORDER — CELECOXIB 200 MG/1
200 CAPSULE ORAL 2 TIMES DAILY
Qty: 30 CAPSULE | Refills: 2 | Status: SHIPPED | OUTPATIENT
Start: 2024-04-16

## 2024-04-16 RX ORDER — CHLORHEXIDINE GLUCONATE ORAL RINSE 1.2 MG/ML
15 SOLUTION DENTAL ONCE
OUTPATIENT
Start: 2024-04-16 | End: 2024-04-16

## 2024-04-16 RX ORDER — FERROUS SULFATE 324(65)MG
324 TABLET, DELAYED RELEASE (ENTERIC COATED) ORAL
Qty: 60 TABLET | Refills: 1 | Status: SHIPPED | OUTPATIENT
Start: 2024-04-16

## 2024-04-16 NOTE — PROGRESS NOTES
Assessment:  1. Primary osteoarthritis of right hip            Plan:    Patient with chronic right hip pain due to osteoarthritis   Weightbearing as tolerated  XR right hip was reviewed in the office today   Prescribed patient Celebrex 100 mg for pain relief  Discussed with patient in detail surgery for Right anterior total hip arthroplasty.  Patient agrees with plan and would like to proceed forward scheduling for surgery  The benefits and purpose of the operation and or procedure have been explained to me in language I understand by Dr. Mccann as well the risks, alternatives and complications pertaining to the above procedure/surgery which include but is not limited to : infections, deep vein thrombosis, blood clots to the lungs, wound healing problems, complications, for extensive blood loss, including shock, possible death, leg length discrepancy, limp hip dislocations, fracture, loss of limb, persistent pain, failure of hardware/implant, damage of blood vessels and or nerves, heart attack, stroke and potential need for further surgery/revision surgery.    SAME DAY SURGERY   She will need clearance from her PCP and Cardiologist  She will be on Aspirin 81 mg twice daily for DVT prophylaxis postop when discharged from the hospital for 5 weeks   She will be on vitamins 30 days prior to surgery   Will repeat x-ray right hip at PO visit   She is to follow up PO 3 weeks Right anterior ADAN         The above stated was discussed in layman's terms and the patient expressed understanding.  All questions were answered to the patient's satisfaction.         Subjective:   Teena Staples is a 58 y.o. female who presents today consultation for right hip pain. She has been having right hip pain for few years. She received steroid injections which helped for 6 months. She notes in October 2023 shew as walking and felt and sharp pain in the right hip. She was seen by Dr. Bonilla and had steroid injection with relief. She states   she walked 8,000 steps in march.and since then  She states she is having constant pain in the right hip. She  is using a cane for assistance when ambulating. She has pain over lateral right hip radiating down to her knee. She is having trouble with stairs, bending forward to put on her shoes and socks.  She also states she is unable to do prolonged walking.  She states before she was able to walk 10,000 steps a day.  He has been taking ibuprofen, Aleve or Tylenol with no pain relief.  She has been using Voltaren gel.  She has been taken in the past diclofenac 50 mg for her lower back.      Review of systems negative unless otherwise specified in HPI    Past Medical History:   Diagnosis Date    Allergic     Allergic rhinitis     last assessed 14     Arthritis     Asthma     Bleeding per rectum     Last assessed 16     Colon polyp     Diverticulitis of colon     GERD (gastroesophageal reflux disease)     Hypercholesterolemia     Hypertension     last assessed 3/24/15     Hyponatremia     last assessed 9/17/15     Obesity        Past Surgical History:   Procedure Laterality Date     SECTION      COLONOSCOPY      FL INJECTION RIGHT SHOULDER (ARTHROGRAM)  3/18/2021    WISDOM TOOTH EXTRACTION      WRIST SURGERY         Family History   Adopted: Yes   Problem Relation Age of Onset    No Known Problems Mother     Migraines Daughter        Social History     Occupational History     Comment: full time employment    Tobacco Use    Smoking status: Never    Smokeless tobacco: Never   Vaping Use    Vaping status: Never Used   Substance and Sexual Activity    Alcohol use: Yes     Comment: rare / never drank alcohol per [employer]script     Drug use: No    Sexual activity: Yes         Current Outpatient Medications:     acetaminophen (TYLENOL) 500 mg tablet, Take by mouth, Disp: , Rfl:     Black Cohosh (REMIFEMIN MENOPAUSE PO), Take by mouth, Disp: , Rfl:     Cholecalciferol (VITAMIN D) 2000 units CAPS, Take by mouth,  Disp: , Rfl:     cholestyramine (QUESTRAN) 4 GM/DOSE powder, Take 1 packet (4 g total) by mouth 2 (two) times a day As needed for diarrhea, Disp: 378 g, Rfl: 5    diclofenac sodium (VOLTAREN) 50 mg EC tablet, Take 1 tablet (50 mg total) by mouth 2 (two) times a day as needed (Pain > 5/10), Disp: 120 tablet, Rfl: 0    glucosamine-chondroitin 500-400 MG tablet, Take 1 tablet by mouth 3 (three) times a day, Disp: 100 tablet, Rfl: 2    mometasone (NASONEX) 50 mcg/act nasal spray, 2 sprays into each nostril daily, Disp: 17 g, Rfl: 0    Naltrexone-buPROPion HCl ER 8-90 MG TB12, Take 1 tab po QAM x 1 week, then 1 tab po BID x 1 week, then 2 tab QAM, 1 tab QPM x 1 week, then 2 tabs BID thereafter., Disp: 360 tablet, Rfl: 2    omeprazole (PriLOSEC) 10 mg delayed release capsule, Take 1 capsule (10 mg total) by mouth daily, Disp: 90 capsule, Rfl: 2    Probiotic Product (ALIGN) 4 MG CAPS, Take by mouth, Disp: , Rfl:     Rhubarb (ESTROVEN COMPLETE PO), Estroven, Disp: , Rfl:     simvastatin (ZOCOR) 5 MG tablet, Take 1 tablet (5 mg total) by mouth daily at bedtime, Disp: 90 tablet, Rfl: 0    spironolactone (ALDACTONE) 50 mg tablet, Take 1 tablet (50 mg total) by mouth daily, Disp: 90 tablet, Rfl: 0    verapamil (Verelan) 180 MG 24 hr capsule, Take 1 capsule (180 mg total) by mouth daily before breakfast, Disp: 90 capsule, Rfl: 2    vitamin E, tocopherol, 400 units capsule, Take 1 capsule (400 Units total) by mouth daily, Disp: , Rfl:     Ginkgo Biloba 60 MG TABS, Take by mouth daily  (Patient not taking: Reported on 12/20/2023), Disp: , Rfl:     Allergies   Allergen Reactions    Levofloxacin Hives and Tachycardia     Annotation - 15Jan2013: LIGHTHEADED    Sulfamethoxazole-Trimethoprim     Hydrocodone Bitartrate Er      Annotation - 15Jan2013: FLUSHING    CHEST/RIB PAIN            Vitals:    04/16/24 0819   BP: 125/82   Pulse: 83     Body mass index is 39.08 kg/m².  Wt Readings from Last 3 Encounters:   04/16/24 117 kg (257 lb)  "  02/28/24 117 kg (258 lb)   02/22/24 117 kg (258 lb)       Objective:            Physical Exam  Physical Exam:      General Appearance:    Alert, cooperative, no distress, appears stated age   Head:    Normocephalic, without obvious abnormality, atraumatic   Eyes:    conjunctiva/corneas clear, both eyes         Nose:   Nares normal, septum midline, no drainage    Throat:   Lips normal; teeth and gums normal   Neck:    symmetrical, trachea midline, ;     thyroid:  no enlargement/   Back:     Symmetric, no curvature, ROM normal   Lungs:   No audible wheezing or labored breathing   Chest Wall:    No tenderness or deformity    Heart:    Regular rate and rhythm                         Pulses:   2+ and symmetric all extremities   Skin:   Skin color, texture, turgor normal, no rashes or lesions   Neurologic:   normal strength, sensation and reflexes     throughout                       Ortho Exam  Right hip  Skin intact  No erythema or open wounds  + Stinchfield  Negative HODA test  + FADDIR  No tenderness to palpation over the greater trochanteric region  Neurovascularly Intact Distally        Diagnostics, reviewed and taken today if performed as documented:    The attending physician has personally reviewed the pertinent films in PACS and interpretation is as follows: XR right hip dated 10/19/23 demonstrates severe joint space narrowing with osteophyte formation and subchondral sclerosis     Procedures, if performed today:    Procedures    None performed      Scribe Attestation      I,:  Haley Guy am acting as a scribe while in the presence of the attending physician.:       I,:  Marlee Mccann MD personally performed the services described in this documentation    as scribed in my presence.:               Portions of the record may have been created with voice recognition software.  Occasional wrong word or \"sound a like\" substitutions may have occurred due to the inherent limitations of voice " recognition software.  Read the chart carefully and recognize, using context, where substitutions have occurred.

## 2024-04-25 ENCOUNTER — TELEPHONE (OUTPATIENT)
Dept: OBGYN CLINIC | Facility: HOSPITAL | Age: 59
End: 2024-04-25

## 2024-04-25 NOTE — TELEPHONE ENCOUNTER
Preoperative Elective Admission Assessment- spoke to patient     Living Situation:    Who does pt live with: spouse  What kind of home: multi-level  How do they enter the home: front  How many levels in home: 2 level home   # of steps to enter home: 4 to enter   # of steps to second floor: 12 to 2nd floor   Are there handrails: Yes  Are there landings: No  Sleeping arrangement: first/entry floor  Where is Bathroom: First floor 1/2 bath  Where is the tub or shower: Walk in shower on 2nd floor with handle and seat   Dogs or ther pets: 2 cats      First Floor Setup:   Is there a bathroom: Yes  Where would pt sleep: recliner     DME: cane, ordering RW.   PROVIDED [facility] NUMBER TO CONTACT: [phone]   We discussed clearing pathways in the home and making sure there is accessibly to use the walker, for example, removing throw rugs.      Patient's Current Level of Function: Ambulates with cane and ADLs: Independent    Post-op Caregiver: spouse  Currently receive any HHC/aides/community supports: No     Post-op Transport: spouse  To/from hospital: spouse  To/from PT 2-3x/week: spouse  Uses community transport now: No     Outpatient Physical Therapy Site:  Site: [facility]   pre and post-op appts scheduled? Yes     Medication Management: self  Preferred Pharmacy for Post-op Medications: [id number] - [place] - [place] [address] [[id number]]   Blood Management Vitamin Regimen: Pt confirms taking as prescribed  Post-op anticoagulant: to be determined by surgical team postoperatively     DC Plan: Pt plans to be discharged home    Barriers to DC identified preoperatively: none identified    BMI: 39.08    Patient Education:  Pt educated on post-op pain, early mobilization (POD0), LOS goals, OP PT goals, and preoperative bathing. Patient educated that our goal is to appropriately discharge patient based off their post-op function while striving to maintain maximal independence. The goal is to discharge patient  to home and for them to attend outpatient physical therapy.    Assigned to care team? Yes

## 2024-04-26 LAB
DME PARACHUTE DELIVERY DATE REQUESTED: NORMAL
DME PARACHUTE ITEM DESCRIPTION: NORMAL
DME PARACHUTE ORDER STATUS: NORMAL
DME PARACHUTE SUPPLIER NAME: NORMAL
DME PARACHUTE SUPPLIER PHONE: NORMAL

## 2024-04-29 ENCOUNTER — TELEPHONE (OUTPATIENT)
Dept: OBGYN CLINIC | Facility: MEDICAL CENTER | Age: 59
End: 2024-04-29

## 2024-04-29 ENCOUNTER — APPOINTMENT (OUTPATIENT)
Dept: LAB | Facility: HOSPITAL | Age: 59
End: 2024-04-29
Attending: ORTHOPAEDIC SURGERY
Payer: COMMERCIAL

## 2024-04-29 ENCOUNTER — LAB REQUISITION (OUTPATIENT)
Dept: LAB | Facility: HOSPITAL | Age: 59
End: 2024-04-29
Payer: COMMERCIAL

## 2024-04-29 ENCOUNTER — TELEPHONE (OUTPATIENT)
Age: 59
End: 2024-04-29

## 2024-04-29 ENCOUNTER — OFFICE VISIT (OUTPATIENT)
Dept: LAB | Facility: HOSPITAL | Age: 59
End: 2024-04-29
Payer: COMMERCIAL

## 2024-04-29 DIAGNOSIS — M16.11 PRIMARY OSTEOARTHRITIS OF RIGHT HIP: ICD-10-CM

## 2024-04-29 DIAGNOSIS — Z01.818 ENCOUNTER FOR OTHER PREPROCEDURAL EXAMINATION: ICD-10-CM

## 2024-04-29 DIAGNOSIS — Z01.818 PREOPERATIVE TESTING: ICD-10-CM

## 2024-04-29 DIAGNOSIS — Z01.818 PRE-OP EVALUATION: ICD-10-CM

## 2024-04-29 LAB
ALBUMIN SERPL BCP-MCNC: 4.2 G/DL (ref 3.5–5)
ALP SERPL-CCNC: 73 U/L (ref 34–104)
ALT SERPL W P-5'-P-CCNC: 12 U/L (ref 7–52)
ANION GAP SERPL CALCULATED.3IONS-SCNC: 7 MMOL/L (ref 4–13)
APTT PPP: 30 SECONDS (ref 23–37)
AST SERPL W P-5'-P-CCNC: 18 U/L (ref 13–39)
ATRIAL RATE: 71 BPM
BASOPHILS # BLD AUTO: 0.06 THOUSANDS/ÂΜL (ref 0–0.1)
BASOPHILS NFR BLD AUTO: 2 % (ref 0–1)
BILIRUB SERPL-MCNC: 0.58 MG/DL (ref 0.2–1)
BUN SERPL-MCNC: 11 MG/DL (ref 5–25)
CALCIUM SERPL-MCNC: 9.2 MG/DL (ref 8.4–10.2)
CHLORIDE SERPL-SCNC: 104 MMOL/L (ref 96–108)
CO2 SERPL-SCNC: 29 MMOL/L (ref 21–32)
CREAT SERPL-MCNC: 0.8 MG/DL (ref 0.6–1.3)
DME PARACHUTE DELIVERY DATE ACTUAL: NORMAL
DME PARACHUTE DELIVERY DATE REQUESTED: NORMAL
DME PARACHUTE ITEM DESCRIPTION: NORMAL
DME PARACHUTE ORDER STATUS: NORMAL
DME PARACHUTE SUPPLIER NAME: NORMAL
DME PARACHUTE SUPPLIER PHONE: NORMAL
EOSINOPHIL # BLD AUTO: 0.21 THOUSAND/ÂΜL (ref 0–0.61)
EOSINOPHIL NFR BLD AUTO: 5 % (ref 0–6)
ERYTHROCYTE [DISTWIDTH] IN BLOOD BY AUTOMATED COUNT: 14.9 % (ref 11.6–15.1)
EST. AVERAGE GLUCOSE BLD GHB EST-MCNC: 126 MG/DL
GFR SERPL CREATININE-BSD FRML MDRD: 81 ML/MIN/1.73SQ M
GLUCOSE P FAST SERPL-MCNC: 89 MG/DL (ref 65–99)
HBA1C MFR BLD: 6 %
HCT VFR BLD AUTO: 41.7 % (ref 34.8–46.1)
HGB BLD-MCNC: 12.5 G/DL (ref 11.5–15.4)
IMM GRANULOCYTES # BLD AUTO: 0.01 THOUSAND/UL (ref 0–0.2)
IMM GRANULOCYTES NFR BLD AUTO: 0 % (ref 0–2)
INR PPP: 0.99 (ref 0.84–1.19)
LYMPHOCYTES # BLD AUTO: 0.87 THOUSANDS/ÂΜL (ref 0.6–4.47)
LYMPHOCYTES NFR BLD AUTO: 22 % (ref 14–44)
MCH RBC QN AUTO: 25.4 PG (ref 26.8–34.3)
MCHC RBC AUTO-ENTMCNC: 30 G/DL (ref 31.4–37.4)
MCV RBC AUTO: 85 FL (ref 82–98)
MONOCYTES # BLD AUTO: 0.33 THOUSAND/ÂΜL (ref 0.17–1.22)
MONOCYTES NFR BLD AUTO: 8 % (ref 4–12)
NEUTROPHILS # BLD AUTO: 2.44 THOUSANDS/ÂΜL (ref 1.85–7.62)
NEUTS SEG NFR BLD AUTO: 63 % (ref 43–75)
NRBC BLD AUTO-RTO: 0 /100 WBCS
P AXIS: 52 DEGREES
PLATELET # BLD AUTO: 364 THOUSANDS/UL (ref 149–390)
PMV BLD AUTO: 8.9 FL (ref 8.9–12.7)
POTASSIUM SERPL-SCNC: 4 MMOL/L (ref 3.5–5.3)
PR INTERVAL: 176 MS
PROT SERPL-MCNC: 6.9 G/DL (ref 6.4–8.4)
PROTHROMBIN TIME: 13.7 SECONDS (ref 11.6–14.5)
QRS AXIS: -8 DEGREES
QRSD INTERVAL: 68 MS
QT INTERVAL: 384 MS
QTC INTERVAL: 417 MS
RBC # BLD AUTO: 4.92 MILLION/UL (ref 3.81–5.12)
SODIUM SERPL-SCNC: 140 MMOL/L (ref 135–147)
T WAVE AXIS: 13 DEGREES
VENTRICULAR RATE: 71 BPM
WBC # BLD AUTO: 3.92 THOUSAND/UL (ref 4.31–10.16)

## 2024-04-29 PROCEDURE — 85025 COMPLETE CBC W/AUTO DIFF WBC: CPT

## 2024-04-29 PROCEDURE — 83036 HEMOGLOBIN GLYCOSYLATED A1C: CPT

## 2024-04-29 PROCEDURE — 85730 THROMBOPLASTIN TIME PARTIAL: CPT

## 2024-04-29 PROCEDURE — 85610 PROTHROMBIN TIME: CPT

## 2024-04-29 PROCEDURE — 93010 ELECTROCARDIOGRAM REPORT: CPT | Performed by: INTERNAL MEDICINE

## 2024-04-29 PROCEDURE — 86900 BLOOD TYPING SEROLOGIC ABO: CPT | Performed by: ORTHOPAEDIC SURGERY

## 2024-04-29 PROCEDURE — 93005 ELECTROCARDIOGRAM TRACING: CPT

## 2024-04-29 PROCEDURE — 86901 BLOOD TYPING SEROLOGIC RH(D): CPT | Performed by: ORTHOPAEDIC SURGERY

## 2024-04-29 PROCEDURE — 36415 COLL VENOUS BLD VENIPUNCTURE: CPT

## 2024-04-29 PROCEDURE — 86850 RBC ANTIBODY SCREEN: CPT | Performed by: ORTHOPAEDIC SURGERY

## 2024-04-29 PROCEDURE — 80053 COMPREHEN METABOLIC PANEL: CPT

## 2024-04-29 NOTE — TELEPHONE ENCOUNTER
Received a call from Robina at  Orthopedics regading patient's preop appointment scheduled on 5/6/24.  Patient's insurance needs to have a documented weight loss plan on the day of her preop.  Please make sure BMI is discussed and a plan is discussed at the time of this visit.

## 2024-04-29 NOTE — TELEPHONE ENCOUNTER
----- Message from Ann Sahni MA sent at 4/26/2024  9:35 AM EDT -----  Regarding: bmi  Good morning Robina,  Patient's BMI is 39, she will need a documented weight loss plan per [facility] requirements. I see she has a med clearance appt, coming up, maybe the doc can put one in for her before I submit auth? Thank you!

## 2024-04-30 LAB
ABO GROUP BLD: NORMAL
BLD GP AB SCN SERPL QL: NEGATIVE
RH BLD: POSITIVE
SPECIMEN EXPIRATION DATE: NORMAL

## 2024-05-06 ENCOUNTER — OFFICE VISIT (OUTPATIENT)
Dept: INTERNAL MEDICINE CLINIC | Facility: CLINIC | Age: 59
End: 2024-05-06
Payer: COMMERCIAL

## 2024-05-06 VITALS
HEART RATE: 104 BPM | OXYGEN SATURATION: 97 % | BODY MASS INDEX: 38.8 KG/M2 | HEIGHT: 68 IN | SYSTOLIC BLOOD PRESSURE: 118 MMHG | WEIGHT: 256 LBS | DIASTOLIC BLOOD PRESSURE: 78 MMHG

## 2024-05-06 DIAGNOSIS — Z01.818 PREOP EXAM FOR INTERNAL MEDICINE: Primary | ICD-10-CM

## 2024-05-06 DIAGNOSIS — E66.01 MORBID OBESITY (HCC): ICD-10-CM

## 2024-05-06 DIAGNOSIS — M16.11 OSTEOARTHRITIS OF RIGHT HIP, UNSPECIFIED OSTEOARTHRITIS TYPE: ICD-10-CM

## 2024-05-06 PROCEDURE — 99214 OFFICE O/P EST MOD 30 MIN: CPT | Performed by: INTERNAL MEDICINE

## 2024-05-06 RX ORDER — LIDOCAINE 4 G/G
PATCH TOPICAL
COMMUNITY

## 2024-05-06 NOTE — PROGRESS NOTES
Presurgical Evaluation    Subjective:      Patient ID: Teena Staples is a 58 y.o. female.    Chief Complaint   Patient presents with    Pre-op Exam       Teena Staples is a 58 y.o. female who presents to the office today for a preoperative consultation at the request of surgeon  Marlee Mccann MD who plans on performing right hip arthroplasty on May 22. This consultation is requested for the specific conditions prompting preoperative evaluation (i.e. because of potential affect on operative risk): HLD HTN. Planned anesthesia: general. The patient has the following known anesthesia issues: n/a. Patients bleeding risk: no recent abnormal bleeding. Patient does not have objections to receiving blood products if needed.          Depression Screening and Follow-up Plan: Clincally patient does not have depression. No treatment is required.         BMI Counseling: Body mass index is 38.92 kg/m². The BMI is above normal. Nutrition recommendations include decreasing portion sizes and encouraging healthy choices of fruits and vegetables. Exercise recommendations include moderate physical activity 150 minutes/week. Rationale for BMI follow-up plan is due to patient being overweight or obese.     The following portions of the patient's history were reviewed and updated as appropriate: She  has a past medical history of Allergic, Allergic rhinitis, Arthritis, Asthma, Bleeding per rectum, Colon polyp, Diverticulitis of colon, GERD (gastroesophageal reflux disease), Hypercholesterolemia, Hypertension, Hyponatremia, and Obesity.  She   Patient Active Problem List    Diagnosis Date Noted    Primary osteoarthritis of one hip, right 11/21/2023    Mass of spine 11/18/2022    Pain in right hip 08/16/2022    Other headache syndrome 02/02/2022    Actinic keratosis 10/27/2021    Plantar fasciitis, left 10/08/2020    Hand joint stiff, right PIP 11/14/2019    GERD (gastroesophageal reflux disease) 06/18/2018    Chondromalacia of right  patella 06/12/2018    Popliteal cyst, right 06/12/2018    Trochanteric bursitis of both hips 10/04/2017    Benign essential hypertension 07/14/2016    Osteoarthritis of knees, bilateral 03/10/2016    Arthralgia of both ankles 01/25/2016    Greater trochanteric bursitis, left 11/25/2015    Cervical spinal stenosis 06/02/2015    Right cervical radiculopathy 06/02/2015    Colon polyp 03/24/2015    Morbid obesity (HCC) 03/24/2015    Prediabetes 03/24/2015    Asthma 02/18/2014    Foraminal stenosis of cervical region 12/13/2013    Lumbar radiculopathy 12/13/2013    Chronic sinusitis 01/16/2013    Hyperlipidemia 01/16/2013    Alopecia mucinosa 12/20/2012     Current Outpatient Medications   Medication Sig Dispense Refill    acetaminophen (TYLENOL) 500 mg tablet Take by mouth      ascorbic acid (VITAMIN C) 500 MG tablet Take 1 tablet (500 mg total) by mouth 2 (two) times a day 60 tablet 1    celecoxib (CeleBREX) 200 mg capsule Take 1 capsule (200 mg total) by mouth 2 (two) times a day 30 capsule 2    Cholecalciferol (VITAMIN D) 2000 units CAPS Take by mouth      cholestyramine (QUESTRAN) 4 GM/DOSE powder Take 1 packet (4 g total) by mouth 2 (two) times a day As needed for diarrhea 378 g 5    Diclofenac Sodium (Voltaren) 1 % Apply 2 g topically 4 (four) times a day      Elderberry-Vitamin C-Zinc ([employer] GUMMY PO) Take 2 gums by mouth daily      ferrous sulfate 324 (65 Fe) mg Take 1 tablet (324 mg total) by mouth 2 (two) times a day before meals 60 tablet 1    folic acid (FOLVITE) 1 mg tablet Take 1 tablet (1 mg total) by mouth daily 30 tablet 1    glucosamine-chondroitin 500-400 MG tablet Take 1 tablet by mouth 3 (three) times a day 100 tablet 2    Lidocaine 4 % PTCH Apply topically      mometasone (NASONEX) 50 mcg/act nasal spray 2 sprays into each nostril daily 17 g 0    Multiple Vitamins-Minerals (multivitamin with minerals) tablet Take 1 tablet by mouth daily 30 tablet 0    omeprazole (PriLOSEC) 10 mg  delayed release capsule Take 1 capsule (10 mg total) by mouth daily 90 capsule 2    Probiotic Product (ALIGN) 4 MG CAPS Take by mouth      Rhubarb (ESTROVEN COMPLETE PO) Estroven      simvastatin (ZOCOR) 5 MG tablet Take 1 tablet (5 mg total) by mouth daily at bedtime 90 tablet 0    spironolactone (ALDACTONE) 50 mg tablet Take 1 tablet (50 mg total) by mouth daily 90 tablet 0    verapamil (Verelan) 180 MG 24 hr capsule Take 1 capsule (180 mg total) by mouth daily before breakfast 90 capsule 2    vitamin E, tocopherol, 400 units capsule Take 1 capsule (400 Units total) by mouth daily       No current facility-administered medications for this visit.   .    Procedure date: 5/22/24    Surgeon:  Marlee Mccann MD   Planned procedure:  right hip total arthroplasty  Diagnosis for procedure:  Primary osteoarthritis of right hip     Prior anesthesia: Yes   General; Complications:  None / Tolerated well    CAD History: None     Pulmonary History: None    Renal history: None    Diabetes History:  None     Neurological History: None     On Immunosuppressant meds/biologics: No      Review of Systems   Constitutional:  Negative for chills and fever.   HENT:  Negative for ear pain and sore throat.    Eyes:  Negative for pain and visual disturbance.   Respiratory:  Negative for cough and shortness of breath.    Cardiovascular:  Negative for chest pain and palpitations.   Gastrointestinal:  Negative for abdominal pain and vomiting.   Genitourinary:  Negative for dysuria and hematuria.   Musculoskeletal:  Positive for arthralgias and gait problem. Negative for back pain.   Skin:  Negative for color change and rash.   Neurological:  Negative for seizures and syncope.   All other systems reviewed and are negative.        Current Outpatient Medications   Medication Sig Dispense Refill    acetaminophen (TYLENOL) 500 mg tablet Take by mouth      ascorbic acid (VITAMIN C) 500 MG tablet Take 1 tablet (500 mg total) by mouth 2 (two)  times a day 60 tablet 1    celecoxib (CeleBREX) 200 mg capsule Take 1 capsule (200 mg total) by mouth 2 (two) times a day 30 capsule 2    Cholecalciferol (VITAMIN D) 2000 units CAPS Take by mouth      cholestyramine (QUESTRAN) 4 GM/DOSE powder Take 1 packet (4 g total) by mouth 2 (two) times a day As needed for diarrhea 378 g 5    Diclofenac Sodium (Voltaren) 1 % Apply 2 g topically 4 (four) times a day      Elderberry-Vitamin C-Zinc (ERUCES GUMMY PO) Take 2 gums by mouth daily      ferrous sulfate 324 (65 Fe) mg Take 1 tablet (324 mg total) by mouth 2 (two) times a day before meals 60 tablet 1    folic acid (FOLVITE) 1 mg tablet Take 1 tablet (1 mg total) by mouth daily 30 tablet 1    glucosamine-chondroitin 500-400 MG tablet Take 1 tablet by mouth 3 (three) times a day 100 tablet 2    Lidocaine 4 % PTCH Apply topically      mometasone (NASONEX) 50 mcg/act nasal spray 2 sprays into each nostril daily 17 g 0    Multiple Vitamins-Minerals (multivitamin with minerals) tablet Take 1 tablet by mouth daily 30 tablet 0    omeprazole (PriLOSEC) 10 mg delayed release capsule Take 1 capsule (10 mg total) by mouth daily 90 capsule 2    Probiotic Product (ALIGN) 4 MG CAPS Take by mouth      Rhubarb (ESTROVEN COMPLETE PO) Estroven      simvastatin (ZOCOR) 5 MG tablet Take 1 tablet (5 mg total) by mouth daily at bedtime 90 tablet 0    spironolactone (ALDACTONE) 50 mg tablet Take 1 tablet (50 mg total) by mouth daily 90 tablet 0    verapamil (Verelan) 180 MG 24 hr capsule Take 1 capsule (180 mg total) by mouth daily before breakfast 90 capsule 2    vitamin E, tocopherol, 400 units capsule Take 1 capsule (400 Units total) by mouth daily       No current facility-administered medications for this visit.       Allergies on file:   Levofloxacin, Sulfamethoxazole-trimethoprim, and Hydrocodone bitartrate er    Patient Active Problem List   Diagnosis    Alopecia mucinosa    Arthralgia of both ankles    Asthma    Benign  "essential hypertension    Cervical spinal stenosis    Chronic sinusitis    Colon polyp    Foraminal stenosis of cervical region    Greater trochanteric bursitis, left    Hyperlipidemia    Lumbar radiculopathy    Morbid obesity (HCC)    Osteoarthritis of knees, bilateral    Prediabetes    Right cervical radiculopathy    Trochanteric bursitis of both hips    Chondromalacia of right patella    Popliteal cyst, right    GERD (gastroesophageal reflux disease)    Hand joint stiff, right PIP    Plantar fasciitis, left    Actinic keratosis    Other headache syndrome    Pain in right hip    Mass of spine    Primary osteoarthritis of one hip, right        Past Medical History:   Diagnosis Date    Allergic     Allergic rhinitis     last assessed 14     Arthritis     Asthma     Bleeding per rectum     Last assessed 16     Colon polyp     Diverticulitis of colon     GERD (gastroesophageal reflux disease)     Hypercholesterolemia     Hypertension     last assessed 3/24/15     Hyponatremia     last assessed 9/17/15     Obesity        Past Surgical History:   Procedure Laterality Date     SECTION      COLONOSCOPY      FL INJECTION RIGHT SHOULDER (ARTHROGRAM)  3/18/2021    WISDOM TOOTH EXTRACTION      WRIST SURGERY         Family History   Adopted: Yes   Problem Relation Age of Onset    No Known Problems Mother     Migraines Daughter        Social History     Tobacco Use    Smoking status: Never    Smokeless tobacco: Never   Vaping Use    Vaping status: Never Used   Substance Use Topics    Alcohol use: Yes     Comment: rare / never drank alcohol per [employer]     Drug use: No       Objective:    Vitals:    24 1410   BP: 118/78   BP Location: Left arm   Patient Position: Sitting   Cuff Size: Large   Pulse: 104   SpO2: 97%   Height: 5' 8\" (1.727 m)        Physical Exam  Vitals and nursing note reviewed.   Constitutional:       Appearance: Normal appearance. She is obese.   HENT:      Head: Normocephalic and " atraumatic.   Cardiovascular:      Rate and Rhythm: Normal rate and regular rhythm.      Heart sounds: Normal heart sounds.   Pulmonary:      Effort: Pulmonary effort is normal.      Breath sounds: Normal breath sounds.   Musculoskeletal:         General: Normal range of motion.      Cervical back: Normal range of motion.      Right lower leg: No edema.      Left lower leg: No edema.   Lymphadenopathy:      Cervical: No cervical adenopathy.   Skin:     General: Skin is warm and dry.   Neurological:      General: No focal deficit present.      Mental Status: She is alert and oriented to person, place, and time. Mental status is at baseline.      Gait: Gait abnormal.   Psychiatric:         Mood and Affect: Mood normal.           Preop labs/testing available and reviewed: yes    eGFR   Date Value Ref Range Status   2024 81 ml/min/1.73sq m Final     WBC   Date Value Ref Range Status   2024 3.92 (L) 4.31 - 10.16 Thousand/uL Final     Hemoglobin   Date Value Ref Range Status   2024 12.5 11.5 - 15.4 g/dL Final     Hematocrit   Date Value Ref Range Status   2024 41.7 34.8 - 46.1 % Final     Platelets   Date Value Ref Range Status   2024 364 149 - 390 Thousands/uL Final     INR   Date Value Ref Range Status   2024 0.99 0.84 - 1.19 Final       EKG yes    Echo no    Stress test/cath no    PFT/Donnell no    Functional capacity: Eat, Dress, walk indoors:  1-3 Mets   Pick the highest level patient can comfortably perform   4 mets or greater for surgery    RCRI  High Risk surgery?         1 Point  CAD History:         1 Point   MI; Positive Stress Test; CP due to Mi;  Nitrate Usage to control Angina; Pathologic Q wave on EKG  CHF Active:         1 Point   Pulm Edema; Paroxysmal Nocturnal Dyspnea;  Bibasilar Rales (crackles);S3; CHF on CXR  Cerebrovascular Disease (TIA or CVA):     1 Point  DM on Insulin:        1 Point  Serum Creat >2.0 mg/dl:       1 Point          Total Points: 0     Scorin:  Class I, Very Low Risk (0.4%)     1: Class II, Low risk (0.9%)     2: Class III Moderate (6.6%)     3: Class IV High (>11%)      RAE Risk:  GFR:   eGFR   Date Value Ref Range Status   04/29/2024 81 ml/min/1.73sq m Final                 Assessment/Plan:    Patient is medically optimized (cleared) for the planned procedure.    Further testing/evaluation is not required.    Postop concerns: no    Problem List Items Addressed This Visit       Morbid obesity (HCC)     Other Visit Diagnoses       Preop exam for internal medicine    -  Primary    Osteoarthritis of right hip, unspecified osteoarthritis type                 Diagnoses and all orders for this visit:    Preop exam for internal medicine    Morbid obesity (HCC)    Osteoarthritis of right hip, unspecified osteoarthritis type    Other orders  -     Diclofenac Sodium (Voltaren) 1 %; Apply 2 g topically 4 (four) times a day  -     Lidocaine 4 % PTCH; Apply topically          Pre-Surgery Instructions:   Medication Instructions    acetaminophen (TYLENOL) 500 mg tablet per anesthesia guidelines     ascorbic acid (VITAMIN C) 500 MG tablet per anesthesia guidelines     celecoxib (CeleBREX) 200 mg capsule per anesthesia guidelines     Cholecalciferol (VITAMIN D) 2000 units CAPS per anesthesia guidelines     cholestyramine (QUESTRAN) 4 GM/DOSE powder per anesthesia guidelines     Diclofenac Sodium (Voltaren) 1 % per anesthesia guidelines     Elderberry-Vitamin C-Zinc ([employer] GUMMY PO) per anesthesia guidelines     ferrous sulfate 324 (65 Fe) mg per anesthesia guidelines     folic acid (FOLVITE) 1 mg tablet per anesthesia guidelines     glucosamine-chondroitin 500-400 MG tablet per anesthesia guidelines     Lidocaine 4 % PTCH per anesthesia guidelines     mometasone (NASONEX) 50 mcg/act nasal spray per anesthesia guidelines     Multiple Vitamins-Minerals (multivitamin with minerals) tablet per anesthesia guidelines     omeprazole (PriLOSEC) 10 mg delayed  "release capsule per anesthesia guidelines     Probiotic Product (ALIGN) 4 MG CAPS per anesthesia guidelines     Rhubarb (ESTROVEN COMPLETE PO) per anesthesia guidelines     simvastatin (ZOCOR) 5 MG tablet per anesthesia guidelines     spironolactone (ALDACTONE) 50 mg tablet per anesthesia guidelines     verapamil (Verelan) 180 MG 24 hr capsule Take morning of surgery    vitamin E, tocopherol, 400 units capsule per anesthesia guidelines         NOTE: Please use the above to review important meds for your specialty, the remainder \"per anesthesia Guidelines.\"    NOTE: Please place an Inbasket message for \"SOC\" pool for complicated patients.    "

## 2024-05-06 NOTE — H&P (VIEW-ONLY)
Presurgical Evaluation    Subjective:      Patient ID: Teena Staples is a 58 y.o. female.    Chief Complaint   Patient presents with    Pre-op Exam       Teena Staples is a 58 y.o. female who presents to the office today for a preoperative consultation at the request of surgeon  Marlee Mccann MD who plans on performing right hip arthroplasty on May 22. This consultation is requested for the specific conditions prompting preoperative evaluation (i.e. because of potential affect on operative risk): HLD HTN. Planned anesthesia: general. The patient has the following known anesthesia issues: n/a. Patients bleeding risk: no recent abnormal bleeding. Patient does not have objections to receiving blood products if needed.          Depression Screening and Follow-up Plan: Clincally patient does not have depression. No treatment is required.         BMI Counseling: Body mass index is 38.92 kg/m². The BMI is above normal. Nutrition recommendations include decreasing portion sizes and encouraging healthy choices of fruits and vegetables. Exercise recommendations include moderate physical activity 150 minutes/week. Rationale for BMI follow-up plan is due to patient being overweight or obese.     The following portions of the patient's history were reviewed and updated as appropriate: She  has a past medical history of Allergic, Allergic rhinitis, Arthritis, Asthma, Bleeding per rectum, Colon polyp, Diverticulitis of colon, GERD (gastroesophageal reflux disease), Hypercholesterolemia, Hypertension, Hyponatremia, and Obesity.  She   Patient Active Problem List    Diagnosis Date Noted    Primary osteoarthritis of one hip, right 11/21/2023    Mass of spine 11/18/2022    Pain in right hip 08/16/2022    Other headache syndrome 02/02/2022    Actinic keratosis 10/27/2021    Plantar fasciitis, left 10/08/2020    Hand joint stiff, right PIP 11/14/2019    GERD (gastroesophageal reflux disease) 06/18/2018    Chondromalacia of right  patella 06/12/2018    Popliteal cyst, right 06/12/2018    Trochanteric bursitis of both hips 10/04/2017    Benign essential hypertension 07/14/2016    Osteoarthritis of knees, bilateral 03/10/2016    Arthralgia of both ankles 01/25/2016    Greater trochanteric bursitis, left 11/25/2015    Cervical spinal stenosis 06/02/2015    Right cervical radiculopathy 06/02/2015    Colon polyp 03/24/2015    Morbid obesity (HCC) 03/24/2015    Prediabetes 03/24/2015    Asthma 02/18/2014    Foraminal stenosis of cervical region 12/13/2013    Lumbar radiculopathy 12/13/2013    Chronic sinusitis 01/16/2013    Hyperlipidemia 01/16/2013    Alopecia mucinosa 12/20/2012     Current Outpatient Medications   Medication Sig Dispense Refill    acetaminophen (TYLENOL) 500 mg tablet Take by mouth      ascorbic acid (VITAMIN C) 500 MG tablet Take 1 tablet (500 mg total) by mouth 2 (two) times a day 60 tablet 1    celecoxib (CeleBREX) 200 mg capsule Take 1 capsule (200 mg total) by mouth 2 (two) times a day 30 capsule 2    Cholecalciferol (VITAMIN D) 2000 units CAPS Take by mouth      cholestyramine (QUESTRAN) 4 GM/DOSE powder Take 1 packet (4 g total) by mouth 2 (two) times a day As needed for diarrhea 378 g 5    Diclofenac Sodium (Voltaren) 1 % Apply 2 g topically 4 (four) times a day      Elderberry-Vitamin C-Zinc (ELDERBERRY IMMUNE HEALTH GUMMY PO) Take 2 gums by mouth daily      ferrous sulfate 324 (65 Fe) mg Take 1 tablet (324 mg total) by mouth 2 (two) times a day before meals 60 tablet 1    folic acid (FOLVITE) 1 mg tablet Take 1 tablet (1 mg total) by mouth daily 30 tablet 1    glucosamine-chondroitin 500-400 MG tablet Take 1 tablet by mouth 3 (three) times a day 100 tablet 2    Lidocaine 4 % PTCH Apply topically      mometasone (NASONEX) 50 mcg/act nasal spray 2 sprays into each nostril daily 17 g 0    Multiple Vitamins-Minerals (multivitamin with minerals) tablet Take 1 tablet by mouth daily 30 tablet 0    omeprazole (PriLOSEC) 10 mg  delayed release capsule Take 1 capsule (10 mg total) by mouth daily 90 capsule 2    Probiotic Product (ALIGN) 4 MG CAPS Take by mouth      Rhubarb (ESTROVEN COMPLETE PO) Estroven      simvastatin (ZOCOR) 5 MG tablet Take 1 tablet (5 mg total) by mouth daily at bedtime 90 tablet 0    spironolactone (ALDACTONE) 50 mg tablet Take 1 tablet (50 mg total) by mouth daily 90 tablet 0    verapamil (Verelan) 180 MG 24 hr capsule Take 1 capsule (180 mg total) by mouth daily before breakfast 90 capsule 2    vitamin E, tocopherol, 400 units capsule Take 1 capsule (400 Units total) by mouth daily       No current facility-administered medications for this visit.   .    Procedure date: 5/22/24    Surgeon:  Marlee Mccann MD   Planned procedure:  right hip total arthroplasty  Diagnosis for procedure:  Primary osteoarthritis of right hip     Prior anesthesia: Yes   General; Complications:  None / Tolerated well    CAD History: None     Pulmonary History: None    Renal history: None    Diabetes History:  None     Neurological History: None     On Immunosuppressant meds/biologics: No      Review of Systems   Constitutional:  Negative for chills and fever.   HENT:  Negative for ear pain and sore throat.    Eyes:  Negative for pain and visual disturbance.   Respiratory:  Negative for cough and shortness of breath.    Cardiovascular:  Negative for chest pain and palpitations.   Gastrointestinal:  Negative for abdominal pain and vomiting.   Genitourinary:  Negative for dysuria and hematuria.   Musculoskeletal:  Positive for arthralgias and gait problem. Negative for back pain.   Skin:  Negative for color change and rash.   Neurological:  Negative for seizures and syncope.   All other systems reviewed and are negative.        Current Outpatient Medications   Medication Sig Dispense Refill    acetaminophen (TYLENOL) 500 mg tablet Take by mouth      ascorbic acid (VITAMIN C) 500 MG tablet Take 1 tablet (500 mg total) by mouth 2 (two)  times a day 60 tablet 1    celecoxib (CeleBREX) 200 mg capsule Take 1 capsule (200 mg total) by mouth 2 (two) times a day 30 capsule 2    Cholecalciferol (VITAMIN D) 2000 units CAPS Take by mouth      cholestyramine (QUESTRAN) 4 GM/DOSE powder Take 1 packet (4 g total) by mouth 2 (two) times a day As needed for diarrhea 378 g 5    Diclofenac Sodium (Voltaren) 1 % Apply 2 g topically 4 (four) times a day      Elderberry-Vitamin C-Zinc (Triblio GUMMY PO) Take 2 gums by mouth daily      ferrous sulfate 324 (65 Fe) mg Take 1 tablet (324 mg total) by mouth 2 (two) times a day before meals 60 tablet 1    folic acid (FOLVITE) 1 mg tablet Take 1 tablet (1 mg total) by mouth daily 30 tablet 1    glucosamine-chondroitin 500-400 MG tablet Take 1 tablet by mouth 3 (three) times a day 100 tablet 2    Lidocaine 4 % PTCH Apply topically      mometasone (NASONEX) 50 mcg/act nasal spray 2 sprays into each nostril daily 17 g 0    Multiple Vitamins-Minerals (multivitamin with minerals) tablet Take 1 tablet by mouth daily 30 tablet 0    omeprazole (PriLOSEC) 10 mg delayed release capsule Take 1 capsule (10 mg total) by mouth daily 90 capsule 2    Probiotic Product (ALIGN) 4 MG CAPS Take by mouth      Rhubarb (ESTROVEN COMPLETE PO) Estroven      simvastatin (ZOCOR) 5 MG tablet Take 1 tablet (5 mg total) by mouth daily at bedtime 90 tablet 0    spironolactone (ALDACTONE) 50 mg tablet Take 1 tablet (50 mg total) by mouth daily 90 tablet 0    verapamil (Verelan) 180 MG 24 hr capsule Take 1 capsule (180 mg total) by mouth daily before breakfast 90 capsule 2    vitamin E, tocopherol, 400 units capsule Take 1 capsule (400 Units total) by mouth daily       No current facility-administered medications for this visit.       Allergies on file:   Levofloxacin, Sulfamethoxazole-trimethoprim, and Hydrocodone bitartrate er    Patient Active Problem List   Diagnosis    Alopecia mucinosa    Arthralgia of both ankles    Asthma    Benign  "essential hypertension    Cervical spinal stenosis    Chronic sinusitis    Colon polyp    Foraminal stenosis of cervical region    Greater trochanteric bursitis, left    Hyperlipidemia    Lumbar radiculopathy    Morbid obesity (HCC)    Osteoarthritis of knees, bilateral    Prediabetes    Right cervical radiculopathy    Trochanteric bursitis of both hips    Chondromalacia of right patella    Popliteal cyst, right    GERD (gastroesophageal reflux disease)    Hand joint stiff, right PIP    Plantar fasciitis, left    Actinic keratosis    Other headache syndrome    Pain in right hip    Mass of spine    Primary osteoarthritis of one hip, right        Past Medical History:   Diagnosis Date    Allergic     Allergic rhinitis     last assessed 14     Arthritis     Asthma     Bleeding per rectum     Last assessed 16     Colon polyp     Diverticulitis of colon     GERD (gastroesophageal reflux disease)     Hypercholesterolemia     Hypertension     last assessed 3/24/15     Hyponatremia     last assessed 9/17/15     Obesity        Past Surgical History:   Procedure Laterality Date     SECTION      COLONOSCOPY      FL INJECTION RIGHT SHOULDER (ARTHROGRAM)  3/18/2021    WISDOM TOOTH EXTRACTION      WRIST SURGERY         Family History   Adopted: Yes   Problem Relation Age of Onset    No Known Problems Mother     Migraines Daughter        Social History     Tobacco Use    Smoking status: Never    Smokeless tobacco: Never   Vaping Use    Vaping status: Never Used   Substance Use Topics    Alcohol use: Yes     Comment: rare / never drank alcohol per allscript     Drug use: No       Objective:    Vitals:    24 1410   BP: 118/78   BP Location: Left arm   Patient Position: Sitting   Cuff Size: Large   Pulse: 104   SpO2: 97%   Height: 5' 8\" (1.727 m)        Physical Exam  Vitals and nursing note reviewed.   Constitutional:       Appearance: Normal appearance. She is obese.   HENT:      Head: Normocephalic and " atraumatic.   Cardiovascular:      Rate and Rhythm: Normal rate and regular rhythm.      Heart sounds: Normal heart sounds.   Pulmonary:      Effort: Pulmonary effort is normal.      Breath sounds: Normal breath sounds.   Musculoskeletal:         General: Normal range of motion.      Cervical back: Normal range of motion.      Right lower leg: No edema.      Left lower leg: No edema.   Lymphadenopathy:      Cervical: No cervical adenopathy.   Skin:     General: Skin is warm and dry.   Neurological:      General: No focal deficit present.      Mental Status: She is alert and oriented to person, place, and time. Mental status is at baseline.      Gait: Gait abnormal.   Psychiatric:         Mood and Affect: Mood normal.           Preop labs/testing available and reviewed: yes    eGFR   Date Value Ref Range Status   2024 81 ml/min/1.73sq m Final     WBC   Date Value Ref Range Status   2024 3.92 (L) 4.31 - 10.16 Thousand/uL Final     Hemoglobin   Date Value Ref Range Status   2024 12.5 11.5 - 15.4 g/dL Final     Hematocrit   Date Value Ref Range Status   2024 41.7 34.8 - 46.1 % Final     Platelets   Date Value Ref Range Status   2024 364 149 - 390 Thousands/uL Final     INR   Date Value Ref Range Status   2024 0.99 0.84 - 1.19 Final       EKG yes    Echo no    Stress test/cath no    PFT/Donnell no    Functional capacity: Eat, Dress, walk indoors:  1-3 Mets   Pick the highest level patient can comfortably perform   4 mets or greater for surgery    RCRI  High Risk surgery?         1 Point  CAD History:         1 Point   MI; Positive Stress Test; CP due to Mi;  Nitrate Usage to control Angina; Pathologic Q wave on EKG  CHF Active:         1 Point   Pulm Edema; Paroxysmal Nocturnal Dyspnea;  Bibasilar Rales (crackles);S3; CHF on CXR  Cerebrovascular Disease (TIA or CVA):     1 Point  DM on Insulin:        1 Point  Serum Creat >2.0 mg/dl:       1 Point          Total Points: 0     Scorin:  Class I, Very Low Risk (0.4%)     1: Class II, Low risk (0.9%)     2: Class III Moderate (6.6%)     3: Class IV High (>11%)      RAE Risk:  GFR:   eGFR   Date Value Ref Range Status   04/29/2024 81 ml/min/1.73sq m Final                 Assessment/Plan:    Patient is medically optimized (cleared) for the planned procedure.    Further testing/evaluation is not required.    Postop concerns: no    Problem List Items Addressed This Visit       Morbid obesity (HCC)     Other Visit Diagnoses       Preop exam for internal medicine    -  Primary    Osteoarthritis of right hip, unspecified osteoarthritis type                 Diagnoses and all orders for this visit:    Preop exam for internal medicine    Morbid obesity (HCC)    Osteoarthritis of right hip, unspecified osteoarthritis type    Other orders  -     Diclofenac Sodium (Voltaren) 1 %; Apply 2 g topically 4 (four) times a day  -     Lidocaine 4 % PTCH; Apply topically          Pre-Surgery Instructions:   Medication Instructions    acetaminophen (TYLENOL) 500 mg tablet per anesthesia guidelines     ascorbic acid (VITAMIN C) 500 MG tablet per anesthesia guidelines     celecoxib (CeleBREX) 200 mg capsule per anesthesia guidelines     Cholecalciferol (VITAMIN D) 2000 units CAPS per anesthesia guidelines     cholestyramine (QUESTRAN) 4 GM/DOSE powder per anesthesia guidelines     Diclofenac Sodium (Voltaren) 1 % per anesthesia guidelines     Elderberry-Vitamin C-Zinc (Evermind GUMMY PO) per anesthesia guidelines     ferrous sulfate 324 (65 Fe) mg per anesthesia guidelines     folic acid (FOLVITE) 1 mg tablet per anesthesia guidelines     glucosamine-chondroitin 500-400 MG tablet per anesthesia guidelines     Lidocaine 4 % PTCH per anesthesia guidelines     mometasone (NASONEX) 50 mcg/act nasal spray per anesthesia guidelines     Multiple Vitamins-Minerals (multivitamin with minerals) tablet per anesthesia guidelines     omeprazole (PriLOSEC) 10 mg delayed  "release capsule per anesthesia guidelines     Probiotic Product (ALIGN) 4 MG CAPS per anesthesia guidelines     Rhubarb (ESTROVEN COMPLETE PO) per anesthesia guidelines     simvastatin (ZOCOR) 5 MG tablet per anesthesia guidelines     spironolactone (ALDACTONE) 50 mg tablet per anesthesia guidelines     verapamil (Verelan) 180 MG 24 hr capsule Take morning of surgery    vitamin E, tocopherol, 400 units capsule per anesthesia guidelines         NOTE: Please use the above to review important meds for your specialty, the remainder \"per anesthesia Guidelines.\"    NOTE: Please place an Inbasket message for \"SOC\" pool for complicated patients.    "

## 2024-05-09 RX ORDER — LORATADINE 10 MG/1
10 TABLET ORAL DAILY
COMMUNITY

## 2024-05-09 NOTE — PRE-PROCEDURE INSTRUCTIONS
Pre-Surgery Instructions:   Medication Instructions    acetaminophen (TYLENOL) 500 mg tablet Uses PRN- OK to take day of surgery    ascorbic acid (VITAMIN C) 500 MG tablet Hold day of surgery.    celecoxib (CeleBREX) 200 mg capsule Stop taking 3 days prior to surgery.    Cholecalciferol (VITAMIN D) 2000 units CAPS Stop taking 7 days prior to surgery.    cholestyramine (QUESTRAN) 4 GM/DOSE powder Hold day of surgery.    Diclofenac Sodium (Voltaren) 1 % Stop taking 3 days prior to surgery.    diphenhydrAMINE-acetaminophen (TYLENOL PM)  MG TABS Take night before surgery    Elderberry-Vitamin C-Zinc (ELDERBERRY IMMUNE HEALTH GUMMY PO) Stop taking 7 days prior to surgery.    ferrous sulfate 324 (65 Fe) mg Hold day of surgery.    folic acid (FOLVITE) 1 mg tablet Hold day of surgery.    glucosamine-chondroitin 500-400 MG tablet Stop taking 7 days prior to surgery.    Lidocaine 4 % PTCH Hold day of surgery.    loratadine (CLARITIN) 10 mg tablet Take day of surgery.    mometasone (NASONEX) 50 mcg/act nasal spray Take day of surgery.    Multiple Vitamins-Minerals (multivitamin with minerals) tablet Hold day of surgery.    omeprazole (PriLOSEC) 10 mg delayed release capsule Take day of surgery.    Probiotic Product (ALIGN) 4 MG CAPS Hold day of surgery.    Rhubarb (ESTROVEN COMPLETE PO) Stop taking 7 days prior to surgery.    simvastatin (ZOCOR) 5 MG tablet Take night before surgery    spironolactone (ALDACTONE) 50 mg tablet Take night before surgery    verapamil (Verelan) 180 MG 24 hr capsule Take night before surgery    vitamin E, tocopherol, 400 units capsule Stop taking 3 days prior to surgery.Per MD   Medication instructions for day surgery reviewed. Please use only a sip of water to take your instructed medications. Avoid all over the counter vitamins, supplements and NSAIDS for one week prior to surgery per anesthesia guidelines. Tylenol is ok to take as needed.     You will receive a call one business day prior  to surgery with an arrival time and hospital directions. If your surgery is scheduled on a Monday, the hospital will be calling you on the Friday prior to your surgery. If you have not heard from anyone by 8pm, please call the hospital supervisor through the hospital  at [phone] or [place] [phone]).    Do not eat or drink anything after midnight the night before your surgery, including candy, mints, lifesavers, or chewing gum. Do not drink alcohol 24hrs before your surgery. Try not to smoke at least 24hrs before your surgery.       Follow the pre surgery showering instructions as listed in the “My Surgical Experience Booklet” or otherwise provided by your surgeon's office. Do not use a blade to shave the surgical area 1 week before surgery. It is okay to use a clean electric clippers up to 24 hours before surgery. Do not apply any lotions, creams, including makeup, cologne, deodorant, or perfumes after showering on the day of your surgery. Do not use dry shampoo, hair spray, hair gel, or any type of hair products.     No contact lenses, eye make-up, or artificial eyelashes. Remove nail polish, including gel polish, and any artificial, gel, or acrylic nails if possible. Remove all jewelry including rings and body piercing jewelry.     Wear causal clothing that is easy to take on and off. Consider your type of surgery.    Keep any valuables, jewelry, piercings at home. Please bring any specially ordered equipment (sling, braces) if indicated.    Arrange for a responsible person to drive you to and from the hospital on the day of your surgery. Please confirm the visitor policy for the day of your procedure when you receive your phone call with an arrival time.     Call the surgeon's office with any new illnesses, exposures, or additional questions prior to surgery.    Please reference your “My Surgical Experience Booklet” for additional information to prepare for your upcoming surgery.      Reviewed  "with patient, in detail, instructions from \"My Surgical Experience\". Verified with patient that he received [facility] patient education from surgeon's office. Advised to call ortho office/surgery coordinator with any questions and/or concerns.   Confirmed that patient has hibiclens soap and CHG wipes. Incentive spirometer received.   Patient verbalized understanding of current visitor restrictions due to Covid and will clarify with nurse DOS. Instructed to avoid all ASA and OTC Vit/Supp 1 week prior to surgery and to avoid NSAIDs 7 days prior to surgery per anesthesia guidelines.Tylenol ok to take prn.   No alcohol 24 hours prior to surgery. Patient aware Lovenox or other Blood Thinner prescribed is for POST OP ONLY. Instructed to call surgeon's office in meantime with any new illness.  Patient verbalized an understanding of all instructions reviewed and offers no concerns at this time.    "

## 2024-05-19 ENCOUNTER — ANESTHESIA EVENT (OUTPATIENT)
Dept: PERIOP | Facility: HOSPITAL | Age: 59
End: 2024-05-19
Payer: COMMERCIAL

## 2024-05-20 ENCOUNTER — EVALUATION (OUTPATIENT)
Dept: PHYSICAL THERAPY | Facility: CLINIC | Age: 59
End: 2024-05-20
Payer: COMMERCIAL

## 2024-05-20 DIAGNOSIS — M16.11 PRIMARY OSTEOARTHRITIS OF RIGHT HIP: Primary | ICD-10-CM

## 2024-05-20 DIAGNOSIS — Z01.818 PREOPERATIVE TESTING: ICD-10-CM

## 2024-05-20 PROCEDURE — 97110 THERAPEUTIC EXERCISES: CPT

## 2024-05-20 PROCEDURE — 97161 PT EVAL LOW COMPLEX 20 MIN: CPT

## 2024-05-20 PROCEDURE — 97112 NEUROMUSCULAR REEDUCATION: CPT

## 2024-05-20 NOTE — PROGRESS NOTES
PT Evaluation     Today's date: 2024  Patient name: Teena Staples  : 1965  MRN: [id number]  Referring provider: Marlee Mccann MD  Dx:   Encounter Diagnosis     ICD-10-CM    1. Primary osteoarthritis of right hip  M16.11 Ambulatory referral to Physical Therapy      2. Preoperative testing  Z01.818 Ambulatory referral to Physical Therapy          Start Time: 1100  Stop Time: 1145  Total time in clinic (min): 45 minutes    Assessment  Impairments: abnormal gait, abnormal muscle tone, abnormal or restricted ROM, abnormal movement, activity intolerance, impaired balance, impaired physical strength, lacks appropriate home exercise program, pain with function and weight-bearing intolerance    Assessment details: Patient is a 57 y/o female reporting to physical therapy for pre-op rehabilitation for R ADAN scheduled for 24. Upon examination, patient demonstrates impairments of increased pain, decreased ROM, and decreased strength in her R hip which are resulting in functional limitations of her performance of ADL's/self-care and household activities. PT plan of care will focus on pain reduction, ROM, and strengthening both before and after her surgery to improve her impairments to be able to return to her prior level of function. Patient is a good candidate for and would benefit from skilled physical therapy to improve above listed impairments to facilitate a return to her prior level of function.     Understanding of Dx/Px/POC: good     Prognosis: good    Goals  ST weeks  1. Patient's pain level at worst will decrease by at least 50% for improved performance of daily activities.  2. Patient will become independent with her HEP.     LT weeks  1. Patient's gross R hip strength will improve to at least 4+/5 for increased tolerance to ambulation and daily activities.   2. Patient's FOTO score will improve by at least 10 points indicating improvements in her performance of functional activities.      Plan  Patient would benefit from: PT eval and skilled physical therapy  Planned modality interventions: cryotherapy, thermotherapy: hydrocollator packs and unattended electrical stimulation    Planned therapy interventions: IASTM, joint mobilization, kinesiology taping, manual therapy, abdominal trunk stabilization, balance/weight bearing training, nerve gliding, neuromuscular re-education, postural training, strengthening, stretching, therapeutic activities, therapeutic exercise, home exercise program, gait training, functional ROM exercises and flexibility    Frequency: 2-3x week  Duration in weeks: 8  Plan of Care beginning date: 5/20/2024  Plan of Care expiration date: 7/15/2024  Treatment plan discussed with: patient        Subjective Evaluation    History of Present Illness  Mechanism of injury: Patient is a 59 y/o female presenting to outpatient physical therapy today for pre-op rehabilitation for R ADAN scheduled for 5/22/24 with Dr. Mccann. Patient reports difficulties with stair negotiation, standing, driving, and ambulation. Patient reports using heat and Tylenol to provide pain relief and notes it has not been helping much. Patient reports experiencing radicular symptoms down her R LE to her knee and her ankle at times. Patient imaging notes severe degenerative changes in her R hip. Patient states her goals for physical therapy are to be able to walk, bike, and move as safely and as efficiently as possible.     Quality of life: good    Patient Goals  Patient goals for therapy: decreased pain, increased motion, return to sport/leisure activities, independence with ADLs/IADLs and increased strength    Pain  Current pain rating: 3  At best pain rating: 3  At worst pain rating: 10  Quality: dull ache, sharp, tight, knife-like and discomfort    Social Support  Steps to enter house: yes  5    Treatments  Previous treatment: injection treatment        Objective     Palpation     Right   Tenderness of the  "TFL.     Tenderness     Right Hip   Tenderness in the greater trochanter.     Active Range of Motion   Left Hip   Flexion: 90 degrees   Abduction: 20 degrees     Right Hip   Flexion: 80 degrees with pain  Abduction: 10 degrees with pain    Strength/Myotome Testing     Left Hip   Planes of Motion   Flexion: 5  Abduction: 5  Adduction: 5    Right Hip   Planes of Motion   Flexion: 4  Abduction: 4  Adduction: 4    Ambulation   Weight-Bearing Status   Assistive device used: single point cane    Observational Gait   Gait: antalgic   Decreased walking speed and stride length.              Precautions: Hx HTN    POC expires Unit limit Auth Expiration date PT/OT + Visit Limit?   7/15 N/A 12/31/24 P- 50 visits/BOMN; S- BOMN                 Visit/Unit Tracking  AUTH Status:  Date 5/20              50 visits Used 1               Remaining  49                FOTO: following surgery    Manuals 5/20                                                                Neuro Re-Ed             Pt education regarding HEP, POC, and dx 5'                         LAQ's  2x10 3\"             Seated quad sets  2x10 3\"                                                    Ther Ex             Glute sets  2x10 3\"             Seated marches 2x10 3\"             Bridging 2x10 2\"             Supine hip adduction squeeze X20 5\"                                                                 Ther Activity                                       Gait Training                                       Modalities                                            "

## 2024-05-21 NOTE — ANESTHESIA PREPROCEDURE EVALUATION
Procedure:  ARTHROPLASTY HIP TOTAL ANTERIOR (Right: Hip)    Relevant Problems   CARDIO   (+) Benign essential hypertension   (+) Hyperlipidemia      GI/HEPATIC   (+) GERD (gastroesophageal reflux disease)      MUSCULOSKELETAL   (+) Chondromalacia of right patella   (+) Osteoarthritis of knees, bilateral   (+) Primary osteoarthritis of one hip, right      NEURO/PSYCH   (+) Other headache syndrome      PULMONARY   (+) Asthma      Orthopedic/Musculoskeletal   (+) Foraminal stenosis of cervical region   (+) Lumbar radiculopathy      Dermatology   (+) Alopecia mucinosa      Other   (+) Morbid obesity (HCC)        Physical Exam    Airway    Mallampati score: II  TM Distance: >3 FB  Neck ROM: full     Dental       Cardiovascular  Cardiovascular exam normal    Pulmonary  Pulmonary exam normal     Other Findings  post-pubertal.      Anesthesia Plan  ASA Score- 3     Anesthesia Type- spinal with ASA Monitors.         Additional Monitors:     Airway Plan:            Plan Factors-Exercise tolerance (METS): <4 METS.    Chart reviewed. EKG reviewed.  Existing labs reviewed. Patient summary reviewed.    Patient is not a current smoker. Patient not instructed to abstain from smoking on day of procedure. Patient did not smoke on day of surgery.            Induction-     Postoperative Plan- Plan for postoperative opioid use.     Perioperative Resuscitation Plan - Level 1 - Full Code.       Informed Consent- Anesthetic plan and risks discussed with patient and spouse.  I personally reviewed this patient with the CRNA. Discussed and agreed on the Anesthesia Plan with the CRNA..        Lab Results   Component Value Date    HGBA1C 6.0 (H) 04/29/2024       Lab Results   Component Value Date     09/21/2015    K 4.0 04/29/2024     04/29/2024    CO2 29 04/29/2024    ANIONGAP 5 09/21/2015    BUN 11 04/29/2024    CREATININE 0.80 04/29/2024    GLUCOSE 94 09/21/2015    GLUF 89 04/29/2024    CALCIUM 9.2 04/29/2024    AST 18 04/29/2024     ALT 12 04/29/2024    ALKPHOS 73 04/29/2024    EGFR 81 04/29/2024       Lab Results   Component Value Date    WBC 3.92 (L) 04/29/2024    HGB 12.5 04/29/2024    HCT 41.7 04/29/2024    MCV 85 04/29/2024     04/29/2024     Normal sinus rhythm  Normal ECG  When compared with ECG of 30-OCT-2020 21:19,  No significant change was found  Confirmed by Gerard Iyer ([id number]) on 4/29/2024 1:40:24 PM       Echo 2021   EFT VENTRICLE:  Ejection fraction was estimated to be 55 %.  There were no regional wall motion abnormalities.     MITRAL VALVE:  There was trace regurgitation.     TRICUSPID VALVE:  There was trace regurgitation.     PULMONIC VALVE:  There was trace regurgitation.    PCP clearance noted

## 2024-05-22 ENCOUNTER — ANESTHESIA (OUTPATIENT)
Dept: PERIOP | Facility: HOSPITAL | Age: 59
End: 2024-05-22
Payer: COMMERCIAL

## 2024-05-22 ENCOUNTER — APPOINTMENT (OUTPATIENT)
Dept: RADIOLOGY | Facility: HOSPITAL | Age: 59
End: 2024-05-22
Payer: COMMERCIAL

## 2024-05-22 ENCOUNTER — HOSPITAL ENCOUNTER (OUTPATIENT)
Facility: HOSPITAL | Age: 59
Setting detail: OUTPATIENT SURGERY
Discharge: HOME | End: 2024-05-22
Attending: ORTHOPAEDIC SURGERY | Admitting: ORTHOPAEDIC SURGERY
Payer: COMMERCIAL

## 2024-05-22 VITALS
OXYGEN SATURATION: 98 % | TEMPERATURE: 97.8 F | DIASTOLIC BLOOD PRESSURE: 77 MMHG | WEIGHT: 251 LBS | HEIGHT: 68 IN | RESPIRATION RATE: 16 BRPM | HEART RATE: 87 BPM | BODY MASS INDEX: 38.04 KG/M2 | SYSTOLIC BLOOD PRESSURE: 139 MMHG

## 2024-05-22 DIAGNOSIS — Z96.641 STATUS POST TOTAL HIP REPLACEMENT, RIGHT: ICD-10-CM

## 2024-05-22 DIAGNOSIS — Z01.818 PREOPERATIVE TESTING: ICD-10-CM

## 2024-05-22 DIAGNOSIS — Z96.641 S/P TOTAL RIGHT HIP ARTHROPLASTY: Primary | ICD-10-CM

## 2024-05-22 DIAGNOSIS — M16.11 PRIMARY OSTEOARTHRITIS OF ONE HIP, RIGHT: ICD-10-CM

## 2024-05-22 DIAGNOSIS — M16.11 PRIMARY OSTEOARTHRITIS OF RIGHT HIP: ICD-10-CM

## 2024-05-22 LAB
ABO GROUP BLD: NORMAL
RH BLD: POSITIVE

## 2024-05-22 PROCEDURE — 27130 TOTAL HIP ARTHROPLASTY: CPT | Performed by: PHYSICIAN ASSISTANT

## 2024-05-22 PROCEDURE — 97167 OT EVAL HIGH COMPLEX 60 MIN: CPT

## 2024-05-22 PROCEDURE — 97163 PT EVAL HIGH COMPLEX 45 MIN: CPT

## 2024-05-22 PROCEDURE — C1776 JOINT DEVICE (IMPLANTABLE): HCPCS | Performed by: ORTHOPAEDIC SURGERY

## 2024-05-22 PROCEDURE — 73501 X-RAY EXAM HIP UNI 1 VIEW: CPT

## 2024-05-22 PROCEDURE — 97530 THERAPEUTIC ACTIVITIES: CPT

## 2024-05-22 PROCEDURE — 97535 SELF CARE MNGMENT TRAINING: CPT

## 2024-05-22 PROCEDURE — 0054T BONE SRGRY CMPTR FLUOR IMAGE: CPT | Performed by: ORTHOPAEDIC SURGERY

## 2024-05-22 PROCEDURE — 27130 TOTAL HIP ARTHROPLASTY: CPT | Performed by: ORTHOPAEDIC SURGERY

## 2024-05-22 PROCEDURE — 97116 GAIT TRAINING THERAPY: CPT

## 2024-05-22 PROCEDURE — 97110 THERAPEUTIC EXERCISES: CPT

## 2024-05-22 DEVICE — ACTIS DUOFIX HIP PROSTHESIS (FEMORAL STEM 12/14 TAPER CEMENTLESS SIZE 4 STD COLLAR)  CE
Type: IMPLANTABLE DEVICE | Site: HIP | Status: FUNCTIONAL
Brand: ACTIS

## 2024-05-22 DEVICE — BIOLOX DELTA CERAMIC FEMORAL HEAD +1.5 36MM DIA 12/14 TAPER
Type: IMPLANTABLE DEVICE | Site: HIP | Status: FUNCTIONAL
Brand: BIOLOX DELTA

## 2024-05-22 DEVICE — PINNACLE HIP SOLUTIONS ALTRX POLYETHYLENE ACETABULAR LINER NEUTRAL 36MM ID 56MM OD
Type: IMPLANTABLE DEVICE | Site: HIP | Status: FUNCTIONAL
Brand: PINNACLE ALTRX

## 2024-05-22 DEVICE — PINNACLE GRIPTION ACETABULAR SHELL SECTOR 56MM OD
Type: IMPLANTABLE DEVICE | Site: HIP | Status: FUNCTIONAL
Brand: PINNACLE GRIPTION

## 2024-05-22 RX ORDER — ACETAMINOPHEN 325 MG/1
975 TABLET ORAL ONCE
Status: COMPLETED | OUTPATIENT
Start: 2024-05-22 | End: 2024-05-22

## 2024-05-22 RX ORDER — ACETAMINOPHEN 325 MG/1
975 TABLET ORAL EVERY 8 HOURS
Status: CANCELLED | OUTPATIENT
Start: 2024-05-22

## 2024-05-22 RX ORDER — METOCLOPRAMIDE HYDROCHLORIDE 5 MG/ML
10 INJECTION INTRAMUSCULAR; INTRAVENOUS ONCE AS NEEDED
Status: DISCONTINUED | OUTPATIENT
Start: 2024-05-22 | End: 2024-05-22 | Stop reason: HOSPADM

## 2024-05-22 RX ORDER — ROCURONIUM BROMIDE 10 MG/ML
INJECTION, SOLUTION INTRAVENOUS AS NEEDED
Status: DISCONTINUED | OUTPATIENT
Start: 2024-05-22 | End: 2024-05-22

## 2024-05-22 RX ORDER — CEFAZOLIN SODIUM 2 G/50ML
2000 SOLUTION INTRAVENOUS ONCE
Status: COMPLETED | OUTPATIENT
Start: 2024-05-22 | End: 2024-05-22

## 2024-05-22 RX ORDER — OXYCODONE HYDROCHLORIDE 5 MG/1
TABLET ORAL
Qty: 30 TABLET | Refills: 0 | Status: SHIPPED | OUTPATIENT
Start: 2024-05-22

## 2024-05-22 RX ORDER — ONDANSETRON 4 MG/1
4 TABLET, FILM COATED ORAL EVERY 8 HOURS PRN
Qty: 5 TABLET | Refills: 0 | Status: SHIPPED | OUTPATIENT
Start: 2024-05-22

## 2024-05-22 RX ORDER — FENTANYL CITRATE 50 UG/ML
INJECTION, SOLUTION INTRAMUSCULAR; INTRAVENOUS AS NEEDED
Status: DISCONTINUED | OUTPATIENT
Start: 2024-05-22 | End: 2024-05-22

## 2024-05-22 RX ORDER — TRANEXAMIC ACID 10 MG/ML
1000 INJECTION, SOLUTION INTRAVENOUS ONCE
Status: COMPLETED | OUTPATIENT
Start: 2024-05-22 | End: 2024-05-22

## 2024-05-22 RX ORDER — HYDROMORPHONE HCL/PF 1 MG/ML
0.5 SYRINGE (ML) INJECTION
Status: DISCONTINUED | OUTPATIENT
Start: 2024-05-22 | End: 2024-05-22 | Stop reason: HOSPADM

## 2024-05-22 RX ORDER — METHOCARBAMOL 500 MG/1
500 TABLET, FILM COATED ORAL 3 TIMES DAILY PRN
Qty: 30 TABLET | Refills: 0 | Status: SHIPPED | OUTPATIENT
Start: 2024-05-22

## 2024-05-22 RX ORDER — HYDROMORPHONE HCL/PF 1 MG/ML
SYRINGE (ML) INJECTION AS NEEDED
Status: DISCONTINUED | OUTPATIENT
Start: 2024-05-22 | End: 2024-05-22

## 2024-05-22 RX ORDER — METHYLPREDNISOLONE 4 MG/1
TABLET ORAL
Qty: 1 EACH | Refills: 0 | Status: SHIPPED | OUTPATIENT
Start: 2024-05-22

## 2024-05-22 RX ORDER — ALBUTEROL SULFATE 2.5 MG/3ML
2.5 SOLUTION RESPIRATORY (INHALATION) ONCE AS NEEDED
Status: DISCONTINUED | OUTPATIENT
Start: 2024-05-22 | End: 2024-05-22 | Stop reason: HOSPADM

## 2024-05-22 RX ORDER — DEXAMETHASONE SODIUM PHOSPHATE 10 MG/ML
INJECTION, SOLUTION INTRAMUSCULAR; INTRAVENOUS AS NEEDED
Status: DISCONTINUED | OUTPATIENT
Start: 2024-05-22 | End: 2024-05-22

## 2024-05-22 RX ORDER — HYDROMORPHONE HCL IN WATER/PF 6 MG/30 ML
0.2 PATIENT CONTROLLED ANALGESIA SYRINGE INTRAVENOUS
Status: DISCONTINUED | OUTPATIENT
Start: 2024-05-22 | End: 2024-05-22

## 2024-05-22 RX ORDER — VERAPAMIL HYDROCHLORIDE 180 MG/1
180 CAPSULE, EXTENDED RELEASE ORAL
Status: CANCELLED | OUTPATIENT
Start: 2024-05-22

## 2024-05-22 RX ORDER — CALCIUM CARBONATE 500 MG/1
1000 TABLET, CHEWABLE ORAL DAILY PRN
Status: CANCELLED | OUTPATIENT
Start: 2024-05-22

## 2024-05-22 RX ORDER — MAGNESIUM HYDROXIDE 1200 MG/15ML
LIQUID ORAL AS NEEDED
Status: DISCONTINUED | OUTPATIENT
Start: 2024-05-22 | End: 2024-05-22 | Stop reason: HOSPADM

## 2024-05-22 RX ORDER — DOCUSATE SODIUM 100 MG/1
100 CAPSULE, LIQUID FILLED ORAL 2 TIMES DAILY
Qty: 10 CAPSULE | Refills: 0 | Status: SHIPPED | OUTPATIENT
Start: 2024-05-22

## 2024-05-22 RX ORDER — SODIUM CHLORIDE, SODIUM LACTATE, POTASSIUM CHLORIDE, CALCIUM CHLORIDE 600; 310; 30; 20 MG/100ML; MG/100ML; MG/100ML; MG/100ML
50 INJECTION, SOLUTION INTRAVENOUS CONTINUOUS
Status: DISCONTINUED | OUTPATIENT
Start: 2024-05-22 | End: 2024-05-22 | Stop reason: ALTCHOICE

## 2024-05-22 RX ORDER — CHLORHEXIDINE GLUCONATE 40 MG/ML
SOLUTION TOPICAL DAILY PRN
Status: DISCONTINUED | OUTPATIENT
Start: 2024-05-22 | End: 2024-05-22 | Stop reason: HOSPADM

## 2024-05-22 RX ORDER — OXYCODONE HYDROCHLORIDE 5 MG/1
5 TABLET ORAL EVERY 4 HOURS PRN
Status: DISCONTINUED | OUTPATIENT
Start: 2024-05-22 | End: 2024-05-22 | Stop reason: HOSPADM

## 2024-05-22 RX ORDER — SENNOSIDES 8.6 MG
650 CAPSULE ORAL EVERY 8 HOURS PRN
Qty: 30 TABLET | Refills: 0 | Status: SHIPPED | OUTPATIENT
Start: 2024-05-22 | End: 2024-06-21

## 2024-05-22 RX ORDER — FENTANYL CITRATE/PF 50 MCG/ML
50 SYRINGE (ML) INJECTION
Status: COMPLETED | OUTPATIENT
Start: 2024-05-22 | End: 2024-05-22

## 2024-05-22 RX ORDER — LORATADINE 10 MG/1
10 TABLET ORAL DAILY
Status: CANCELLED | OUTPATIENT
Start: 2024-05-22

## 2024-05-22 RX ORDER — LIDOCAINE HYDROCHLORIDE 10 MG/ML
INJECTION, SOLUTION EPIDURAL; INFILTRATION; INTRACAUDAL; PERINEURAL AS NEEDED
Status: DISCONTINUED | OUTPATIENT
Start: 2024-05-22 | End: 2024-05-22

## 2024-05-22 RX ORDER — LIDOCAINE HYDROCHLORIDE AND EPINEPHRINE 10; 10 MG/ML; UG/ML
INJECTION, SOLUTION INFILTRATION; PERINEURAL AS NEEDED
Status: DISCONTINUED | OUTPATIENT
Start: 2024-05-22 | End: 2024-05-22 | Stop reason: HOSPADM

## 2024-05-22 RX ORDER — MIDAZOLAM HYDROCHLORIDE 2 MG/2ML
INJECTION, SOLUTION INTRAMUSCULAR; INTRAVENOUS AS NEEDED
Status: DISCONTINUED | OUTPATIENT
Start: 2024-05-22 | End: 2024-05-22

## 2024-05-22 RX ORDER — SODIUM CHLORIDE, SODIUM LACTATE, POTASSIUM CHLORIDE, CALCIUM CHLORIDE 600; 310; 30; 20 MG/100ML; MG/100ML; MG/100ML; MG/100ML
75 INJECTION, SOLUTION INTRAVENOUS CONTINUOUS
Status: CANCELLED | OUTPATIENT
Start: 2024-05-22

## 2024-05-22 RX ORDER — ONDANSETRON 2 MG/ML
4 INJECTION INTRAMUSCULAR; INTRAVENOUS ONCE AS NEEDED
Status: DISCONTINUED | OUTPATIENT
Start: 2024-05-22 | End: 2024-05-22 | Stop reason: HOSPADM

## 2024-05-22 RX ORDER — OMEPRAZOLE 10 MG/1
10 CAPSULE, DELAYED RELEASE ORAL DAILY
Status: CANCELLED | OUTPATIENT
Start: 2024-05-22

## 2024-05-22 RX ORDER — DOCUSATE SODIUM 100 MG/1
100 CAPSULE, LIQUID FILLED ORAL 2 TIMES DAILY
Status: CANCELLED | OUTPATIENT
Start: 2024-05-22

## 2024-05-22 RX ORDER — PROPOFOL 10 MG/ML
INJECTION, EMULSION INTRAVENOUS AS NEEDED
Status: DISCONTINUED | OUTPATIENT
Start: 2024-05-22 | End: 2024-05-22

## 2024-05-22 RX ORDER — CEFAZOLIN SODIUM 1 G/50ML
1000 SOLUTION INTRAVENOUS EVERY 8 HOURS
Status: CANCELLED | OUTPATIENT
Start: 2024-05-22 | End: 2024-05-23

## 2024-05-22 RX ORDER — SIMVASTATIN 5 MG
5 TABLET ORAL
Status: CANCELLED | OUTPATIENT
Start: 2024-05-22

## 2024-05-22 RX ORDER — HYDROMORPHONE HCL/PF 1 MG/ML
0.5 SYRINGE (ML) INJECTION EVERY 2 HOUR PRN
Status: CANCELLED | OUTPATIENT
Start: 2024-05-22 | End: 2024-05-24

## 2024-05-22 RX ORDER — OXYCODONE HYDROCHLORIDE 5 MG/1
10 TABLET ORAL EVERY 4 HOURS PRN
Status: DISCONTINUED | OUTPATIENT
Start: 2024-05-22 | End: 2024-05-22 | Stop reason: HOSPADM

## 2024-05-22 RX ORDER — ONDANSETRON 2 MG/ML
INJECTION INTRAMUSCULAR; INTRAVENOUS AS NEEDED
Status: DISCONTINUED | OUTPATIENT
Start: 2024-05-22 | End: 2024-05-22

## 2024-05-22 RX ORDER — ONDANSETRON 2 MG/ML
4 INJECTION INTRAMUSCULAR; INTRAVENOUS EVERY 6 HOURS PRN
Status: CANCELLED | OUTPATIENT
Start: 2024-05-22

## 2024-05-22 RX ORDER — SODIUM CHLORIDE, SODIUM LACTATE, POTASSIUM CHLORIDE, CALCIUM CHLORIDE 600; 310; 30; 20 MG/100ML; MG/100ML; MG/100ML; MG/100ML
125 INJECTION, SOLUTION INTRAVENOUS CONTINUOUS
Status: DISCONTINUED | OUTPATIENT
Start: 2024-05-22 | End: 2024-05-22 | Stop reason: HOSPADM

## 2024-05-22 RX ORDER — ENOXAPARIN SODIUM 100 MG/ML
40 INJECTION SUBCUTANEOUS DAILY
Status: CANCELLED | OUTPATIENT
Start: 2024-05-22

## 2024-05-22 RX ORDER — CHLORHEXIDINE GLUCONATE ORAL RINSE 1.2 MG/ML
15 SOLUTION DENTAL ONCE
Status: COMPLETED | OUTPATIENT
Start: 2024-05-22 | End: 2024-05-22

## 2024-05-22 RX ADMIN — PROPOFOL 200 MG: 10 INJECTION, EMULSION INTRAVENOUS at 08:43

## 2024-05-22 RX ADMIN — LIDOCAINE HYDROCHLORIDE 50 MG: 10 INJECTION, SOLUTION EPIDURAL; INFILTRATION; INTRACAUDAL; PERINEURAL at 08:43

## 2024-05-22 RX ADMIN — FENTANYL CITRATE 50 MCG: 50 INJECTION INTRAMUSCULAR; INTRAVENOUS at 10:34

## 2024-05-22 RX ADMIN — FENTANYL CITRATE 50 MCG: 50 INJECTION INTRAMUSCULAR; INTRAVENOUS at 08:43

## 2024-05-22 RX ADMIN — HYDROMORPHONE HYDROCHLORIDE 0.5 MG: 1 INJECTION, SOLUTION INTRAMUSCULAR; INTRAVENOUS; SUBCUTANEOUS at 10:17

## 2024-05-22 RX ADMIN — CHLORHEXIDINE GLUCONATE 0.12% ORAL RINSE 15 ML: 1.2 LIQUID ORAL at 07:06

## 2024-05-22 RX ADMIN — FENTANYL CITRATE 50 MCG: 50 INJECTION INTRAMUSCULAR; INTRAVENOUS at 10:42

## 2024-05-22 RX ADMIN — ROCURONIUM BROMIDE 10 MG: 10 INJECTION, SOLUTION INTRAVENOUS at 09:49

## 2024-05-22 RX ADMIN — SODIUM CHLORIDE, SODIUM LACTATE, POTASSIUM CHLORIDE, AND CALCIUM CHLORIDE 125 ML/HR: .6; .31; .03; .02 INJECTION, SOLUTION INTRAVENOUS at 07:05

## 2024-05-22 RX ADMIN — SODIUM CHLORIDE, SODIUM LACTATE, POTASSIUM CHLORIDE, AND CALCIUM CHLORIDE: .6; .31; .03; .02 INJECTION, SOLUTION INTRAVENOUS at 10:09

## 2024-05-22 RX ADMIN — CEFAZOLIN SODIUM 2000 MG: 2 SOLUTION INTRAVENOUS at 08:22

## 2024-05-22 RX ADMIN — FENTANYL CITRATE 25 MCG: 50 INJECTION INTRAMUSCULAR; INTRAVENOUS at 08:40

## 2024-05-22 RX ADMIN — SUGAMMADEX 200 MG: 100 INJECTION, SOLUTION INTRAVENOUS at 10:14

## 2024-05-22 RX ADMIN — ACETAMINOPHEN 975 MG: 325 TABLET, FILM COATED ORAL at 07:05

## 2024-05-22 RX ADMIN — MIDAZOLAM 1 MG: 1 INJECTION INTRAMUSCULAR; INTRAVENOUS at 08:21

## 2024-05-22 RX ADMIN — HYDROMORPHONE HYDROCHLORIDE 0.5 MG: 1 INJECTION, SOLUTION INTRAMUSCULAR; INTRAVENOUS; SUBCUTANEOUS at 09:32

## 2024-05-22 RX ADMIN — DEXAMETHASONE SODIUM PHOSPHATE 10 MG: 10 INJECTION, SOLUTION INTRAMUSCULAR; INTRAVENOUS at 08:46

## 2024-05-22 RX ADMIN — TRANEXAMIC ACID 1000 MG: 10 INJECTION, SOLUTION INTRAVENOUS at 08:25

## 2024-05-22 RX ADMIN — OXYCODONE 5 MG: 5 TABLET ORAL at 13:45

## 2024-05-22 RX ADMIN — ROCURONIUM BROMIDE 50 MG: 10 INJECTION, SOLUTION INTRAVENOUS at 08:44

## 2024-05-22 RX ADMIN — SODIUM CHLORIDE 8 MCG: 9 INJECTION, SOLUTION INTRAVENOUS at 10:31

## 2024-05-22 RX ADMIN — FENTANYL CITRATE 25 MCG: 50 INJECTION INTRAMUSCULAR; INTRAVENOUS at 08:37

## 2024-05-22 RX ADMIN — ONDANSETRON 4 MG: 2 INJECTION INTRAMUSCULAR; INTRAVENOUS at 10:08

## 2024-05-22 RX ADMIN — MIDAZOLAM 1 MG: 1 INJECTION INTRAMUSCULAR; INTRAVENOUS at 08:24

## 2024-05-22 RX ADMIN — SODIUM CHLORIDE 12 MCG: 9 INJECTION, SOLUTION INTRAVENOUS at 10:34

## 2024-05-22 NOTE — OP NOTE
[id number]                                                                                                        Teena Staples [facility] OR MAIN    PreOp Dx: right hip DJD    Postop Dx: right hip DJD    Procedure: right anterior total hip arthroplasty    Surgeon: Marlee Mccann MD    Assistants: Lalit Paz PA-C    No qualified resident was available for this case.  The physician assistant was needed for all portions of this case including prepping and draping the patient as well as prepping and final implantation of the femoral and acetabular component    Fluids:     EBL:     Drains: none    Specimens: none    Complications: none    Condition: stable,Transferred to postanesthesia care unit.    Implants: Depuy      Acetabulum size 56    Acetabular poly 56/36 neutral    Femur Actis, size 4    Femoral head size 36/+1.5 ceramic    58 y.o. female , presents at this, time, secondary to failed conservative treatment of right hip DJD for right anterior total hip arthroplasty    The patient was told of all the pros and cons of operative and nonoperative intervention. Some of the complications of operative intervention include infection, bleeding, scarring, nerve injury, vascular injury,leg length discrepancy, hip dislocation, continued pain, decreased range of motion, DVT, PE death, loss of limb, fracture, need for further surgery, not obtain an results. The patient wished. Patient did consent for operative intervention for this pathology.  Patient was brought to the operating room. Patient was anesthetized as anesthesia team. Patient was prepped and draped in normal sterile fashion. After this was done, we did conduct a time out to make sure correct. Patient was in the room, prepped marked and draped. Implants were in the room, Rep. For the implants were present, DVT prophylaxis and antibiotics were dressed.  An  anterior approach was used. Incision was made 2 cm lateral and 1 cm distal to the ASIS and extended this incision distally. After going through skin, fatty tissue, fascia davina was identified and incised. The fascial layer Was incised going towards the ASIS And extending it distally to incorporate our entire incision. We were able to go through the internervous planes until we were able to identify the anterior perforating vessels. Once this was done with able to obtain hemostasis. Able to excise the pre-capsular, fatty tissue. After this was done, we were able to do all releases, which included, the inferior portion of the femoral neck going towards the lesser trochanteric region, the iliocapsularis, and also, the piriformis recess.  We then were able to make our femoral neck cut. Femoral head was removed. Labrum was excised. Fatty tissue within the acetabulum was also excised. At this, time. We did remain to be appropriate theta angle and anteversion. We did place a trial acetabular component, and with the aid of radiographic imaging. It was noted that all was appropriate. At this, time, we were able to place, our true acetabular component. Our theta angle and anteversion were noted to be appropriate. At this time, we are able to place, our true acetabular poly-ethylene.  At this, time. We did release on the medial aspect of the greater trochanteric region. We externally rotated. The femur as well as extending at the hip and adducting the femur to obtain appropriate visualization of the proximal femur. We were able to use our box osteotome, canal finder, and rasp in order to make sure there, we avoided any varus, placement of our implant. We did broach to appropriate size, making sure we had correct version. Calcar planer was then used. Trial femoral neck and head were placed. Patient was placed through provocative ranges of motion to make sure that stability was obtained. Radiographic imaging confirmed appropriate  leg lengths. At this time. We did remove a trial femoral neck and head. After the hip was dislocated. All broach handle was placed within a trial femoral component to make sure that the implant was still stable. Once this was confirmed, we did remove the trial femoral component.  Copious irrigation was used at the operative site. True femoral implant was placed. Trial femoral head was placed once more in order to confirm appropriate leg lengths and appropriate stability. Once as noted to be appropriate, we placed a true femoral head. We did check for stability, leg lengths and final radiographs were taken. Irrigation was used once more at the operative site. Fascial layer was reapproximated with barbed sutures. 2-0 Vicryl sutures were used for the subcutaneous layer.  Monocryl sutures were utilized for the subcuticular closure.  The wounds were cleaned and dried. Exofin sealant was used to augment the closure. Mepilex dressing was placed. Patient was subsequently awakened noted to be in stable condition and transferred to postanesthesia care unit.    The [employer] system was utilized in this case for:  Presurgical planning x-ray  Intraoperative use of the navigation system  Intraoperative use of fluoroscopy and saved images

## 2024-05-22 NOTE — PLAN OF CARE
Problem: OCCUPATIONAL THERAPY ADULT  Goal: Performs self-care activities at highest level of function for planned discharge setting.  See evaluation for individualized goals.  Description: Treatment Interventions: ADL retraining, Functional transfer training, Patient/family training, Compensatory technique education, Equipment evaluation/education  Equipment Recommended: Reacher ($), Bedside commode       See flowsheet documentation for full assessment, interventions and recommendations.   Note: Limitation: Decreased ADL status, Decreased high-level ADLs, Decreased self-care trans  Prognosis: Good  Assessment: Pt is a 58 y.o. female seen for OT evaluation at [facility], admitted 5/22/2024 w/ Status post total hip replacement, right.  Pt now s/p R ADAN anterior approach. Comorbidities affecting pt's functional performance at time of assessment include: spine mass, morbid obesity, lumbar radiculopathy, benign essential HTN.  Prior to admission, pt was living with spouse in a house with steps to manage.  Pt was mostly I w/  ADLS and IADLS, (+) drove, & required cane PTA. Upon evaluation, pt appears to be performing below baseline functional status. Pt currently requires min A x 1 for bed mobility, min A x 1 (progressed to supervision) for functional mobility/transfers, sup for UB ADLs and sup-min A for LB ADLS 2* the following deficits impacting occupational performance: weakness, decreased strength, decreased balance, decreased tolerance, and increased pain. Full objective findings from OT assessment regarding body systems outlined above. Personal factors affecting pt at time of IE include:steps to enter environment, difficulty performing ADLS, difficulty performing IADLS , and decreased functional mobility . These impairments, as well as risk for falls  limit pt's ability to safely engage in all baseline areas of occupation and mobility. Pt to benefit from continued skilled OT tx while in the hospital  to address deficits as defined above and maximize level of functional independence w ADL's and functional mobility. Pt educated this encounter re: ADL strategies and AD to maximize ADL independence and safety. This evaluation required an extensive review of medical and/or therapy records and additional review of physical, cognitive and psychosocial history related to functional performance. Based upon functional performance deficits and assessments, this evaluation has been identified as a high complexity evaluation.  At this time, OT recommendations at time of discharge are home with no OT needs.     Rehab Resource Intensity Level, OT: No post-acute rehabilitation needs

## 2024-05-22 NOTE — DISCHARGE INSTR - AVS FIRST PAGE
Discharge Instructions - Orthopedics  Teena Staples 58 y.o. female MRN: [id number]  Unit/Bed#: [facility] OR MAIN    Weight Bearing Status:                                           Weight Bearing as tolerated to the right lower extremity.  Anterior total hip precautions    DVT prophylaxis:  Complete course of Aspirin 81 mg twice daily x 35 days from date of surgery as directed    Pain:  Start oxycodone 5 mg every 6 hrs after last dose taken after surgery for 1 day  Transition to oxycodone 5 mg every 6 hours as needed    Showering Instructions:   May shower 5 days from date of surgery with operative dressing intact  Do not soak your incision(Tubs, Jacuzzi's, pools, etc)    Dressing Instructions:   Keep dressing/incision clean and intact until follow up appointment.    Do not apply any creams or ointments/lotions to your incisions including antibiotic ointment, peroxide    Driving Instructions:  No driving until cleared by Orthopaedic Surgery.    PT/OT:  Continue PT/OT on outpatient basis as directed    Appt Instructions:   If you do not have your appointment, please call the clinic at [phone]  Otherwise followup as scheduled    Contact the office sooner if you experience any increased numbness/tingling in the extremities.

## 2024-05-22 NOTE — OCCUPATIONAL THERAPY NOTE
Occupational Therapy Evaluation & Treat     Patient Name: Teena Staples  Today's Date: 2024  Problem List  Principal Problem:    Status post total hip replacement, right    Past Medical History  Past Medical History:   Diagnosis Date    Allergic     Allergic rhinitis     last assessed 14     Arthritis     Asthma     Bleeding per rectum     Last assessed 16     Colon polyp     Diverticulitis of colon     GERD (gastroesophageal reflux disease)     Hypercholesterolemia     Hypertension     last assessed 3/24/15     Hyponatremia     last assessed 9/17/15     Obesity      Past Surgical History  Past Surgical History:   Procedure Laterality Date     SECTION      COLONOSCOPY      FL INJECTION RIGHT SHOULDER (ARTHROGRAM)  3/18/2021    WISDOM TOOTH EXTRACTION      WRIST SURGERY             24 1450   OT Last Visit   OT Visit Date 24   Note Type   Note type   (& Treat)   Additional Comments Pt is s/p R ADAN POD #0.   Pain Assessment   Pain Assessment Tool 0-10   Pain Score 6   Pain Location/Orientation Orientation: Right;Location: Hip   Hospital Pain Intervention(s) Repositioned;Rest   Restrictions/Precautions   Weight Bearing Precautions Per Order Yes   RLE Weight Bearing Per Order WBAT  (anterior hip precautions)   Other Precautions Fall Risk;WBS;Pain   Home Living   Type of Home House   Home Layout Two level;Stairs to enter with rails;1/2 bath on main level;Bed/bath upstairs   Bathroom Shower/Tub Walk-in shower   Bathroom Toilet Standard   Bathroom Equipment Grab bars in shower;Built-in shower seat   Bathroom Accessibility Accessible   Home Equipment Sock aid   Prior Function   Level of [place] Independent with ADLs;Independent with functional mobility;Independent with IADLS  (Required occasional assist with ADLs/IADLs.)   Lives With Spouse   Receives Help From Family   IADLs Independent with driving   Vocational Retired  (Federal employee)   Subjective   Subjective Pt received in  supine position. Pt agreeable to session.   ADL   Eating Assistance 7  Independent   Grooming Assistance 7  Independent   UB Bathing Assistance 5  Supervision/Setup   LB Bathing Assistance 5  Supervision/Setup   UB Dressing Assistance 5  Supervision/Setup   LB Dressing Assistance 4  Minimal Assistance   Toileting Assistance  5  Supervision/Setup   Bed Mobility   Supine to Sit 4  Minimal assistance   Additional items Assist x 1;Verbal cues;Increased time required   Additional Comments Pt remained seated EOB by end of session.   Transfers   Sit to Stand 4  Minimal assistance   Additional items Assist x 1;Verbal cues;Increased time required   Stand to Sit 4  Minimal assistance   Additional items Assist x 1;Verbal cues;Increased time required   Additional Comments Transfers improved to supervision by end of session. See treatment note below.   Balance   Static Sitting Good   Dynamic Sitting Fair +   Static Standing Fair   Dynamic Standing Fair -   Activity Tolerance   Activity Tolerance Patient limited by pain   Medical Staff Made Aware PT Fran   Nurse Made Aware LIBIA HOGUE Assessment   RUE Assessment WFL   LUE Assessment   LUE Assessment WFL   Cognition   Overall Cognitive Status WFL   Arousal/Participation Alert   Attention Within functional limits   Orientation Level Oriented X4   Memory Within functional limits   Following Commands Follows all commands and directions without difficulty   Assessment   Limitation Decreased ADL status;Decreased high-level ADLs;Decreased self-care trans   Prognosis Good   Assessment Pt is a 58 y.o. female seen for OT evaluation at [facility], admitted 5/22/2024 w/ Status post total hip replacement, right.  Pt now s/p R ADAN anterior approach. Comorbidities affecting pt's functional performance at time of assessment include: spine mass, morbid obesity, lumbar radiculopathy, benign essential HTN.  Prior to admission, pt was living with spouse in a house with steps to  manage.  Pt was mostly I w/  ADLS and IADLS, (+) drove, & required cane PTA. Upon evaluation, pt appears to be performing below baseline functional status. Pt currently requires min A x 1 for bed mobility, min A x 1 (progressed to supervision) for functional mobility/transfers, sup for UB ADLs and sup-min A for LB ADLS 2* the following deficits impacting occupational performance: weakness, decreased strength, decreased balance, decreased tolerance, and increased pain. Full objective findings from OT assessment regarding body systems outlined above. Personal factors affecting pt at time of IE include:steps to enter environment, difficulty performing ADLS, difficulty performing IADLS , and decreased functional mobility . These impairments, as well as risk for falls  limit pt's ability to safely engage in all baseline areas of occupation and mobility. Pt to benefit from continued skilled OT tx while in the hospital to address deficits as defined above and maximize level of functional independence w ADL's and functional mobility. Pt educated this encounter re: ADL strategies and AD to maximize ADL independence and safety. This evaluation required an extensive review of medical and/or therapy records and additional review of physical, cognitive and psychosocial history related to functional performance. Based upon functional performance deficits and assessments, this evaluation has been identified as a high complexity evaluation.  At this time, OT recommendations at time of discharge are home with no OT needs.   Goals   Patient Goals Pt wishes to regain functional independence.   Plan   Treatment Interventions ADL retraining;Functional transfer training;Patient/family training;Compensatory technique education;Equipment evaluation/education   Goal Expiration Date 06/01/24   OT Treatment Day 0   OT Frequency 2-3x/wk   Discharge Recommendation   Rehab Resource Intensity Level, OT No post-acute rehabilitation needs    Equipment Recommended Reacher ($);Bedside commode   Additional Comments  The patient's raw score on the AM-PAC Daily Activity inpatient short form is 23, standardized score is 51.12, greater than 39.4. Patients at this level are likely to benefit from DC to home. However please refer to therapist recommendation for discharge planning given other factors that may influence destination.   [facility] Daily Activity Inpatient   Lower Body Dressing 3   Bathing 4   Toileting 4   Upper Body Dressing 4   Grooming 4   Eating 4   Daily Activity Raw Score 23   Daily Activity Standardized Score (Calc for Raw Score >=11) 51.12   -[facility] Applied Cognition Inpatient   Following a Speech/Presentation 4   Understanding Ordinary Conversation 4   Taking Medications 4   Remembering Where Things Are Placed or Put Away 4   Remembering List of 4-5 Errands 4   Taking Care of Complicated Tasks 4   Applied Cognition Raw Score 24   Applied Cognition Standardized Score 62.21   Additional Treatment Session   Start Time 1426   End Time 1450   Treatment Assessment Pt seated EOB. Performed additional sit<>stand transfers with supervision with VC for hand placement. Performed functional mobility into bathroom with supervision with RW. Performed toilet transfer [facility] supervision. Pt performed additional functional mobility with supervision. Pt provided with ADL education. Pt able to perform LB dressing with min Ax 1. Pt Reports that she already owns a sock aide, however would benefit from use of a reacher. Performed UB dressing with set-up. Reviewed toilet and car transfers. Discussed benefit of commode to place over toilet for maximum independence with toilet transfers. Pt remained seated EOB. NAD. Call bell in reach.   End of Consult   Education Provided Family or social support of family present for education by provider;Yes   Patient Position at End of Consult Seated edge of bed;All needs within reach   Nurse Communication Nurse  aware of consult       Pt will achieve the following goals within 10 days.    *Pt will complete UB bathing and dressing with mod I.    *Pt will complete LB bathing and dressing with mod I .    * Pt will complete toileting w/ mod I w/ G hygiene/thoroughness using DME PRN    *Pt will complete bed mobility with mod I, with bed flat and no side rail to prep for purposeful tasks    *Pt will perform functional transfers with on/off all surfaces with mod I using DME as needed w/ G balance/safety.    *Pt will increase static/dynamic sitting balance to good to improve the ability to sit at edge of bed or on a chair for ADLS;      *Pt will increase static/dynamic standing balance to good to improve postural stability and decrease fall risk during standing ADLS and transfers.       *Pt will improve functional mobility during ADL/IADL/leisure tasks to mod I using DME as needed w/ G balance/safety.       Ligia Villareal, OTR/L

## 2024-05-22 NOTE — PHYSICAL THERAPY NOTE
PHYSICAL THERAPY Evaluation and Treatment  DATE: 24  TIME: 4040-9469    NAME:  Teena Staples  AGE:   58 y.o.  Mrn:   [id number]  Length Of Stay: 0    ADMIT DX:  Primary osteoarthritis of right hip [M16.11]  Preoperative testing [Z01.818]    Past Medical History:   Diagnosis Date    Allergic     Allergic rhinitis     last assessed 14     Arthritis     Asthma     Bleeding per rectum     Last assessed 16     Colon polyp     Diverticulitis of colon     GERD (gastroesophageal reflux disease)     Hypercholesterolemia     Hypertension     last assessed 3/24/15     Hyponatremia     last assessed 9/17/15     Obesity      Past Surgical History:   Procedure Laterality Date     SECTION      COLONOSCOPY      FL INJECTION RIGHT SHOULDER (ARTHROGRAM)  3/18/2021    WISDOM TOOTH EXTRACTION      WRIST SURGERY          24 1354   PT Last Visit   PT Visit Date 24   Note Type   Note type Evaluation   Additional Comments 57 y/o presents for elective right anterior ADAN.   Pain Assessment   Pain Assessment Tool 0-10   Pain Score 6   Pain Location/Orientation Orientation: Right;Location: Hip   Hospital Pain Intervention(s) Ambulation/increased activity;Repositioned   Restrictions/Precautions   Weight Bearing Precautions Per Order Yes   RLE Weight Bearing Per Order WBAT  (Anterior hip precaution)   Other Precautions WBS;THR;Fall Risk;Pain   Home Living   Additional Comments Pt lives in a 2-level house, 5+1 step to enter (left hand rail).  Pt intends to stay on main floor initally, sleep in recliner with half bath.  full bed/bathroom on second floor (left hand rail to access): walk-in shower with chair and bars, standard toilet, flat bed.  DME: SPC, RW   Prior Function   Comments Prior to admission: mod I most times for ADL, IADLs (light chores), ambulates with a SPC.  drive (+).   General   Family/Caregiver Present Yes   Cognition   Overall Cognitive Status WFL   Arousal/Participation Alert   Orientation  Level Oriented X4   Subjective   Subjective Pt reporting she has a lot of pain.   RUE Assessment   RUE Assessment WFL   LUE Assessment   LUE Assessment WFL   RLE Assessment   RLE Assessment   (hip ext lacking, difficulty getting to neutral.  hip grossly 2+/5.  knee and ankle WFL.)   LLE Assessment   LLE Assessment WFL   Coordination   Movements are Fluid and Coordinated 1   Sensation WFL   Bed Mobility   Supine to Sit 4  Minimal assistance   Additional items Assist x 1;HOB elevated;Bedrails;Increased time required;Verbal cues;LE management  (assist to scoot RLE to EOB, and support trunk)   Transfers   Sit to Stand 4  Minimal assistance   Additional items Assist x 1;Increased time required;Verbal cues;Impulsive  (progressed to sup A with practice.  cues for technique and hand placement)   Stand to Sit 4  Minimal assistance   Additional items Assist x 1;Increased time required;Impulsive;Verbal cues  (progressed to sup A with practice.  cues for technique and hand placement.)   Ambulation/Elevation   Gait Assistance 4  Minimal assist  (progressed to sup A)   Additional items Assist x 1;Verbal cues;Tactile cues   Assistive Device Rolling walker   Distance 25'+50'   Stair Management Assistance 4  Minimal assist  (progressed to sup A)   Additional items Assist x 1;Verbal cues;Tactile cues   Stair Management Technique Two rails;One rail L;With cane   Number of Stairs 12   Ambulation/Elevation Additional Comments Pt exhibits step-to gait pattern.  difficulty for pt to clear and advance RLE due to pain.  pt exhibits good carry over of cues for posture, walker management/use.  However, pt quickly exhibits UE fatigue limiting progression and distance of ambulation.  pt ascend/descend 4 steps with rainer hand rails, and 8 steps with 1 handrail+SPC.  with cues pt exhibits learning and able to display appropriate gait technique that would be appropriate for home setup   Balance   Static Sitting Fair +   Dynamic Sitting Fair   Static  Standing Fair   Dynamic Standing Fair   Ambulatory Fair  (RW)   Endurance Deficit   Endurance Deficit Yes   Endurance Deficit Description Pt exhibits UE fatigue due to heavy use with walker, limiting ambulation   Activity Tolerance   Activity Tolerance Patient limited by fatigue;Patient limited by pain   Medical Staff Made Aware collaborated with OT   Nurse Made Aware Nursing present and aware of pt's performance   Assessment   Prognosis Good   Problem List Decreased strength;Decreased range of motion;Decreased endurance;Impaired balance;Decreased mobility;Pain;Obesity;Orthopedic restrictions   Assessment Orders for PT eval and treat received. Pt's PMHx: OA, cervical spine stenosis, cervical/lumbar radiculopathy, rainer knee OA. Pt exhibits physical deficits noted in problem list above.  Deficits listed contribute to functional limitations that are significant from the patient PLOF and include: difficulty with bed mobility, impaired standing balance, inability to perform safe transfers, tolerance for OOB functional activities, unsafe/inefficient ambulation, fall risk, and unable to safely manage stairs/steps/ramp    Additionally, patient is limited by restrictions/precautions/DME: RLE WBAT, anterior hip precaution.    During today's session, formal PT evaluation performed. Initiated supine exercises and progressed to standing.  Pt responded well to exercises and progressive bed and transfer training.  Improvement in ability without assistance noted with practice.  Pt progressed gait and stair training with education and instruction, to a level that demonstrates safety to return home.  Pt's pain seems well managed and improved with mobility.  Pt's  present during session, and all questions answered.      The AM-PAC & Barthel Index outcome tools were used to assist in determining pt safety w/ mobility/self care & appropriate d/c recommendations, see above for scores. Patient's clinical presentation is evolving due  to uncontrolled pain, significant need for care assistance compared to baseline, recent surgery/procedure, and ongoing medical management needs.     Considering the patient's PLOF, co-morbidities, acute functional limitations, functional outcome measures, and/or goal to progress functional independence; this patient would benefit from skilled Physical Therapy intervention in the acute care setting.   Barriers to Discharge None   Goals   Patient Goals return to PLOF   STG Expiration Date 06/01/24   Short Term Goal #1 1: Pt will perform supine<>sit transfer on flat bed, mod I.  2: Pt will perform stand pivot transfer with RW, mod I.  3: Pt will ambulate 150' with reciprocal gait and appropriate pace using RW, mod I. 4: Pt will perform written HEP, mod I. 5: Pt will ascend/descend a curb step and/or stairs require to mobilize around the the patient's home with RW/rails, Sup A.   Plan   Treatment/Interventions Functional transfer training;LE strengthening/ROM;Elevations;Therapeutic exercise;Endurance training;Gait training;Bed mobility;Equipment eval/education;Patient/family training   PT Frequency Twice a day   Discharge Recommendation   Rehab Resource Intensity Level, PT III (Minimum Resource Intensity)   AM-PAC Basic Mobility Inpatient   Turning in Flat Bed Without Bedrails 3   Lying on Back to Sitting on Edge of Flat Bed Without Bedrails 3   Moving Bed to Chair 3   Standing Up From Chair Using Arms 3   Walk in Room 3   Climb 3-5 Stairs With Railing 3   Basic Mobility Inpatient Raw Score 18   Basic Mobility Standardized Score 41.05   [place] Highest Level Of Mobility   -HLM Goal 6: Walk 10 steps or more   -HLM Achieved 7: Walk 25 feet or more   Additional Treatment Session   Start Time 1404   End Time 1439   Exercises   Heelslides Supine;AAROM;Right;20 reps   Hip Abduction Supine;15 reps;AAROM;Right   Hip Adduction Supine;15 reps;AAROM;Right   Marching Standing;10 reps;AROM;Right;Left  (RW)   Bridging  Supine;10 reps;AROM;Bilateral  (partial bridge)   Balance training  with RW: lateral weight shifting 10x, rainer heel raises 10x.   End of Consult   Patient Position at End of Consult All needs within reach;Seated edge of bed  (OT present to take over patient care)   The patient's AM-PAC Basic Mobility Inpatient Short Form Raw Score is 18. A Raw score of greater than or equal to 16 suggests the patient may benefit from discharge to home. Please also refer to the recommendation of the Physical Therapist for safe discharge planning.    Shiv Randlal, PT, DPT  [place] Licensure #[id number]

## 2024-05-22 NOTE — ANESTHESIA POSTPROCEDURE EVALUATION
Post-Op Assessment Note    CV Status:  Stable    Pain management: adequate       Mental Status:  Alert and awake   Hydration Status:  Euvolemic   PONV Controlled:  Controlled   Airway Patency:  Patent     Post Op Vitals Reviewed: Yes    No anethesia notable event occurred.    Staff: CRNA               BP   136/72   Temp   97.6   Pulse  75   Resp   16   SpO2   100%

## 2024-05-22 NOTE — INTERVAL H&P NOTE
H&P reviewed. After examining the patient I find no changes in the patients condition since the H&P had been written.    Vitals:    05/22/24 0701   BP: 138/73   Pulse: 81   Resp: 20   Temp: (!) 97.3 °F (36.3 °C)   SpO2: 96%   Patient seen and examined  General: No apparent distress  CV: S1-S2  Abdomen: Soft  Chest: No audible wheezing  Right lower extremity: No abrasions or open wounds; patient's hip in flexed position; pain with logrolling; neurologically vascular intact distally  To the operating room for right anterior total hip arthroplasty  Pros and cons of operative and nonoperative intervention discussed with patient  We will follow-up postoperatively

## 2024-05-23 ENCOUNTER — TELEPHONE (OUTPATIENT)
Age: 59
End: 2024-05-23

## 2024-05-23 ENCOUNTER — TELEPHONE (OUTPATIENT)
Dept: OBGYN CLINIC | Facility: HOSPITAL | Age: 59
End: 2024-05-23

## 2024-05-23 NOTE — TELEPHONE ENCOUNTER
"Patient contacted for a postoperative follow up assessment. Patient reports doing  \"a lot of pain\". Patient states current pain level of a  3/10  when sitting and 10/10 when walking with RW. Patient reports swelling near incision and to top of thigh area, which patient reports is very warm at this point.  and dressing is clean, dry and intact. Patient was not icing, I advised patient to ice area every 1-2 hours for 20-30 minutes     We reviewed patients AVS medication list. Patient will be starting her medication regimen today. We discussed Tylenol 1000mg every 8 hours, Oxycodone 5mg PRN, methlprednisolone tape pack, robaxin 400mg TID PRN, ASA 81mg BID, Colace 100mg BID. Patient has not yet had a BM but is passing gas. I encouraged patient to increase fluid and fiber intake.     Patient denies nausea, vomiting, abdominal pain, chest pain, shortness of breath, fever, dizziness and calf pain. Patient confirmed post-op appointment with PT 05/24 and surgeon on 06/11. Patient does not have any other questions or concerns at this time. Pt was encouraged to call with any questions, concerns or issues.    "

## 2024-05-23 NOTE — TELEPHONE ENCOUNTER
Caller: Teena    Doctor: Siobhan    Reason for call: Patient wanted to know if she should start taking this again   Multiple Vitamins-Minerals (multivitamin with minerals) tablet  and the celebrex 200 mg  Patient will need a refill on the Multiple Vitamins-Minerals. Patient just wanted to make sure   Patient would like to know if the tingling in her leg. Is this Normal?  Please advise Patient  Call back#: [phone]

## 2024-05-24 ENCOUNTER — OFFICE VISIT (OUTPATIENT)
Dept: PHYSICAL THERAPY | Facility: CLINIC | Age: 59
End: 2024-05-24
Payer: COMMERCIAL

## 2024-05-24 ENCOUNTER — TELEPHONE (OUTPATIENT)
Age: 59
End: 2024-05-24

## 2024-05-24 DIAGNOSIS — M25.551 PAIN IN RIGHT HIP: ICD-10-CM

## 2024-05-24 DIAGNOSIS — Z96.641 STATUS POST TOTAL HIP REPLACEMENT, RIGHT: Primary | ICD-10-CM

## 2024-05-24 PROCEDURE — 97112 NEUROMUSCULAR REEDUCATION: CPT

## 2024-05-24 PROCEDURE — 97110 THERAPEUTIC EXERCISES: CPT

## 2024-05-24 NOTE — TELEPHONE ENCOUNTER
Caller: Patient    Doctor: Siobhan    Reason for call: has questions about medication, oxycodone and Robaxin. How long should be off of oxycodone for her to take the Robaxin?    Call back#: [phone]

## 2024-05-24 NOTE — PROGRESS NOTES
PT Re-Evaluation     Today's date: 2024  Patient name: Teena Staples  : 1965  MRN: [id number]  Referring provider: Marlee Mccann MD  Dx:   Encounter Diagnosis     ICD-10-CM    1. Status post total hip replacement, right  Z96.641 Ambulatory referral to Physical Therapy      2. Pain in right hip  M25.551             Start Time: 1100  Stop Time: 1145  Total time in clinic (min): 45 minutes    Assessment  Impairments: abnormal gait, abnormal muscle tone, abnormal or restricted ROM, abnormal movement, activity intolerance, impaired balance, impaired physical strength, lacks appropriate home exercise program, pain with function, weight-bearing intolerance and poor posture     Assessment details: Patient is a 59 y/o female reporting to physical therapy 2 days s/p R ADAN, anterior approach. Upon examination, patient demonstrates impairments of increased pain, decreased ROM, and decreased strength in her R hip which are resulting in functional limitations of her performance of ADL's/self-care and household activities. PT plan of care will focus on pain reduction, ROM, and strengthening while maintaining anterior hip precautions, to improve her impairments to be able to return to her prior level of function. Patient is a good candidate for and would benefit from skilled physical therapy to improve above listed impairments to facilitate a return to her prior level of function.     Understanding of Dx/Px/POC: good     Prognosis: good    Goals  ST weeks  1. Patient's pain level at worst will decrease by at least 50% for improved performance of daily activities.  2. Patient will become independent with her HEP.     LT weeks  1. Patient's gross R hip strength will improve to at least 4+/5 for increased tolerance to ambulation and daily activities.   2. Patient's FOTO score will improve from a 12 upon initial evaluation to a 44 or better indicating improvements in her performance of functional activities.      Plan  Patient would benefit from: PT eval and skilled physical therapy  Planned modality interventions: cryotherapy, thermotherapy: hydrocollator packs and unattended electrical stimulation    Planned therapy interventions: IASTM, joint mobilization, kinesiology taping, manual therapy, abdominal trunk stabilization, balance/weight bearing training, nerve gliding, neuromuscular re-education, postural training, strengthening, stretching, therapeutic activities, therapeutic exercise, home exercise program, gait training, functional ROM exercises and flexibility    Frequency: 2-3x week  Duration in weeks: 8  Plan of Care beginning date: 2024  Plan of Care expiration date: 2024  Treatment plan discussed with: patient        Subjective Evaluation    History of Present Illness  Mechanism of injury: Patient is a 57 y/o female presenting to outpatient physical therapy today 2 days s/p R ADAN by Dr. Mccann. Patient notes her pain at rest is not as great as with activity. She notes today she was able to stand up on her own twice so far, which she was not able to to yesterday unless she had her 's assistance. She ambulates WBAT on her R LE using a rolling walker. Patient reports using ice every hour for 20 minutes and pain medications to relieve her pain. Patient reports difficulties with stair negotiation, standing, driving, and ambulation. Patient states her goals for physical therapy are to be able to walk, bike, and move as safely and as efficiently as possible.     Quality of life: good    Patient Goals  Patient goals for therapy: decreased pain, increased motion, return to sport/leisure activities, independence with ADLs/IADLs and increased strength    Pain  Current pain ratin  At best pain rating: 3  At worst pain rating: 10  Quality: dull ache, sharp, tight, knife-like, discomfort and needle-like    Social Support  Steps to enter house: yes  5    Treatments  Previous treatment: injection  "treatment        Objective     Palpation     Right   Tenderness of the TFL.     Tenderness     Right Hip   Tenderness in the greater trochanter.     Active Range of Motion   Left Hip   Flexion: 90 degrees   Abduction: 20 degrees     Right Hip   Flexion: 5 degrees with pain  Abduction: 5 degrees with pain    Passive Range of Motion     Right Hip   Flexion: 40 degrees with pain  Abduction: 14 degrees with pain    Strength/Myotome Testing     Left Hip   Planes of Motion   Flexion: 5  Abduction: 5  Adduction: 5    Right Hip   Planes of Motion   Flexion: 3+  Abduction: 3+  Adduction: 3+    Ambulation   Weight-Bearing Status   Weight-Bearing Status (Right): weight-bearing as tolerated    Assistive device used: front-wheeled walker    Observational Gait   Gait: antalgic   Decreased walking speed and stride length.     General Comments:      Hip Comments   R ADAN incision site with slight warmth, but no signs of increased redness or oozing from bandage. Displays normal post-surgical bruising.              Precautions: Hx HTN; S/p R ADAN anterior approach 5/22/24 (Anterior hip precautions- No extension, ER, > 90 deg flex)    POC expires Unit limit Auth Expiration date PT/OT + Visit Limit?   7/15 N/A 12/31/24 P- 50 visits/BOMN; S- BOMN                 Visit/Unit Tracking  AUTH Status:  Date 5/20 5/24             50 visits Used 1 2              Remaining  49 48               FOTO:       Manuals 5/20 5/24           R hip flexion/abduction PROM  TB                                                  Neuro Re-Ed             Pt education regarding HEP, POC, and dx 5' 5'                         LAQ's  2x10 3\"  hold           Quad sets  2x10 3\"  3x10 3\"            SAQ's  Nv, fatigue            Ankle pumps  3x10           TVA contractions  3x10 3\"            Ther Ex             Glute sets  2x10 3\"  2x10 3\"            Seated marches 2x10 3\"  Hold            Bridging 2x10 2\"  Discontinued post-surgical           Supine hip adduction squeeze " "X20 5\"             Heel slides   1x10 AROM; 2x10 AA-ROM                                                  Ther Activity                                       Gait Training                                       Modalities                                            "

## 2024-05-29 ENCOUNTER — TELEPHONE (OUTPATIENT)
Age: 59
End: 2024-05-29

## 2024-05-29 ENCOUNTER — OFFICE VISIT (OUTPATIENT)
Dept: PHYSICAL THERAPY | Facility: CLINIC | Age: 59
End: 2024-05-29
Payer: COMMERCIAL

## 2024-05-29 DIAGNOSIS — M25.551 PAIN IN RIGHT HIP: ICD-10-CM

## 2024-05-29 DIAGNOSIS — Z96.641 STATUS POST TOTAL HIP REPLACEMENT, RIGHT: Primary | ICD-10-CM

## 2024-05-29 PROCEDURE — 97112 NEUROMUSCULAR REEDUCATION: CPT

## 2024-05-29 PROCEDURE — 97110 THERAPEUTIC EXERCISES: CPT

## 2024-05-29 NOTE — PROGRESS NOTES
"Daily Note     Today's date: 2024  Patient name: Teena Staples  : 1965  MRN: [id number]  Referring provider: Marlee Mccann MD  Dx:   Encounter Diagnosis     ICD-10-CM    1. Status post total hip replacement, right  Z96.641       2. Pain in right hip  M25.551           Start Time: 1145  Stop Time: 1230  Total time in clinic (min): 45 minutes    Subjective: Patient reports to physical therapy today stating she has not had any more muscle spasms since last visit. She notes she has been compliant with her HEP.         Objective: See treatment diary below      Assessment: Tolerated treatment well. Patient demonstrated fatigue post treatment, exhibited good technique with therapeutic exercises, and would benefit from continued PT. Continues to provide good effort during performance of exercises. Added standing marches, standing hip abduction, weight shifting, SAQ's, and SLR's this visit to which patient noted a challenge, but responded well. Attempted step ups this visit to which patient was met with difficulty. Will re-attempt next session. Will continue to assess patient tolerance and progress next visit, as able.         Plan: Continue per plan of care.      Precautions: Hx HTN; S/p R ADAN anterior approach 24 (Anterior hip precautions- No extension, ER, > 90 deg flex)    POC expires Unit limit Auth Expiration date PT/OT + Visit Limit?   7/15 N/A 24 P- 50 visits/BOMN; S- BOMN                 Visit/Unit Tracking  AUTH Status:  Date             50 visits Used 1 2 3             Remaining  49 48 47              FOTO:       Manuals           R hip flexion/abduction PROM  TB TB                                                 Neuro Re-Ed             Pt education regarding HEP, POC, and dx 5' 5'  5'                       LAQ's  2x10 3\"  hold           Quad sets  2x10 3\"  3x10 3\"  3x10 3\"           SAQ's  Nv, fatigue  3x10 2\"          Ankle pumps  3x10 HEP          TVA " "contractions  3x10 3\"  HEP          Standing weight shifts   2x10          Standing hip abduction   2x10 R  LE           Ther Ex             Glute sets  2x10 3\"  2x10 3\"  2x10 5\"          Standing marches   2x10           Bridging 2x10 2\"  Discontinued post-surgical           Supine hip adduction squeeze X20 5\"             Heel slides   1x10 AROM; 2x10 AA-ROM 3x10 AROM           SLR   X12 AA-ROM                                     Ther Activity                                       Gait Training                                       Modalities                                            "

## 2024-05-29 NOTE — TELEPHONE ENCOUNTER
"ADAN 5/22     I contacted Patient.     Pt has blisters on her hand, two blisters, PT and  suggested from using the RW. We discussed keeping clean and dry, do not pop the blisters and let them run their course. Pt was understanding.     Pt reports PT therapist is \"pleased\" with patients progress. We discussed she can D/C the incentive spirometer use, as long as she is up and ambulating often and well.     pt encouraged to call me with questions, concerns or issues.    "

## 2024-05-29 NOTE — TELEPHONE ENCOUNTER
Caller: Patient     Doctor: Siobhan    Reason for call: Asked to speak with nurse     Call back#: [phone]

## 2024-05-30 ENCOUNTER — APPOINTMENT (OUTPATIENT)
Dept: LAB | Facility: CLINIC | Age: 59
End: 2024-05-30
Payer: COMMERCIAL

## 2024-05-30 DIAGNOSIS — E78.2 MODERATE MIXED HYPERLIPIDEMIA NOT REQUIRING STATIN THERAPY: ICD-10-CM

## 2024-05-30 DIAGNOSIS — I10 BENIGN ESSENTIAL HYPERTENSION: ICD-10-CM

## 2024-05-30 DIAGNOSIS — R73.03 PREDIABETES: ICD-10-CM

## 2024-05-30 LAB
ALBUMIN SERPL BCP-MCNC: 3.9 G/DL (ref 3.5–5)
ALP SERPL-CCNC: 68 U/L (ref 34–104)
ALT SERPL W P-5'-P-CCNC: 12 U/L (ref 7–52)
ANION GAP SERPL CALCULATED.3IONS-SCNC: 7 MMOL/L (ref 4–13)
AST SERPL W P-5'-P-CCNC: 14 U/L (ref 13–39)
BASOPHILS # BLD AUTO: 0.05 THOUSANDS/ÂΜL (ref 0–0.1)
BASOPHILS NFR BLD AUTO: 1 % (ref 0–1)
BILIRUB SERPL-MCNC: 0.68 MG/DL (ref 0.2–1)
BUN SERPL-MCNC: 12 MG/DL (ref 5–25)
CALCIUM SERPL-MCNC: 9.1 MG/DL (ref 8.4–10.2)
CHLORIDE SERPL-SCNC: 99 MMOL/L (ref 96–108)
CHOLEST SERPL-MCNC: 151 MG/DL
CO2 SERPL-SCNC: 32 MMOL/L (ref 21–32)
CREAT SERPL-MCNC: 0.74 MG/DL (ref 0.6–1.3)
EOSINOPHIL # BLD AUTO: 0.27 THOUSAND/ÂΜL (ref 0–0.61)
EOSINOPHIL NFR BLD AUTO: 4 % (ref 0–6)
ERYTHROCYTE [DISTWIDTH] IN BLOOD BY AUTOMATED COUNT: 17.2 % (ref 11.6–15.1)
EST. AVERAGE GLUCOSE BLD GHB EST-MCNC: 120 MG/DL
GFR SERPL CREATININE-BSD FRML MDRD: 89 ML/MIN/1.73SQ M
GLUCOSE P FAST SERPL-MCNC: 92 MG/DL (ref 65–99)
HBA1C MFR BLD: 5.8 %
HCT VFR BLD AUTO: 42.6 % (ref 34.8–46.1)
HDLC SERPL-MCNC: 51 MG/DL
HGB BLD-MCNC: 12.5 G/DL (ref 11.5–15.4)
IMM GRANULOCYTES # BLD AUTO: 0.03 THOUSAND/UL (ref 0–0.2)
IMM GRANULOCYTES NFR BLD AUTO: 0 % (ref 0–2)
LDLC SERPL CALC-MCNC: 78 MG/DL (ref 0–100)
LYMPHOCYTES # BLD AUTO: 1.18 THOUSANDS/ÂΜL (ref 0.6–4.47)
LYMPHOCYTES NFR BLD AUTO: 17 % (ref 14–44)
MCH RBC QN AUTO: 26.5 PG (ref 26.8–34.3)
MCHC RBC AUTO-ENTMCNC: 29.3 G/DL (ref 31.4–37.4)
MCV RBC AUTO: 90 FL (ref 82–98)
MONOCYTES # BLD AUTO: 0.56 THOUSAND/ÂΜL (ref 0.17–1.22)
MONOCYTES NFR BLD AUTO: 8 % (ref 4–12)
NEUTROPHILS # BLD AUTO: 4.74 THOUSANDS/ÂΜL (ref 1.85–7.62)
NEUTS SEG NFR BLD AUTO: 70 % (ref 43–75)
NRBC BLD AUTO-RTO: 0 /100 WBCS
PLATELET # BLD AUTO: 456 THOUSANDS/UL (ref 149–390)
PMV BLD AUTO: 9.3 FL (ref 8.9–12.7)
POTASSIUM SERPL-SCNC: 4.2 MMOL/L (ref 3.5–5.3)
PROT SERPL-MCNC: 7 G/DL (ref 6.4–8.4)
RBC # BLD AUTO: 4.72 MILLION/UL (ref 3.81–5.12)
SODIUM SERPL-SCNC: 138 MMOL/L (ref 135–147)
TRIGL SERPL-MCNC: 110 MG/DL
TSH SERPL DL<=0.05 MIU/L-ACNC: 1.17 UIU/ML (ref 0.45–4.5)
WBC # BLD AUTO: 6.83 THOUSAND/UL (ref 4.31–10.16)

## 2024-05-30 PROCEDURE — 84443 ASSAY THYROID STIM HORMONE: CPT

## 2024-05-30 PROCEDURE — 80061 LIPID PANEL: CPT

## 2024-05-30 PROCEDURE — 80053 COMPREHEN METABOLIC PANEL: CPT

## 2024-05-30 PROCEDURE — 36415 COLL VENOUS BLD VENIPUNCTURE: CPT

## 2024-05-30 PROCEDURE — 85025 COMPLETE CBC W/AUTO DIFF WBC: CPT

## 2024-05-30 PROCEDURE — 83036 HEMOGLOBIN GLYCOSYLATED A1C: CPT

## 2024-05-31 ENCOUNTER — OFFICE VISIT (OUTPATIENT)
Dept: PHYSICAL THERAPY | Facility: CLINIC | Age: 59
End: 2024-05-31
Payer: COMMERCIAL

## 2024-05-31 DIAGNOSIS — Z96.641 STATUS POST TOTAL HIP REPLACEMENT, RIGHT: Primary | ICD-10-CM

## 2024-05-31 DIAGNOSIS — M25.551 PAIN IN RIGHT HIP: ICD-10-CM

## 2024-05-31 PROCEDURE — 97112 NEUROMUSCULAR REEDUCATION: CPT

## 2024-05-31 PROCEDURE — 97110 THERAPEUTIC EXERCISES: CPT

## 2024-05-31 NOTE — PROGRESS NOTES
"Daily Note     Today's date: 2024  Patient name: Teena Staples  : 1965  MRN: [id number]  Referring provider: Marlee Mccann MD  Dx:   Encounter Diagnosis     ICD-10-CM    1. Status post total hip replacement, right  Z96.641       2. Pain in right hip  M25.551           Start Time: 1145  Stop Time: 1230  Total time in clinic (min): 45 minutes    Subjective: Patient reports to physical therapy today stating her pain has been doing pretty well, but notes she is getting tingling in her thigh. She states she had to go up the stairs yesterday and that went well with her  guarding her.       Objective: See treatment diary below      Assessment: Tolerated treatment well. Patient demonstrated fatigue post treatment, exhibited good technique with therapeutic exercises, and would benefit from continued PT. Continues to provide good effort during performance of exercises. Added STS c airex this visit to which patient responded well. Attempted to add in step ups with 2\" box this session, but patient noted buckling in her R knee as a result of decreased quadriceps strength and could not complete a repetition. Plan to add in NMES for quadricep muscle recruitment at next session. Will continue to assess patient tolerance and progress next visit, as able.         Plan: Continue per plan of care.      Precautions: Hx HTN; S/p R ADAN anterior approach 24 (Anterior hip precautions- No extension, ER, > 90 deg flex)    POC expires Unit limit Auth Expiration date PT/OT + Visit Limit?   7/15 N/A 24 P- 50 visits/BOMN; S- BOMN                 Visit/Unit Tracking  AUTH Status:  Date            50 visits Used 1 2 3 4            Remaining  49 48 47 46             FOTO:       Manuals          R hip flexion/abduction PROM  TB TB TB                                                Neuro Re-Ed             Pt education regarding HEP, POC, and dx 5' 5'  5' 5'                    " "  LAQ's  2x10 3\"  hold           Quad sets  2x10 3\"  3x10 3\"  3x10 3\"  3x10 3\"          SAQ's  Nv, fatigue  3x10 2\" 3x10 2\"         Ankle pumps  3x10 HEP          TVA contractions  3x10 3\"  HEP          Standing weight shifts   2x10 3x10         Standing hip abduction   2x10 R  LE  2x10 R  LE          Ther Ex             Glute sets  2x10 3\"  2x10 3\"  2x10 5\" 2x10 5\"         Standing marches   2x10  2x10         Bridging 2x10 2\"  Discontinued post-surgical           Supine hip adduction squeeze X20 5\"             Heel slides   1x10 AROM; 2x10 AA-ROM 3x10 AROM  3x10 AROM         SLR   X12 AA-ROM  X18 AA-ROM                                    Ther Activity             STS c airex    4x5                      Gait Training                                       Modalities                                              "

## 2024-06-03 ENCOUNTER — TELEPHONE (OUTPATIENT)
Age: 59
End: 2024-06-03

## 2024-06-03 ENCOUNTER — OFFICE VISIT (OUTPATIENT)
Dept: PHYSICAL THERAPY | Facility: CLINIC | Age: 59
End: 2024-06-03
Payer: COMMERCIAL

## 2024-06-03 DIAGNOSIS — M25.551 PAIN IN RIGHT HIP: ICD-10-CM

## 2024-06-03 DIAGNOSIS — Z96.641 STATUS POST TOTAL HIP REPLACEMENT, RIGHT: Primary | ICD-10-CM

## 2024-06-03 DIAGNOSIS — M16.11 PRIMARY OSTEOARTHRITIS OF RIGHT HIP: ICD-10-CM

## 2024-06-03 PROCEDURE — 97140 MANUAL THERAPY 1/> REGIONS: CPT

## 2024-06-03 PROCEDURE — 97110 THERAPEUTIC EXERCISES: CPT

## 2024-06-03 PROCEDURE — 97112 NEUROMUSCULAR REEDUCATION: CPT

## 2024-06-03 NOTE — TELEPHONE ENCOUNTER
Caller: Teena    Doctor: Siobhan    Reason for call: Would like to speak to a nurse regarding her SX on 5/22    Call back#: [phone]

## 2024-06-03 NOTE — PROGRESS NOTES
"Daily Note     Today's date: 6/3/2024  Patient name: Teena Staples  : 1965  MRN: [id number]  Referring provider: Marlee Mccann MD  Dx:   Encounter Diagnosis     ICD-10-CM    1. Status post total hip replacement, right  Z96.641       2. Pain in right hip  M25.551       3. Primary osteoarthritis of right hip  M16.11                      Subjective: Patient reports to physical therapy today stating         Objective: See treatment diary below      Assessment: Tolerated treatment well. Patient demonstrated fatigue post treatment, exhibited good technique with therapeutic exercises, and would benefit from continued PT. Unable to achieve quad contraction due to tolerance. Did well with visual feedback. Added TKE for quad strengthng in CKC. Continues to provide good effort during performance of exercises. Will continue to assess patient tolerance and progress next visit, as able.         Plan: Continue per plan of care.      Precautions: Hx HTN; S/p R ADAN anterior approach 24 (Anterior hip precautions- No extension, ER, > 90 deg flex)    POC expires Unit limit Auth Expiration date PT/OT + Visit Limit?   7/15 N/A 24 P- 50 visits/BOMN; S- BOMN                 Visit/Unit Tracking  AUTH Status:  Date 5/20 5/24 5/29 5/31 6/3          50 visits Used 1 2 3 4 5           Remaining  49 48 47 46 45            FOTO:       Manuals  6/3        R hip flexion/abduction PROM  TB TB TB LIBBY                                               Neuro Re-Ed             Pt education regarding HEP, POC, and dx 5' 5'  5' 5'         TKE     5\" 2x10        LAQ's  2x10 3\"  hold           Quad sets  2x10 3\"  3x10 3\"  3x10 3\"  3x10 3\"  3x10 3\"        SAQ's  Nv, fatigue  3x10 2\" 3x10 2\" 3x10 2\"        Ankle pumps  3x10 HEP          TVA contractions  3x10 3\"  HEP          Standing weight shifts   2x10 3x10 10\"x10        Standing hip abduction   2x10 R  LE  2x10 R  LE  NV        Ther Ex             Glute sets  2x10 3\"  " "2x10 3\"  2x10 5\" 2x10 5\" HEP today        Standing marches   2x10  2x10 2x10         Bridging 2x10 2\"  Discontinued post-surgical           Supine hip adduction squeeze X20 5\"             Heel slides   1x10 AROM; 2x10 AA-ROM 3x10 AROM  3x10 AROM 3x10 AROM         SLR   X12 AA-ROM  X18 AA-ROM  2x10 AA-ROM                                  Ther Activity             STS c airex    4x5 2x10                     Gait Training                                       Modalities             Japanese E-Stim     Unable to tolerate                               "

## 2024-06-03 NOTE — TELEPHONE ENCOUNTER
Contacted patient.     Discussed postoperative dressing care, and icing areas of pain and swelling, not just the Hip, but any area of pain and swelling.    We also discussed OTC tylenol, as she reports she is now out of Tylenol 650mg tablets.  We discussed 1000mg TID.     All questions answered, no further questions at this time.     pt encouraged to call me with questions, concerns or issues.

## 2024-06-05 ENCOUNTER — OFFICE VISIT (OUTPATIENT)
Dept: PHYSICAL THERAPY | Facility: CLINIC | Age: 59
End: 2024-06-05
Payer: COMMERCIAL

## 2024-06-05 DIAGNOSIS — Z96.641 STATUS POST TOTAL HIP REPLACEMENT, RIGHT: Primary | ICD-10-CM

## 2024-06-05 DIAGNOSIS — M25.551 PAIN IN RIGHT HIP: ICD-10-CM

## 2024-06-05 PROCEDURE — 97112 NEUROMUSCULAR REEDUCATION: CPT

## 2024-06-05 PROCEDURE — 97110 THERAPEUTIC EXERCISES: CPT

## 2024-06-05 NOTE — PROGRESS NOTES
"Daily Note     Today's date: 2024  Patient name: Teena Staples  : 1965  MRN: [id number]  Referring provider: Marlee Mccann MD  Dx:   Encounter Diagnosis     ICD-10-CM    1. Status post total hip replacement, right  Z96.641       2. Pain in right hip  M25.551           Start Time: 1100  Stop Time: 1146  Total time in clinic (min): 46 minutes    Subjective: Patient reports to physical therapy today stating no new complaints. She notes she practiced ambulating with a SPC yesterday and was able to climb her stairs to her second floor yesterday.        Objective: See treatment diary below      Assessment: Tolerated treatment well. Patient demonstrated fatigue post treatment, exhibited good technique with therapeutic exercises, and would benefit from continued PT. Continues to provide good effort during performance of exercises. Added ambulation around the gym with use of SPC this visit to which patient responded well and with demonstration of proper gait pattern. Will continue to assess patient tolerance and progress next visit, as able.         Plan: Continue per plan of care.      Precautions: Hx HTN; S/p R ADAN anterior approach 24 (Anterior hip precautions- No extension, ER, > 90 deg flex)    POC expires Unit limit Auth Expiration date PT/OT + Visit Limit?   7/15 N/A 24 P- 50 visits/BOMN; S- BOMN                 Visit/Unit Tracking  AUTH Status:  Date  6/3 6         50 visits Used 1 2 3 4 5 6          Remaining  49 48 47 46 45 44           FOTO:       Manuals  6/3 6       R hip flexion/abduction PROM  TB TB TB LIBBY TB                                              Neuro Re-Ed             Pt education regarding HEP, POC, and dx 5' 5'  5' 5'         TKE     5\" 2x10 5\" 2x10       LAQ's  2x10 3\"  hold           Quad sets  2x10 3\"  3x10 3\"  3x10 3\"  3x10 3\"  3x10 3\" 3x10 3\"       SAQ's  Nv, fatigue  3x10 2\" 3x10 2\" 3x10 2\" 3x10 2\"       Ankle pumps  3x10 " "HEP          TVA contractions  3x10 3\"  HEP          Standing weight shifts   2x10 3x10 10\"x10 10\"x10       Standing hip abduction   2x10 R  LE  2x10 R  LE  NV        Ther Ex             Glute sets  2x10 3\"  2x10 3\"  2x10 5\" 2x10 5\" HEP today        Standing marches   2x10  2x10 2x10  2x10       Bridging 2x10 2\"  Discontinued post-surgical           Supine hip adduction squeeze X20 5\"             Heel slides   1x10 AROM; 2x10 AA-ROM 3x10 AROM  3x10 AROM 3x10 AROM  3x10 AROM        SLR   X12 AA-ROM  X18 AA-ROM  2x10 AA-ROM 2x10 AA-ROM                                 Ther Activity             STS c airex    4x5 2x10 2x10       Laps c cane       200'       Gait Training                                       Modalities             Al[place]ian E-Stim     Unable to tolerate                                 "

## 2024-06-06 DIAGNOSIS — Z01.818 PREOPERATIVE TESTING: ICD-10-CM

## 2024-06-06 DIAGNOSIS — M16.11 PRIMARY OSTEOARTHRITIS OF RIGHT HIP: ICD-10-CM

## 2024-06-06 RX ORDER — FOLIC ACID 1 MG/1
1000 TABLET ORAL DAILY
Qty: 30 TABLET | Refills: 1 | Status: SHIPPED | OUTPATIENT
Start: 2024-06-06

## 2024-06-07 ENCOUNTER — APPOINTMENT (OUTPATIENT)
Dept: PHYSICAL THERAPY | Facility: CLINIC | Age: 59
End: 2024-06-07
Payer: COMMERCIAL

## 2024-06-07 ENCOUNTER — TELEPHONE (OUTPATIENT)
Dept: ADMINISTRATIVE | Facility: OTHER | Age: 59
End: 2024-06-07

## 2024-06-07 ENCOUNTER — OFFICE VISIT (OUTPATIENT)
Dept: INTERNAL MEDICINE CLINIC | Facility: CLINIC | Age: 59
End: 2024-06-07
Payer: COMMERCIAL

## 2024-06-07 VITALS
OXYGEN SATURATION: 99 % | SYSTOLIC BLOOD PRESSURE: 118 MMHG | BODY MASS INDEX: 38.77 KG/M2 | RESPIRATION RATE: 18 BRPM | TEMPERATURE: 99.6 F | HEART RATE: 81 BPM | WEIGHT: 255.8 LBS | HEIGHT: 68 IN | DIASTOLIC BLOOD PRESSURE: 78 MMHG

## 2024-06-07 DIAGNOSIS — L65.2 ALOPECIA MUCINOSA: ICD-10-CM

## 2024-06-07 DIAGNOSIS — E78.2 MIXED HYPERLIPIDEMIA: ICD-10-CM

## 2024-06-07 DIAGNOSIS — I10 BENIGN ESSENTIAL HTN: ICD-10-CM

## 2024-06-07 DIAGNOSIS — E66.01 MORBID OBESITY (HCC): ICD-10-CM

## 2024-06-07 DIAGNOSIS — J45.909 MODERATE ASTHMA WITHOUT COMPLICATION, UNSPECIFIED WHETHER PERSISTENT: ICD-10-CM

## 2024-06-07 DIAGNOSIS — E78.5 HYPERLIPIDEMIA, UNSPECIFIED HYPERLIPIDEMIA TYPE: ICD-10-CM

## 2024-06-07 DIAGNOSIS — R73.03 PREDIABETES: ICD-10-CM

## 2024-06-07 DIAGNOSIS — L57.0 ACTINIC KERATOSIS: ICD-10-CM

## 2024-06-07 DIAGNOSIS — K21.9 GASTROESOPHAGEAL REFLUX DISEASE: ICD-10-CM

## 2024-06-07 DIAGNOSIS — M54.16 LUMBAR RADICULOPATHY: ICD-10-CM

## 2024-06-07 DIAGNOSIS — I10 BENIGN ESSENTIAL HYPERTENSION: Primary | ICD-10-CM

## 2024-06-07 DIAGNOSIS — L65.9 ALOPECIA: ICD-10-CM

## 2024-06-07 DIAGNOSIS — I10 HYPERTENSION, UNSPECIFIED TYPE: ICD-10-CM

## 2024-06-07 PROCEDURE — 99214 OFFICE O/P EST MOD 30 MIN: CPT | Performed by: INTERNAL MEDICINE

## 2024-06-07 RX ORDER — SPIRONOLACTONE 50 MG/1
50 TABLET, FILM COATED ORAL DAILY
Qty: 90 TABLET | Refills: 0 | Status: SHIPPED | OUTPATIENT
Start: 2024-06-07

## 2024-06-07 RX ORDER — VERAPAMIL HYDROCHLORIDE 180 MG/1
180 CAPSULE, EXTENDED RELEASE ORAL
Qty: 90 CAPSULE | Refills: 2 | Status: SHIPPED | OUTPATIENT
Start: 2024-06-07

## 2024-06-07 RX ORDER — SIMVASTATIN 5 MG
5 TABLET ORAL
Qty: 90 TABLET | Refills: 0 | Status: SHIPPED | OUTPATIENT
Start: 2024-06-07

## 2024-06-07 NOTE — TELEPHONE ENCOUNTER
Upon review of the In Basket request and the patient's chart, initial outreach has been made via fax to facility. Please see Contacts section for details.     Thank you  Deng Cortés

## 2024-06-07 NOTE — ASSESSMENT & PLAN NOTE
Hypertension is controlled.  Continue current regimen.  Continue verapamil.  She denies any chest pain shortness of breath headaches.

## 2024-06-07 NOTE — TELEPHONE ENCOUNTER
----- Message from Peter APPIAH sent at 6/7/2024 10:45 AM EDT -----  Regarding: Care Gap Request  06/07/24 10:45 AM     Hello, our patient above has had Pap Smear (HPV) aka Cervical Cancer Screening completed/performed. Please assist in updating the patient chart by making an External outreach to  facDr. Nabil Jackson [address] [address] [place] [address]  [phone] The date of service is 2/24.     Thank you,  Peter Cruz MA  [facility] [place]

## 2024-06-07 NOTE — PROGRESS NOTES
Ambulatory Visit  Name: Teena Staples      : 1965      MRN: [id number]  Encounter Provider: Juan Grover MD  Encounter Date: 2024   Encounter department: [facility] INTERNAL MEDICINE [place]      Assessment & Plan     1. Benign essential hypertension  Assessment & Plan:  Hypertension is controlled.  Continue current regimen.  Continue verapamil.  She denies any chest pain shortness of breath headaches.    Orders:  -     spironolactone (ALDACTONE) 50 mg tablet; Take 1 tablet (50 mg total) by mouth daily  -     verapamil (Verelan) 180 MG 24 hr capsule; Take 1 capsule (180 mg total) by mouth daily before breakfast  -     simvastatin (ZOCOR) 5 MG tablet; Take 1 tablet (5 mg total) by mouth daily at bedtime  2. Moderate asthma without complication, unspecified whether persistent  -     spironolactone (ALDACTONE) 50 mg tablet; Take 1 tablet (50 mg total) by mouth daily  -     verapamil (Verelan) 180 MG 24 hr capsule; Take 1 capsule (180 mg total) by mouth daily before breakfast  -     simvastatin (ZOCOR) 5 MG tablet; Take 1 tablet (5 mg total) by mouth daily at bedtime  3. Alopecia  -     spironolactone (ALDACTONE) 50 mg tablet; Take 1 tablet (50 mg total) by mouth daily  -     verapamil (Verelan) 180 MG 24 hr capsule; Take 1 capsule (180 mg total) by mouth daily before breakfast  -     simvastatin (ZOCOR) 5 MG tablet; Take 1 tablet (5 mg total) by mouth daily at bedtime  4. Hypertension, unspecified type  -     spironolactone (ALDACTONE) 50 mg tablet; Take 1 tablet (50 mg total) by mouth daily  -     verapamil (Verelan) 180 MG 24 hr capsule; Take 1 capsule (180 mg total) by mouth daily before breakfast  -     simvastatin (ZOCOR) 5 MG tablet; Take 1 tablet (5 mg total) by mouth daily at bedtime  -     CBC and differential; Future  -     Basic metabolic panel; Future  -     TSH, 3rd generation with Free T4 reflex; Future  5. Prediabetes  Assessment & Plan:  A1c reviewed.  Diet discussed.  Avoid  sweets.  Avoid carbs.  A1c with next labs.  Orders:  -     verapamil (Verelan) 180 MG 24 hr capsule; Take 1 capsule (180 mg total) by mouth daily before breakfast  -     simvastatin (ZOCOR) 5 MG tablet; Take 1 tablet (5 mg total) by mouth daily at bedtime  -     Hemoglobin A1C; Future  6. Lumbar radiculopathy  -     verapamil (Verelan) 180 MG 24 hr capsule; Take 1 capsule (180 mg total) by mouth daily before breakfast  -     simvastatin (ZOCOR) 5 MG tablet; Take 1 tablet (5 mg total) by mouth daily at bedtime  7. Benign essential HTN  -     verapamil (Verelan) 180 MG 24 hr capsule; Take 1 capsule (180 mg total) by mouth daily before breakfast  8. Hyperlipidemia, unspecified hyperlipidemia type  Assessment & Plan:  Lipid panel is on target.  Continue statin.  Lipid before next visit.  Orders:  -     verapamil (Verelan) 180 MG 24 hr capsule; Take 1 capsule (180 mg total) by mouth daily before breakfast  -     simvastatin (ZOCOR) 5 MG tablet; Take 1 tablet (5 mg total) by mouth daily at bedtime  -     Lipid Panel with Direct LDL reflex; Future  9. Gastroesophageal reflux disease  -     simvastatin (ZOCOR) 5 MG tablet; Take 1 tablet (5 mg total) by mouth daily at bedtime  10. Alopecia mucinosa  Assessment & Plan:  Stable.  Continue spironolactone.  Labs reviewed.  potassium normal.  11. Morbid obesity (HCC)  Assessment & Plan:  Discussed diet and exercise.  12. Mixed hyperlipidemia  Assessment & Plan:  Lipid panel is on target.  Continue statin.  Lipid before next visit.  13. Actinic keratosis      Depression Screening and Follow-up Plan: Clincally patient does not have depression. No treatment is required.       History of Present Illness     Patient is here for routine follow up, reviewed chronic medical problems, ordered labs for next visit including CBC CMP TSH A1C LIPID. Reviewed labs for this visit.    Recovering from right hip replacement.        Review of Systems   Constitutional:  Negative for chills and fever.    HENT:  Negative for ear pain and sore throat.    Eyes:  Negative for pain and visual disturbance.   Respiratory:  Negative for cough and shortness of breath.    Cardiovascular:  Negative for chest pain and palpitations.   Gastrointestinal:  Negative for abdominal pain and vomiting.   Genitourinary:  Negative for dysuria and hematuria.   Musculoskeletal:  Negative for arthralgias and back pain.   Skin:  Negative for color change and rash.   Neurological:  Negative for seizures and syncope.   All other systems reviewed and are negative.    Pertinent Medical History         Current Outpatient Medications on File Prior to Visit   Medication Sig Dispense Refill    acetaminophen (TYLENOL) 650 mg CR tablet Take 1 tablet (650 mg total) by mouth every 8 (eight) hours as needed for mild pain (Patient taking differently: Take 650 mg by mouth every 8 (eight) hours as needed for mild pain PRN) 30 tablet 0    ascorbic acid (VITAMIN C) 500 MG tablet Take 1 tablet (500 mg total) by mouth 2 (two) times a day 60 tablet 1    aspirin (ECOTRIN LOW STRENGTH) 81 mg EC tablet Take 1 tablet (81 mg total) by mouth 2 (two) times a day Take 1(one) 81 mg tablet twice daily x 35 days after total joint arthroplasty 70 tablet 0    celecoxib (CeleBREX) 200 mg capsule Take 1 capsule (200 mg total) by mouth 2 (two) times a day 30 capsule 2    Cholecalciferol (VITAMIN D) 2000 units CAPS Take by mouth      cholestyramine (QUESTRAN) 4 GM/DOSE powder Take 1 packet (4 g total) by mouth 2 (two) times a day As needed for diarrhea 378 g 5    Diclofenac Sodium (Voltaren) 1 % Apply 2 g topically 4 (four) times a day      diphenhydrAMINE-acetaminophen (TYLENOL PM)  MG TABS Take 1 tablet by mouth daily at bedtime as needed for sleep      docusate sodium (COLACE) 100 mg capsule Take 1 capsule (100 mg total) by mouth 2 (two) times a day 10 capsule 0    Elderberry-Vitamin C-Zinc ([facility] GUMMY PO) Take 2 gums by mouth daily      ferrous  sulfate 324 (65 Fe) mg Take 1 tablet (324 mg total) by mouth 2 (two) times a day before meals 60 tablet 1    folic acid (FOLVITE) 1 mg tablet take 1 tablet by mouth once daily 30 tablet 1    glucosamine-chondroitin 500-400 MG tablet Take 1 tablet by mouth 3 (three) times a day 100 tablet 2    loratadine (CLARITIN) 10 mg tablet Take 10 mg by mouth daily      methocarbamol (ROBAXIN) 500 mg tablet Take 1 tablet (500 mg total) by mouth 3 (three) times a day as needed for muscle spasms (Patient taking differently: Take 500 mg by mouth 3 (three) times a day as needed for muscle spasms prn) 30 tablet 0    mometasone (NASONEX) 50 mcg/act nasal spray 2 sprays into each nostril daily 17 g 0    Multiple Vitamins-Minerals (multivitamin with minerals) tablet Take 1 tablet by mouth daily 30 tablet 0    omeprazole (PriLOSEC) 10 mg delayed release capsule Take 1 capsule (10 mg total) by mouth daily 90 capsule 2    ondansetron (ZOFRAN) 4 mg tablet Take 1 tablet (4 mg total) by mouth every 8 (eight) hours as needed for nausea or vomiting 5 tablet 0    Probiotic Product (ALIGN) 4 MG CAPS Take by mouth      Rhubarb (ESTROVEN COMPLETE PO) Estroven      vitamin E, tocopherol, 400 units capsule Take 1 capsule (400 Units total) by mouth daily      [DISCONTINUED] simvastatin (ZOCOR) 5 MG tablet Take 1 tablet (5 mg total) by mouth daily at bedtime 90 tablet 0    [DISCONTINUED] spironolactone (ALDACTONE) 50 mg tablet Take 1 tablet (50 mg total) by mouth daily (Patient taking differently: Take 50 mg by mouth daily at bedtime) 90 tablet 0    [DISCONTINUED] verapamil (Verelan) 180 MG 24 hr capsule Take 1 capsule (180 mg total) by mouth daily before breakfast (Patient taking differently: Take 180 mg by mouth daily at bedtime) 90 capsule 2    [DISCONTINUED] Lidocaine 4 % PTCH Apply topically (Patient not taking: Reported on 6/7/2024)      [DISCONTINUED] methylPREDNISolone 4 MG tablet therapy pack Use as directed on package (Patient not taking:  "Reported on 6/7/2024) 1 each 0    [DISCONTINUED] oxyCODONE (ROXICODONE) 5 immediate release tablet Take 1 tablets every 6 hrs as needed for pain control (Patient not taking: Reported on 6/7/2024) 30 tablet 0     No current facility-administered medications on file prior to visit.      Objective     /78 (BP Location: Right arm, Patient Position: Sitting, Cuff Size: Standard)   Pulse 81   Temp 99.6 °F (37.6 °C) (Tympanic)   Resp 18   Ht 5' 8\" (1.727 m)   Wt 116 kg (255 lb 12.8 oz)   SpO2 99%   BMI 38.89 kg/m²     Physical Exam  Vitals and nursing note reviewed.   Constitutional:       General: She is not in acute distress.     Appearance: She is well-developed.   HENT:      Head: Normocephalic and atraumatic.   Eyes:      Conjunctiva/sclera: Conjunctivae normal.   Cardiovascular:      Rate and Rhythm: Normal rate and regular rhythm.      Heart sounds: No murmur heard.  Pulmonary:      Effort: Pulmonary effort is normal. No respiratory distress.      Breath sounds: Normal breath sounds.   Abdominal:      Palpations: Abdomen is soft.      Tenderness: There is no abdominal tenderness.   Musculoskeletal:         General: No swelling.      Cervical back: Neck supple.   Skin:     General: Skin is warm and dry.      Capillary Refill: Capillary refill takes less than 2 seconds.   Neurological:      Mental Status: She is alert and oriented to person, place, and time.      Gait: Gait abnormal.   Psychiatric:         Mood and Affect: Mood normal.       Administrative Statements     I have spent a total time of 20 minutes on 06/07/24 In caring for this patient including Instructions for management, Patient and family education, Importance of tx compliance, Risk factor reductions, Impressions, Counseling / Coordination of care, Documenting in the medical record, Reviewing / ordering tests, medicine, procedures  , and Obtaining or reviewing history  .        "

## 2024-06-07 NOTE — LETTER
Procedure Request Form: Cervical Cancer Screening      Date Requested: 24  Patient: Teena HURTADO Jhoan  Patient : 1965   Referring Provider: Juan Grover MD        Date of Procedure ______________________________       The above patient has informed us that they have completed their   most recent Cervical Cancer Screening at your facility. Please complete   this form and attach all corresponding procedure reports/results.    Comments __________________________________________________________  ____________________________________________________________________  ____________________________________________________________________  ____________________________________________________________________    Facility Completing Procedure _________________________________________    Form Completed By (print name) _______________________________________      Signature __________________________________________________________      These reports are needed for  compliance.    Please fax this completed form and a copy of the procedure report to our office located at [address] [place] [address] as soon as possible to Fax [phone] reno Vilchis: Phone [phone]    We thank you for your assistance in treating our mutual patient.

## 2024-06-10 ENCOUNTER — OFFICE VISIT (OUTPATIENT)
Dept: PHYSICAL THERAPY | Facility: CLINIC | Age: 59
End: 2024-06-10
Payer: COMMERCIAL

## 2024-06-10 DIAGNOSIS — Z96.641 STATUS POST TOTAL HIP REPLACEMENT, RIGHT: Primary | ICD-10-CM

## 2024-06-10 DIAGNOSIS — Z01.818 PREOPERATIVE TESTING: ICD-10-CM

## 2024-06-10 DIAGNOSIS — M16.11 PRIMARY OSTEOARTHRITIS OF RIGHT HIP: ICD-10-CM

## 2024-06-10 DIAGNOSIS — M25.551 PAIN IN RIGHT HIP: ICD-10-CM

## 2024-06-10 PROCEDURE — 97112 NEUROMUSCULAR REEDUCATION: CPT

## 2024-06-10 PROCEDURE — 97110 THERAPEUTIC EXERCISES: CPT

## 2024-06-10 PROCEDURE — 97140 MANUAL THERAPY 1/> REGIONS: CPT

## 2024-06-10 NOTE — PROGRESS NOTES
"Daily Note     Today's date: 6/10/2024  Patient name: Teena Staples  : 1965  MRN: [id number]  Referring provider: Marlee Mccann MD  Dx:   Encounter Diagnosis     ICD-10-CM    1. Status post total hip replacement, right  Z96.641       2. Pain in right hip  M25.551       3. Primary osteoarthritis of right hip  M16.11       4. Preoperative testing  Z01.818                      Subjective: Pt reports she walked 5000 steps on Saturday. She has been walking with the SPC without issues.       Objective: See treatment diary below      Assessment: Tolerated treatment well. Pt demonstrates good effort throughout session. Minimal curing given to facilitate proper exercise performance and technique. She tolerates addition of TB to TKE and nustep this visit. Continue to progress as tolerated. Patient demonstrated fatigue post treatment, exhibited good technique with therapeutic exercises, and would benefit from continued PT      Plan: Continue per plan of care.      Precautions: Hx HTN; S/p R ADAN anterior approach 24 (Anterior hip precautions- No extension, ER, > 90 deg flex)    POC expires Unit limit Auth Expiration date PT/OT + Visit Limit?   7/15 N/A 24 P- 50 visits/BOMN; S- BOMN                 Visit/Unit Tracking  AUTH Status:  Date 5/20 5/24 5/29 5/31 6/3 6/5 6/10        50 visits Used 1 2 3 4 5 6 7         Remaining  49 48 47 46 45 44 43          FOTO:       Manuals  6/3 6 6/10      R hip flexion/abduction PROM  TB TB TB LIBBY TB KT                                             Neuro Re-Ed             Pt education regarding HEP, POC, and dx 5' 5'  5' 5'         TKE     5\" 2x10 5\" 2x10 5\" 2x10 purple      LAQ's  2x10 3\"  hold           Quad sets  2x10 3\"  3x10 3\"  3x10 3\"  3x10 3\"  3x10 3\" 3x10 3\" 3x10 3\"      SAQ's  Nv, fatigue  3x10 2\" 3x10 2\" 3x10 2\" 3x10 2\" 3x10 3\"      Ankle pumps  3x10 HEP          TVA contractions  3x10 3\"  HEP          Standing weight shifts   2x10 3x10 " "10\"x10 10\"x10       Standing hip abduction   2x10 R  LE  2x10 R  LE  NV  2x10 R  LE       Ther Ex             Glute sets  2x10 3\"  2x10 3\"  2x10 5\" 2x10 5\" HEP today        Standing marches   2x10  2x10 2x10  2x10 2x10      Bridging 2x10 2\"  Discontinued post-surgical           Supine hip adduction squeeze X20 5\"             Heel slides   1x10 AROM; 2x10 AA-ROM 3x10 AROM  3x10 AROM 3x10 AROM  3x10 AROM  3x10 AROM       SLR   X12 AA-ROM  X18 AA-ROM  2x10 AA-ROM 2x10 AA-ROM 2x10 AA-ROM      Minisquats       2x10      Nustep for LE endurance       Seat 10 arms 10 5'      Ther Activity             STS c airex    4x5 2x10 2x10 2x10      Laps c cane       200'       Gait Training                                       Modalities             [place] E-Stim     Unable to tolerate                                   "

## 2024-06-11 ENCOUNTER — OFFICE VISIT (OUTPATIENT)
Dept: OBGYN CLINIC | Facility: CLINIC | Age: 59
End: 2024-06-11

## 2024-06-11 ENCOUNTER — HOSPITAL ENCOUNTER (OUTPATIENT)
Dept: RADIOLOGY | Facility: HOSPITAL | Age: 59
Discharge: HOME | End: 2024-06-11
Payer: COMMERCIAL

## 2024-06-11 VITALS — HEIGHT: 68 IN | WEIGHT: 255 LBS | BODY MASS INDEX: 38.65 KG/M2

## 2024-06-11 DIAGNOSIS — M16.11 PRIMARY OSTEOARTHRITIS OF RIGHT HIP: ICD-10-CM

## 2024-06-11 DIAGNOSIS — Z96.641 S/P HIP REPLACEMENT, RIGHT: Primary | ICD-10-CM

## 2024-06-11 DIAGNOSIS — Z96.641 S/P HIP REPLACEMENT, RIGHT: ICD-10-CM

## 2024-06-11 PROCEDURE — 99024 POSTOP FOLLOW-UP VISIT: CPT | Performed by: PHYSICIAN ASSISTANT

## 2024-06-11 PROCEDURE — 73502 X-RAY EXAM HIP UNI 2-3 VIEWS: CPT

## 2024-06-11 RX ORDER — AMOXICILLIN 500 MG/1
CAPSULE ORAL
Qty: 4 CAPSULE | Refills: 3 | Status: SHIPPED | OUTPATIENT
Start: 2024-06-11 | End: 2024-06-12

## 2024-06-11 RX ORDER — CELECOXIB 200 MG/1
200 CAPSULE ORAL 2 TIMES DAILY
Qty: 30 CAPSULE | Refills: 2 | Status: SHIPPED | OUTPATIENT
Start: 2024-06-11

## 2024-06-11 NOTE — PROGRESS NOTES
58 y.o.female presents for 3 weeks postoperative visit status post Right Anterior ADAN. Patient denies any chest pain, shortness or breath or calf pain .  Patient is taking aspirin for DVT prophylaxis.  Pain is well controlled with medication.    Right Arthroplasty Hip Total Anterior - Right  2024    Review of Systems  Review of systems negative unless otherwise specified in HPI    Past Medical History  Past Medical History:  No date: Allergic  No date: Allergic rhinitis      Comment:  last assessed 14   No date: Arthritis  No date: Asthma  No date: Bleeding per rectum      Comment:  Last assessed 16   No date: Colon polyp  No date: Diverticulitis of colon  No date: GERD (gastroesophageal reflux disease)  No date: Hypercholesterolemia  No date: Hypertension      Comment:  last assessed 3/24/15   No date: Hyponatremia      Comment:  last assessed 9/17/15   No date: Obesity    Past Surgical History  Past Surgical History:  No date:  SECTION  No date: COLONOSCOPY  3/18/2021: [place] INJECTION RIGHT SHOULDER (ARTHROGRAM)  2024: [place] ARTHRP ACETBLR/PROX FEM PROSTC AGRFT/ALGRFT; Right      Comment:  Procedure: RIGHT ARTHROPLASTY HIP TOTAL ANTERIOR;                 Surgeon: Marlee Mccann MD;  Location: [facility];                 Service: Orthopedics  No date: WISDOM TOOTH EXTRACTION  No date: WRIST SURGERY    Current Medications  Current Outpatient Medications on File Prior to Visit:  acetaminophen (TYLENOL) 650 mg CR tablet, Take 1 tablet (650 mg total) by mouth every 8 (eight) hours as needed for mild pain (Patient taking differently: Take 650 mg by mouth every 8 (eight) hours as needed for mild pain PRN), Disp: 30 tablet, Rfl: 0  ascorbic acid (VITAMIN C) 500 MG tablet, Take 1 tablet (500 mg total) by mouth 2 (two) times a day, Disp: 60 tablet, Rfl: 1  aspirin (ECOTRIN LOW STRENGTH) 81 mg EC tablet, Take 1 tablet (81 mg total) by mouth 2 (two) times a day Take 1(one) 81 mg tablet twice daily x  35 days after total joint arthroplasty, Disp: 70 tablet, Rfl: 0  Cholecalciferol (VITAMIN D) 2000 units CAPS, Take by mouth, Disp: , Rfl:   cholestyramine (QUESTRAN) 4 GM/DOSE powder, Take 1 packet (4 g total) by mouth 2 (two) times a day As needed for diarrhea, Disp: 378 g, Rfl: 5  Diclofenac Sodium (Voltaren) 1 %, Apply 2 g topically 4 (four) times a day, Disp: , Rfl:   diphenhydrAMINE-acetaminophen (TYLENOL PM)  MG TABS, Take 1 tablet by mouth daily at bedtime as needed for sleep, Disp: , Rfl:   Elderberry-Vitamin C-Zinc ([employer] GUMMY PO), Take 2 gums by mouth daily, Disp: , Rfl:   ferrous sulfate 324 (65 Fe) mg, Take 1 tablet (324 mg total) by mouth 2 (two) times a day before meals, Disp: 60 tablet, Rfl: 1  folic acid (FOLVITE) 1 mg tablet, take 1 tablet by mouth once daily, Disp: 30 tablet, Rfl: 1  glucosamine-chondroitin 500-400 MG tablet, Take 1 tablet by mouth 3 (three) times a day, Disp: 100 tablet, Rfl: 2  methocarbamol (ROBAXIN) 500 mg tablet, Take 1 tablet (500 mg total) by mouth 3 (three) times a day as needed for muscle spasms (Patient taking differently: Take 500 mg by mouth 3 (three) times a day as needed for muscle spasms prn), Disp: 30 tablet, Rfl: 0  mometasone (NASONEX) 50 mcg/act nasal spray, 2 sprays into each nostril daily, Disp: 17 g, Rfl: 0  Multiple Vitamins-Minerals (multivitamin with minerals) tablet, Take 1 tablet by mouth daily, Disp: 30 tablet, Rfl: 0  omeprazole (PriLOSEC) 10 mg delayed release capsule, Take 1 capsule (10 mg total) by mouth daily, Disp: 90 capsule, Rfl: 2  Probiotic Product (ALIGN) 4 MG CAPS, Take by mouth, Disp: , Rfl:   Rhubarb (ESTROVEN COMPLETE PO), Estroven, Disp: , Rfl:   simvastatin (ZOCOR) 5 MG tablet, Take 1 tablet (5 mg total) by mouth daily at bedtime, Disp: 90 tablet, Rfl: 0  spironolactone (ALDACTONE) 50 mg tablet, Take 1 tablet (50 mg total) by mouth daily, Disp: 90 tablet, Rfl: 0  verapamil (Verelan) 180 MG 24 hr capsule, Take 1  capsule (180 mg total) by mouth daily before breakfast, Disp: 90 capsule, Rfl: 2  vitamin E, tocopherol, 400 units capsule, Take 1 capsule (400 Units total) by mouth daily, Disp: , Rfl:   [DISCONTINUED] celecoxib (CeleBREX) 200 mg capsule, Take 1 capsule (200 mg total) by mouth 2 (two) times a day, Disp: 30 capsule, Rfl: 2  docusate sodium (COLACE) 100 mg capsule, Take 1 capsule (100 mg total) by mouth 2 (two) times a day (Patient not taking: Reported on 6/11/2024), Disp: 10 capsule, Rfl: 0  loratadine (CLARITIN) 10 mg tablet, Take 10 mg by mouth daily, Disp: , Rfl:    ondansetron (ZOFRAN) 4 mg tablet, Take 1 tablet (4 mg total) by mouth every 8 (eight) hours as needed for nausea or vomiting (Patient not taking: Reported on 6/11/2024), Disp: 5 tablet, Rfl: 0    No current facility-administered medications on file prior to visit.      Recent Labs (HCT,HGB,PT,INR,ESR,CRP,GLU,HgA1C)  0       Lab                      Value               Date/Time                  HCT                      42.6                05/30/2024 0922            HGB                      12.5                05/30/2024 0922            WBC                      6.83                05/30/2024 0922            INR                      0.99                04/29/2024 0755            GLUCOSE                  94                  09/21/2015 1537            HGBA1C                   5.8 (H)             05/30/2024 0922            HGBA1C                   5.7 (H)             04/20/2019 0942               Body mass index is 38.77 kg/m².Wt Readings from Last 3 Encounters:  06/11/24 : 116 kg (255 lb)  06/07/24 : 116 kg (255 lb 12.8 oz)  05/22/24 : 114 kg (251 lb)      Physical exam   General: Awake, Alert, Oriented   Eyes: Pupils equal, round and reactive to light    Heart: regular rate and rhythm   Lungs: No audible wheezing   Abdomen: soft  Right Lower extremity      Incision well approximated without erythema no tenderness to palpation   Active hip flexion to 90  degrees   Full knee extension   Active ankle dorsal and plantarflexion without pain, calf nontender palpation   Sensation lateral femoral cutaneous nerve distribution mildly decreased, sensation intact   Distal pulses present      Imaging  X rays of the Right hip reviewed.  X rays reveal well located, excellently aligned right total hip arthroplasty without evidence of loosening or osseous abnormality.    Assessment:  Status post 3 weeks Right ADAN anterior approach    Plan:  Weight bearing as tolerated right Lower extremity  Physical therapy  Dental antibiotic prophylaxis recommended  Pain medication as needed  Follow-up in 2 months and repeat x-rays right hip

## 2024-06-11 NOTE — PATIENT INSTRUCTIONS
[facility]                                                                                               Dr. Marlee Mccann                                                                                                            ANTIBIOTIC USE FOR JOINT REPLACEMENT PATIENTS  You have had surgery to replace one of your joints with a metal prosthesis. Going forward, you should take oral antibiotics before any dental work, including routine cleanings. These procedures are a potential source of injection. If you develop an infection, it could spread to your operative joint and cause complications.  Please show the following guidelines to your medical doctor and dentist, so he/she can prescribe the appropriate medications.  The following information is recommended by the American Heart, Dental, and Orthopedic Associations:  STANDARD GENERAL PROPHYLAXIS  antibiotic prophylaxis recommended lifetime  Recommend refraining from dental procedures until 3 months post operatively  Amoxicillin for Adults:    500mg - Take 4 capsules (2 grams) orally one hour before the procedure  If allergic to Penicillin  Clindamycin for Adults:   300mg - Take 2 capsules (600 mg) orally one hour before the procedure  Azithromycin for Adults:  250mg - Take 2 capsules (500 mg) orally one hour before the procedure  Cephalexin for Adults:     500mg - Take 4 capsules (2 grams) orally one hour before the procedure  Note: Cephalosporins should not be used if you have ever developed an immediate hypersensitivity reaction (i.e., hives, swelling, severe itching, difficulty breathing) to Penicillin  Please feel free to contact our office at [phone] with questions or concerns.

## 2024-06-12 ENCOUNTER — OFFICE VISIT (OUTPATIENT)
Dept: PHYSICAL THERAPY | Facility: CLINIC | Age: 59
End: 2024-06-12
Payer: COMMERCIAL

## 2024-06-12 DIAGNOSIS — M16.11 PRIMARY OSTEOARTHRITIS OF RIGHT HIP: ICD-10-CM

## 2024-06-12 DIAGNOSIS — M25.551 PAIN IN RIGHT HIP: ICD-10-CM

## 2024-06-12 DIAGNOSIS — Z01.818 PREOPERATIVE TESTING: ICD-10-CM

## 2024-06-12 DIAGNOSIS — Z96.641 STATUS POST TOTAL HIP REPLACEMENT, RIGHT: Primary | ICD-10-CM

## 2024-06-12 PROCEDURE — 97112 NEUROMUSCULAR REEDUCATION: CPT

## 2024-06-12 PROCEDURE — 97140 MANUAL THERAPY 1/> REGIONS: CPT

## 2024-06-12 PROCEDURE — 97110 THERAPEUTIC EXERCISES: CPT

## 2024-06-12 NOTE — PROGRESS NOTES
"Daily Note     Today's date: 2024  Patient name: Teena Staples  : 1965  MRN: [id number]  Referring provider: Marlee Mccann MD  Dx:   Encounter Diagnosis     ICD-10-CM    1. Status post total hip replacement, right  Z96.641       2. Pain in right hip  M25.551       3. Primary osteoarthritis of right hip  M16.11       4. Preoperative testing  Z01.818           Start Time: 1058  Stop Time: 1140  Total time in clinic (min): 42 minutes    Subjective: Patient reports to physical therapy today stating her dr nola went well and she was able to shower and sleep in her bed.         Objective: See treatment diary below      Assessment: Tolerated treatment well. Pt demonstrates good effort throughout session. Minimal curing given to facilitate proper exercise performance and technique. She tolerates no use of UEs for STS today and tolerates progressions to program made this session. Patient demonstrated fatigue post treatment, exhibited good technique with therapeutic exercises, and would benefit from continued PT.         Plan: Continue per plan of care.      Precautions: Hx HTN; S/p R ADAN anterior approach 24 (Anterior hip precautions- No extension, ER, > 90 deg flex)    POC expires Unit limit Auth Expiration date PT/OT + Visit Limit?   7/15 N/A 24 P- 50 visits/BOMN; S- BOMN                 Visit/Unit Tracking  AUTH Status:  Date 5/20 5/24 5/29 5/31 6/3 6/5 6/10 6/12       50 visits Used 1 2 3 4 5 6 7 8        Remaining  49 48 47 46 45 44 43 42         FOTO:       Manuals  6/3 6/5 6/10 6/12     R hip flexion/abduction PROM  TB TB TB LIBBY TB KT KT                                            Neuro Re-Ed             Pt education regarding HEP, POC, and dx 5' 5'  5' 5'         TKE     5\" 2x10 5\" 2x10 5\" 2x10 purple 5\" 2x10 purple     LAQ's  2x10 3\"  hold           Quad sets  2x10 3\"  3x10 3\"  3x10 3\"  3x10 3\"  3x10 3\" 3x10 3\" 3x10 3\" 3x10 3\"     SAQ's  Nv, fatigue  3x10 2\" 3x10 2\" " "3x10 2\" 3x10 2\" 3x10 3\" 3x10 3\"     TB BKFO        Grn 2x10 ea     HR        3x10     Standing weight shifts   2x10 3x10 10\"x10 10\"x10       Standing hip abduction   2x10 R  LE  2x10 R  LE  NV  2x10 R  LE  2x10 R  LE      Ther Ex             Glute sets  2x10 3\"  2x10 3\"  2x10 5\" 2x10 5\" HEP today        Standing marches   2x10  2x10 2x10  2x10 2x10 2x10     Bridging 2x10 2\"  Discontinued post-surgical           Supine hip adduction squeeze X20 5\"             Heel slides   1x10 AROM; 2x10 AA-ROM 3x10 AROM  3x10 AROM 3x10 AROM  3x10 AROM  3x10 AROM  3x10 AROM      SLR   X12 AA-ROM  X18 AA-ROM  2x10 AA-ROM 2x10 AA-ROM 2x10 AA-ROM 2x10 AA-ROM     Minisquats       2x10 2x10     Nustep for LE endurance       Seat 10 arms 10 5' Seat 9 arms 10 5'     Ther Activity             STS c airex    4x5 2x10 2x10 2x10 2x10     Step ups        2\" 2x10     Laps c cane       200'       Gait Training                                       Modalities             [place] E-Stim     Unable to tolerate                                     "

## 2024-06-14 ENCOUNTER — OFFICE VISIT (OUTPATIENT)
Dept: PHYSICAL THERAPY | Facility: CLINIC | Age: 59
End: 2024-06-14
Payer: COMMERCIAL

## 2024-06-14 DIAGNOSIS — M16.11 PRIMARY OSTEOARTHRITIS OF RIGHT HIP: ICD-10-CM

## 2024-06-14 DIAGNOSIS — M25.551 PAIN IN RIGHT HIP: ICD-10-CM

## 2024-06-14 DIAGNOSIS — Z96.641 STATUS POST TOTAL HIP REPLACEMENT, RIGHT: Primary | ICD-10-CM

## 2024-06-14 PROCEDURE — 97110 THERAPEUTIC EXERCISES: CPT

## 2024-06-14 PROCEDURE — 97112 NEUROMUSCULAR REEDUCATION: CPT

## 2024-06-14 PROCEDURE — 97140 MANUAL THERAPY 1/> REGIONS: CPT

## 2024-06-14 NOTE — PROGRESS NOTES
"Daily Note     Today's date: 2024  Patient name: Teena Staples  : 1965  MRN: [id number]  Referring provider: Marlee Mccann MD  Dx:   Encounter Diagnosis     ICD-10-CM    1. Status post total hip replacement, right  Z96.641       2. Pain in right hip  M25.551       3. Primary osteoarthritis of right hip  M16.11                      Subjective: Patient reports to physical therapy today stating she did a short walk yesterday and already went up and down her stairs at home twice.         Objective: See treatment diary below      Assessment: Tolerated treatment well. Pt demonstrates good effort throughout session. Minimal curing given to facilitate proper exercise performance and technique. Less assistance required with supine SLR today. She reports she is trying to walk more without overdoing it. She tolerates progressions made to reps and sets today. Patient demonstrated fatigue post treatment, exhibited good technique with therapeutic exercises, and would benefit from continued PT.       Plan: Continue per plan of care.      Precautions: Hx HTN; S/p R ADAN anterior approach 24 (Anterior hip precautions- No extension, ER, > 90 deg flex)    POC expires Unit limit Auth Expiration date PT/OT + Visit Limit?   7/15 N/A 24 P- 50 visits/BOMN; S- BOMN                 Visit/Unit Tracking  AUTH Status:  Date 5/20 5/24 5/29 5/31 6/3 6/5 6/10 6/12 6/14      50 visits Used 1 2 3 4 5 6 7 8 9       Remaining  49 48 47 46 45 44 43 42 41        FOTO:       Manuals 5/20 5/24 5/29 5/31 6/3 6/5 6/10 6/12 6/14    R hip flexion/abduction PROM  TB TB TB LIBBY TB KT KT KT                                           Neuro Re-Ed             Pt education regarding HEP, POC, and dx 5' 5'  5' 5'         TKE     5\" 2x10 5\" 2x10 5\" 2x10 purple 5\" 2x10 purple 5\" 2x10 purple    LAQ's  2x10 3\"  hold       2x10    Quad sets  2x10 3\"  3x10 3\"  3x10 3\"  3x10 3\"  3x10 3\" 3x10 3\" 3x10 3\" 3x10 3\" 3x10 3\"    SAQ's  Nv, fatigue  3x10 2\" " "3x10 2\" 3x10 2\" 3x10 2\" 3x10 3\" 3x10 3\" 3x10 3\"    TB BKFO        Grn 2x10 ea Grn 3x10 ea    HR        3x10 3x10    Standing weight shifts   2x10 3x10 10\"x10 10\"x10       Standing hip abduction   2x10 R  LE  2x10 R  LE  NV  2x10 R  LE  2x10 R  LE  3x10 R  LE    Ther Ex             Glute sets  2x10 3\"  2x10 3\"  2x10 5\" 2x10 5\" HEP today        Standing marches   2x10  2x10 2x10  2x10 2x10 2x10 3x10    Bridging 2x10 2\"  Discontinued post-surgical           Supine hip adduction squeeze X20 5\"             Heel slides   1x10 AROM; 2x10 AA-ROM 3x10 AROM  3x10 AROM 3x10 AROM  3x10 AROM  3x10 AROM  3x10 AROM  3x10 AROM     SLR   X12 AA-ROM  X18 AA-ROM  2x10 AA-ROM 2x10 AA-ROM 2x10 AA-ROM 2x10 AA-ROM 2x10 AA-ROM    Minisquats       2x10 2x10 2x10    Nustep for LE endurance       Seat 10 arms 10 5' Seat 9 arms 10 5' Seat 9 arms 10 5'    Ther Activity             STS c airex    4x5 2x10 2x10 2x10 2x10 3x10     Step ups        2\" 2x10 4\" 2x10    Laps c cane       200'       Gait Training                                       Modalities             [place] E-Stim     Unable to tolerate                                       "

## 2024-06-17 ENCOUNTER — OFFICE VISIT (OUTPATIENT)
Dept: PHYSICAL THERAPY | Facility: CLINIC | Age: 59
End: 2024-06-17
Payer: COMMERCIAL

## 2024-06-17 DIAGNOSIS — M25.551 PAIN IN RIGHT HIP: ICD-10-CM

## 2024-06-17 DIAGNOSIS — Z96.641 STATUS POST TOTAL HIP REPLACEMENT, RIGHT: Primary | ICD-10-CM

## 2024-06-17 DIAGNOSIS — M16.11 PRIMARY OSTEOARTHRITIS OF RIGHT HIP: ICD-10-CM

## 2024-06-17 PROCEDURE — 97110 THERAPEUTIC EXERCISES: CPT

## 2024-06-17 PROCEDURE — 97112 NEUROMUSCULAR REEDUCATION: CPT

## 2024-06-17 NOTE — PROGRESS NOTES
"Daily Note     Today's date: 2024  Patient name: Teena Staples  : 1965  MRN: [id number]  Referring provider: Marlee Mccann MD  Dx:   Encounter Diagnosis     ICD-10-CM    1. Status post total hip replacement, right  Z96.641       2. Primary osteoarthritis of right hip  M16.11       3. Pain in right hip  M25.551           Start Time: 1055  Stop Time: 1143  Total time in clinic (min): 48 minutes    Subjective: Patient reports to physical therapy today stating she barely uses the cane at home anymore. She is stiff in the front of her hip when she wakes up in the morning.       Objective: See treatment diary below      Assessment: Tolerated treatment well. Pt demonstrates good effort throughout session. Minimal curing given to facilitate proper exercise performance and technique. Added a gentle manual sidelying hip flexor/quad stretch in sidelying to neutral hip extension. She tolerates all progressions made to program with reps and sets. Increased step height to 6\" step today. Patient demonstrated fatigue post treatment, exhibited good technique with therapeutic exercises, and would benefit from continued PT.       Plan: Continue per plan of care.      Precautions: Hx HTN; S/p R ADAN anterior approach 24 (Anterior hip precautions- No extension, ER, > 90 deg flex)    POC expires Unit limit Auth Expiration date PT/OT + Visit Limit?   7/15 N/A 24 P- 50 visits/BOMN; S- BOMN                 Visit/Unit Tracking  AUTH Status:  Date  6/3 6/5 6/10 6/12 6/14 6/17     50 visits Used 1 2 3 4 5 6 7 8 9 8      Remaining  49 48 47 46 45 44 43 42 41 40       FOTO:       Manuals  6/3 6/ 6/10 6/12 6/14 6/17   R hip flexion/abduction PROM  TB TB TB LIBBY TB KT KT KT KT   Gentle hip flexor quad stretch to neutral in SL          KT                             Neuro Re-Ed             Pt education regarding HEP, POC, and dx 5' 5'  5' 5'      3'   TKE     5\" 2x10 5\" 2x10 5\" 2x10 " "purple 5\" 2x10 purple 5\" 2x10 purple 5\" 2x10 purple   LAQ's  2x10 3\"  hold       2x10 2x10   Quad sets  2x10 3\"  3x10 3\"  3x10 3\"  3x10 3\"  3x10 3\" 3x10 3\" 3x10 3\" 3x10 3\" 3x10 3\"    SAQ's  Nv, fatigue  3x10 2\" 3x10 2\" 3x10 2\" 3x10 2\" 3x10 3\" 3x10 3\" 3x10 3\" 3x10 3\"   TB BKFO        Grn 2x10 ea Grn 3x10 ea Grn 3x10 ea   HR        3x10 3x10 3x10   Standing hamstring curls          3x10   Standing hip abduction   2x10 R  LE  2x10 R  LE  NV  2x10 R  LE  2x10 R  LE  3x10 R  LE 3x10 R  LE   Ther Ex             Glute sets  2x10 3\"  2x10 3\"  2x10 5\" 2x10 5\" HEP today        Standing marches   2x10  2x10 2x10  2x10 2x10 2x10 3x10 3x10   Bridging 2x10 2\"  Discontinued post-surgical           Supine hip adduction squeeze X20 5\"             Heel slides   1x10 AROM; 2x10 AA-ROM 3x10 AROM  3x10 AROM 3x10 AROM  3x10 AROM  3x10 AROM  3x10 AROM  3x10 AROM     SLR   X12 AA-ROM  X18 AA-ROM  2x10 AA-ROM 2x10 AA-ROM 2x10 AA-ROM 2x10 AA-ROM 2x10 AA-ROM 2x10 AA-ROM   Minisquats       2x10 2x10 2x10 2x10   Nustep for LE endurance       Seat 10 arms 10 5' Seat 9 arms 10 5' Seat 9 arms 10 5' Seat 9 7'   Ther Activity             STS c airex    4x5 2x10 2x10 2x10 2x10 3x10  3x10   Step ups        2\" 2x10 4\" 2x10 6\" 3x10   Laps s cane          1 lap   Gait Training                                       Modalities             [place] E-Stim     Unable to tolerate                                         "

## 2024-06-18 ENCOUNTER — TELEPHONE (OUTPATIENT)
Age: 59
End: 2024-06-18

## 2024-06-18 NOTE — TELEPHONE ENCOUNTER
Caller: Patient    Doctor: Dr. Mccann    Reason for call: Patient calling asking to speak to ortho nurse Navigator in regard to a few questions she has regarding her ADAN.    Call back#: [phone]

## 2024-06-18 NOTE — TELEPHONE ENCOUNTER
Spoke to pt, relayed information/instructions from Lalit Paz PA-C. Pt appreciative of callback, no further questions at this time.

## 2024-06-18 NOTE — TELEPHONE ENCOUNTER
Upon review of the In Basket request we were able to locate, review, and update the patient chart as requested for Pap Smear (HPV) aka Cervical Cancer Screening.    Any additional questions or concerns should be emailed to the Practice Liaisons via the appropriate education email address, please do not reply via In Basket.    Thank you  Deng Cortés   [facility]

## 2024-06-19 ENCOUNTER — OFFICE VISIT (OUTPATIENT)
Dept: PHYSICAL THERAPY | Facility: CLINIC | Age: 59
End: 2024-06-19
Payer: COMMERCIAL

## 2024-06-19 DIAGNOSIS — M25.551 PAIN IN RIGHT HIP: ICD-10-CM

## 2024-06-19 DIAGNOSIS — M16.11 PRIMARY OSTEOARTHRITIS OF RIGHT HIP: ICD-10-CM

## 2024-06-19 DIAGNOSIS — Z96.641 STATUS POST TOTAL HIP REPLACEMENT, RIGHT: Primary | ICD-10-CM

## 2024-06-19 PROCEDURE — 97110 THERAPEUTIC EXERCISES: CPT

## 2024-06-19 PROCEDURE — 97140 MANUAL THERAPY 1/> REGIONS: CPT

## 2024-06-19 NOTE — PROGRESS NOTES
"Daily Note     Today's date: 2024  Patient name: Teena Staples  : 1965  MRN: [id number]  Referring provider: Marlee Mccann MD  Dx:   Encounter Diagnosis     ICD-10-CM    1. Status post total hip replacement, right  Z96.641       2. Primary osteoarthritis of right hip  M16.11       3. Pain in right hip  M25.551           Start Time: 1100  Stop Time: 1146  Total time in clinic (min): 46 minutes    Subjective: Patient reports to physical therapy today stating her R knee has been bothering her a little bit, but otherwise feels okay.         Objective: See treatment diary below      Assessment: Tolerated treatment well. Patient demonstrated fatigue post treatment, exhibited good technique with therapeutic exercises, and would benefit from continued PT. Continues to provide good effort during performance of exercises. Added manish step overs this visit to which patient responded well. Visual and verbal cues provided to ensure correct form during performance of new exercise. Will continue to assess patient tolerance and progress next visit, as able.         Plan: Continue per plan of care.      Precautions: Hx HTN; S/p R ADAN anterior approach 24 (Anterior hip precautions- No extension, ER, > 90 deg flex)    POC expires Unit limit Auth Expiration date PT/OT + Visit Limit?   7/15 N/A 24 P- 50 visits/BOMN; S- BOMN                 Visit/Unit Tracking  AUTH Status:  Date 5/20 5/24 5/29 5/31 6/3 6/5 6/10 6/12 6/14 6/17 6/19    50 visits Used 1 2 3 4 5 6 7 8 9 10 11     Remaining  49 48 47 46 45 44 43 42 41 40 39      FOTO:       Manuals  6/3 6/ 6/10 6   R hip flexion/abduction PROM TB  TB TB LIBBY TB KT KT KT KT   Gentle hip flexor quad stretch to neutral in SL TB         KT                             Neuro Re-Ed             Pt education regarding HEP, POC, and dx 3'  5' 5'      3'   TKE 5\" 2x10 purple    5\" 2x10 5\" 2x10 5\" 2x10 purple 5\" 2x10 purple 5\" 2x10 purple 5\" 2x10 " "purple   LAQ's  nv        2x10 2x10   Quad sets    3x10 3\"  3x10 3\"  3x10 3\" 3x10 3\" 3x10 3\" 3x10 3\" 3x10 3\"    SAQ's 3x10 3\"  3x10 2\" 3x10 2\" 3x10 2\" 3x10 2\" 3x10 3\" 3x10 3\" 3x10 3\" 3x10 3\"   TB BKFO Grn 3x10 ea       Grn 2x10 ea Grn 3x10 ea Grn 3x10 ea   HR 3x10       3x10 3x10 3x10   Standing hamstring curls 3x10         3x10   Standing hip abduction 3x10 R LE   2x10 R  LE  2x10 R  LE  NV  2x10 R  LE  2x10 R  LE  3x10 R  LE 3x10 R  LE   Ther Ex             Glute sets    2x10 5\" 2x10 5\" HEP today        Standing marches 3x10  2x10  2x10 2x10  2x10 2x10 2x10 3x10 3x10                             Heel slides    3x10 AROM  3x10 AROM 3x10 AROM  3x10 AROM  3x10 AROM  3x10 AROM  3x10 AROM     SLR 1x10 AROM, 2x10 AA-ROM  X12 AA-ROM  X18 AA-ROM  2x10 AA-ROM 2x10 AA-ROM 2x10 AA-ROM 2x10 AA-ROM 2x10 AA-ROM 2x10 AA-ROM   Minisquats 3x10      2x10 2x10 2x10 2x10   Nustep for LE endurance Seat 9 8.5'      Seat 10 arms 10 5' Seat 9 arms 10 5' Seat 9 arms 10 5' Seat 9 7'   Ther Activity             STS c airex 3x10   4x5 2x10 2x10 2x10 2x10 3x10  3x10   Step ups 6\" 3x10       2\" 2x10 4\" 2x10 6\" 3x10   Laps s cane 1 lap throughout session         1 lap   Sabra step overs 4 hurdles x  4             Gait Training                                       Modalities             [place] E-Stim     Unable to tolerate                                           "

## 2024-06-21 ENCOUNTER — OFFICE VISIT (OUTPATIENT)
Dept: PHYSICAL THERAPY | Facility: CLINIC | Age: 59
End: 2024-06-21
Payer: COMMERCIAL

## 2024-06-21 DIAGNOSIS — M25.551 PAIN IN RIGHT HIP: ICD-10-CM

## 2024-06-21 DIAGNOSIS — M16.11 PRIMARY OSTEOARTHRITIS OF RIGHT HIP: ICD-10-CM

## 2024-06-21 DIAGNOSIS — Z96.641 STATUS POST TOTAL HIP REPLACEMENT, RIGHT: Primary | ICD-10-CM

## 2024-06-21 PROCEDURE — 97112 NEUROMUSCULAR REEDUCATION: CPT

## 2024-06-21 PROCEDURE — 97110 THERAPEUTIC EXERCISES: CPT

## 2024-06-21 PROCEDURE — 97140 MANUAL THERAPY 1/> REGIONS: CPT

## 2024-06-21 NOTE — PROGRESS NOTES
"Daily Note     Today's date: 2024  Patient name: Teena Staples  : 1965  MRN: [id number]  Referring provider: Marlee Mccann MD  Dx:   Encounter Diagnosis     ICD-10-CM    1. Status post total hip replacement, right  Z96.641       2. Primary osteoarthritis of right hip  M16.11       3. Pain in right hip  M25.551                      Subjective: Pt reports she walked 6000 steps yesterday and feels good so far.       Objective: See treatment diary below      Assessment: Tolerated treatment well. Pt demonstrates good effort throughout session. Minimal curing given to facilitate proper exercise performance and technique. She tolerates progressions weight weights and resistances today. She reports fatigue with WB on her R LE for hip abd, so she alternating WB exercises on the R LE. Patient demonstrated fatigue post treatment, exhibited good technique with therapeutic exercises, and would benefit from continued PT      Plan: Continue per plan of care.      Precautions: Hx HTN; S/p R ADAN anterior approach 24 (Anterior hip precautions- No extension, ER, > 90 deg flex)    POC expires Unit limit Auth Expiration date PT/OT + Visit Limit?   7/15 N/A 24 P- 50 visits/BOMN; S- BOMN                 Visit/Unit Tracking  AUTH Status:  Date 5/20 5/24 5/29 5/31 6/3 6/5 6/10 6/12 6/14 6/17 6/19 6/21   50 visits Used 1 2 3 4 5 6 7 8 9 10 11 12    Remaining  49 48 47 46 45 44 43 42 41 40 39 38     FOTO:       Manuals 6/19 6/21    6/5 6/10 6/12 6/14 6/17   R hip flexion/abduction PROM TB KT    TB KT KT KT KT   Gentle hip flexor quad stretch to neutral in SL TB KT        KT                             Neuro Re-Ed             Pt education regarding HEP, POC, and dx 3' 3'        3'   TKE 5\" 2x10 purple 5\" 2x10 grn    5\" 2x10 5\" 2x10 purple 5\" 2x10 purple 5\" 2x10 purple 5\" 2x10 purple   LAQ's  nv 2x10 2#       2x10 2x10   Quad sets       3x10 3\" 3x10 3\" 3x10 3\" 3x10 3\"    SAQ's 3x10 3\" 2x10 3\" 2#    3x10 2\" 3x10 " "3\" 3x10 3\" 3x10 3\" 3x10 3\"   TB BKFO Grn 3x10 ea Blue 3x10      Grn 2x10 ea Grn 3x10 ea Grn 3x10 ea   HR 3x10 3x10      3x10 3x10 3x10   Standing hamstring curls 3x10 3x10        3x10   Standing hip abduction 3x10 R LE  3x10 rainer     2x10 R  LE  2x10 R  LE  3x10 R  LE 3x10 R  LE   Ther Ex             Bridges  2x10           Standing marches 3x10 3x10    2x10 2x10 2x10 3x10 3x10                             Heel slides       3x10 AROM  3x10 AROM  3x10 AROM  3x10 AROM     SLR 1x10 AROM, 2x10 AA-ROM 2x10 AROM, 1x10 AAROM    2x10 AA-ROM 2x10 AA-ROM 2x10 AA-ROM 2x10 AA-ROM 2x10 AA-ROM   Minisquats 3x10 3x10     2x10 2x10 2x10 2x10   Nustep for LE endurance Seat 9 8.5' Seat 10 L 1-2-1     Seat 10 arms 10 5' Seat 9 arms 10 5' Seat 9 arms 10 5' Seat 9 7'   Ther Activity             STS c airex 3x10 3x10    2x10 2x10 2x10 3x10  3x10   Step ups 6\" 3x10       2\" 2x10 4\" 2x10 6\" 3x10   Laps s cane 1 lap throughout session T/o session        1 lap   Sabra step overs 4 hurdles x  4  4 hurdles 4x           Gait Training                                       Modalities                                                              "

## 2024-06-24 ENCOUNTER — OFFICE VISIT (OUTPATIENT)
Dept: PHYSICAL THERAPY | Facility: CLINIC | Age: 59
End: 2024-06-24
Payer: COMMERCIAL

## 2024-06-24 DIAGNOSIS — M16.11 PRIMARY OSTEOARTHRITIS OF RIGHT HIP: ICD-10-CM

## 2024-06-24 DIAGNOSIS — Z96.641 STATUS POST TOTAL HIP REPLACEMENT, RIGHT: Primary | ICD-10-CM

## 2024-06-24 PROCEDURE — 97140 MANUAL THERAPY 1/> REGIONS: CPT

## 2024-06-24 PROCEDURE — 97112 NEUROMUSCULAR REEDUCATION: CPT

## 2024-06-24 PROCEDURE — 97110 THERAPEUTIC EXERCISES: CPT

## 2024-06-24 NOTE — PROGRESS NOTES
"Daily Note     Today's date: 2024  Patient name: Teena Staples  : 1965  MRN: [id number]  Referring provider: Marlee Mccann MD  Dx:   Encounter Diagnosis     ICD-10-CM    1. Status post total hip replacement, right  Z96.641       2. Primary osteoarthritis of right hip  M16.11           Start Time: 1100  Stop Time: 1145  Total time in clinic (min): 45 minutes    Subjective: Patient reports to physical therapy today stating she walked 9,000 steps this weekend and notes no complaints of pain. She has not yet practiced driving.        Objective: See treatment diary below      Assessment: Tolerated treatment well. Patient demonstrated fatigue post treatment, exhibited good technique with therapeutic exercises, and would benefit from continued PT. Continues to provide good effort during performance of exercises. Minimal verbal cues needed to correct patient form during performance of exercises. No increase in pt symptoms noted with performance of exercises. Will continue to assess patient tolerance and progress next visit, as able.         Plan: Continue per plan of care.      Precautions: Hx HTN; S/p R ADAN anterior approach 24 (Anterior hip precautions- No extension, ER, > 90 deg flex)    POC expires Unit limit Auth Expiration date PT/OT + Visit Limit?   7/15 N/A 24 P- 50 visits/BOMN; S- BOMN                 Visit/Unit Tracking  AUTH Status:  Date 6/24 5/24 5/29 5/31 6/3 6/5 6/10 6/12 6/14 6/17 6/19 6/21   50 visits Used 13 2 3 4 5 6 7 8 9 10 11 12    Remaining  37 48 47 46 45 44 43 42 41 40 39 38     FOTO:       Manuals 6/19 6/21 6/24   6/5 6/10 6/12 6/14 6/17   R hip flexion/abduction PROM TB KT TB   TB KT KT KT KT   Gentle hip flexor quad stretch to neutral in SL TB KT TB       KT                             Neuro Re-Ed             Pt education regarding HEP, POC, and dx 3' 3' 3'       3'   TKE 5\" 2x10 purple 5\" 2x10 grn 5\" 2x10 grn   5\" 2x10 5\" 2x10 purple 5\" 2x10 purple 5\" 2x10 " "purple 5\" 2x10 purple   LAQ's  nv 2x10 2# 2x10 3\" 2#      2x10 2x10   Quad sets       3x10 3\" 3x10 3\" 3x10 3\" 3x10 3\"    SAQ's 3x10 3\" 2x10 3\" 2# 2x10 3\" 2#   3x10 2\" 3x10 3\" 3x10 3\" 3x10 3\" 3x10 3\"   TB BKFO Grn 3x10 ea Blue 3x10 Blue 3x10     Grn 2x10 ea Grn 3x10 ea Grn 3x10 ea   HR 3x10 3x10 3x10     3x10 3x10 3x10   Standing hamstring curls 3x10 3x10 3x10       3x10   Standing hip abduction 3x10 R LE  3x10 rainer 3x10 rainer    2x10 R  LE  2x10 R  LE  3x10 R  LE 3x10 R  LE   Ther Ex             Bridges  2x10 X15           Standing marches 3x10 3x10 3x10   2x10 2x10 2x10 3x10 3x10                             Heel slides       3x10 AROM  3x10 AROM  3x10 AROM  3x10 AROM     SLR 1x10 AROM, 2x10 AA-ROM 2x10 AROM, 1x10 AAROM 3x10 AROM    2x10 AA-ROM 2x10 AA-ROM 2x10 AA-ROM 2x10 AA-ROM 2x10 AA-ROM   Minisquats 3x10 3x10 3x10    2x10 2x10 2x10 2x10   Nustep for LE endurance Seat 9 8.5' Seat 10 L 1-2-1 Seat 10 L 1-2-1    Seat 10 arms 10 5' Seat 9 arms 10 5' Seat 9 arms 10 5' Seat 9 7'   Ther Activity             STS c airex 3x10 3x10 3x10   2x10 2x10 2x10 3x10  3x10   Step ups 6\" 3x10       2\" 2x10 4\" 2x10 6\" 3x10   Laps s cane 1 lap throughout session T/o session T/o session       1 lap   Sabra step overs 4 hurdles x  4  4 hurdles 4x 4 hurdles 4x          Gait Training                                       Modalities                                                                "

## 2024-06-26 ENCOUNTER — OFFICE VISIT (OUTPATIENT)
Dept: PHYSICAL THERAPY | Facility: CLINIC | Age: 59
End: 2024-06-26
Payer: COMMERCIAL

## 2024-06-26 DIAGNOSIS — Z96.641 STATUS POST TOTAL HIP REPLACEMENT, RIGHT: Primary | ICD-10-CM

## 2024-06-26 DIAGNOSIS — M25.551 PAIN IN RIGHT HIP: ICD-10-CM

## 2024-06-26 DIAGNOSIS — M16.11 PRIMARY OSTEOARTHRITIS OF RIGHT HIP: ICD-10-CM

## 2024-06-26 PROCEDURE — 97530 THERAPEUTIC ACTIVITIES: CPT

## 2024-06-26 PROCEDURE — 97110 THERAPEUTIC EXERCISES: CPT

## 2024-06-26 PROCEDURE — 97140 MANUAL THERAPY 1/> REGIONS: CPT

## 2024-06-26 PROCEDURE — 97112 NEUROMUSCULAR REEDUCATION: CPT

## 2024-06-26 NOTE — PROGRESS NOTES
"Daily Note     Today's date: 2024  Patient name: Teena Staples  : 1965  MRN: [id number]  Referring provider: Marlee Mccann MD  Dx:   Encounter Diagnosis     ICD-10-CM    1. Status post total hip replacement, right  Z96.641       2. Primary osteoarthritis of right hip  M16.11       3. Pain in right hip  M25.551           Start Time: 1100  Stop Time: 1154  Total time in clinic (min): 54 minutes    Subjective: Patient reports to physical therapy today stating the front of her thigh was a little tingly last night.        Objective: See treatment diary below      Assessment: Tolerated treatment well. Patient demonstrated fatigue post treatment, exhibited good technique with therapeutic exercises, and would benefit from continued PT. Continues to provide good effort during performance of exercises. Will continue to assess patient tolerance and progress next visit, as able. Pt's ROM as session progresses and stretching is performed.         Plan: Continue per plan of care.      Precautions: Hx HTN; S/p R ADAN anterior approach 24 (Anterior hip precautions- No extension, ER, > 90 deg flex)    POC expires Unit limit Auth Expiration date PT/OT + Visit Limit?   7/15 N/A 24 P- 50 visits/BOMN; S- BOMN                 Visit/Unit Tracking  AUTH Status:  Date 6/24 6/26  5/31 6/3 6/5 6/10 6/12 6/14 6/17 6/19 6/21   50 visits Used 13 14  4 5 6 7 8 9 10 11 12    Remaining  37 36  46 45 44 43 42 41 40 39 38     FOTO:       Manuals 6/19 6/21 6/24 6/26  6/5 6/10 6/12 6/14 6/17   R hip flexion/abduction PROM TB KT TB MM  TB KT KT KT KT   Gentle hip flexor quad stretch to neutral in SL TB KT TB MM      KT                             Neuro Re-Ed             Pt education regarding HEP, POC, and dx 3' 3' 3' 2'      3'   TKE 5\" 2x10 purple 5\" 2x10 grn 5\" 2x10 grn 5\" 2x10 grn  5\" 2x10 5\" 2x10 purple 5\" 2x10 purple 5\" 2x10 purple 5\" 2x10 purple   LAQ's  nv 2x10 2# 2x10 3\" 2# 2x10 3\" 2#     2x10 2x10   Quad sets   " "    3x10 3\" 3x10 3\" 3x10 3\" 3x10 3\"    SAQ's 3x10 3\" 2x10 3\" 2# 2x10 3\" 2# 2x10 3\" 2#  3x10 2\" 3x10 3\" 3x10 3\" 3x10 3\" 3x10 3\"   TB BKFO Grn 3x10 ea Blue 3x10 Blue 3x10 Blue 3x10    Grn 2x10 ea Grn 3x10 ea Grn 3x10 ea   HR 3x10 3x10 3x10 3x10    3x10 3x10 3x10   Standing hamstring curls 3x10 3x10 3x10 3x10      3x10   Standing hip abduction 3x10 R LE  3x10 rainer 3x10 rainer 3x10 rainer   2x10 R  LE  2x10 R  LE  3x10 R  LE 3x10 R  LE   Ther Ex             Bridges  2x10 X15  x20         Standing marches 3x10 3x10 3x10 3x10  2x10 2x10 2x10 3x10 3x10                             Heel slides       3x10 AROM  3x10 AROM  3x10 AROM  3x10 AROM     SLR 1x10 AROM, 2x10 AA-ROM 2x10 AROM, 1x10 AAROM 3x10 AROM  3x10 AROM   2x10 AA-ROM 2x10 AA-ROM 2x10 AA-ROM 2x10 AA-ROM 2x10 AA-ROM   Minisquats 3x10 3x10 3x10 3x10   2x10 2x10 2x10 2x10   Nustep for LE endurance Seat 9 8.5' Seat 10 L 1-2-1 Seat 10 L 1-2-1 Seat 10 L 1-2-1   Seat 10 arms 10 5' Seat 9 arms 10 5' Seat 9 arms 10 5' Seat 9 7'   Ther Activity             STS c airex 3x10 3x10 3x10 3x10  2x10 2x10 2x10 3x10  3x10   Step ups 6\" 3x10       2\" 2x10 4\" 2x10 6\" 3x10   Laps s cane 1 lap throughout session T/o session T/o session       1 lap   Sabra step overs 4 hurdles x  4  4 hurdles 4x 4 hurdles 4x 4 hurdles 4x         Gait Training                                       Modalities                                                                  "

## 2024-06-28 ENCOUNTER — OFFICE VISIT (OUTPATIENT)
Dept: PHYSICAL THERAPY | Facility: CLINIC | Age: 59
End: 2024-06-28
Payer: COMMERCIAL

## 2024-06-28 DIAGNOSIS — Z96.641 STATUS POST TOTAL HIP REPLACEMENT, RIGHT: Primary | ICD-10-CM

## 2024-06-28 DIAGNOSIS — M16.11 PRIMARY OSTEOARTHRITIS OF RIGHT HIP: ICD-10-CM

## 2024-06-28 PROCEDURE — 97140 MANUAL THERAPY 1/> REGIONS: CPT

## 2024-06-28 PROCEDURE — 97112 NEUROMUSCULAR REEDUCATION: CPT

## 2024-06-28 PROCEDURE — 97110 THERAPEUTIC EXERCISES: CPT

## 2024-06-28 NOTE — PROGRESS NOTES
"Daily Note     Today's date: 2024  Patient name: Teena Staples  : 1965  MRN: [id number]  Referring provider: Marlee Mccann MD  Dx:   Encounter Diagnosis     ICD-10-CM    1. Status post total hip replacement, right  Z96.641       2. Primary osteoarthritis of right hip  M16.11           Start Time: 1145  Stop Time: 1230  Total time in clinic (min): 45 minutes    Subjective: Patient reports to physical therapy today stating she was able to walk for 8 minutes at the park the other day without having to take a break. She notes continued tingling in her R thigh and a little soreness at her anterior joint line.         Objective: See treatment diary below      Assessment: Tolerated treatment well. Patient demonstrated fatigue post treatment, exhibited good technique with therapeutic exercises, and would benefit from continued PT. Continues to provide good effort during performance of exercises. Minimal verbal cues needed to correct patient form during performance of exercises. Will continue to assess patient tolerance and progress next visit, as able.         Plan: Continue per plan of care.      Precautions: Hx HTN; S/p R ADAN anterior approach 24 (Anterior hip precautions- No extension, ER, > 90 deg flex)    POC expires Unit limit Auth Expiration date PT/OT + Visit Limit?   7/15 N/A 24 P- 50 visits/BOMN; S- BOMN                 Visit/Unit Tracking  AUTH Status:  Date 6/24 6/26 6/28 5/31 6/3 6/5 6/10 6/12 6/14 6/17 6/19 6/21   50 visits Used 13 14 15 4 5 6 7 8 9 10 11 12    Remaining  37 36 35 46 45 44 43 42 41 40 39 38     FOTO:       Manuals    R hip flexion/abduction PROM TB KT TB MM TB    KT KT   Gentle hip flexor quad stretch to neutral in SL TB KT TB MM TB     KT                             Neuro Re-Ed             Pt education regarding HEP, POC, and dx 3' 3' 3' 2' 2'     3'   TKE 5\" 2x10 purple 5\" 2x10 grn 5\" 2x10 grn 5\" 2x10 grn 3\" 3x10 grn    " "5\" 2x10 purple 5\" 2x10 purple   LAQ's  nv 2x10 2# 2x10 3\" 2# 2x10 3\" 2# 2x10 3\" 2#    2x10 2x10   Quad sets          3x10 3\"    SAQ's 3x10 3\" 2x10 3\" 2# 2x10 3\" 2# 2x10 3\" 2# 2x10 3\" 2#    3x10 3\" 3x10 3\"   TB BKFO Grn 3x10 ea Blue 3x10 Blue 3x10 Blue 3x10 Blue 3x10    Grn 3x10 ea Grn 3x10 ea   HR 3x10 3x10 3x10 3x10 3x10    3x10 3x10   Standing hamstring curls 3x10 3x10 3x10 3x10 3x10     3x10   Standing hip abduction 3x10 R LE  3x10 rainer 3x10 rainer 3x10 rainer 3x10 rainer    3x10 R  LE 3x10 R  LE   Ther Ex             Bridges  2x10 X15  x20 x20        Standing marches 3x10 3x10 3x10 3x10 3x10    3x10 3x10                             Heel slides          3x10 AROM     SLR 1x10 AROM, 2x10 AA-ROM 2x10 AROM, 1x10 AAROM 3x10 AROM  3x10 AROM  3x10 AROM     2x10 AA-ROM 2x10 AA-ROM   Minisquats 3x10 3x10 3x10 3x10 3x10    2x10 2x10   Nustep for LE endurance Seat 9 8.5' Seat 10 L 1-2-1 Seat 10 L 1-2-1 Seat 10 L 1-2-1 Seat 10 L 1-2-1    Seat 9 arms 10 5' Seat 9 7'   Ther Activity             STS c airex 3x10 3x10 3x10 3x10 3x10    3x10  3x10   Step ups 6\" 3x10        4\" 2x10 6\" 3x10   Laps s cane 1 lap throughout session T/o session T/o session  2 laps w/o cane      1 lap   Sabra step overs 4 hurdles x  4  4 hurdles 4x 4 hurdles 4x 4 hurdles 4x 4 hurdles 4x        Gait Training                                       Modalities                                                                    "

## 2024-07-01 ENCOUNTER — OFFICE VISIT (OUTPATIENT)
Dept: PHYSICAL THERAPY | Facility: CLINIC | Age: 59
End: 2024-07-01
Payer: COMMERCIAL

## 2024-07-01 DIAGNOSIS — M16.11 PRIMARY OSTEOARTHRITIS OF RIGHT HIP: ICD-10-CM

## 2024-07-01 DIAGNOSIS — Z96.641 STATUS POST TOTAL HIP REPLACEMENT, RIGHT: Primary | ICD-10-CM

## 2024-07-01 PROCEDURE — 97530 THERAPEUTIC ACTIVITIES: CPT

## 2024-07-01 PROCEDURE — 97112 NEUROMUSCULAR REEDUCATION: CPT

## 2024-07-01 PROCEDURE — 97110 THERAPEUTIC EXERCISES: CPT

## 2024-07-01 NOTE — PROGRESS NOTES
"Daily Note     Today's date: 2024  Patient name: Teena Staples  : 1965  MRN: [id number]  Referring provider: Marlee Mccann MD  Dx:   Encounter Diagnosis     ICD-10-CM    1. Status post total hip replacement, right  Z96.641       2. Primary osteoarthritis of right hip  M16.11           Start Time: 1145  Stop Time: 1230  Total time in clinic (min): 45 minutes    Subjective: Patient reports to physical therapy today stating she walked for a half mile the other day and only had to take one rest break.         Objective: See treatment diary below      Assessment: Tolerated treatment well. Patient demonstrated fatigue post treatment, exhibited good technique with therapeutic exercises, and would benefit from continued PT. Continues to provide good effort during performance of exercises. Added step downs and SLS this visit to which patient noted some challenge, but responded well. Will continue to assess patient tolerance and progress next visit, as able.         Plan: Continue per plan of care.      Precautions: Hx HTN; S/p R ADAN anterior approach 24 (Anterior hip precautions- No extension, ER, > 90 deg flex)    POC expires Unit limit Auth Expiration date PT/OT + Visit Limit?   7/15 N/A 24 P- 50 visits/BOMN; S- BOMN                 Visit/Unit Tracking  AUTH Status:  Date 6/24 6/26 6/28 7/1 6/3 6/5 6/10 6/12 6/14 6/17 6/19 6/21   50 visits Used 13 14 15 16 5 6 7 8 9 10 11 12    Remaining  37 36 35 34 45 44 43 42 41 40 39 38     FOTO:       Manuals    R hip flexion/abduction PROM TB KT TB MM TB TB   KT KT   Gentle hip flexor quad stretch to neutral in SL TB KT TB MM TB TB    KT                             Neuro Re-Ed             Pt education regarding HEP, POC, and dx 3' 3' 3' 2' 2' 2'    3'   TKE 5\" 2x10 purple 5\" 2x10 grn 5\" 2x10 grn 5\" 2x10 grn 3\" 3x10 grn 3\" 3x10 grn   5\" 2x10 purple 5\" 2x10 purple   LAQ's  nv 2x10 2# 2x10 3\" 2# 2x10 3\" 2# 2x10 3\" " "2# 3x10 3\" 3#   2x10 2x10   Quad sets          3x10 3\"    SLS      3x 30' R LE        SAQ's 3x10 3\" 2x10 3\" 2# 2x10 3\" 2# 2x10 3\" 2# 2x10 3\" 2# HEP   3x10 3\" 3x10 3\"   TB BKFO Grn 3x10 ea Blue 3x10 Blue 3x10 Blue 3x10 Blue 3x10 Blue 3x10   Grn 3x10 ea Grn 3x10 ea   HR 3x10 3x10 3x10 3x10 3x10 3x10   3x10 3x10   Standing hamstring curls 3x10 3x10 3x10 3x10 3x10 3x10    3x10   Standing hip abduction 3x10 R LE  3x10 rainer 3x10 rainer 3x10 rainer 3x10 rainer 3x10 rainer   3x10 R  LE 3x10 R  LE   Ther Ex             Bridges  2x10 X15  x20 x20 x20       Standing marches 3x10 3x10 3x10 3x10 3x10 3x10   3x10 3x10                             Heel slides          3x10 AROM     SLR 1x10 AROM, 2x10 AA-ROM 2x10 AROM, 1x10 AAROM 3x10 AROM  3x10 AROM  3x10 AROM  3x10 AROM    2x10 AA-ROM 2x10 AA-ROM   Minisquats 3x10 3x10 3x10 3x10 3x10 3x10    2x10 2x10   Nustep for LE endurance Seat 9 8.5' Seat 10 L 1-2-1 Seat 10 L 1-2-1 Seat 10 L 1-2-1 Seat 10 L 1-2-1 Seat 10 L 1-2-1   Seat 9 arms 10 5' Seat 9 7'   Ther Activity             STS c airex 3x10 3x10 3x10 3x10 3x10 3x10   3x10  3x10   Step ups 6\" 3x10        4\" 2x10 6\" 3x10   Step downs       2x10 R LE 2\"        Laps s cane 1 lap throughout session T/o session T/o session  2 laps w/o cane  HEP    1 lap   Sabra step overs 4 hurdles x  4  4 hurdles 4x 4 hurdles 4x 4 hurdles 4x 4 hurdles 4x 4 hurdles 4x       Gait Training                                       Modalities                                                                      "

## 2024-07-03 ENCOUNTER — OFFICE VISIT (OUTPATIENT)
Dept: PHYSICAL THERAPY | Facility: CLINIC | Age: 59
End: 2024-07-03
Payer: COMMERCIAL

## 2024-07-03 DIAGNOSIS — M25.551 PAIN IN RIGHT HIP: ICD-10-CM

## 2024-07-03 DIAGNOSIS — M16.11 PRIMARY OSTEOARTHRITIS OF RIGHT HIP: ICD-10-CM

## 2024-07-03 DIAGNOSIS — Z96.641 STATUS POST TOTAL HIP REPLACEMENT, RIGHT: Primary | ICD-10-CM

## 2024-07-03 PROCEDURE — 97112 NEUROMUSCULAR REEDUCATION: CPT

## 2024-07-03 PROCEDURE — 97110 THERAPEUTIC EXERCISES: CPT

## 2024-07-03 PROCEDURE — 97140 MANUAL THERAPY 1/> REGIONS: CPT

## 2024-07-03 NOTE — PROGRESS NOTES
"Daily Note     Today's date: 7/3/2024  Patient name: Teena Staples  : 1965  MRN: [id number]  Referring provider: Marlee Mccann MD  Dx:   Encounter Diagnosis     ICD-10-CM    1. Status post total hip replacement, right  Z96.641       2. Primary osteoarthritis of right hip  M16.11       3. Pain in right hip  M25.551                      Subjective: Patient states she uses ice in the morning and when she gets better it is a little better. She does not feel like she has stamina.       Objective: See treatment diary below      Assessment: Tolerated treatment well. Progressed to reciprocal steps over hurdles, but was unable to clear higher hurdles. Patient demonstrated fatigue post treatment and would benefit from continued PT      Plan: Progress treatment as tolerated.       Precautions: Hx HTN; S/p R ADAN anterior approach 24 (Anterior hip precautions- No extension, ER, > 90 deg flex)    POC expires Unit limit Auth Expiration date PT/OT + Visit Limit?   7/15 N/A 24 P- 50 visits/BOMN; S- BOMN                 Visit/Unit Tracking  AUTH Status:  Date 6/24 6/26 6/28 7/1 7/3  6/10 6/12 6/14 6/17 6/19 6/21   50 visits Used 13 14 15 16 17  7 8 9 10 11 12    Remaining  37 36 35 34 33  43 42 41 40 39 38     FOTO:       Manuals 6/19 6/21 6/24 6/26 6/28 7/1 7/3  6/14 6/17   R hip flexion/abduction PROM TB KT TB MM TB TB LIBBY  KT KT   Gentle hip flexor quad stretch to neutral in SL TB KT TB MM TB TB LIBBY   KT                             Neuro Re-Ed             Pt education regarding HEP, POC, and dx 3' 3' 3' 2' 2' 2' 2'    3'   TKE 5\" 2x10 purple 5\" 2x10 grn 5\" 2x10 grn 5\" 2x10 grn 3\" 3x10 grn 3\" 3x10 grn 3\" 3x10 grn   5\" 2x10 purple 5\" 2x10 purple   LAQ's  nv 2x10 2# 2x10 3\" 2# 2x10 3\" 2# 2x10 3\" 2# 3x10 3\" 3# 3x10 3\" 3#  2x10 2x10   Quad sets          3x10 3\"    SLS      3x 30' R LE  3x30\"       SAQ's 3x10 3\" 2x10 3\" 2# 2x10 3\" 2# 2x10 3\" 2# 2x10 3\" 2# HEP   3x10 3\" 3x10 3\"   TB BKFO Grn 3x10 ea Blue 3x10 " "Blue 3x10 Blue 3x10 Blue 3x10 Blue 3x10 Blue 3x10   Grn 3x10 ea Grn 3x10 ea   HR 3x10 3x10 3x10 3x10 3x10 3x10 3x10  3x10 3x10   Standing hamstring curls 3x10 3x10 3x10 3x10 3x10 3x10 3x10    3x10   Standing hip abduction 3x10 R LE  3x10 rainer 3x10 rainer 3x10 rainer 3x10 rainer 3x10 rainer 3x10 rainer  3x10 R  LE 3x10 R  LE   Ther Ex             Bridges  2x10 X15  x20 x20 x20 x20      Standing marches 3x10 3x10 3x10 3x10 3x10 3x10 3x10  3x10 3x10                             Heel slides          3x10 AROM     SLR 1x10 AROM, 2x10 AA-ROM 2x10 AROM, 1x10 AAROM 3x10 AROM  3x10 AROM  3x10 AROM  3x10 AROM  3x10 AROM   2x10 AA-ROM 2x10 AA-ROM   Minisquats 3x10 3x10 3x10 3x10 3x10 3x10  3x10   2x10 2x10   Nustep for LE endurance Seat 9 8.5' Seat 10 L 1-2-1 Seat 10 L 1-2-1 Seat 10 L 1-2-1 Seat 10 L 1-2-1 Seat 10 L 1-2-1 Seat 10 L 1-2-1  Seat 9 arms 10 5' Seat 9 7'   Ther Activity             STS c airex 3x10 3x10 3x10 3x10 3x10 3x10 3x10   3x10  3x10   Step ups 6\" 3x10        4\" 2x10 6\" 3x10   Step downs       2x10 R LE 2\"  2x10 R LE 2\"       Laps s cane 1 lap throughout session T/o session T/o session  2 laps w/o cane  HEP    1 lap   Sabra step overs 4 hurdles x  4  4 hurdles 4x 4 hurdles 4x 4 hurdles 4x 4 hurdles 4x 4 hurdles 4x 4 hurdles   1x high  4x low      Gait Training                                       Modalities                                                                        "

## 2024-07-05 ENCOUNTER — APPOINTMENT (OUTPATIENT)
Dept: PHYSICAL THERAPY | Facility: CLINIC | Age: 59
End: 2024-07-05
Payer: COMMERCIAL

## 2024-07-08 ENCOUNTER — APPOINTMENT (OUTPATIENT)
Dept: PHYSICAL THERAPY | Facility: CLINIC | Age: 59
End: 2024-07-08
Payer: COMMERCIAL

## 2024-07-10 ENCOUNTER — OFFICE VISIT (OUTPATIENT)
Dept: PHYSICAL THERAPY | Facility: CLINIC | Age: 59
End: 2024-07-10
Payer: COMMERCIAL

## 2024-07-10 DIAGNOSIS — M25.551 PAIN IN RIGHT HIP: ICD-10-CM

## 2024-07-10 DIAGNOSIS — Z96.641 STATUS POST TOTAL HIP REPLACEMENT, RIGHT: Primary | ICD-10-CM

## 2024-07-10 DIAGNOSIS — M16.11 PRIMARY OSTEOARTHRITIS OF RIGHT HIP: ICD-10-CM

## 2024-07-10 DIAGNOSIS — Z01.818 PREOPERATIVE TESTING: ICD-10-CM

## 2024-07-10 PROCEDURE — 97110 THERAPEUTIC EXERCISES: CPT

## 2024-07-10 PROCEDURE — 97140 MANUAL THERAPY 1/> REGIONS: CPT

## 2024-07-10 PROCEDURE — 97112 NEUROMUSCULAR REEDUCATION: CPT

## 2024-07-10 NOTE — PROGRESS NOTES
"Daily Note     Today's date: 7/10/2024  Patient name: Teena Staples  : 1965  MRN: [id number]  Referring provider: Marlee Mccann MD  Dx:   Encounter Diagnosis     ICD-10-CM    1. Status post total hip replacement, right  Z96.641       2. Primary osteoarthritis of right hip  M16.11       3. Pain in right hip  M25.551       4. Preoperative testing  Z01.818           Start Time: 1100  Stop Time: 1145  Total time in clinic (min): 45 minutes    Subjective: Pt reports she had no pain or soreness after       Objective: See treatment diary below      Assessment: Tolerated treatment well. Pt demonstrates good effort throughout session. Minimal curing given to facilitate proper exercise performance and technique. Pt reprots knee pain following LAQs with increased weight lv so this was regressed to 2#. Good tolerance for all other exercises performed as outlined below. SLS continues to be challenging.  Patient demonstrated fatigue post treatment, exhibited good technique with therapeutic exercises, and would benefit from continued PT      Plan: Continue per plan of care.      Precautions: Hx HTN; S/p R ADAN anterior approach 24 (Anterior hip precautions- No extension, ER, > 90 deg flex)    POC expires Unit limit Auth Expiration date PT/OT + Visit Limit?   7/15 N/A 24 P- 50 visits/BOMN; S- BOMN                 Visit/Unit Tracking  AUTH Status:  Date 6/24 6/26 6/28 7/1 7/3  6/10 6/12 6/14 6/17 6/19 6/21   50 visits Used 13 14 15 16 17  7 8 9 10 11 12    Remaining  37 36 35 34 33  43 42 41 40 39 38     FOTO:       Manuals 6/19 6/21 6/24 6/26 6/28 7/1 7/3 7/20  6/17   R hip flexion/abduction PROM TB KT TB MM TB TB LIBBY KT  KT   Gentle hip flexor quad stretch to neutral in SL TB KT TB MM TB TB LIBBY KT  KT                             Neuro Re-Ed             Pt education regarding HEP, POC, and dx 3' 3' 3' 2' 2' 2' 2'  2'  3'   TKE 5\" 2x10 purple 5\" 2x10 grn 5\" 2x10 grn 5\" 2x10 grn 3\" 3x10 grn 3\" 3x10 grn 3\" " "3x10 grn  3\" 2x10 blue   5\" 2x10 purple   LAQ's  nv 2x10 2# 2x10 3\" 2# 2x10 3\" 2# 2x10 3\" 2# 3x10 3\" 3# 3x10 3\" 3# 3x10 2# 3\"  2x10   Quad sets              SLS      3x 30' R LE  3x30\"  3x30\"     SAQ's 3x10 3\" 2x10 3\" 2# 2x10 3\" 2# 2x10 3\" 2# 2x10 3\" 2# HEP    3x10 3\"   TB BKFO Grn 3x10 ea Blue 3x10 Blue 3x10 Blue 3x10 Blue 3x10 Blue 3x10 Blue 3x10  Blue 3x10  Grn 3x10 ea   HR 3x10 3x10 3x10 3x10 3x10 3x10 3x10 3x10  3x10   Standing hamstring curls 3x10 3x10 3x10 3x10 3x10 3x10 3x10  3x10  3x10   Standing hip abduction 3x10 R LE  3x10 rainer 3x10 rainer 3x10 rainer 3x10 rainer 3x10 rainer 3x10 rainer 3x10 rainer  3x10 R  LE   Ther Ex             Bridges  2x10 X15  x20 x20 x20 x20 x20     Standing marches 3x10 3x10 3x10 3x10 3x10 3x10 3x10 3x10  3x10                             Heel slides              SLR 1x10 AROM, 2x10 AA-ROM 2x10 AROM, 1x10 AAROM 3x10 AROM  3x10 AROM  3x10 AROM  3x10 AROM  3x10 AROM  3x10   2x10 AA-ROM   Minisquats 3x10 3x10 3x10 3x10 3x10 3x10  3x10  3x10  2x10   Nustep for LE endurance Seat 9 8.5' Seat 10 L 1-2-1 Seat 10 L 1-2-1 Seat 10 L 1-2-1 Seat 10 L 1-2-1 Seat 10 L 1-2-1 Seat 10 L 1-2-1 Seat 10 L 1-2-1 9'  Seat 9 7'   Ther Activity             STS c airex 3x10 3x10 3x10 3x10 3x10 3x10 3x10  3x10  3x10   Step ups 6\" 3x10         6\" 3x10   Step downs       2x10 R LE 2\"  2x10 R LE 2\"  2x10 R 2\"     Laps s cane 1 lap throughout session T/o session T/o session  2 laps w/o cane  HEP    1 lap   Sabra step overs 4 hurdles x  4  4 hurdles 4x 4 hurdles 4x 4 hurdles 4x 4 hurdles 4x 4 hurdles 4x 4 hurdles   1x high  4x low 4 high hurdles x3     Gait Training                                       Modalities                                                                          "

## 2024-07-11 DIAGNOSIS — M16.11 PRIMARY OSTEOARTHRITIS OF RIGHT HIP: ICD-10-CM

## 2024-07-11 RX ORDER — CELECOXIB 200 MG/1
200 CAPSULE ORAL 2 TIMES DAILY
Qty: 30 CAPSULE | Refills: 1 | Status: SHIPPED | OUTPATIENT
Start: 2024-07-11

## 2024-07-12 ENCOUNTER — OFFICE VISIT (OUTPATIENT)
Dept: PHYSICAL THERAPY | Facility: CLINIC | Age: 59
End: 2024-07-12
Payer: COMMERCIAL

## 2024-07-12 DIAGNOSIS — M25.551 PAIN IN RIGHT HIP: ICD-10-CM

## 2024-07-12 DIAGNOSIS — Z96.641 STATUS POST TOTAL HIP REPLACEMENT, RIGHT: Primary | ICD-10-CM

## 2024-07-12 DIAGNOSIS — M16.11 PRIMARY OSTEOARTHRITIS OF RIGHT HIP: ICD-10-CM

## 2024-07-12 PROCEDURE — 97112 NEUROMUSCULAR REEDUCATION: CPT

## 2024-07-12 PROCEDURE — 97110 THERAPEUTIC EXERCISES: CPT

## 2024-07-12 PROCEDURE — 97140 MANUAL THERAPY 1/> REGIONS: CPT

## 2024-07-12 NOTE — PROGRESS NOTES
"Daily Note     Today's date: 2024  Patient name: Teena Staples  : 1965  MRN: [id number]  Referring provider: Marlee Mccann MD  Dx:   Encounter Diagnosis     ICD-10-CM    1. Status post total hip replacement, right  Z96.641       2. Primary osteoarthritis of right hip  M16.11       3. Pain in right hip  M25.551                      Subjective: Pt reports no complaints regarding her hip today.       Objective: See treatment diary below      Assessment: Tolerated treatment well. Pt demonstrates good effort throughout session. Minimal curing given to facilitate proper exercise performance and technique. Patient demonstrated fatigue post treatment, exhibited good technique with therapeutic exercises, and would benefit from continued PT      Plan: Continue per plan of care.      Precautions: Hx HTN; S/p R ADAN anterior approach 24 (Anterior hip precautions- No extension, ER, > 90 deg flex)    POC expires Unit limit Auth Expiration date PT/OT + Visit Limit?   7/15 N/A 24 P- 50 visits/BOMN; S- BOMN                 Visit/Unit Tracking  AUTH Status:  Date 6/24 6/26 6/28 7/1 7/3 7/10 7/12  6/14 6/17 6/19 6/21   50 visits Used 13 14 15 16 17 18 19  9 10 11 12    Remaining  37 36 35 34 33 32 31  41 40 39 38     FOTO:       Manuals 6/19 6/21 6/24 6/26 6/28 7/1 7/3 7/10 7/12    R hip flexion/abduction PROM TB KT TB MM TB TB LIBBY KT KT    Gentle hip flexor quad stretch to neutral in SL TB KT TB MM TB TB LIBBY KT KT                              Neuro Re-Ed             Pt education regarding HEP, POC, and dx 3' 3' 3' 2' 2' 2' 2'  2' 2'    TKE 5\" 2x10 purple 5\" 2x10 grn 5\" 2x10 grn 5\" 2x10 grn 3\" 3x10 grn 3\" 3x10 grn 3\" 3x10 grn  3\" 2x10 blue  3\" 2x10 blue     LAQ's  nv 2x10 2# 2x10 3\" 2# 2x10 3\" 2# 2x10 3\" 2# 3x10 3\" 3# 3x10 3\" 3# 3x10 2# 3\" 3x10 3# 3\"    Quad sets              SLS      3x 30' R LE  3x30\"  3x30\" 3x30\"    SAQ's 3x10 3\" 2x10 3\" 2# 2x10 3\" 2# 2x10 3\" 2# 2x10 3\" 2# HEP       TB BKFO Grn 3x10 " "ea Blue 3x10 Blue 3x10 Blue 3x10 Blue 3x10 Blue 3x10 Blue 3x10  Blk 3x10 Blk 3x10    HR 3x10 3x10 3x10 3x10 3x10 3x10 3x10 3x10 3x10    Standing hamstring curls 3x10 3x10 3x10 3x10 3x10 3x10 3x10  3x10 3x10    Standing hip abduction 3x10 R LE  3x10 rainer 3x10 rainer 3x10 rainer 3x10 rainer 3x10 rainer 3x10 ranier 3x10 rainer 3x10 rainer    Ther Ex             Bridges  2x10 X15  x20 x20 x20 x20 x20 x20    Standing marches 3x10 3x10 3x10 3x10 3x10 3x10 3x10 3x10 3x10    Sidelying hip abd                          Heel slides              SLR 1x10 AROM, 2x10 AA-ROM 2x10 AROM, 1x10 AAROM 3x10 AROM  3x10 AROM  3x10 AROM  3x10 AROM  3x10 AROM  3x10  3x10    Minisquats 3x10 3x10 3x10 3x10 3x10 3x10  3x10  3x10 3x10    Nustep for LE endurance Seat 9 8.5' Seat 10 L 1-2-1 Seat 10 L 1-2-1 Seat 10 L 1-2-1 Seat 10 L 1-2-1 Seat 10 L 1-2-1 Seat 10 L 1-2-1 Seat 10 L 1-2-1 9' Seat 10 L 1-2-1 9'    Ther Activity             STS c airex 3x10 3x10 3x10 3x10 3x10 3x10 3x10  3x10 3x10    Step ups 6\" 3x10            Step downs       2x10 R LE 2\"  2x10 R LE 2\"  2x10 R 2\" 2x10 R 2\"    Laps s cane 1 lap throughout session T/o session T/o session  2 laps w/o cane  HEP       Sabra step overs 4 hurdles x  4  4 hurdles 4x 4 hurdles 4x 4 hurdles 4x 4 hurdles 4x 4 hurdles 4x 4 hurdles   1x high  4x low 4 high hurdles x3 4 high hurdles x3    Gait Training                                       Modalities                                                                            "

## 2024-07-14 NOTE — PROGRESS NOTES
PT Re-Evaluation     Today's date: 7/15/2024  Patient name: Teena Staples  : 1965  MRN: [id number]  Referring provider: Marlee Mccann MD  Dx:   Encounter Diagnosis     ICD-10-CM    1. Status post total hip replacement, right  Z96.641       2. Primary osteoarthritis of right hip  M16.11               Start Time: 1050  Stop Time: 1145  Total time in clinic (min): 55 minutes    Assessment  Impairments: abnormal gait, abnormal or restricted ROM, activity intolerance, impaired balance, impaired physical strength and pain with function    Assessment details: Patient is a 59 y/o female reporting to physical therapy for re-evaluation 7 weeks 5 days s/p R ADAN, anterior approach. Upon re-examination, patient demonstrates improvements in her strength and ROM of her R hip. Despite these improvements, patient continues to display impairments of increased pain and decreased strength in her R hip which are resulting in functional limitations of her performance of ADL's/self-care and household activities. PT plan of care will continue to focus on pain reduction and strengthening while maintaining anterior hip precautions, to improve her impairments to be able to return to her prior level of function. Patient continues to be a good candidate for and would benefit from skilled physical therapy to improve above listed impairments to facilitate a return to her prior level of function.     Understanding of Dx/Px/POC: good     Prognosis: good    Goals  ST weeks  1. Patient's pain level at worst will decrease by at least 50% for improved performance of daily activities. MET  2. Patient will become independent with her HEP. MET    LT weeks  1. Patient's gross R hip strength will improve to at least 4+/5 for increased tolerance to ambulation and daily activities. ONGOING  2. Patient's FOTO score will improve from a 12 upon initial evaluation to a 44 or better indicating improvements in her performance of functional  activities. ONGOING    Plan  Patient would benefit from: skilled physical therapy  Planned modality interventions: cryotherapy, thermotherapy: hydrocollator packs and unattended electrical stimulation    Planned therapy interventions: IASTM, joint mobilization, kinesiology taping, manual therapy, abdominal trunk stabilization, balance/weight bearing training, nerve gliding, neuromuscular re-education, postural training, strengthening, stretching, therapeutic activities, therapeutic exercise, home exercise program, gait training, functional ROM exercises and flexibility    Frequency: 2-3x week  Duration in weeks: 8  Plan of Care beginning date: 7/15/2024  Plan of Care expiration date: 2024  Treatment plan discussed with: patient        Subjective Evaluation    History of Present Illness  Mechanism of injury: Patient is a 57 y/o female presenting to outpatient physical therapy today 7 weeks 5 days s/p R ADAN by Dr. Mccann. Patient notes her pain has drastically improved since beginning therapy. She notes improvements with stair negotiation and is now ambulating without an AD. She has been averaging about 2.5-3 miles per day with walking. She notes continued difficulties with standing for prolonged periods.     Quality of life: good    Patient Goals  Patient goals for therapy: decreased pain, increased motion, return to sport/leisure activities, independence with ADLs/IADLs and increased strength    Pain  Current pain ratin  At best pain ratin  At worst pain rating: 3  Quality: dull ache, tight and discomfort    Social Support  Steps to enter house: yes  5    Treatments  Previous treatment: injection treatment        Objective     Palpation     Right   Tenderness of the TFL.     Active Range of Motion   Left Hip   Flexion: 90 degrees   Abduction: 20 degrees     Right Hip   Flexion: 90 degrees   Abduction: 18 degrees     Strength/Myotome Testing     Left Hip   Planes of Motion   Flexion: 5  Abduction:  "5  Adduction: 5    Right Hip   Planes of Motion   Flexion: 4+  Abduction: 4  Adduction: 4+    Ambulation   Weight-Bearing Status   Weight-Bearing Status (Right): full weight-bearing    Assistive device used: none    Observational Gait   Gait: antalgic   Decreased walking speed and stride length.              Precautions: Hx HTN; S/p R ADAN anterior approach 5/22/24 (Anterior hip precautions- No extension, ER, > 90 deg flex)    POC expires Unit limit Auth Expiration date PT/OT + Visit Limit?   9/9 N/A 12/31/24 P- 50 visits/BOMN; S- BOMN                 Visit/Unit Tracking  AUTH Status:  Date 6/24 6/26 6/28 7/1 7/3 7/10 7/12 7/15 6/14 6/17 6/19 6/21   50 visits Used 13 14 15 16 17 18 19 20 9 10 11 12    Remaining  37 36 35 34 33 32 31 30 41 40 39 38     FOTO: 6/19      Manuals 6/19 6/21 6/24 6/26 6/28 7/1 7/3 7/10 7/12 7/15   R hip flexion/abduction PROM TB KT TB MM TB TB LIBBY KT KT TB   Gentle hip flexor quad stretch to neutral in SL TB KT TB MM TB TB LIBBY KT KT TB                             Neuro Re-Ed             Pt education regarding HEP, POC, and dx 3' 3' 3' 2' 2' 2' 2'  2' 2' 2'   TKE 5\" 2x10 purple 5\" 2x10 grn 5\" 2x10 grn 5\" 2x10 grn 3\" 3x10 grn 3\" 3x10 grn 3\" 3x10 grn  3\" 2x10 blue  3\" 2x10 blue  3\" 2x10 blue   LAQ's  nv 2x10 2# 2x10 3\" 2# 2x10 3\" 2# 2x10 3\" 2# 3x10 3\" 3# 3x10 3\" 3# 3x10 2# 3\" 3x10 3# 3\" 3x10 3# 3\"   Quad sets              SLS      3x 30' R LE  3x30\"  3x30\" 3x30\" 3x30\"   SAQ's 3x10 3\" 2x10 3\" 2# 2x10 3\" 2# 2x10 3\" 2# 2x10 3\" 2# HEP       TB BKFO Grn 3x10 ea Blue 3x10 Blue 3x10 Blue 3x10 Blue 3x10 Blue 3x10 Blue 3x10  Blk 3x10 Blk 3x10 Blk 3x10   HR 3x10 3x10 3x10 3x10 3x10 3x10 3x10 3x10 3x10 3x10   Standing hamstring curls 3x10 3x10 3x10 3x10 3x10 3x10 3x10  3x10 3x10 3x10 2#    Standing hip abduction 3x10 R LE  3x10 rainer 3x10 rainer 3x10 rainer 3x10 rainer 3x10 rainer 3x10 rainer 3x10 rainer 3x10 rainer 3x10 rainer 3# R LE   Ther Ex             Bridges  2x10 X15  x20 x20 x20 x20 x20 x20 x30   Standing marches 3x10 3x10 " "3x10 3x10 3x10 3x10 3x10 3x10 3x10 3x10 1#   Sidelying hip abd         2x10 2x10                Heel slides              SLR 1x10 AROM, 2x10 AA-ROM 2x10 AROM, 1x10 AAROM 3x10 AROM  3x10 AROM  3x10 AROM  3x10 AROM  3x10 AROM  3x10  3x10 3x10   Minisquats 3x10 3x10 3x10 3x10 3x10 3x10  3x10  3x10 3x10 3x10   Nustep for LE endurance Seat 9 8.5' Seat 10 L 1-2-1 Seat 10 L 1-2-1 Seat 10 L 1-2-1 Seat 10 L 1-2-1 Seat 10 L 1-2-1 Seat 10 L 1-2-1 Seat 10 L 1-2-1 9' Seat 10 L 1-2-1 9' Seat 10 L 1-2-1 9'   Ther Activity             STS c airex 3x10 3x10 3x10 3x10 3x10 3x10 3x10  3x10 3x10 3x10   Step ups 6\" 3x10            Step downs       2x10 R LE 2\"  2x10 R LE 2\"  2x10 R 2\" 2x10 R 2\" 2x10 R 2\"   Laps s cane 1 lap throughout session T/o session T/o session  2 laps w/o cane  HEP       Sabra step overs 4 hurdles x  4  4 hurdles 4x 4 hurdles 4x 4 hurdles 4x 4 hurdles 4x 4 hurdles 4x 4 hurdles   1x high  4x low 4 high hurdles x3 4 high hurdles x3 4 high hurdles x3   Gait Training                                       Modalities                                             "

## 2024-07-15 ENCOUNTER — EVALUATION (OUTPATIENT)
Dept: PHYSICAL THERAPY | Facility: CLINIC | Age: 59
End: 2024-07-15
Payer: COMMERCIAL

## 2024-07-15 DIAGNOSIS — Z96.641 STATUS POST TOTAL HIP REPLACEMENT, RIGHT: Primary | ICD-10-CM

## 2024-07-15 DIAGNOSIS — M16.11 PRIMARY OSTEOARTHRITIS OF RIGHT HIP: ICD-10-CM

## 2024-07-15 PROCEDURE — 97112 NEUROMUSCULAR REEDUCATION: CPT

## 2024-07-15 PROCEDURE — 97530 THERAPEUTIC ACTIVITIES: CPT

## 2024-07-15 PROCEDURE — 97140 MANUAL THERAPY 1/> REGIONS: CPT

## 2024-07-15 PROCEDURE — 97110 THERAPEUTIC EXERCISES: CPT

## 2024-07-17 ENCOUNTER — OFFICE VISIT (OUTPATIENT)
Dept: PHYSICAL THERAPY | Facility: CLINIC | Age: 59
End: 2024-07-17
Payer: COMMERCIAL

## 2024-07-17 DIAGNOSIS — M16.11 PRIMARY OSTEOARTHRITIS OF RIGHT HIP: ICD-10-CM

## 2024-07-17 DIAGNOSIS — M25.551 PAIN IN RIGHT HIP: ICD-10-CM

## 2024-07-17 DIAGNOSIS — Z96.641 STATUS POST TOTAL HIP REPLACEMENT, RIGHT: Primary | ICD-10-CM

## 2024-07-17 PROCEDURE — 97530 THERAPEUTIC ACTIVITIES: CPT

## 2024-07-17 PROCEDURE — 97110 THERAPEUTIC EXERCISES: CPT

## 2024-07-17 PROCEDURE — 97112 NEUROMUSCULAR REEDUCATION: CPT

## 2024-07-17 NOTE — PROGRESS NOTES
"Daily Note     Today's date: 2024  Patient name: Teena Staples  : 1965  MRN: [id number]  Referring provider: Marlee Mccann MD  Dx:   Encounter Diagnosis     ICD-10-CM    1. Status post total hip replacement, right  Z96.641       2. Primary osteoarthritis of right hip  M16.11       3. Pain in right hip  M25.551           Start Time: 1100  Stop Time: 1145  Total time in clinic (min): 45 minutes    Subjective: Pt reports no complaints today.       Objective: See treatment diary below      Assessment: Tolerated treatment well. Pt demonstrates good effort throughout session. Minimal curing given to facilitate proper exercise performance and technique. She tolerates progressions with hip flexion over manish and addition of leg press this visit. Patient demonstrated fatigue post treatment, exhibited good technique with therapeutic exercises, and would benefit from continued PT      Plan: Continue per plan of care.      Precautions: Hx HTN; S/p R ADAN anterior approach 24 (Anterior hip precautions- No extension, ER, > 90 deg flex)    POC expires Unit limit Auth Expiration date PT/OT + Visit Limit?    N/A 24 P- 50 visits/BOMN; S- BOMN                 Visit/Unit Tracking  AUTH Status:  Date 6/24 6/26 6/28 7/1 7/3 7/10 7/12 7/15 7/17   6/21   50 visits Used 13 14 15 16 17 18 19 20 21   12    Remaining  37 36 35 34 33 32 31 30 29   38     FOTO:       Manuals 7/17  6/24 6/26 6/28 7/1 7/3 7/10 7/12 7/15   R hip flexion/abduction PROM NP - as needed  TB MM TB TB LIBBY KT KT TB   Gentle hip flexor quad stretch to neutral in SL NP - as needed  TB MM TB TB LIBBY KT KT TB                             Neuro Re-Ed             Pt education regarding HEP, POC, and dx 2'  3' 2' 2' 2' 2'  2' 2' 2'   TKE 3\" 2x10 blue  5\" 2x10 grn 5\" 2x10 grn 3\" 3x10 grn 3\" 3x10 grn 3\" 3x10 grn  3\" 2x10 blue  3\" 2x10 blue  3\" 2x10 blue   LAQ's  3x10 3# 3\"  2x10 3\" 2# 2x10 3\" 2# 2x10 3\" 2# 3x10 3\" 3# 3x10 3\" 3# 3x10 2# 3\" 3x10 3# " "3\" 3x10 3# 3\"   Quad sets              SLS 3x30\"     3x 30' R LE  3x30\"  3x30\" 3x30\" 3x30\"   TB BKFO Blk 3x10  Blue 3x10 Blue 3x10 Blue 3x10 Blue 3x10 Blue 3x10  Blk 3x10 Blk 3x10 Blk 3x10   HR 3x10  3x10 3x10 3x10 3x10 3x10 3x10 3x10 3x10   Standing hamstring curls 3x10 3#  3x10 3x10 3x10 3x10 3x10  3x10 3x10 3x10 2#    Standing hip abduction 3x10 3# rainer  3x10 rainer 3x10 rainer 3x10 rainer 3x10 rainer 3x10 rainer 3x10 rainer 3x10 rainer 3x10 rainer 3# R LE   Ther Ex             Bridges   X15  x20 x20 x20 x20 x20 x20 x30   Standing marches 3x10 1#  3x10 3x10 3x10 3x10 3x10 3x10 3x10 3x10 1#   Sidelying hip abd 2x10        2x10 2x10   SL Leg press 25# 2x10 seat 8            SLR 3x10  3x10 AROM  3x10 AROM  3x10 AROM  3x10 AROM  3x10 AROM  3x10  3x10 3x10   Minisquats 3x10  3x10 3x10 3x10 3x10  3x10  3x10 3x10 3x10   Nustep for LE endurance Seat 10 L 1-2-3-1 9'  Seat 10 L 1-2-1 Seat 10 L 1-2-1 Seat 10 L 1-2-1 Seat 10 L 1-2-1 Seat 10 L 1-2-1 Seat 10 L 1-2-1 9' Seat 10 L 1-2-1 9' Seat 10 L 1-2-1 9'   Ther Activity             STS 2x10  3x10 3x10 3x10 3x10 3x10  3x10 3x10 3x10   Step ups             Step downs  2x10 R 2\"     2x10 R LE 2\"  2x10 R LE 2\"  2x10 R 2\" 2x10 R 2\" 2x10 R 2\"   Single manish step over 10x high            Manish step overs   4 hurdles 4x 4 hurdles 4x 4 hurdles 4x 4 hurdles 4x 4 hurdles   1x high  4x low 4 high hurdles x3 4 high hurdles x3 4 high hurdles x3   Gait Training                                       Modalities                                                  "

## 2024-07-18 ENCOUNTER — OFFICE VISIT (OUTPATIENT)
Dept: INTERNAL MEDICINE CLINIC | Facility: CLINIC | Age: 59
End: 2024-07-18
Payer: COMMERCIAL

## 2024-07-18 VITALS
TEMPERATURE: 100 F | WEIGHT: 256 LBS | HEART RATE: 88 BPM | OXYGEN SATURATION: 97 % | SYSTOLIC BLOOD PRESSURE: 124 MMHG | DIASTOLIC BLOOD PRESSURE: 78 MMHG | HEIGHT: 68 IN | RESPIRATION RATE: 17 BRPM | BODY MASS INDEX: 38.8 KG/M2

## 2024-07-18 DIAGNOSIS — J02.9 SORE THROAT: Primary | ICD-10-CM

## 2024-07-18 LAB — S PYO AG THROAT QL: NEGATIVE

## 2024-07-18 PROCEDURE — 99213 OFFICE O/P EST LOW 20 MIN: CPT | Performed by: INTERNAL MEDICINE

## 2024-07-18 PROCEDURE — 87880 STREP A ASSAY W/OPTIC: CPT | Performed by: INTERNAL MEDICINE

## 2024-07-18 NOTE — PROGRESS NOTES
Ambulatory Visit  Name: Teena Staples      : 1965      MRN: [id number]  Encounter Provider: Juan Grover MD  Encounter Date: 2024   Encounter department: [facility] INTERNAL MEDICINE [place]    Assessment & Plan     Symptoms are r/t post nasal drip. Does not appear to be bacterial in nature. Continue with OTC management. Call me if symptoms worsen or change.    1. Sore throat  -     POCT rapid ANTIGEN strepA      Depression Screening and Follow-up Plan: Clincally patient does not have depression. No treatment is required.       History of Present Illness     Here with sick complaints.    Review of Systems   Constitutional:  Negative for chills and fever.   HENT:  Positive for sore throat. Negative for ear pain.    Eyes:  Negative for pain and visual disturbance.   Respiratory:  Positive for cough. Negative for shortness of breath.    Cardiovascular:  Negative for chest pain and palpitations.   Gastrointestinal:  Negative for abdominal pain and vomiting.   Genitourinary:  Negative for dysuria and hematuria.   Musculoskeletal:  Negative for arthralgias and back pain.   Skin:  Negative for color change and rash.   Neurological:  Negative for seizures and syncope.   All other systems reviewed and are negative.    Past Medical History:   Diagnosis Date   • Allergic    • Allergic rhinitis     last assessed 14    • Arthritis    • Asthma    • Bleeding per rectum     Last assessed 16    • Colon polyp    • Diverticulitis of colon    • GERD (gastroesophageal reflux disease)    • Hypercholesterolemia    • Hypertension     last assessed 3/24/15    • Hyponatremia     last assessed 9/17/15    • Obesity      Past Surgical History:   Procedure Laterality Date   •  SECTION     • COLONOSCOPY     • FL INJECTION RIGHT SHOULDER (ARTHROGRAM)  3/18/2021   • ID ARTHRP ACETBLR/PROX FEM PROSTC AGRFT/ALGRFT Right 2024    Procedure: RIGHT ARTHROPLASTY HIP TOTAL ANTERIOR;  Surgeon: Marlee  MD Siobhan;  Location:  MAIN OR;  Service: Orthopedics   • WISDOM TOOTH EXTRACTION     • WRIST SURGERY       Family History   Adopted: Yes   Problem Relation Age of Onset   • No Known Problems Mother    • Migraines Daughter      Social History     Tobacco Use   • Smoking status: Never   • Smokeless tobacco: Never   Vaping Use   • Vaping status: Never Used   Substance and Sexual Activity   • Alcohol use: Yes     Comment: rare /   • Drug use: No   • Sexual activity: Yes     Current Outpatient Medications on File Prior to Visit   Medication Sig   • ascorbic acid (VITAMIN C) 500 MG tablet Take 1 tablet (500 mg total) by mouth 2 (two) times a day   • aspirin (ECOTRIN LOW STRENGTH) 81 mg EC tablet Take 1 tablet (81 mg total) by mouth 2 (two) times a day Take 1(one) 81 mg tablet twice daily x 35 days after total joint arthroplasty   • celecoxib (CeleBREX) 200 mg capsule take 1 capsule by mouth twice a day   • Cholecalciferol (VITAMIN D) 2000 units CAPS Take by mouth   • cholestyramine (QUESTRAN) 4 GM/DOSE powder Take 1 packet (4 g total) by mouth 2 (two) times a day As needed for diarrhea   • Diclofenac Sodium (Voltaren) 1 % Apply 2 g topically 4 (four) times a day   • diphenhydrAMINE-acetaminophen (TYLENOL PM)  MG TABS Take 1 tablet by mouth daily at bedtime as needed for sleep   • Elderberry-Vitamin C-Zinc (ELDERBERRY IMMUNE HEALTH GUMMY PO) Take 2 gums by mouth daily   • ferrous sulfate 324 (65 Fe) mg Take 1 tablet (324 mg total) by mouth 2 (two) times a day before meals   • folic acid (FOLVITE) 1 mg tablet take 1 tablet by mouth once daily   • glucosamine-chondroitin 500-400 MG tablet Take 1 tablet by mouth 3 (three) times a day   • loratadine (CLARITIN) 10 mg tablet Take 10 mg by mouth daily   • mometasone (NASONEX) 50 mcg/act nasal spray 2 sprays into each nostril daily   • Multiple Vitamins-Minerals (multivitamin with minerals) tablet Take 1 tablet by mouth daily   • omeprazole (PriLOSEC) 10 mg delayed  release capsule Take 1 capsule (10 mg total) by mouth daily   • Probiotic Product (ALIGN) 4 MG CAPS Take by mouth   • Rhubarb (ESTROVEN COMPLETE PO) Estroven   • simvastatin (ZOCOR) 5 MG tablet Take 1 tablet (5 mg total) by mouth daily at bedtime   • spironolactone (ALDACTONE) 50 mg tablet Take 1 tablet (50 mg total) by mouth daily   • verapamil (Verelan) 180 MG 24 hr capsule Take 1 capsule (180 mg total) by mouth daily before breakfast   • vitamin E, tocopherol, 400 units capsule Take 1 capsule (400 Units total) by mouth daily   • docusate sodium (COLACE) 100 mg capsule Take 1 capsule (100 mg total) by mouth 2 (two) times a day (Patient not taking: Reported on 6/11/2024)   • methocarbamol (ROBAXIN) 500 mg tablet Take 1 tablet (500 mg total) by mouth 3 (three) times a day as needed for muscle spasms (Patient not taking: Reported on 7/18/2024)   • ondansetron (ZOFRAN) 4 mg tablet Take 1 tablet (4 mg total) by mouth every 8 (eight) hours as needed for nausea or vomiting (Patient not taking: Reported on 6/11/2024)     Allergies   Allergen Reactions   • Levofloxacin Hives and Tachycardia     Annotation - 15Jan2013: LIGHTHEADED   • Sulfamethoxazole-Trimethoprim Other (See Comments)     unknown   • Hydrocodone Bitartrate Er Other (See Comments)     Annotation - 15Jan2013: FLUSHING    CHEST/RIB PAIN     Immunization History   Administered Date(s) Administered   • COVID-19 MODERNA VACC 0.25 ML IM BOOSTER 12/01/2021   • COVID-19 MODERNA VACC 0.5 ML IM 03/25/2021, 04/22/2021, 12/01/2021, 11/07/2022   • COVID-19 Moderna Vac BIVALENT 12 Yr+ IM 0.5 ML 11/01/2022   • COVID-19 Moderna mRNA Vaccine 12 Yr+ 50 mcg/0.5 mL (Spikevax) 10/12/2023   • COVID-19 Pfizer Vac BIVALENT Oz-sucrose 12 Yr+ IM 09/10/2023   • DT (pediatric) 05/01/2002   • INFLUENZA 09/29/2015, 10/07/2022, 10/30/2023   • Influenza Quadrivalent 3 years and older 11/01/2020, 11/01/2021   • Influenza Quadrivalent, 6-35 Months IM 09/29/2015   • Influenza, recombinant,  "quadrivalent,injectable, preservative free 11/14/2019, 10/08/2020, 10/27/2021   • Influenza, seasonal, injectable 10/16/2013, 10/01/2014   • Pneumococcal Polysaccharide PPV23 09/29/2015   • Tdap 12/13/2013     Objective     /78 (BP Location: Left arm, Patient Position: Sitting, Cuff Size: Adult)   Pulse 88   Temp 100 °F (37.8 °C)   Resp 17   Ht 5' 8\" (1.727 m)   Wt 116 kg (256 lb)   SpO2 97%   BMI 38.92 kg/m²     Physical Exam  Vitals and nursing note reviewed.   Constitutional:       General: She is not in acute distress.     Appearance: She is well-developed. She is obese.   HENT:      Head: Normocephalic and atraumatic.      Mouth/Throat:      Mouth: Mucous membranes are moist.   Eyes:      Conjunctiva/sclera: Conjunctivae normal.   Cardiovascular:      Rate and Rhythm: Normal rate.      Heart sounds: No murmur heard.  Pulmonary:      Effort: Pulmonary effort is normal. No respiratory distress.      Breath sounds: Normal breath sounds.   Abdominal:      Palpations: Abdomen is soft.      Tenderness: There is no abdominal tenderness.   Musculoskeletal:         General: No swelling.   Skin:     General: Skin is warm and dry.   Neurological:      Mental Status: She is alert.   Psychiatric:         Mood and Affect: Mood normal.     Administrative Statements   I have spent a total time of 15 minutes in caring for this patient on the day of the visit/encounter including Risks and benefits of tx options, Importance of tx compliance, Counseling / Coordination of care, and Obtaining or reviewing history  .  "

## 2024-07-19 ENCOUNTER — OFFICE VISIT (OUTPATIENT)
Dept: PHYSICAL THERAPY | Facility: CLINIC | Age: 59
End: 2024-07-19
Payer: COMMERCIAL

## 2024-07-19 DIAGNOSIS — Z96.641 STATUS POST TOTAL HIP REPLACEMENT, RIGHT: Primary | ICD-10-CM

## 2024-07-19 DIAGNOSIS — M16.11 PRIMARY OSTEOARTHRITIS OF RIGHT HIP: ICD-10-CM

## 2024-07-19 PROCEDURE — 97530 THERAPEUTIC ACTIVITIES: CPT

## 2024-07-19 PROCEDURE — 97110 THERAPEUTIC EXERCISES: CPT

## 2024-07-19 PROCEDURE — 97112 NEUROMUSCULAR REEDUCATION: CPT

## 2024-07-19 NOTE — PROGRESS NOTES
"Daily Note     Today's date: 2024  Patient name: Teena Staples  : 1965  MRN: [id number]  Referring provider: Marlee Mccann MD  Dx:   Encounter Diagnosis     ICD-10-CM    1. Status post total hip replacement, right  Z96.641       2. Primary osteoarthritis of right hip  M16.11           Start Time: 1100  Stop Time: 1145  Total time in clinic (min): 45 minutes    Subjective: Patient reports to physical therapy today stating she walked for .96 mi yesterday and she is feeling a little stiff today. She notes she would like her endurance to improve.         Objective: See treatment diary below      Assessment: Tolerated treatment well. Patient demonstrated fatigue post treatment, exhibited good technique with therapeutic exercises, and would benefit from continued PT. Continues to provide good effort during performance of exercises. Progressed SL leg press to 45# this visit to which patient noted increased challenge, but responded well. Manual stretching performed this visit secondary to increased tightness. Patient noted feeling \"more loose\" following session. Will continue to assess patient tolerance and progress next visit, as able.         Plan: Continue per plan of care.      Precautions: Hx HTN; S/p R ADAN anterior approach 24 (Anterior hip precautions- No extension, ER, > 90 deg flex)    POC expires Unit limit Auth Expiration date PT/OT + Visit Limit?    N/A 24 P- 50 visits/BOMN; S- BOMN                 Visit/Unit Tracking  AUTH Status:  Date 6/24 6/26 6/28 7/1 7/3 7/10 7/12 7/15 7/17 7/19  6/21   50 visits Used 13 14 15 16 17 18 19 20 21 22  12    Remaining  37 36 35 34 33 32 31 30 29 28  38     FOTO:       Manuals 7/17 7/19    7/1 7/3 7/10 7/12 7/15   R hip flexion/abduction PROM NP - as needed TB    TB LIBBY KT KT TB   Gentle hip flexor quad stretch to neutral in SL NP - as needed TB    TB LIBBY KT KT TB                             Neuro Re-Ed             Pt education regarding HEP, " "POC, and dx 2' 2'    2' 2'  2' 2' 2'   TKE 3\" 2x10 blue 3\" 2x10 blue    3\" 3x10 grn 3\" 3x10 grn  3\" 2x10 blue  3\" 2x10 blue  3\" 2x10 blue   LAQ's  3x10 3# 3\" 3x10 3# 3\"    3x10 3\" 3# 3x10 3\" 3# 3x10 2# 3\" 3x10 3# 3\" 3x10 3# 3\"   Quad sets              SLS 3x30\" 3x30\"    3x 30' R LE  3x30\"  3x30\" 3x30\" 3x30\"   TB BKFO Blk 3x10 Blk 3x10    Blue 3x10 Blue 3x10  Blk 3x10 Blk 3x10 Blk 3x10   HR 3x10 3x10    3x10 3x10 3x10 3x10 3x10   Standing hamstring curls 3x10 3# 3x10 3#    3x10 3x10  3x10 3x10 3x10 2#    Standing hip abduction 3x10 3# rainer 3x10 3# rainer    3x10 rainer 3x10 rainer 3x10 rainer 3x10 rainer 3x10 rainer 3# R LE   Ther Ex             Bridges  X30     x20 x20 x20 x20 x30   Standing marches 3x10 1# 3x10 1#    3x10 3x10 3x10 3x10 3x10 1#   Sidelying hip abd 2x10 2x10       2x10 2x10   SL Leg press 25# 2x10 seat 8 45# 2x10 seat 8            SLR 3x10 3x10    3x10 AROM  3x10 AROM  3x10  3x10 3x10   Minisquats 3x10 3x10    3x10  3x10  3x10 3x10 3x10   Nustep for LE endurance Seat 10 L 1-2-3-1 9' Seat 10 L 1-2-3-1 9'    Seat 10 L 1-2-1 Seat 10 L 1-2-1 Seat 10 L 1-2-1 9' Seat 10 L 1-2-1 9' Seat 10 L 1-2-1 9'   Ther Activity             STS 2x10 2x10    3x10 3x10  3x10 3x10 3x10   Step ups             Step downs  2x10 R 2\" 2x10 R 4\"     2x10 R LE 2\"  2x10 R LE 2\"  2x10 R 2\" 2x10 R 2\" 2x10 R 2\"   Single manish step over 10x high 10x high           Manish step overs      4 hurdles 4x 4 hurdles   1x high  4x low 4 high hurdles x3 4 high hurdles x3 4 high hurdles x3   Gait Training                                       Modalities                                                    "

## 2024-07-22 ENCOUNTER — OFFICE VISIT (OUTPATIENT)
Dept: PHYSICAL THERAPY | Facility: CLINIC | Age: 59
End: 2024-07-22
Payer: COMMERCIAL

## 2024-07-22 DIAGNOSIS — Z96.641 STATUS POST TOTAL HIP REPLACEMENT, RIGHT: Primary | ICD-10-CM

## 2024-07-22 DIAGNOSIS — M16.11 PRIMARY OSTEOARTHRITIS OF RIGHT HIP: ICD-10-CM

## 2024-07-22 PROCEDURE — 97112 NEUROMUSCULAR REEDUCATION: CPT

## 2024-07-22 PROCEDURE — 97110 THERAPEUTIC EXERCISES: CPT

## 2024-07-22 NOTE — PROGRESS NOTES
"Daily Note     Today's date: 2024  Patient name: Teena Staples  : 1965  MRN: [id number]  Referring provider: Marlee Mccann MD  Dx:   Encounter Diagnosis     ICD-10-CM    1. Status post total hip replacement, right  Z96.641       2. Primary osteoarthritis of right hip  M16.11           Start Time: 1100  Stop Time: 1146  Total time in clinic (min): 46 minutes    Subjective: Patient reports to physical therapy today stating she has continued to walk more over this past weekend and notes feeling a little stiff.         Objective: See treatment diary below      Assessment: Tolerated treatment well. Patient demonstrated fatigue post treatment, exhibited good technique with therapeutic exercises, and would benefit from continued PT. Continues to provide good effort during performance of exercises. Minimal verbal cues provided to ensure correct form during performance of exercises. Progressed standing marches to 3# ankle weights this visit to which patient noted some challenge, but responded well. Will continue to assess patient tolerance and progress next visit, as able.         Plan: Continue per plan of care.      Precautions: Hx HTN; S/p R ADAN anterior approach 24 (Anterior hip precautions- No extension, ER, > 90 deg flex)    POC expires Unit limit Auth Expiration date PT/OT + Visit Limit?    N/A 24 P- 50 visits/BOMN; S- BOMN                 Visit/Unit Tracking  AUTH Status:  Date 6/24 6/26 6/28 7/1 7/3 7/10 7/12 7/15 7/17 7/19 7/22 6/21   50 visits Used 13 14 15 16 17 18 19 20 21 22 23 12    Remaining  37 36 35 34 33 32 31 30 29 28 27 38     FOTO:       Manuals 7/17 7/19 7/22   7/1 7/3 7/10 7/12 7/15   R hip flexion/abduction PROM NP - as needed TB TB   TB LIBBY KT KT TB   Gentle hip flexor quad stretch to neutral in SL NP - as needed TB TB   TB LIBBY KT KT TB                             Neuro Re-Ed             Pt education regarding HEP, POC, and dx 2' 2' 2'   2' 2'  2' 2' 2'   TKE 3\" " "2x10 blue 3\" 2x10 blue 3\" 2x10 blue   3\" 3x10 grn 3\" 3x10 grn  3\" 2x10 blue  3\" 2x10 blue  3\" 2x10 blue   LAQ's  3x10 3# 3\" 3x10 3# 3\" 3x10 3# 3\"   3x10 3\" 3# 3x10 3\" 3# 3x10 2# 3\" 3x10 3# 3\" 3x10 3# 3\"   Quad sets              SLS 3x30\" 3x30\" 3x30\"   3x 30' R LE  3x30\"  3x30\" 3x30\" 3x30\"   TB BKFO Blk 3x10 Blk 3x10 Blk 3x10   Blue 3x10 Blue 3x10  Blk 3x10 Blk 3x10 Blk 3x10   HR 3x10 3x10 3x10   3x10 3x10 3x10 3x10 3x10   Standing hamstring curls 3x10 3# 3x10 3# 3x10 3#   3x10 3x10  3x10 3x10 3x10 2#    Standing hip abduction 3x10 3# rainer 3x10 3# rainer 3x10 3# rainer   3x10 rainer 3x10 rainer 3x10 rainer 3x10 rainer 3x10 rainer 3# R LE   Ther Ex             Bridges  X30  x30   x20 x20 x20 x20 x30   Standing marches 3x10 1# 3x10 1# 3x10 3#    3x10 3x10 3x10 3x10 3x10 1#   Sidelying hip abd 2x10 2x10 2x10      2x10 2x10   SL Leg press 25# 2x10 seat 8 45# 2x10 seat 8  45# 2x10 seat 8           SLR 3x10 3x10 3x10   3x10 AROM  3x10 AROM  3x10  3x10 3x10   Minisquats 3x10 3x10 3x10   3x10  3x10  3x10 3x10 3x10   Nustep for LE endurance Seat 10 L 1-2-3-1 9' Seat 10 L 1-2-3-1 9' Seat 10 L 1-2-3-1 9'   Seat 10 L 1-2-1 Seat 10 L 1-2-1 Seat 10 L 1-2-1 9' Seat 10 L 1-2-1 9' Seat 10 L 1-2-1 9'   Ther Activity             STS 2x10 2x10 2x10   3x10 3x10  3x10 3x10 3x10   Step ups             Step downs  2x10 R 2\" 2x10 R 4\"  2x10 R 4\"    2x10 R LE 2\"  2x10 R LE 2\"  2x10 R 2\" 2x10 R 2\" 2x10 R 2\"   Single manish step over 10x high 10x high 10x high          Manish step overs      4 hurdles 4x 4 hurdles   1x high  4x low 4 high hurdles x3 4 high hurdles x3 4 high hurdles x3   Gait Training                                       Modalities                                                      " Statement Selected

## 2024-07-24 ENCOUNTER — OFFICE VISIT (OUTPATIENT)
Dept: PHYSICAL THERAPY | Facility: CLINIC | Age: 59
End: 2024-07-24
Payer: COMMERCIAL

## 2024-07-24 DIAGNOSIS — M16.11 PRIMARY OSTEOARTHRITIS OF RIGHT HIP: ICD-10-CM

## 2024-07-24 DIAGNOSIS — Z96.641 STATUS POST TOTAL HIP REPLACEMENT, RIGHT: Primary | ICD-10-CM

## 2024-07-24 PROCEDURE — 97110 THERAPEUTIC EXERCISES: CPT

## 2024-07-24 PROCEDURE — 97530 THERAPEUTIC ACTIVITIES: CPT

## 2024-07-24 PROCEDURE — 97112 NEUROMUSCULAR REEDUCATION: CPT

## 2024-07-24 NOTE — PROGRESS NOTES
"Daily Note     Today's date: 2024  Patient name: Teena Staples  : 1965  MRN: [id number]  Referring provider: Marlee Mccann MD  Dx:   Encounter Diagnosis     ICD-10-CM    1. Status post total hip replacement, right  Z96.641       2. Primary osteoarthritis of right hip  M16.11           Start Time: 1100  Stop Time: 1145  Total time in clinic (min): 45 minutes    Subjective: Patient reports to physical therapy today stating she has been active with chores this morning and feels more loose today.         Objective: See treatment diary below      Assessment: Tolerated treatment well. Patient demonstrated fatigue post treatment, exhibited good technique with therapeutic exercises, and would benefit from continued PT. Continues to provide good effort during performance of exercises. Progressed repetitions this visit for leg press to which patient responded well. Will continue to assess patient tolerance and progress next visit, as able.         Plan: Continue per plan of care.      Precautions: Hx HTN; S/p R ADAN anterior approach 24 (Anterior hip precautions- No extension, ER, > 90 deg flex)    POC expires Unit limit Auth Expiration date PT/OT + Visit Limit?    N/A 24 P- 50 visits/BOMN; S- BOMN                 Visit/Unit Tracking  AUTH Status:  Date 6/24 6/26 6/28 7/1 7/3 7/10 7/12 7/15 7/17 7/19 7/22 7/24   50 visits Used 13 14 15 16 17 18 19 20 21 22 23 24    Remaining  37 36 35 34 33 32 31 30 29 28 27 26     FOTO:       Manuals 7/17 7/19 7/22 7/24  7/1 7/3 7/10 7/12 7/15   R hip flexion/abduction PROM NP - as needed TB TB   TB LIBBY KT KT TB   Gentle hip flexor quad stretch to neutral in SL NP - as needed TB TB   TB LIBBY KT KT TB                             Neuro Re-Ed             Pt education regarding HEP, POC, and dx 2' 2' 2' 2'  2' 2'  2' 2' 2'   TKE 3\" 2x10 blue 3\" 2x10 blue 3\" 2x10 blue 3\" 2x10 blue  3\" 3x10 grn 3\" 3x10 grn  3\" 2x10 blue  3\" 2x10 blue  3\" 2x10 blue   LAQ's  3x10 3# " "3\" 3x10 3# 3\" 3x10 3# 3\" 3x10 3# 3\"  3x10 3\" 3# 3x10 3\" 3# 3x10 2# 3\" 3x10 3# 3\" 3x10 3# 3\"   Quad sets              SLS 3x30\" 3x30\" 3x30\" 3x30\"   3x 30' R LE  3x30\"  3x30\" 3x30\" 3x30\"   TB BKFO Blk 3x10 Blk 3x10 Blk 3x10 Blk 3x10  Blue 3x10 Blue 3x10  Blk 3x10 Blk 3x10 Blk 3x10   HR 3x10 3x10 3x10 3x10  3x10 3x10 3x10 3x10 3x10   Standing hamstring curls 3x10 3# 3x10 3# 3x10 3# 3x10 3#  3x10 3x10  3x10 3x10 3x10 2#    Standing hip abduction 3x10 3# rainer 3x10 3# rainer 3x10 3# rainer 3x10 3# rainer  3x10 rainer 3x10 rainer 3x10 rainer 3x10 rainer 3x10 rainer 3# R LE   Ther Ex             Bridges  X30  x30 x30  x20 x20 x20 x20 x30   Standing marches 3x10 1# 3x10 1# 3x10 3#  3x10 3#   3x10 3x10 3x10 3x10 3x10 1#   Sidelying hip abd 2x10 2x10 2x10 2x10     2x10 2x10   SL Leg press 25# 2x10 seat 8 45# 2x10 seat 8  45# 2x10 seat 8  45# 3x10 seat 8          SLR 3x10 3x10 3x10 3x10  3x10 AROM  3x10 AROM  3x10  3x10 3x10   Minisquats 3x10 3x10 3x10 3x10  3x10  3x10  3x10 3x10 3x10   Nustep for LE endurance Seat 10 L 1-2-3-1 9' Seat 10 L 1-2-3-1 9' Seat 10 L 1-2-3-1 9' Seat 10 L 1-2-3-1 9'  Seat 10 L 1-2-1 Seat 10 L 1-2-1 Seat 10 L 1-2-1 9' Seat 10 L 1-2-1 9' Seat 10 L 1-2-1 9'   Ther Activity             STS 2x10 2x10 2x10 2x10  3x10 3x10  3x10 3x10 3x10   Step ups             Step downs  2x10 R 2\" 2x10 R 4\"  2x10 R 4\"  2x10 R 4\"   2x10 R LE 2\"  2x10 R LE 2\"  2x10 R 2\" 2x10 R 2\" 2x10 R 2\"   Single manish step over 10x high 10x high 10x high 10x high         Manish step overs      4 hurdles 4x 4 hurdles   1x high  4x low 4 high hurdles x3 4 high hurdles x3 4 high hurdles x3   Gait Training                                       Modalities                                                        "

## 2024-07-26 ENCOUNTER — OFFICE VISIT (OUTPATIENT)
Dept: PHYSICAL THERAPY | Facility: CLINIC | Age: 59
End: 2024-07-26
Payer: COMMERCIAL

## 2024-07-26 DIAGNOSIS — Z96.641 STATUS POST TOTAL HIP REPLACEMENT, RIGHT: Primary | ICD-10-CM

## 2024-07-26 DIAGNOSIS — M16.11 PRIMARY OSTEOARTHRITIS OF RIGHT HIP: ICD-10-CM

## 2024-07-26 PROCEDURE — 97112 NEUROMUSCULAR REEDUCATION: CPT

## 2024-07-26 PROCEDURE — 97530 THERAPEUTIC ACTIVITIES: CPT

## 2024-07-26 PROCEDURE — 97110 THERAPEUTIC EXERCISES: CPT

## 2024-07-26 NOTE — PROGRESS NOTES
"Daily Note     Today's date: 2024  Patient name: Teena Staples  : 1965  MRN: [id number]  Referring provider: Marlee Mccann MD  Dx:   Encounter Diagnosis     ICD-10-CM    1. Status post total hip replacement, right  Z96.641       2. Primary osteoarthritis of right hip  M16.11           Start Time: 1100  Stop Time: 1145  Total time in clinic (min): 45 minutes    Subjective: Patient reports to physical therapy today stating her back is bothering her a little today.         Objective: See treatment diary below      Assessment: Tolerated treatment well. Patient demonstrated fatigue post treatment, exhibited good technique with therapeutic exercises, and would benefit from continued PT. Continues to provide good effort during performance of exercises. Progressed leg press to 55# this session to which patient responded well. Noted increased fatigue following session. Attempted rec bike this session, but patient noted discomfort in L posterior knee after 1 minute, so exercise was discontinued. Will continue to assess patient tolerance and progress next visit, as able.         Plan: Continue per plan of care.      Precautions: Hx HTN; S/p R ADAN anterior approach 24 (Anterior hip precautions- No extension, ER, > 90 deg flex)    POC expires Unit limit Auth Expiration date PT/OT + Visit Limit?    N/A 24 P- 50 visits/BOMN; S- BOMN                 Visit/Unit Tracking  AUTH Status:  Date 7/26 6/26 6/28 7/1 7/3 7/10 7/12 7/15 7/17 7/19 7/22 7/24   50 visits Used 25 14 15 16 17 18 19 20 21 22 23 24    Remaining  25 36 35 34 33 32 31 30 29 28 27 26     FOTO:       Manuals 7/17 7/19 7/22 7/24 7/26     7/15   R hip flexion/abduction PROM NP - as needed TB TB       TB   Gentle hip flexor quad stretch to neutral in SL NP - as needed TB TB       TB                             Neuro Re-Ed             Pt education regarding HEP, POC, and dx 2' 2' 2' 2' 2'     2'   TKE 3\" 2x10 blue 3\" 2x10 blue 3\" 2x10 " "blue 3\" 2x10 blue 3\" 2x10 blue     3\" 2x10 blue   LAQ's  3x10 3# 3\" 3x10 3# 3\" 3x10 3# 3\" 3x10 3# 3\" 3x10 3# 3\"     3x10 3# 3\"   Quad sets              SLS 3x30\" 3x30\" 3x30\" 3x30\"  3x30\"      3x30\"   TB BKFO Blk 3x10 Blk 3x10 Blk 3x10 Blk 3x10 Blk 3x10     Blk 3x10   HR 3x10 3x10 3x10 3x10 3x10     3x10   Standing hamstring curls 3x10 3# 3x10 3# 3x10 3# 3x10 3# 3x10 3#     3x10 2#    Standing hip abduction 3x10 3# rainer 3x10 3# rainer 3x10 3# rainer 3x10 3# rainer 3x10 3# rainer     3x10 rainer 3# R LE   Ther Ex             Bridges  X30  x30 x30 x30     x30   Standing marches 3x10 1# 3x10 1# 3x10 3#  3x10 3#  3x10 3#      3x10 1#   Sidelying hip abd 2x10 2x10 2x10 2x10 2x10     2x10   SL Leg press 25# 2x10 seat 8 45# 2x10 seat 8  45# 2x10 seat 8  45# 3x10 seat 8  55# 3x10 seat 8         SLR 3x10 3x10 3x10 3x10 3x10     3x10   Minisquats 3x10 3x10 3x10 3x10 3x10     3x10   Nustep for LE endurance Seat 10 L 1-2-3-1 9' Seat 10 L 1-2-3-1 9' Seat 10 L 1-2-3-1 9' Seat 10 L 1-2-3-1 9' Rec bike 1' seat 15 L knee p! , Nu step 8'      Seat 10 L 1-2-1 9'   Ther Activity             STS 2x10 2x10 2x10 2x10 2x10     3x10   Step ups             Step downs  2x10 R 2\" 2x10 R 4\"  2x10 R 4\"  2x10 R 4\"  2x10 R 4\"      2x10 R 2\"   Single manish step over 10x high 10x high 10x high 10x high 10x high        Manish step overs          4 high hurdles x3   Gait Training                                       Modalities                                                          "

## 2024-07-29 ENCOUNTER — OFFICE VISIT (OUTPATIENT)
Dept: PHYSICAL THERAPY | Facility: CLINIC | Age: 59
End: 2024-07-29
Payer: COMMERCIAL

## 2024-07-29 DIAGNOSIS — M16.11 PRIMARY OSTEOARTHRITIS OF RIGHT HIP: ICD-10-CM

## 2024-07-29 DIAGNOSIS — M25.551 PAIN IN RIGHT HIP: ICD-10-CM

## 2024-07-29 DIAGNOSIS — Z96.641 STATUS POST TOTAL HIP REPLACEMENT, RIGHT: Primary | ICD-10-CM

## 2024-07-29 PROCEDURE — 97110 THERAPEUTIC EXERCISES: CPT

## 2024-07-29 PROCEDURE — 97530 THERAPEUTIC ACTIVITIES: CPT

## 2024-07-29 PROCEDURE — 97112 NEUROMUSCULAR REEDUCATION: CPT

## 2024-07-29 NOTE — PROGRESS NOTES
"Daily Note     Today's date: 2024  Patient name: Teena Staples  : 1965  MRN: [id number]  Referring provider: Marlee Mccann MD  Dx:   Encounter Diagnosis     ICD-10-CM    1. Status post total hip replacement, right  Z96.641       2. Primary osteoarthritis of right hip  M16.11       3. Pain in right hip  M25.551                      Subjective: Pt reports she had some anterior hip pain after walking some hills. It was hard to control her legs when walking downhill.       Objective: See treatment diary below      Assessment: Tolerated treatment well. Pt demonstrates good effort throughout session. Minimal curing given to facilitate proper exercise performance and technique. She tolerates increased time at level 3 on the nustep this visit. Less difficulty noted with SLS today Patient demonstrated fatigue post treatment, exhibited good technique with therapeutic exercises, and would benefit from continued PT      Plan: Continue per plan of care.      Precautions: Hx HTN; S/p R ADAN anterior approach 24 (Anterior hip precautions- No extension, ER, > 90 deg flex)    POC expires Unit limit Auth Expiration date PT/OT + Visit Limit?    N/A 24 P- 50 visits/BOMN; S- BOMN                 Visit/Unit Tracking  AUTH Status:  Date 7/26 7/29   7/3 7/10 7/12 7/15 7/17 7/19 7/22 7/24   50 visits Used 25 26   17 18 19 20 21 22 23 24    Remaining  25 24   33 32 31 30 29 28 27 26     FOTO:       Manuals 7/17 7/19 7/22 7/24 7/26 7/29    7/15   R hip flexion/abduction PROM NP - as needed TB TB       TB   Gentle hip flexor quad stretch to neutral in SL NP - as needed TB TB       TB                             Neuro Re-Ed             Pt education regarding HEP, POC, and dx 2' 2' 2' 2' 2' 2'    2'   TKE 3\" 2x10 blue 3\" 2x10 blue 3\" 2x10 blue 3\" 2x10 blue 3\" 2x10 blue 3\" 2x10 blue    3\" 2x10 blue   LAQ's  3x10 3# 3\" 3x10 3# 3\" 3x10 3# 3\" 3x10 3# 3\" 3x10 3# 3\" 3x10 3# 3\"    3x10 3# 3\"   Quad sets            " "  SLS 3x30\" 3x30\" 3x30\" 3x30\"  3x30\"  3x30\"    3x30\"   TB BKFO Blk 3x10 Blk 3x10 Blk 3x10 Blk 3x10 Blk 3x10 Blk 3x10    Blk 3x10   HR 3x10 3x10 3x10 3x10 3x10 3x10    3x10   Standing hamstring curls 3x10 3# 3x10 3# 3x10 3# 3x10 3# 3x10 3# 3x10 3#    3x10 2#    Standing hip abduction 3x10 3# rainer 3x10 3# rainer 3x10 3# rainer 3x10 3# rainer 3x10 3# rainer 3x10 3# rainer    3x10 rainer 3# R LE   Ther Ex             Bridges  X30  x30 x30 x30 x30    x30   Standing marches 3x10 1# 3x10 1# 3x10 3#  3x10 3#  3x10 3#  3x10 3#     3x10 1#   Sidelying hip abd 2x10 2x10 2x10 2x10 2x10 2x10    2x10   SL Leg press 25# 2x10 seat 8 45# 2x10 seat 8  45# 2x10 seat 8  45# 3x10 seat 8  55# 3x10 seat 8  55# 3x10 seat 8        SLR 3x10 3x10 3x10 3x10 3x10 3x10    3x10   Minisquats 3x10 3x10 3x10 3x10 3x10 3x10    3x10   Nustep for LE endurance Seat 10 L 1-2-3-1 9' Seat 10 L 1-2-3-1 9' Seat 10 L 1-2-3-1 9' Seat 10 L 1-2-3-1 9' Rec bike 1' seat 15 L knee p! , Nu step 8'  Seat 10 L 1-2-3-1 9'    Seat 10 L 1-2-1 9'   Ther Activity             STS 2x10 2x10 2x10 2x10 2x10 2x10    3x10   Step ups             Step downs  2x10 R 2\" 2x10 R 4\"  2x10 R 4\"  2x10 R 4\"  2x10 R 4\"  2x10 R 4\"     2x10 R 2\"   Single manish step over 10x high 10x high 10x high 10x high 10x high 10x high       Manish step overs          4 high hurdles x3   Gait Training                                       Modalities                                                            "

## 2024-07-31 ENCOUNTER — OFFICE VISIT (OUTPATIENT)
Dept: PHYSICAL THERAPY | Facility: CLINIC | Age: 59
End: 2024-07-31
Payer: COMMERCIAL

## 2024-07-31 DIAGNOSIS — M16.11 PRIMARY OSTEOARTHRITIS OF RIGHT HIP: ICD-10-CM

## 2024-07-31 DIAGNOSIS — Z96.641 STATUS POST TOTAL HIP REPLACEMENT, RIGHT: Primary | ICD-10-CM

## 2024-07-31 PROCEDURE — 97530 THERAPEUTIC ACTIVITIES: CPT

## 2024-07-31 PROCEDURE — 97110 THERAPEUTIC EXERCISES: CPT

## 2024-07-31 PROCEDURE — 97112 NEUROMUSCULAR REEDUCATION: CPT

## 2024-07-31 NOTE — PROGRESS NOTES
"Daily Note     Today's date: 2024  Patient name: Teena Staples  : 1965  MRN: [id number]  Referring provider: Marlee Mccann MD  Dx:   Encounter Diagnosis     ICD-10-CM    1. Status post total hip replacement, right  Z96.641       2. Primary osteoarthritis of right hip  M16.11           Start Time: 1100  Stop Time: 1145  Total time in clinic (min): 45 minutes    Subjective: Patient reports to physical therapy today stating she is feeling a little stiff today.         Objective: See treatment diary below      Assessment: Tolerated treatment well. Patient demonstrated fatigue post treatment, exhibited good technique with therapeutic exercises, and would benefit from continued PT. Continues to provide good effort during performance of exercises. Progressed SLR to 1# ankle weights this session to which patient noted increased challenge, but responded well. Noted improvement in feelings of stiffness following session. Will continue to assess patient tolerance and progress next visit, as able.         Plan: Continue per plan of care.      Precautions: Hx HTN; S/p R ADAN anterior approach 24 (Anterior hip precautions- No extension, ER, > 90 deg flex)    POC expires Unit limit Auth Expiration date PT/OT + Visit Limit?    N/A 24 P- 50 visits/BOMN; S- BOMN                 Visit/Unit Tracking  AUTH Status:  Date 7/26 7/29 7/31  7/3 7/10 7/12 7/15 7/17 7/19 7/22 7/24   50 visits Used 25 26 27  17 18 19 20 21 22 23 24    Remaining  25 24 23  33 32 31 30 29 28 27 26     FOTO:       Manuals 7/17 7/19 7/22 7/24 7/26 7/29 7/31   7/15   R hip flexion/abduction PROM NP - as needed TB TB       TB   Gentle hip flexor quad stretch to neutral in SL NP - as needed TB TB       TB                             Neuro Re-Ed             Pt education regarding HEP, POC, and dx 2' 2' 2' 2' 2' 2' 2'   2'   TKE 3\" 2x10 blue 3\" 2x10 blue 3\" 2x10 blue 3\" 2x10 blue 3\" 2x10 blue 3\" 2x10 blue 3\" 2x10 blue   3\" 2x10 blue " "  LAQ's  3x10 3# 3\" 3x10 3# 3\" 3x10 3# 3\" 3x10 3# 3\" 3x10 3# 3\" 3x10 3# 3\" 3x10 3# 3\"   3x10 3# 3\"   Quad sets              SLS 3x30\" 3x30\" 3x30\" 3x30\"  3x30\"  3x30\" 3x30\"   3x30\"   TB BKFO Blk 3x10 Blk 3x10 Blk 3x10 Blk 3x10 Blk 3x10 Blk 3x10 Blk 3x10   Blk 3x10   HR 3x10 3x10 3x10 3x10 3x10 3x10 3x10   3x10   Standing hamstring curls 3x10 3# 3x10 3# 3x10 3# 3x10 3# 3x10 3# 3x10 3# 3x10 3#   3x10 2#    Standing hip abduction 3x10 3# rainer 3x10 3# rainer 3x10 3# rainer 3x10 3# rainer 3x10 3# rainer 3x10 3# rainer 3x10 3# rainer   3x10 rainer 3# R LE   Ther Ex             Bridges  X30  x30 x30 x30 x30 x30   x30   Standing marches 3x10 1# 3x10 1# 3x10 3#  3x10 3#  3x10 3#  3x10 3#  3x10 3#    3x10 1#   Sidelying hip abd 2x10 2x10 2x10 2x10 2x10 2x10 2x10   2x10   SL Leg press 25# 2x10 seat 8 45# 2x10 seat 8  45# 2x10 seat 8  45# 3x10 seat 8  55# 3x10 seat 8  55# 3x10 seat 8  55# 3x10 seat 8       SLR 3x10 3x10 3x10 3x10 3x10 3x10 3x10 1#    3x10   Minisquats 3x10 3x10 3x10 3x10 3x10 3x10 3x10   3x10   Nustep for LE endurance Seat 10 L 1-2-3-1 9' Seat 10 L 1-2-3-1 9' Seat 10 L 1-2-3-1 9' Seat 10 L 1-2-3-1 9' Rec bike 1' seat 15 L knee p! , Nu step 8'  Seat 10 L 1-2-3-1 9' Seat 10 L 1-2-3-1 9'   Seat 10 L 1-2-1 9'   Ther Activity             STS 2x10 2x10 2x10 2x10 2x10 2x10 2x10   3x10   Step ups             Step downs  2x10 R 2\" 2x10 R 4\"  2x10 R 4\"  2x10 R 4\"  2x10 R 4\"  2x10 R 4\"  3x10 R 4\"    2x10 R 2\"   Single manish step over 10x high 10x high 10x high 10x high 10x high 10x high 10x high      Manish step overs          4 high hurdles x3   Gait Training                                       Modalities                                                              "

## 2024-08-02 ENCOUNTER — OFFICE VISIT (OUTPATIENT)
Dept: PHYSICAL THERAPY | Facility: CLINIC | Age: 59
End: 2024-08-02
Payer: COMMERCIAL

## 2024-08-02 DIAGNOSIS — M16.11 PRIMARY OSTEOARTHRITIS OF RIGHT HIP: ICD-10-CM

## 2024-08-02 DIAGNOSIS — Z96.641 STATUS POST TOTAL HIP REPLACEMENT, RIGHT: Primary | ICD-10-CM

## 2024-08-02 PROCEDURE — 97110 THERAPEUTIC EXERCISES: CPT

## 2024-08-02 PROCEDURE — 97112 NEUROMUSCULAR REEDUCATION: CPT

## 2024-08-02 PROCEDURE — 97530 THERAPEUTIC ACTIVITIES: CPT

## 2024-08-02 NOTE — PROGRESS NOTES
"Daily Note     Today's date: 2024  Patient name: Teena Staples  : 1965  MRN: [id number]  Referring provider: Marlee Mccann MD  Dx:   Encounter Diagnosis     ICD-10-CM    1. Status post total hip replacement, right  Z96.641       2. Primary osteoarthritis of right hip  M16.11           Start Time: 1100  Stop Time: 1145  Total time in clinic (min): 45 minutes    Subjective: Patient reports to physical therapy today stating she feels a little stiff today.         Objective: See treatment diary below      Assessment: Tolerated treatment well. Patient demonstrated fatigue post treatment, exhibited good technique with therapeutic exercises, and would benefit from continued PT. Continues to provide good effort during performance of exercises. Progressed s/l hip abduction to include 1# ankle weights to which patient noted some challenge, but responded well. Will continue to assess patient tolerance and progress next visit, as able.         Plan: Continue per plan of care.      Precautions: Hx HTN; S/p R ADAN anterior approach 24 (Anterior hip precautions- No extension, ER, > 90 deg flex)    POC expires Unit limit Auth Expiration date PT/OT + Visit Limit?    N/A 24 P- 50 visits/BOMN; S- BOMN                 Visit/Unit Tracking  AUTH Status:  Date 7/26 7/29 7/31 8/2 7/3 7/10 7/12 7/15 7/17 7/19 7/22 7/24   50 visits Used 25 26 27 28 17 18 19 20 21 22 23 24    Remaining  25 24 23 22 33 32 31 30 29 28 27 26     FOTO:       Manuals 7/17 7/19 7/22 7/24 7/26 7/29 7/31 8/2  7/15   R hip flexion/abduction PROM NP - as needed TB TB     TB  TB   Gentle hip flexor quad stretch to neutral in SL NP - as needed TB TB     TB  TB                             Neuro Re-Ed             Pt education regarding HEP, POC, and dx 2' 2' 2' 2' 2' 2' 2' 2'  2'   TKE 3\" 2x10 blue 3\" 2x10 blue 3\" 2x10 blue 3\" 2x10 blue 3\" 2x10 blue 3\" 2x10 blue 3\" 2x10 blue 3\" 2x10 blue  3\" 2x10 blue   LAQ's  3x10 3# 3\" 3x10 3# 3\" 3x10 " "3# 3\" 3x10 3# 3\" 3x10 3# 3\" 3x10 3# 3\" 3x10 3# 3\" 3x10 3# 3\"  3x10 3# 3\"   Quad sets              SLS 3x30\" 3x30\" 3x30\" 3x30\"  3x30\"  3x30\" 3x30\" 3x30\"  3x30\"   TB BKFO Blk 3x10 Blk 3x10 Blk 3x10 Blk 3x10 Blk 3x10 Blk 3x10 Blk 3x10 Blk 3x10  Blk 3x10   HR 3x10 3x10 3x10 3x10 3x10 3x10 3x10 3x10  3x10   Standing hamstring curls 3x10 3# 3x10 3# 3x10 3# 3x10 3# 3x10 3# 3x10 3# 3x10 3# 3x10 3#   3x10 2#    Standing hip abduction 3x10 3# rainer 3x10 3# rainer 3x10 3# rainer 3x10 3# rainer 3x10 3# rainer 3x10 3# rainer 3x10 3# rainer 3x10 3# rainer  3x10 rainer 3# R LE   Ther Ex             Bridges  X30  x30 x30 x30 x30 x30 nv  x30   Standing marches 3x10 1# 3x10 1# 3x10 3#  3x10 3#  3x10 3#  3x10 3#  3x10 3#  3x10 3#   3x10 1#   Sidelying hip abd 2x10 2x10 2x10 2x10 2x10 2x10 2x10 2x10 1#   2x10   SL Leg press 25# 2x10 seat 8 45# 2x10 seat 8  45# 2x10 seat 8  45# 3x10 seat 8  55# 3x10 seat 8  55# 3x10 seat 8  55# 3x10 seat 8  65# 3x10 seat 8     SLR 3x10 3x10 3x10 3x10 3x10 3x10 3x10 1#  3x10 1#  3x10   Minisquats 3x10 3x10 3x10 3x10 3x10 3x10 3x10 3x10  3x10   Nustep for LE endurance Seat 10 L 1-2-3-1 9' Seat 10 L 1-2-3-1 9' Seat 10 L 1-2-3-1 9' Seat 10 L 1-2-3-1 9' Rec bike 1' seat 15 L knee p! , Nu step 8'  Seat 10 L 1-2-3-1 9' Seat 10 L 1-2-3-1 9' Seat 10 L 1-2-3-1 9'  Seat 10 L 1-2-1 9'   Ther Activity             STS 2x10 2x10 2x10 2x10 2x10 2x10 2x10 2x10  3x10   Step ups             Step downs  2x10 R 2\" 2x10 R 4\"  2x10 R 4\"  2x10 R 4\"  2x10 R 4\"  2x10 R 4\"  3x10 R 4\"  3x10 R 4\"   2x10 R 2\"   Single manish step over 10x high 10x high 10x high 10x high 10x high 10x high 10x high 10x high     Manish step overs          4 high hurdles x3   Gait Training                                       Modalities                                                                "

## 2024-08-05 ENCOUNTER — OFFICE VISIT (OUTPATIENT)
Dept: PHYSICAL THERAPY | Facility: CLINIC | Age: 59
End: 2024-08-05
Payer: COMMERCIAL

## 2024-08-05 DIAGNOSIS — M25.551 PAIN IN RIGHT HIP: ICD-10-CM

## 2024-08-05 DIAGNOSIS — M16.11 PRIMARY OSTEOARTHRITIS OF RIGHT HIP: ICD-10-CM

## 2024-08-05 DIAGNOSIS — Z96.641 STATUS POST TOTAL HIP REPLACEMENT, RIGHT: Primary | ICD-10-CM

## 2024-08-05 PROCEDURE — 97530 THERAPEUTIC ACTIVITIES: CPT

## 2024-08-05 PROCEDURE — 97110 THERAPEUTIC EXERCISES: CPT

## 2024-08-05 PROCEDURE — 97112 NEUROMUSCULAR REEDUCATION: CPT

## 2024-08-05 RX ORDER — CELECOXIB 200 MG/1
200 CAPSULE ORAL 2 TIMES DAILY
Qty: 30 CAPSULE | Refills: 1 | Status: SHIPPED | OUTPATIENT
Start: 2024-08-05

## 2024-08-05 NOTE — PROGRESS NOTES
"Daily Note     Today's date: 2024  Patient name: Teena Staples  : 1965  MRN: [id number]  Referring provider: Marlee Mccann MD  Dx:   Encounter Diagnosis     ICD-10-CM    1. Status post total hip replacement, right  Z96.641       2. Primary osteoarthritis of right hip  M16.11       3. Pain in right hip  M25.551                      Subjective: Patient states her hip is coming along.       Objective: See treatment diary below      Assessment: Tolerated treatment well. She is able to complete charted program with minimal increase in discomfort from subjective baseline. She did note more fatigue with table exercises after stretching, she is used to it being performed after her exercises. Attempted to increase resistance with SLR, however she then utilized momentum despite cues. Better performance with original charted wt. Increased resistance on leg press with good form. Patient demonstrated fatigue post treatment and would benefit from continued PT      Plan: Progress treatment as tolerated.       Precautions: Hx HTN; S/p R ADAN anterior approach 24 (Anterior hip precautions- No extension, ER, > 90 deg flex)    POC expires Unit limit Auth Expiration date PT/OT + Visit Limit?    N/A 24 P- 50 visits/BOMN; S- BOMN                 Visit/Unit Tracking  AUTH Status:  Date 7/26 7/29 7/31 8/2 8/5  7/12 7/15 7/17 7/19 7/22 7/24   50 visits Used 25 26 27 28 29  19 20 21 22 23 24    Remaining  25 24 23 22 21  31 30 29 28 27 26     FOTO:       Manuals     R hip flexion/abduction PROM NP - as needed TB TB     TB     Gentle hip flexor quad stretch to neutral in SL NP - as needed TB TB     TB LIBBY                              Neuro Re-Ed             Pt education regarding HEP, POC, and dx 2' 2' 2' 2' 2' 2' 2' 2'     TKE 3\" 2x10 blue 3\" 2x10 blue 3\" 2x10 blue 3\" 2x10 blue 3\" 2x10 blue 3\" 2x10 blue 3\" 2x10 blue 3\" 2x10 blue 3\" 2x10 blue    LAQ's  3x10 3# 3\" 3x10 " "3# 3\" 3x10 3# 3\" 3x10 3# 3\" 3x10 3# 3\" 3x10 3# 3\" 3x10 3# 3\" 3x10 3# 3\" 3x10  3# 3\"    Quad sets              SLS 3x30\" 3x30\" 3x30\" 3x30\"  3x30\"  3x30\" 3x30\" 3x30\" 3x30\"    TB BKFO Blk 3x10 Blk 3x10 Blk 3x10 Blk 3x10 Blk 3x10 Blk 3x10 Blk 3x10 Blk 3x10 Blk 3x10    HR 3x10 3x10 3x10 3x10 3x10 3x10 3x10 3x10 3x10    Standing hamstring curls 3x10 3# 3x10 3# 3x10 3# 3x10 3# 3x10 3# 3x10 3# 3x10 3# 3x10 3#  3x10 3#    Standing hip abduction 3x10 3# rainer 3x10 3# rainer 3x10 3# rainer 3x10 3# rainer 3x10 3# rainer 3x10 3# rainer 3x10 3# rainer 3x10 3# rainer 3x10 3# rainer    Ther Ex             Bridges  X30  x30 x30 x30 x30 x30 nv     Standing marches 3x10 1# 3x10 1# 3x10 3#  3x10 3#  3x10 3#  3x10 3#  3x10 3#  3x10 3#  3x10 3#    Sidelying hip abd 2x10 2x10 2x10 2x10 2x10 2x10 2x10 2x10 1#  3x10 1#    SL Leg press 25# 2x10 seat 8 45# 2x10 seat 8  45# 2x10 seat 8  45# 3x10 seat 8  55# 3x10 seat 8  55# 3x10 seat 8  55# 3x10 seat 8  65# 3x10 seat 8 65# 1x10  75#   2x10 seat 8     SLR 3x10 3x10 3x10 3x10 3x10 3x10 3x10 1#  3x10 1# 2x10  1#    Minisquats 3x10 3x10 3x10 3x10 3x10 3x10 3x10 3x10 3x10    Nustep for LE endurance Seat 10 L 1-2-3-1 9' Seat 10 L 1-2-3-1 9' Seat 10 L 1-2-3-1 9' Seat 10 L 1-2-3-1 9' Rec bike 1' seat 15 L knee p! , Nu step 8'  Seat 10 L 1-2-3-1 9' Seat 10 L 1-2-3-1 9' Seat 10 L 1-2-3-1 9' Seat 10 L  1-2-3-1 9'    Ther Activity             STS 2x10 2x10 2x10 2x10 2x10 2x10 2x10 2x10 2x10    Step ups             Step downs  2x10 R 2\" 2x10 R 4\"  2x10 R 4\"  2x10 R 4\"  2x10 R 4\"  2x10 R 4\"  3x10 R 4\"  3x10 R 4\"  3x10 R 4\"    Single manish step over 10x high 10x high 10x high 10x high 10x high 10x high 10x high 10x high 10x high    Manish step overs             Gait Training                                       Modalities                                                                  "

## 2024-08-07 ENCOUNTER — OFFICE VISIT (OUTPATIENT)
Dept: PHYSICAL THERAPY | Facility: CLINIC | Age: 59
End: 2024-08-07
Payer: COMMERCIAL

## 2024-08-07 DIAGNOSIS — M16.11 PRIMARY OSTEOARTHRITIS OF RIGHT HIP: ICD-10-CM

## 2024-08-07 DIAGNOSIS — Z96.641 STATUS POST TOTAL HIP REPLACEMENT, RIGHT: Primary | ICD-10-CM

## 2024-08-07 PROCEDURE — 97530 THERAPEUTIC ACTIVITIES: CPT

## 2024-08-07 PROCEDURE — 97110 THERAPEUTIC EXERCISES: CPT

## 2024-08-07 PROCEDURE — 97112 NEUROMUSCULAR REEDUCATION: CPT

## 2024-08-07 NOTE — PROGRESS NOTES
"Daily Note     Today's date: 2024  Patient name: Teena Staples  : 1965  MRN: [id number]  Referring provider: Marlee Mccann MD  Dx:   Encounter Diagnosis     ICD-10-CM    1. Status post total hip replacement, right  Z96.641       2. Primary osteoarthritis of right hip  M16.11           Start Time: 1100  Stop Time: 1145  Total time in clinic (min): 45 minutes    Subjective: Patient reports to physical therapy today stating she has been feeling some muscle tightness in her lateral R thigh since Monday.         Objective: See treatment diary below      Assessment: Tolerated treatment well. Patient demonstrated fatigue post treatment, exhibited good technique with therapeutic exercises, and would benefit from continued PT. Continues to provide good effort during performance of exercises. Added therarolling and standing ITB stretch within post-op precautions to which patient responded well and with improvement in her symptoms. Will continue to assess patient tolerance and progress next visit, as able.         Plan: Continue per plan of care.      Precautions: Hx HTN; S/p R ADAN anterior approach 24 (Anterior hip precautions- No extension, ER, > 90 deg flex)    POC expires Unit limit Auth Expiration date PT/OT + Visit Limit?    N/A 24 P- 50 visits/BOMN; S- BOMN                 Visit/Unit Tracking  AUTH Status:  Date 7/26 7/29 7/31 8/2 8/5 8/7 7/12 7/15 7/17 7/19 7/22 7/24   50 visits Used 25 26 27 28 29 30 19 20 21 22 23 24    Remaining  25 24 23 22 21 20 31 30 29 28 27 26     FOTO:       Manuals    R hip flexion/abduction PROM NP - as needed TB TB     TB     Gentle hip flexor quad stretch to neutral in SL NP - as needed TB TB     TB LIBBY    Theraroll R ITB          TB                Neuro Re-Ed             Pt education regarding HEP, POC, and dx 2' 2' 2' 2' 2' 2' 2' 2'     TKE 3\" 2x10 blue 3\" 2x10 blue 3\" 2x10 blue 3\" 2x10 blue 3\" 2x10 blue 3\" " "2x10 blue 3\" 2x10 blue 3\" 2x10 blue 3\" 2x10 blue 3\" 2x10 blue   LAQ's  3x10 3# 3\" 3x10 3# 3\" 3x10 3# 3\" 3x10 3# 3\" 3x10 3# 3\" 3x10 3# 3\" 3x10 3# 3\" 3x10 3# 3\" 3x10  3# 3\" 3x10  3# 3\"                SLS 3x30\" 3x30\" 3x30\" 3x30\"  3x30\"  3x30\" 3x30\" 3x30\" 3x30\" 3x30\"   TB BKFO Blk 3x10 Blk 3x10 Blk 3x10 Blk 3x10 Blk 3x10 Blk 3x10 Blk 3x10 Blk 3x10 Blk 3x10 Blk 3x10   HR 3x10 3x10 3x10 3x10 3x10 3x10 3x10 3x10 3x10 3x10    Standing hamstring curls 3x10 3# 3x10 3# 3x10 3# 3x10 3# 3x10 3# 3x10 3# 3x10 3# 3x10 3#  3x10 3# 3x10 3#   Standing hip abduction 3x10 3# rainer 3x10 3# rainer 3x10 3# rainer 3x10 3# rainer 3x10 3# rainer 3x10 3# rainer 3x10 3# rainer 3x10 3# rainer 3x10 3# rainer 3x10 3# rainer   Ther Ex             Bridges  X30  x30 x30 x30 x30 x30 nv  x30   Standing marches 3x10 1# 3x10 1# 3x10 3#  3x10 3#  3x10 3#  3x10 3#  3x10 3#  3x10 3#  3x10 3# 3x10 3#   Sidelying hip abd 2x10 2x10 2x10 2x10 2x10 2x10 2x10 2x10 1#  3x10 1# 3x10 1#   SL Leg press 25# 2x10 seat 8 45# 2x10 seat 8  45# 2x10 seat 8  45# 3x10 seat 8  55# 3x10 seat 8  55# 3x10 seat 8  55# 3x10 seat 8  65# 3x10 seat 8 65# 1x10  75#   2x10 seat 8  75#   3x10 seat 8    SLR 3x10 3x10 3x10 3x10 3x10 3x10 3x10 1#  3x10 1# 2x10  1# 2x10 2#    Minisquats 3x10 3x10 3x10 3x10 3x10 3x10 3x10 3x10 3x10 3x10   Nustep for LE endurance Seat 10 L 1-2-3-1 9' Seat 10 L 1-2-3-1 9' Seat 10 L 1-2-3-1 9' Seat 10 L 1-2-3-1 9' Rec bike 1' seat 15 L knee p! , Nu step 8'  Seat 10 L 1-2-3-1 9' Seat 10 L 1-2-3-1 9' Seat 10 L 1-2-3-1 9' Seat 10 L  1-2-3-1 9' Seat 10 L  1-2-3-1 9'   Standing ITB stretch           10\" x 10    Ther Activity             STS 2x10 2x10 2x10 2x10 2x10 2x10 2x10 2x10 2x10 2x10   Step ups             Step downs  2x10 R 2\" 2x10 R 4\"  2x10 R 4\"  2x10 R 4\"  2x10 R 4\"  2x10 R 4\"  3x10 R 4\"  3x10 R 4\"  3x10 R 4\" 3x10 R 4\"   Single manish step over 10x high 10x high 10x high 10x high 10x high 10x high 10x high 10x high 10x high 10x high   Manish step overs             Gait Training        "                                Modalities

## 2024-08-09 ENCOUNTER — APPOINTMENT (OUTPATIENT)
Dept: PHYSICAL THERAPY | Facility: CLINIC | Age: 59
End: 2024-08-09
Payer: COMMERCIAL

## 2024-08-12 ENCOUNTER — APPOINTMENT (OUTPATIENT)
Dept: PHYSICAL THERAPY | Facility: CLINIC | Age: 59
End: 2024-08-12
Payer: COMMERCIAL

## 2024-08-14 ENCOUNTER — OFFICE VISIT (OUTPATIENT)
Dept: PHYSICAL THERAPY | Facility: CLINIC | Age: 59
End: 2024-08-14
Payer: COMMERCIAL

## 2024-08-14 DIAGNOSIS — M16.11 PRIMARY OSTEOARTHRITIS OF RIGHT HIP: ICD-10-CM

## 2024-08-14 DIAGNOSIS — M25.551 PAIN IN RIGHT HIP: ICD-10-CM

## 2024-08-14 DIAGNOSIS — Z96.641 STATUS POST TOTAL HIP REPLACEMENT, RIGHT: Primary | ICD-10-CM

## 2024-08-14 PROCEDURE — 97530 THERAPEUTIC ACTIVITIES: CPT

## 2024-08-14 PROCEDURE — 97110 THERAPEUTIC EXERCISES: CPT

## 2024-08-14 PROCEDURE — 97112 NEUROMUSCULAR REEDUCATION: CPT

## 2024-08-14 NOTE — PROGRESS NOTES
"Daily Note     Today's date: 2024  Patient name: Teena Staples  : 1965  MRN: [id number]  Referring provider: Marlee Mccann MD  Dx:   Encounter Diagnosis     ICD-10-CM    1. Status post total hip replacement, right  Z96.641       2. Primary osteoarthritis of right hip  M16.11       3. Pain in right hip  M25.551                      Subjective: Pt reports she is feeling a little stiff from her drive back from [place] yesterday,       Objective: See treatment diary below      Assessment: Tolerated treatment well. Pt demonstrates good effort throughout session. Minimal curing given to facilitate proper exercise performance and technique. Pt reports her ITB was better but it flared up with walking and a long drive yesterday. She reports improvement in her ITB sx with STM and stretching. Good tolerance for all exercises at current difficulty. Patient demonstrated fatigue post treatment, exhibited good technique with therapeutic exercises, and would benefit from continued PT      Plan: Continue per plan of care.      Precautions: Hx HTN; S/p R ADAN anterior approach 24 (Anterior hip precautions- No extension, ER, > 90 deg flex)    POC expires Unit limit Auth Expiration date PT/OT + Visit Limit?    N/A 24 P- 50 visits/BOMN; S- BOMN                 Visit/Unit Tracking  AUTH Status:  Date 7/26 7/29 7/31 8/2 8/5 8/7 8/14   7/19 7/22 7/24   50 visits Used 25 26 27 28 29 30 31   22 23 24    Remaining  25 24 23 22 21 20 19   28 27 26     FOTO:       Manuals    R hip flexion/abduction PROM   TB     TB     Gentle hip flexor quad stretch to neutral in SL KT  TB     TB LIBBY    Theraroll R ITB KT         TB                Neuro Re-Ed             Pt education regarding HEP, POC, and dx   2' 2' 2' 2' 2' 2'     TKE 3\" 2x10 blue  3\" 2x10 blue 3\" 2x10 blue 3\" 2x10 blue 3\" 2x10 blue 3\" 2x10 blue 3\" 2x10 blue 3\" 2x10 blue 3\" 2x10 blue   LAQ's  3x10  3# 3\"  3x10 3# 3\" " "3x10 3# 3\" 3x10 3# 3\" 3x10 3# 3\" 3x10 3# 3\" 3x10 3# 3\" 3x10  3# 3\" 3x10  3# 3\"                SLS 3x30\"  3x30\" 3x30\"  3x30\"  3x30\" 3x30\" 3x30\" 3x30\" 3x30\"   TB BKFO   Blk 3x10 Blk 3x10 Blk 3x10 Blk 3x10 Blk 3x10 Blk 3x10 Blk 3x10 Blk 3x10   HR 3x10  3x10 3x10 3x10 3x10 3x10 3x10 3x10 3x10    Standing hamstring curls 3x10 3#  3x10 3# 3x10 3# 3x10 3# 3x10 3# 3x10 3# 3x10 3#  3x10 3# 3x10 3#   Standing hip abduction 3x10 3# rainer  3x10 3# rainer 3x10 3# rainer 3x10 3# rainer 3x10 3# rainer 3x10 3# rainer 3x10 3# rainer 3x10 3# rainer 3x10 3# rainer   Ther Ex             Bridges x30  x30 x30 x30 x30 x30 nv  x30   Standing marches 3x10 3#  3x10 3#  3x10 3#  3x10 3#  3x10 3#  3x10 3#  3x10 3#  3x10 3# 3x10 3#   Sidelying hip abd 3x10 1#  2x10 2x10 2x10 2x10 2x10 2x10 1#  3x10 1# 3x10 1#   SL Leg press 75#   3x10 seat 8   45# 2x10 seat 8  45# 3x10 seat 8  55# 3x10 seat 8  55# 3x10 seat 8  55# 3x10 seat 8  65# 3x10 seat 8 65# 1x10  75#   2x10 seat 8  75#   3x10 seat 8    SLR 2x10 2#  3x10 3x10 3x10 3x10 3x10 1#  3x10 1# 2x10  1# 2x10 2#    Minisquats 3x10  3x10 3x10 3x10 3x10 3x10 3x10 3x10 3x10   Nustep for LE endurance Seat 10 L  1-2-3-1 9'  Seat 10 L 1-2-3-1 9' Seat 10 L 1-2-3-1 9' Rec bike 1' seat 15 L knee p! , Nu step 8'  Seat 10 L 1-2-3-1 9' Seat 10 L 1-2-3-1 9' Seat 10 L 1-2-3-1 9' Seat 10 L  1-2-3-1 9' Seat 10 L  1-2-3-1 9'   Standing ITB stretch  10\" x 10          10\" x 10    Ther Activity             STS 2x10  2x10 2x10 2x10 2x10 2x10 2x10 2x10 2x10   Step ups             Step downs  3x10 R 4\"  2x10 R 4\"  2x10 R 4\"  2x10 R 4\"  2x10 R 4\"  3x10 R 4\"  3x10 R 4\"  3x10 R 4\" 3x10 R 4\"   Single manish step over 10x high  10x high 10x high 10x high 10x high 10x high 10x high 10x high 10x high   Manish step overs             Gait Training                                       Modalities                                                                      "

## 2024-08-16 ENCOUNTER — OFFICE VISIT (OUTPATIENT)
Dept: PHYSICAL THERAPY | Facility: CLINIC | Age: 59
End: 2024-08-16
Payer: COMMERCIAL

## 2024-08-16 DIAGNOSIS — M25.551 PAIN IN RIGHT HIP: ICD-10-CM

## 2024-08-16 DIAGNOSIS — M16.11 PRIMARY OSTEOARTHRITIS OF RIGHT HIP: ICD-10-CM

## 2024-08-16 DIAGNOSIS — Z96.641 STATUS POST TOTAL HIP REPLACEMENT, RIGHT: Primary | ICD-10-CM

## 2024-08-16 PROCEDURE — 97110 THERAPEUTIC EXERCISES: CPT

## 2024-08-16 PROCEDURE — 97112 NEUROMUSCULAR REEDUCATION: CPT

## 2024-08-16 PROCEDURE — 97530 THERAPEUTIC ACTIVITIES: CPT

## 2024-08-16 NOTE — PROGRESS NOTES
"Daily Note     Today's date: 2024  Patient name: Teena Staples  : 1965  MRN: [id number]  Referring provider: Marlee Mccann MD  Dx:   Encounter Diagnosis     ICD-10-CM    1. Status post total hip replacement, right  Z96.641       2. Primary osteoarthritis of right hip  M16.11       3. Pain in right hip  M25.551           Start Time: 1100  Stop Time: 1138  Total time in clinic (min): 38 minutes    Subjective: Patient reports to physical therapy today stating she didn't sleep well last night.         Objective: See treatment diary below      Assessment: Tolerated treatment well. Pt demonstrates good effort throughout session. Minimal curing given to facilitate proper exercise performance and technique. Increased weight for supine SLRs this visit. Increased height for step downs and cued her to perform slower to better challenge the muscular control of her quads. She declines manual therapy today, she reports feeling good after the session with less soreness and stiffness. Patient demonstrated fatigue post treatment, exhibited good technique with therapeutic exercises, and would benefit from continued PT.         Plan: Continue per plan of care.      Precautions: Hx HTN; S/p R ADAN anterior approach 24 (Anterior hip precautions- No extension, ER, > 90 deg flex)    POC expires Unit limit Auth Expiration date PT/OT + Visit Limit?    N/A 24 P- 50 visits/BOMN; S- BOMN                 Visit/Unit Tracking  AUTH Status:  Date 7/26 7/29 7/31 8/2 8/5 8/7 8/14 8/16  7/19 7/22 7/24   50 visits Used 25 26 27 28 29 30 31 32  22 23 24    Remaining  25 24 23 22 21 20 19 18  28 27 26     FOTO:       Manuals    R hip flexion/abduction PROM        TB     Gentle hip flexor quad stretch to neutral in SL KT       TB LIBBY    Theraroll R ITB KT         TB                Neuro Re-Ed             Pt education regarding HEP, POC, and dx     2' 2' 2' 2'     TKE 3\" 2x10 blue 3\" " "2x10 blue   3\" 2x10 blue 3\" 2x10 blue 3\" 2x10 blue 3\" 2x10 blue 3\" 2x10 blue 3\" 2x10 blue   LAQ's  3x10  3# 3\" 3x10  3# 3\"   3x10 3# 3\" 3x10 3# 3\" 3x10 3# 3\" 3x10 3# 3\" 3x10  3# 3\" 3x10  3# 3\"                SLS 3x30\" 3x30\"   3x30\"  3x30\" 3x30\" 3x30\" 3x30\" 3x30\"   TB BKFO     Blk 3x10 Blk 3x10 Blk 3x10 Blk 3x10 Blk 3x10 Blk 3x10   HR 3x10 3x10   3x10 3x10 3x10 3x10 3x10 3x10    Standing hamstring curls 3x10 3# 3x10 3#   3x10 3# 3x10 3# 3x10 3# 3x10 3#  3x10 3# 3x10 3#   Standing hip abduction 3x10 3# rainer 3x10 3# rainer   3x10 3# rainer 3x10 3# rainer 3x10 3# rainer 3x10 3# rainer 3x10 3# rainer 3x10 3# rainer   Ther Ex             Bridges x30 x30   x30 x30 x30 nv  x30   Standing marches 3x10 3# 3x10 3#   3x10 3#  3x10 3#  3x10 3#  3x10 3#  3x10 3# 3x10 3#   Sidelying hip abd 3x10 1# 3x10 1#   2x10 2x10 2x10 2x10 1#  3x10 1# 3x10 1#   SL Leg press 75#   3x10 seat 8  75#   3x10 seat 8    55# 3x10 seat 8  55# 3x10 seat 8  55# 3x10 seat 8  65# 3x10 seat 8 65# 1x10  75#   2x10 seat 8  75#   3x10 seat 8    SLR 2x10 2# 2x10 3#   3x10 3x10 3x10 1#  3x10 1# 2x10  1# 2x10 2#    Minisquats 3x10 3x10   3x10 3x10 3x10 3x10 3x10 3x10   Nustep for LE endurance Seat 10 L  1-2-3-1 9' Seat 10 L  1-2-3-1 9'   Rec bike 1' seat 15 L knee p! , Nu step 8'  Seat 10 L 1-2-3-1 9' Seat 10 L 1-2-3-1 9' Seat 10 L 1-2-3-1 9' Seat 10 L  1-2-3-1 9' Seat 10 L  1-2-3-1 9'   Standing ITB stretch  10\" x 10  10\" x 10         10\" x 10    Ther Activity             STS 2x10 2x10   2x10 2x10 2x10 2x10 2x10 2x10   Step ups             Step downs  3x10 R 4\" 2x10 R 6\"   2x10 R 4\"  2x10 R 4\"  3x10 R 4\"  3x10 R 4\"  3x10 R 4\" 3x10 R 4\"   Single manish step over 10x high 10x high   10x high 10x high 10x high 10x high 10x high 10x high   Gait Training                                       Modalities                                                                        "

## 2024-08-19 ENCOUNTER — OFFICE VISIT (OUTPATIENT)
Dept: PHYSICAL THERAPY | Facility: CLINIC | Age: 59
End: 2024-08-19
Payer: COMMERCIAL

## 2024-08-19 DIAGNOSIS — M16.11 PRIMARY OSTEOARTHRITIS OF RIGHT HIP: ICD-10-CM

## 2024-08-19 DIAGNOSIS — M25.551 PAIN IN RIGHT HIP: ICD-10-CM

## 2024-08-19 DIAGNOSIS — Z96.641 STATUS POST TOTAL HIP REPLACEMENT, RIGHT: Primary | ICD-10-CM

## 2024-08-19 PROCEDURE — 97110 THERAPEUTIC EXERCISES: CPT

## 2024-08-19 PROCEDURE — 97112 NEUROMUSCULAR REEDUCATION: CPT

## 2024-08-19 PROCEDURE — 97530 THERAPEUTIC ACTIVITIES: CPT

## 2024-08-19 NOTE — PROGRESS NOTES
"Daily Note     Today's date: 2024  Patient name: Teena Staples  : 1965  MRN: [id number]  Referring provider: Marlee Mccann MD  Dx:   Encounter Diagnosis     ICD-10-CM    1. Status post total hip replacement, right  Z96.641       2. Primary osteoarthritis of right hip  M16.11       3. Pain in right hip  M25.551           Start Time: 1100  Stop Time: 1145  Total time in clinic (min): 45 minutes    Subjective: Pt reports she got a spasm up higher on her lateral thigh last night and she wasn't sure if it was connected to the spasm she gets closer to her knee.       Objective: See treatment diary below      Assessment: Tolerated treatment well. Pt demonstrates good effort throughout session. Minimal curing given to facilitate proper exercise performance and technique. She tolerates program as outlined below. Good effort noted with all exercises. SLS continues to be challenging for pt. Fatigue reported with recent progressions made to program. Patient demonstrated fatigue post treatment, exhibited good technique with therapeutic exercises, and would benefit from continued PT      Plan: Continue per plan of care.      Precautions: Hx HTN; S/p R ADAN anterior approach 24 (Anterior hip precautions- No extension, ER, > 90 deg flex)    POC expires Unit limit Auth Expiration date PT/OT + Visit Limit?    N/A 24 P- 50 visits/BOMN; S- BOMN                 Visit/Unit Tracking  AUTH Status:  Date 7/26 7/29 7/31 8/2 8/5 8/7 8/14 8/16 8/19  7/22 7/24   50 visits Used 25 26 27 28 29 30 31 32 33  23 24    Remaining  25 24 23 22 21 20 19 18 17  27 26     FOTO:       Manuals    R hip flexion/abduction PROM        TB     Gentle hip flexor quad stretch to neutral in SL KT       TB LIBBY    Theraroll R ITB KT         TB                Neuro Re-Ed             Pt education regarding HEP, POC, and dx   2'  2' 2' 2' 2'     TKE 3\" 2x10 blue 3\" 2x10 blue 3\" 2x10 blue  3\" " "2x10 blue 3\" 2x10 blue 3\" 2x10 blue 3\" 2x10 blue 3\" 2x10 blue 3\" 2x10 blue   LAQ's  3x10  3# 3\" 3x10  3# 3\" 3x10  3# 3\"  3x10 3# 3\" 3x10 3# 3\" 3x10 3# 3\" 3x10 3# 3\" 3x10  3# 3\" 3x10  3# 3\"                SLS 3x30\" 3x30\" 3x30\"  3x30\"  3x30\" 3x30\" 3x30\" 3x30\" 3x30\"   TB BKFO     Blk 3x10 Blk 3x10 Blk 3x10 Blk 3x10 Blk 3x10 Blk 3x10   HR 3x10 3x10 3x10  3x10 3x10 3x10 3x10 3x10 3x10    Standing hamstring curls 3x10 3# 3x10 3# 3x10 3#  3x10 3# 3x10 3# 3x10 3# 3x10 3#  3x10 3# 3x10 3#   Standing hip abduction 3x10 3# rainer 3x10 3# rainer 3x10 3# rainer  3x10 3# rainer 3x10 3# rainer 3x10 3# rainer 3x10 3# rainer 3x10 3# rainer 3x10 3# rainer   Ther Ex             Bridges x30 x30 x30  x30 x30 x30 nv  x30   Standing marches 3x10 3# 3x10 3# 3x10 3#  3x10 3#  3x10 3#  3x10 3#  3x10 3#  3x10 3# 3x10 3#   Sidelying hip abd 3x10 1# 3x10 1# 3x10 1#  2x10 2x10 2x10 2x10 1#  3x10 1# 3x10 1#   SL Leg press 75#   3x10 seat 8  75#   3x10 seat 8  75#   3x10 seat 8   55# 3x10 seat 8  55# 3x10 seat 8  55# 3x10 seat 8  65# 3x10 seat 8 65# 1x10  75#   2x10 seat 8  75#   3x10 seat 8    SLR 2x10 2# 2x10 3# 2x10 3#  3x10 3x10 3x10 1#  3x10 1# 2x10  1# 2x10 2#    Minisquats 3x10 3x10 3x10  3x10 3x10 3x10 3x10 3x10 3x10   Nustep for LE endurance Seat 10 L  1-2-3-1 9' Seat 10 L  1-2-3-1 9' Seat 10 L  1-2-3-1 9'  Rec bike 1' seat 15 L knee p! , Nu step 8'  Seat 10 L 1-2-3-1 9' Seat 10 L 1-2-3-1 9' Seat 10 L 1-2-3-1 9' Seat 10 L  1-2-3-1 9' Seat 10 L  1-2-3-1 9'   Standing ITB stretch  10\" x 10  10\" x 10  10x10\"       10\" x 10    Ther Activity             STS 2x10 2x10 2x15  2x10 2x10 2x10 2x10 2x10 2x10   Step ups             Step downs  3x10 R 4\" 2x10 R 6\" 2x10 R 6\"  2x10 R 4\"  2x10 R 4\"  3x10 R 4\"  3x10 R 4\"  3x10 R 4\" 3x10 R 4\"   Single manish step over 10x high 10x high 10x high  10x high 10x high 10x high 10x high 10x high 10x high   Gait Training                                       Modalities                                                                        "

## 2024-08-20 ENCOUNTER — OFFICE VISIT (OUTPATIENT)
Dept: OBGYN CLINIC | Facility: CLINIC | Age: 59
End: 2024-08-20

## 2024-08-20 ENCOUNTER — HOSPITAL ENCOUNTER (OUTPATIENT)
Dept: RADIOLOGY | Facility: HOSPITAL | Age: 59
Discharge: HOME | End: 2024-08-20
Attending: ORTHOPAEDIC SURGERY
Payer: COMMERCIAL

## 2024-08-20 VITALS — HEIGHT: 68 IN | HEART RATE: 91 BPM | OXYGEN SATURATION: 94 % | WEIGHT: 256 LBS | BODY MASS INDEX: 38.8 KG/M2

## 2024-08-20 DIAGNOSIS — Z96.641 STATUS POST TOTAL HIP REPLACEMENT, RIGHT: ICD-10-CM

## 2024-08-20 DIAGNOSIS — Z96.641 STATUS POST TOTAL HIP REPLACEMENT, RIGHT: Primary | ICD-10-CM

## 2024-08-20 PROCEDURE — 73502 X-RAY EXAM HIP UNI 2-3 VIEWS: CPT

## 2024-08-20 PROCEDURE — 99024 POSTOP FOLLOW-UP VISIT: CPT | Performed by: ORTHOPAEDIC SURGERY

## 2024-08-20 NOTE — PROGRESS NOTES
Assessment:  1. Status post total hip replacement, right  XR hip/pelv 2-3 vws right if performed    Ambulatory referral to Physical Therapy          Plan:    Patient is 12 weeks s/p right anterior total hip arthroplasty, DOS 24.   Weightbearing as tolerated   XR right hip was reviewed in the office today   She is aware she will be on lifetime  antibiotics for dental prophylaxis   PT: s/p R ADAN patient has no restriction no precautions   Provided patient with IT band and hamstring stretching exercises   Will repeat XR right hip at the next office visit   She is to follow up in 9 months         The above stated was discussed in layman's terms and the patient expressed understanding.  All questions were answered to the patient's satisfaction.         Subjective:   Teena Staples is a 58 y.o. female who presents today 12 weeks s/p right anterior total hip arthroplasty, DOS 24. Patient is doing well. She notes no pain in her hip. She is walking a mile a day. And returned to all physical activity.  She has two more visits left in physical therapy.  She is using a cane when walking on a trail.       Review of systems negative unless otherwise specified in HPI    Past Medical History:   Diagnosis Date    Allergic     Allergic rhinitis     last assessed 14     Arthritis     Asthma     Bleeding per rectum     Last assessed 16     Colon polyp     Diverticulitis of colon     GERD (gastroesophageal reflux disease)     Hypercholesterolemia     Hypertension     last assessed 3/24/15     Hyponatremia     last assessed 9/17/15     Obesity        Past Surgical History:   Procedure Laterality Date     SECTION      COLONOSCOPY      FL INJECTION RIGHT SHOULDER (ARTHROGRAM)  3/18/2021    ND ARTHRP ACETBLR/PROX FEM PROSTC AGRFT/ALGRFT Right 2024    Procedure: RIGHT ARTHROPLASTY HIP TOTAL ANTERIOR;  Surgeon: Marlee Mccann MD;  Location:  MAIN OR;  Service: Orthopedics    WISDOM TOOTH EXTRACTION       WRIST SURGERY         Family History   Adopted: Yes   Problem Relation Age of Onset    No Known Problems Mother     Migraines Daughter        Social History     Occupational History     Comment: full time employment    Tobacco Use    Smoking status: Never    Smokeless tobacco: Never   Vaping Use    Vaping status: Never Used   Substance and Sexual Activity    Alcohol use: Yes     Comment: rare /    Drug use: No    Sexual activity: Yes         Current Outpatient Medications:     celecoxib (CeleBREX) 200 mg capsule, take 1 capsule by mouth twice a day, Disp: 30 capsule, Rfl: 1    Cholecalciferol (VITAMIN D) 2000 units CAPS, Take by mouth, Disp: , Rfl:     cholestyramine (QUESTRAN) 4 GM/DOSE powder, Take 1 packet (4 g total) by mouth 2 (two) times a day As needed for diarrhea, Disp: 378 g, Rfl: 5    Diclofenac Sodium (Voltaren) 1 %, Apply 2 g topically 4 (four) times a day, Disp: , Rfl:     diphenhydrAMINE-acetaminophen (TYLENOL PM)  MG TABS, Take 1 tablet by mouth daily at bedtime as needed for sleep, Disp: , Rfl:     Elderberry-Vitamin C-Zinc ([employer] GUMMY PO), Take 2 gums by mouth daily, Disp: , Rfl:     glucosamine-chondroitin 500-400 MG tablet, Take 1 tablet by mouth 3 (three) times a day, Disp: 100 tablet, Rfl: 2    loratadine (CLARITIN) 10 mg tablet, Take 10 mg by mouth daily, Disp: , Rfl:     mometasone (NASONEX) 50 mcg/act nasal spray, 2 sprays into each nostril daily, Disp: 17 g, Rfl: 0    Multiple Vitamins-Minerals (multivitamin with minerals) tablet, Take 1 tablet by mouth daily, Disp: 30 tablet, Rfl: 0    omeprazole (PriLOSEC) 10 mg delayed release capsule, Take 1 capsule (10 mg total) by mouth daily, Disp: 90 capsule, Rfl: 2    Probiotic Product (ALIGN) 4 MG CAPS, Take by mouth, Disp: , Rfl:     Rhubarb (ESTROVEN COMPLETE PO), Estroven, Disp: , Rfl:     simvastatin (ZOCOR) 5 MG tablet, Take 1 tablet (5 mg total) by mouth daily at bedtime, Disp: 90 tablet, Rfl: 0    spironolactone  (ALDACTONE) 50 mg tablet, Take 1 tablet (50 mg total) by mouth daily, Disp: 90 tablet, Rfl: 0    verapamil (Verelan) 180 MG 24 hr capsule, Take 1 capsule (180 mg total) by mouth daily before breakfast, Disp: 90 capsule, Rfl: 2    vitamin E, tocopherol, 400 units capsule, Take 1 capsule (400 Units total) by mouth daily, Disp: , Rfl:     ascorbic acid (VITAMIN C) 500 MG tablet, Take 1 tablet (500 mg total) by mouth 2 (two) times a day (Patient not taking: Reported on 8/20/2024), Disp: 60 tablet, Rfl: 1    aspirin (ECOTRIN LOW STRENGTH) 81 mg EC tablet, Take 1 tablet (81 mg total) by mouth 2 (two) times a day Take 1(one) 81 mg tablet twice daily x 35 days after total joint arthroplasty (Patient not taking: Reported on 8/20/2024), Disp: 70 tablet, Rfl: 0    docusate sodium (COLACE) 100 mg capsule, Take 1 capsule (100 mg total) by mouth 2 (two) times a day (Patient not taking: Reported on 6/11/2024), Disp: 10 capsule, Rfl: 0    ferrous sulfate 324 (65 Fe) mg, Take 1 tablet (324 mg total) by mouth 2 (two) times a day before meals (Patient not taking: Reported on 8/20/2024), Disp: 60 tablet, Rfl: 1    folic acid (FOLVITE) 1 mg tablet, take 1 tablet by mouth once daily (Patient not taking: Reported on 8/20/2024), Disp: 30 tablet, Rfl: 1    methocarbamol (ROBAXIN) 500 mg tablet, Take 1 tablet (500 mg total) by mouth 3 (three) times a day as needed for muscle spasms (Patient not taking: Reported on 7/18/2024), Disp: 30 tablet, Rfl: 0    ondansetron (ZOFRAN) 4 mg tablet, Take 1 tablet (4 mg total) by mouth every 8 (eight) hours as needed for nausea or vomiting (Patient not taking: Reported on 6/11/2024), Disp: 5 tablet, Rfl: 0    Allergies   Allergen Reactions    Levofloxacin Hives and Tachycardia     Annotation - [id number]: LIGHTHEADED    Sulfamethoxazole-Trimethoprim Other (See Comments)     unknown    Hydrocodone Bitartrate Er Other (See Comments)     Annotation - [id number]: FLUSHING    CHEST/RIB PAIN            Vitals:     "08/20/24 1407   Pulse: 91   SpO2: 94%     Body mass index is 38.92 kg/m².  Wt Readings from Last 3 Encounters:   08/20/24 116 kg (256 lb)   07/18/24 116 kg (256 lb)   06/11/24 116 kg (255 lb)       Objective:            Physical Exam  Physical Exam:      General Appearance:    Alert, cooperative, no distress, appears stated age   Head:    Normocephalic, without obvious abnormality, atraumatic   Eyes:    conjunctiva/corneas clear, both eyes         Nose:   Nares normal, septum midline, no drainage    Throat:   Lips normal; teeth and gums normal   Neck:    symmetrical, trachea midline, ;     thyroid:  no enlargement/   Back:     Symmetric, no curvature, ROM normal   Lungs:   No audible wheezing or labored breathing   Chest Wall:    No tenderness or deformity    Heart:    Regular rate and rhythm                         Pulses:   2+ and symmetric all extremities   Skin:   Skin color, texture, turgor normal, no rashes or lesions   Neurologic:   normal strength, sensation and reflexes     throughout                       Ortho Exam  Right hip  Surgical incision   No erythema or open wounds  Negative Stinchfield  No tenderness to palpation over the greater trochanteric region  Neurovascularly Intact Distally        Diagnostics, reviewed and taken today if performed as documented:  The attending physician has personally reviewed the pertinent films in PACS and interpretation is as follows: XR right hip demonstrates s/p ADAN adequate alignment of implant,no sign of loosening.       Procedures, if performed today:    Procedures    None performed      Scribe Attestation      I,:  Haley Guy MA am acting as a scribe while in the presence of the attending physician.:       I,:  Marlee Mccann MD personally performed the services described in this documentation    as scribed in my presence.:               Portions of the record may have been created with voice recognition software.  Occasional wrong word or \"sound a " "like\" substitutions may have occurred due to the inherent limitations of voice recognition software.  Read the chart carefully and recognize, using context, where substitutions have occurred.  "

## 2024-08-20 NOTE — PATIENT INSTRUCTIONS
Stretching:  IT band- Safe for A/P:                                                   Hamstring stretching:                                     Perform effective stretching exercises:   3 sets of 10 repetitions (rep)  Hold each rep for 20-30 seconds

## 2024-08-21 ENCOUNTER — OFFICE VISIT (OUTPATIENT)
Dept: PHYSICAL THERAPY | Facility: CLINIC | Age: 59
End: 2024-08-21
Payer: COMMERCIAL

## 2024-08-21 DIAGNOSIS — M25.551 PAIN IN RIGHT HIP: ICD-10-CM

## 2024-08-21 DIAGNOSIS — M16.11 PRIMARY OSTEOARTHRITIS OF RIGHT HIP: ICD-10-CM

## 2024-08-21 DIAGNOSIS — Z96.641 STATUS POST TOTAL HIP REPLACEMENT, RIGHT: Primary | ICD-10-CM

## 2024-08-21 PROCEDURE — 97110 THERAPEUTIC EXERCISES: CPT

## 2024-08-21 PROCEDURE — 97112 NEUROMUSCULAR REEDUCATION: CPT

## 2024-08-21 PROCEDURE — 97530 THERAPEUTIC ACTIVITIES: CPT

## 2024-08-21 NOTE — PROGRESS NOTES
"Daily Note     Today's date: 2024  Patient name: Teena Staples  : 1965  MRN: [id number]  Referring provider: Marlee Mccann MD  Dx:   Encounter Diagnosis     ICD-10-CM    1. Status post total hip replacement, right  Z96.641       2. Primary osteoarthritis of right hip  M16.11       3. Pain in right hip  M25.551           Start Time: 1100  Stop Time: 1145  Total time in clinic (min): 45 minutes    Subjective: Patient reports to physical therapy today stating she had her follow up with her surgeon and she was cleared with no precautions.         Objective: See treatment diary below      Assessment: Tolerated treatment well. Patient demonstrated fatigue post treatment, exhibited good technique with therapeutic exercises, and would benefit from continued PT. Continues to provide good effort during performance of exercises. Demonstrated stretches patient was given by her surgeon at her follow up visit to ensure her safety with their performance. Will continue to assess patient tolerance and progress next visit, as able.         Plan: Continue per plan of care.      Precautions: Hx HTN; S/p R ADAN anterior approach 24 (Anterior hip precautions- No extension, ER, > 90 deg flex)    POC expires Unit limit Auth Expiration date PT/OT + Visit Limit?    N/A 24 P- 50 visits/BOMN; S- BOMN                 Visit/Unit Tracking  AUTH Status:  Date 7/26 7/29 7/31 8/2 8/5 8/7 8/14 8/16 8/19 8/21     50 visits Used 25 26 27 28 29 30 31 32 33 34      Remaining  25 24 23 22 21 20 19 18 17 16       FOTO:       Manuals    R hip flexion/abduction PROM    TB + ER     TB     Gentle hip flexor quad stretch to neutral in SL KT       TB LIBBY    Theraroll R ITB KT         TB                Neuro Re-Ed             Pt education regarding HEP, POC, and dx   2' 2'    2'     TKE 3\" 2x10 blue 3\" 2x10 blue 3\" 2x10 blue 3\" 2x10 blue    3\" 2x10 blue 3\" 2x10 blue 3\" 2x10 blue   LAQ's  " "3x10  3# 3\" 3x10  3# 3\" 3x10  3# 3\" 3x10  3# 3\"    3x10 3# 3\" 3x10  3# 3\" 3x10  3# 3\"                SLS 3x30\" 3x30\" 3x30\" 3x30\"    3x30\" 3x30\" 3x30\"   TB BKFO        Blk 3x10 Blk 3x10 Blk 3x10   HR 3x10 3x10 3x10 3x10    3x10 3x10 3x10    Standing hamstring curls 3x10 3# 3x10 3# 3x10 3# 3x10 3#    3x10 3#  3x10 3# 3x10 3#   Standing hip abduction 3x10 3# rainer 3x10 3# rainer 3x10 3# rainer 3x10 3# rainer    3x10 3# rainer 3x10 3# rainer 3x10 3# rainer   Ther Ex             Bridges x30 x30 x30 X30     nv  x30   Standing marches 3x10 3# 3x10 3# 3x10 3# 3x10 3#    3x10 3#  3x10 3# 3x10 3#   Sidelying hip abd 3x10 1# 3x10 1# 3x10 1# 3x10 3#     2x10 1#  3x10 1# 3x10 1#   SL Leg press 75#   3x10 seat 8  75#   3x10 seat 8  75#   3x10 seat 8  75#   3x10 seat 8     65# 3x10 seat 8 65# 1x10  75#   2x10 seat 8  75#   3x10 seat 8    SLR 2x10 2# 2x10 3# 2x10 3# 2x10 3#    3x10 1# 2x10  1# 2x10 2#    Minisquats 3x10 3x10 3x10 3x10    3x10 3x10 3x10   Nustep for LE endurance Seat 10 L  1-2-3-1 9' Seat 10 L  1-2-3-1 9' Seat 10 L  1-2-3-1 9' Seat 10 L  1-2-3-1 9'    Seat 10 L 1-2-3-1 9' Seat 10 L  1-2-3-1 9' Seat 10 L  1-2-3-1 9'   Standing ITB stretch  10\" x 10  10\" x 10  10x10\" 10x10\"      10\" x 10    Ther Activity             STS 2x10 2x10 2x15 2x15    2x10 2x10 2x10   Step ups             Step downs  3x10 R 4\" 2x10 R 6\" 2x10 R 6\" 2x10 R 6\"    3x10 R 4\"  3x10 R 4\" 3x10 R 4\"   Single manish step over 10x high 10x high 10x high 10x high    10x high 10x high 10x high   Gait Training                                       Modalities                                                                            "

## 2024-08-23 ENCOUNTER — APPOINTMENT (OUTPATIENT)
Dept: PHYSICAL THERAPY | Facility: CLINIC | Age: 59
End: 2024-08-23
Payer: COMMERCIAL

## 2024-08-26 ENCOUNTER — OFFICE VISIT (OUTPATIENT)
Dept: PHYSICAL THERAPY | Facility: CLINIC | Age: 59
End: 2024-08-26
Payer: COMMERCIAL

## 2024-08-26 DIAGNOSIS — M16.11 PRIMARY OSTEOARTHRITIS OF RIGHT HIP: ICD-10-CM

## 2024-08-26 DIAGNOSIS — Z96.641 STATUS POST TOTAL HIP REPLACEMENT, RIGHT: Primary | ICD-10-CM

## 2024-08-26 DIAGNOSIS — M25.551 PAIN IN RIGHT HIP: ICD-10-CM

## 2024-08-26 PROCEDURE — 97110 THERAPEUTIC EXERCISES: CPT

## 2024-08-26 PROCEDURE — 97112 NEUROMUSCULAR REEDUCATION: CPT

## 2024-08-26 NOTE — PROGRESS NOTES
"Daily Note     Today's date: 2024  Patient name: Teena Staples  : 1965  MRN: [id number]  Referring provider: Marlee Mccann MD  Dx:   Encounter Diagnosis     ICD-10-CM    1. Status post total hip replacement, right  Z96.641       2. Primary osteoarthritis of right hip  M16.11       3. Pain in right hip  M25.551                      Subjective: Pt reports she had a long weekend and got home late last night so she is tired.       Objective: See treatment diary below      Assessment: Tolerated treatment well. Pt demonstrates good effort throughout session. Minimal curing given to facilitate proper exercise performance and technique. Increased weight for LAQs today. She demonstrates good recall of current program and is ready for approaching DC next visit.  Patient demonstrated fatigue post treatment, exhibited good technique with therapeutic exercises, and would benefit from continued PT      Plan: Continue per plan of care.  Potential discharge next visit.     Precautions: Hx HTN; S/p R ADAN anterior approach 24 (Anterior hip precautions- No extension, ER, > 90 deg flex)    POC expires Unit limit Auth Expiration date PT/OT + Visit Limit?    N/A 24 P- 50 visits/BOMN; S- BOMN                 Visit/Unit Tracking  AUTH Status:  Date 7/26 7/29 7/31 8/2 8/5 8/7 8/14 8/16 8/19 8/21 8/26    50 visits Used 25 26 27 28 29 30 31 32 33 34 35     Remaining  25 24 23 22 21 20 19 18 17 16 15      FOTO:       Manuals    R hip flexion/abduction PROM    TB + ER  declined   TB     Gentle hip flexor quad stretch to neutral in SL KT       TB LIBBY    Theraroll R ITB KT         TB                Neuro Re-Ed             Pt education regarding HEP, POC, and dx   2' 2' 2'   2'     TKE 3\" 2x10 blue 3\" 2x10 blue 3\" 2x10 blue 3\" 2x10 blue 3\" 2x10 blue   3\" 2x10 blue 3\" 2x10 blue 3\" 2x10 blue   LAQ's  3x10  3# 3\" 3x10  3# 3\" 3x10  3# 3\" 3x10  3# 3\" 3x10  4# 3\"   3x10 3# 3\" " "3x10  3# 3\" 3x10  3# 3\"                SLS 3x30\" 3x30\" 3x30\" 3x30\" 3x30\"   3x30\" 3x30\" 3x30\"   TB BKFO        Blk 3x10 Blk 3x10 Blk 3x10   HR 3x10 3x10 3x10 3x10 3x10   3x10 3x10 3x10    Standing hamstring curls 3x10 3# 3x10 3# 3x10 3# 3x10 3# 3x10 3#   3x10 3#  3x10 3# 3x10 3#   Standing hip abduction 3x10 3# rainer 3x10 3# rainer 3x10 3# rainer 3x10 3# rainer 3x10 3# rainer   3x10 3# rainer 3x10 3# rainer 3x10 3# rainer   Ther Ex             Bridges x30 x30 x30 X30  x30   nv  x30   Standing marches 3x10 3# 3x10 3# 3x10 3# 3x10 3# 3x10 3#   3x10 3#  3x10 3# 3x10 3#   Sidelying hip abd 3x10 1# 3x10 1# 3x10 1# 3x10 3#  2x10 3#   2x10 1#  3x10 1# 3x10 1#   SL Leg press 75#   3x10 seat 8  75#   3x10 seat 8  75#   3x10 seat 8  75#   3x10 seat 8  75#   3x10 seat 8    65# 3x10 seat 8 65# 1x10  75#   2x10 seat 8  75#   3x10 seat 8    SLR 2x10 2# 2x10 3# 2x10 3# 2x10 3# 2x10 3#   3x10 1# 2x10  1# 2x10 2#    Minisquats 3x10 3x10 3x10 3x10 3x10   3x10 3x10 3x10   Nustep for LE endurance Seat 10 L  1-2-3-1 9' Seat 10 L  1-2-3-1 9' Seat 10 L  1-2-3-1 9' Seat 10 L  1-2-3-1 9' Seat 10 L  1-2-3-1 9'   Seat 10 L 1-2-3-1 9' Seat 10 L  1-2-3-1 9' Seat 10 L  1-2-3-1 9'   Standing ITB stretch  10\" x 10  10\" x 10  10x10\" 10x10\" 10x10\"     10\" x 10    Ther Activity             STS 2x10 2x10 2x15 2x15 2x15   2x10 2x10 2x10   Step ups             Step downs  3x10 R 4\" 2x10 R 6\" 2x10 R 6\" 2x10 R 6\" 2x10 R 6\"   3x10 R 4\"  3x10 R 4\" 3x10 R 4\"   Single manish step over 10x high 10x high 10x high 10x high 10x high   10x high 10x high 10x high   Gait Training                                       Modalities                                                                              "

## 2024-08-28 ENCOUNTER — OFFICE VISIT (OUTPATIENT)
Dept: PHYSICAL THERAPY | Facility: CLINIC | Age: 59
End: 2024-08-28
Payer: COMMERCIAL

## 2024-08-28 DIAGNOSIS — M16.11 PRIMARY OSTEOARTHRITIS OF RIGHT HIP: ICD-10-CM

## 2024-08-28 DIAGNOSIS — Z96.641 STATUS POST TOTAL HIP REPLACEMENT, RIGHT: Primary | ICD-10-CM

## 2024-08-28 PROCEDURE — 97110 THERAPEUTIC EXERCISES: CPT

## 2024-08-28 PROCEDURE — 97112 NEUROMUSCULAR REEDUCATION: CPT

## 2024-08-28 PROCEDURE — 97530 THERAPEUTIC ACTIVITIES: CPT

## 2024-08-28 NOTE — PROGRESS NOTES
"PT Discharge     Today's date: 2024  Patient name: Teena Staples  : 1965  MRN: [id number]  Referring provider: Marlee Mccann MD  Dx:   Encounter Diagnosis     ICD-10-CM    1. Status post total hip replacement, right  Z96.641       2. Primary osteoarthritis of right hip  M16.11           Start Time: 1100  Stop Time: 1145  Total time in clinic (min): 45 minutes    Subjective: Patient reports to physical therapy today stating she is feeling good and ready to discharge to a HEP.         Objective: See treatment diary below      Assessment: Tolerated treatment well. Patient demonstrated fatigue post treatment, exhibited good technique with therapeutic exercises, and would benefit from continued PT. Continues to provide good effort during performance of exercises. Minimal verbal cues needed t correct patient form during performance of exercises. Patient was provided updated HEP to continue to maintain therapeutic gains post-discharge. She is to be discharged from PT at this time secondary to meeting therapeutic and functional goals.         Plan: Patient to be discharged at this time.      Precautions: Hx HTN; S/p R ADAN anterior approach 24 (Anterior hip precautions- No extension, ER, > 90 deg flex)    POC expires Unit limit Auth Expiration date PT/OT + Visit Limit?    N/A 24 P- 50 visits/BOMN; S- BOMN                 Visit/Unit Tracking  AUTH Status:  Date 7/26 7/29 7/31 8/2 8/5 8/7 8/14 8/16 8/19 8/21 8/26 8/28   50 visits Used 25 26 27 28 29 30 31 32 33 34 35 36    Remaining  25 24 23 22 21 20 19 18 17 16 15 14     FOTO: done       Manuals    R hip flexion/abduction PROM    TB + ER  declined   TB     Gentle hip flexor quad stretch to neutral in SL KT       TB LIBBY    Theraroll R ITB KT         TB                Neuro Re-Ed             Pt education regarding HEP, POC, and dx   2' 2' 2' 2'  2'     TKE 3\" 2x10 blue 3\" 2x10 blue 3\" 2x10 blue 3\" " "2x10 blue 3\" 2x10 blue 3\" 2x10 blue  3\" 2x10 blue 3\" 2x10 blue 3\" 2x10 blue   LAQ's  3x10  3# 3\" 3x10  3# 3\" 3x10  3# 3\" 3x10  3# 3\" 3x10  4# 3\"   3x10 3# 3\" 3x10  3# 3\" 3x10  3# 3\"                SLS 3x30\" 3x30\" 3x30\" 3x30\" 3x30\" 3x30\"  3x30\" 3x30\" 3x30\"   TB BKFO        Blk 3x10 Blk 3x10 Blk 3x10   HR 3x10 3x10 3x10 3x10 3x10 3x10  3x10 3x10 3x10    Standing hamstring curls 3x10 3# 3x10 3# 3x10 3# 3x10 3# 3x10 3# 3x10 3#   3x10 3#  3x10 3# 3x10 3#   Standing hip abduction 3x10 3# rainer 3x10 3# rainer 3x10 3# rainer 3x10 3# rainer 3x10 3# rainer 3x10 3#   3x10 3# rainer 3x10 3# rainer 3x10 3# rainer   Ther Ex             Bridges x30 x30 x30 X30  x30   nv  x30   Standing marches 3x10 3# 3x10 3# 3x10 3# 3x10 3# 3x10 3# 3x10 3#   3x10 3#  3x10 3# 3x10 3#   Sidelying hip abd 3x10 1# 3x10 1# 3x10 1# 3x10 3#  2x10 3#   2x10 1#  3x10 1# 3x10 1#   SL Leg press 75#   3x10 seat 8  75#   3x10 seat 8  75#   3x10 seat 8  75#   3x10 seat 8  75#   3x10 seat 8  85#   3x10 seat 8   65# 3x10 seat 8 65# 1x10  75#   2x10 seat 8  75#   3x10 seat 8    SLR 2x10 2# 2x10 3# 2x10 3# 2x10 3# 2x10 3#   3x10 1# 2x10  1# 2x10 2#    Minisquats 3x10 3x10 3x10 3x10 3x10 3x10  3x10 3x10 3x10   Nustep for LE endurance Seat 10 L  1-2-3-1 9' Seat 10 L  1-2-3-1 9' Seat 10 L  1-2-3-1 9' Seat 10 L  1-2-3-1 9' Seat 10 L  1-2-3-1 9' Seat 10 L  1-2-3-1 9'  Seat 10 L 1-2-3-1 9' Seat 10 L  1-2-3-1 9' Seat 10 L  1-2-3-1 9'   Standing ITB stretch  10\" x 10  10\" x 10  10x10\" 10x10\" 10x10\"     10\" x 10    Ther Activity             STS 2x10 2x10 2x15 2x15 2x15 2x15  2x10 2x10 2x10   Step ups             Step downs  3x10 R 4\" 2x10 R 6\" 2x10 R 6\" 2x10 R 6\" 2x10 R 6\" 2x10 R 6\"  3x10 R 4\"  3x10 R 4\" 3x10 R 4\"   Single manish step over 10x high 10x high 10x high 10x high 10x high 12x high   10x high 10x high 10x high   Gait Training                                       Modalities                                                                                "

## 2024-08-30 ENCOUNTER — APPOINTMENT (OUTPATIENT)
Dept: PHYSICAL THERAPY | Facility: CLINIC | Age: 59
End: 2024-08-30
Payer: COMMERCIAL

## 2024-08-30 DIAGNOSIS — I10 BENIGN ESSENTIAL HYPERTENSION: ICD-10-CM

## 2024-08-30 DIAGNOSIS — L65.9 ALOPECIA: ICD-10-CM

## 2024-08-30 DIAGNOSIS — J45.909 MODERATE ASTHMA WITHOUT COMPLICATION, UNSPECIFIED WHETHER PERSISTENT: ICD-10-CM

## 2024-08-30 DIAGNOSIS — I10 HYPERTENSION, UNSPECIFIED TYPE: ICD-10-CM

## 2024-08-30 RX ORDER — SPIRONOLACTONE 50 MG/1
50 TABLET, FILM COATED ORAL DAILY
Qty: 90 TABLET | Refills: 0 | Status: SHIPPED | OUTPATIENT
Start: 2024-08-30

## 2024-09-03 ENCOUNTER — OFFICE VISIT (OUTPATIENT)
Dept: INTERNAL MEDICINE CLINIC | Facility: CLINIC | Age: 59
End: 2024-09-03
Payer: COMMERCIAL

## 2024-09-03 ENCOUNTER — HOSPITAL ENCOUNTER (OUTPATIENT)
Dept: RADIOLOGY | Facility: HOSPITAL | Age: 59
Discharge: HOME | End: 2024-09-03
Payer: COMMERCIAL

## 2024-09-03 VITALS
RESPIRATION RATE: 18 BRPM | DIASTOLIC BLOOD PRESSURE: 78 MMHG | SYSTOLIC BLOOD PRESSURE: 128 MMHG | WEIGHT: 254.2 LBS | OXYGEN SATURATION: 98 % | BODY MASS INDEX: 38.52 KG/M2 | HEART RATE: 90 BPM | TEMPERATURE: 98.3 F | HEIGHT: 68 IN

## 2024-09-03 DIAGNOSIS — M25.572 ACUTE LEFT ANKLE PAIN: ICD-10-CM

## 2024-09-03 DIAGNOSIS — R21 RASH: Primary | ICD-10-CM

## 2024-09-03 DIAGNOSIS — L50.9 URTICARIA: ICD-10-CM

## 2024-09-03 PROCEDURE — 73610 X-RAY EXAM OF ANKLE: CPT

## 2024-09-03 PROCEDURE — 99214 OFFICE O/P EST MOD 30 MIN: CPT | Performed by: INTERNAL MEDICINE

## 2024-09-03 RX ORDER — METHYLPREDNISOLONE 4 MG
TABLET, DOSE PACK ORAL
Qty: 21 EACH | Refills: 0 | Status: SHIPPED | OUTPATIENT
Start: 2024-09-03

## 2024-09-03 NOTE — PROGRESS NOTES
Ambulatory Visit  Name: Teena Staples      : 1965      MRN: [id number]  Encounter Provider: Juan Grover MD  Encounter Date: 9/3/2024   Encounter department: [facility] [place]    Assessment & Plan     Ongoing rash and urticaria to b/l arms, no rash on my exam today, worse at night, denies any new detergents, foods, allergies, animals, everything is the same.    Acute left ankle pain, h/o left plantar fascitis. Some pain with movement.     1. Rash  -     methylPREDNISolone 4 MG tablet therapy pack; Use as directed on package  2. Urticaria  -     methylPREDNISolone 4 MG tablet therapy pack; Use as directed on package  3. Acute left ankle pain  -     XR ankle 3+ vw left; Future; Expected date: 2024      Depression Screening and Follow-up Plan: Clincally patient does not have depression. No treatment is required.     History of Present Illness   Here with rash and urticaria.        Review of Systems   Constitutional:  Negative for chills and fever.   HENT:  Negative for ear pain and sore throat.    Eyes:  Negative for pain and visual disturbance.   Respiratory:  Negative for cough and shortness of breath.    Cardiovascular:  Negative for chest pain and palpitations.   Gastrointestinal:  Negative for abdominal pain and vomiting.   Genitourinary:  Negative for dysuria and hematuria.   Musculoskeletal:  Positive for arthralgias. Negative for back pain.   Skin:  Positive for rash. Negative for color change.   Neurological:  Negative for seizures and syncope.   All other systems reviewed and are negative.    Past Medical History   Past Medical History:   Diagnosis Date    Allergic     Allergic rhinitis     last assessed 14     Arthritis     Asthma     Bleeding per rectum     Last assessed 16     Colon polyp     Diverticulitis of colon     GERD (gastroesophageal reflux disease)     Hypercholesterolemia     Hypertension     last assessed 3/24/15     Hyponatremia     last  assessed 9/17/15     Obesity      Past Surgical History:   Procedure Laterality Date     SECTION      COLONOSCOPY      FL INJECTION RIGHT SHOULDER (ARTHROGRAM)  3/18/2021    SD ARTHRP ACETBLR/PROX FEM PROSTC AGRFT/ALGRFT Right 2024    Procedure: RIGHT ARTHROPLASTY HIP TOTAL ANTERIOR;  Surgeon: Marlee Mccann MD;  Location:  MAIN OR;  Service: Orthopedics    WISDOM TOOTH EXTRACTION      WRIST SURGERY       Family History   Adopted: Yes   Problem Relation Age of Onset    No Known Problems Mother     Migraines Daughter      Current Outpatient Medications on File Prior to Visit   Medication Sig Dispense Refill    celecoxib (CeleBREX) 200 mg capsule take 1 capsule by mouth twice a day 30 capsule 1    Cholecalciferol (VITAMIN D) 2000 units CAPS Take by mouth      cholestyramine (QUESTRAN) 4 GM/DOSE powder Take 1 packet (4 g total) by mouth 2 (two) times a day As needed for diarrhea 378 g 5    Diclofenac Sodium (Voltaren) 1 % Apply 2 g topically 4 (four) times a day      diphenhydrAMINE-acetaminophen (TYLENOL PM)  MG TABS Take 1 tablet by mouth daily at bedtime as needed for sleep      Elderberry-Vitamin C-Zinc (ELDERBERRY IMMUNE HEALTH GUMMY PO) Take 2 gums by mouth daily      glucosamine-chondroitin 500-400 MG tablet Take 1 tablet by mouth 3 (three) times a day 100 tablet 2    loratadine (CLARITIN) 10 mg tablet Take 10 mg by mouth daily      mometasone (NASONEX) 50 mcg/act nasal spray 2 sprays into each nostril daily 17 g 0    Multiple Vitamins-Minerals (multivitamin with minerals) tablet Take 1 tablet by mouth daily 30 tablet 0    omeprazole (PriLOSEC) 10 mg delayed release capsule Take 1 capsule (10 mg total) by mouth daily 90 capsule 2    Probiotic Product (ALIGN) 4 MG CAPS Take by mouth      Rhubarb (ESTROVEN COMPLETE PO) Estroven      simvastatin (ZOCOR) 5 MG tablet Take 1 tablet (5 mg total) by mouth daily at bedtime 90 tablet 0    spironolactone (ALDACTONE) 50 mg tablet Take 1 tablet (50  mg total) by mouth daily 90 tablet 0    verapamil (Verelan) 180 MG 24 hr capsule Take 1 capsule (180 mg total) by mouth daily before breakfast 90 capsule 2    vitamin E, tocopherol, 400 units capsule Take 1 capsule (400 Units total) by mouth daily      ascorbic acid (VITAMIN C) 500 MG tablet Take 1 tablet (500 mg total) by mouth 2 (two) times a day (Patient not taking: Reported on 8/20/2024) 60 tablet 1    aspirin (ECOTRIN LOW STRENGTH) 81 mg EC tablet Take 1 tablet (81 mg total) by mouth 2 (two) times a day Take 1(one) 81 mg tablet twice daily x 35 days after total joint arthroplasty (Patient not taking: Reported on 8/20/2024) 70 tablet 0    docusate sodium (COLACE) 100 mg capsule Take 1 capsule (100 mg total) by mouth 2 (two) times a day (Patient not taking: Reported on 6/11/2024) 10 capsule 0    ferrous sulfate 324 (65 Fe) mg Take 1 tablet (324 mg total) by mouth 2 (two) times a day before meals (Patient not taking: Reported on 8/20/2024) 60 tablet 1    folic acid (FOLVITE) 1 mg tablet take 1 tablet by mouth once daily (Patient not taking: Reported on 8/20/2024) 30 tablet 1    methocarbamol (ROBAXIN) 500 mg tablet Take 1 tablet (500 mg total) by mouth 3 (three) times a day as needed for muscle spasms (Patient not taking: Reported on 7/18/2024) 30 tablet 0    ondansetron (ZOFRAN) 4 mg tablet Take 1 tablet (4 mg total) by mouth every 8 (eight) hours as needed for nausea or vomiting (Patient not taking: Reported on 6/11/2024) 5 tablet 0     No current facility-administered medications on file prior to visit.     Allergies   Allergen Reactions    Levofloxacin Hives and Tachycardia     Annotation - 15Jan2013: LIGHTHEADED    Sulfamethoxazole-Trimethoprim Other (See Comments)     unknown    Hydrocodone Bitartrate Er Other (See Comments)     Annotation - 15Jan2013: FLUSHING    CHEST/RIB PAIN      Objective   /78 (BP Location: Right arm, Patient Position: Sitting, Cuff Size: Standard)   Pulse 90   Temp 98.3 °F  "(36.8 °C) (Tympanic)   Resp 18   Ht 5' 8\" (1.727 m)   Wt 115 kg (254 lb 3.2 oz)   SpO2 98%   BMI 38.65 kg/m²     Physical Exam  Vitals and nursing note reviewed.   Constitutional:       General: She is not in acute distress.     Appearance: She is well-developed. She is obese.   HENT:      Head: Normocephalic and atraumatic.   Eyes:      Conjunctiva/sclera: Conjunctivae normal.   Cardiovascular:      Rate and Rhythm: Normal rate.      Heart sounds: No murmur heard.  Pulmonary:      Effort: Pulmonary effort is normal. No respiratory distress.   Abdominal:      Tenderness: There is no abdominal tenderness.   Musculoskeletal:         General: No swelling.   Skin:     General: Skin is dry.   Neurological:      Mental Status: She is alert and oriented to person, place, and time.   Psychiatric:         Mood and Affect: Mood normal.     I have spent a total time of 15 minutes in caring for this patient on the day of the visit/encounter including Importance of tx compliance, Impressions, and Obtaining or reviewing history  .        "

## 2024-09-04 DIAGNOSIS — M16.11 PRIMARY OSTEOARTHRITIS OF RIGHT HIP: ICD-10-CM

## 2024-09-04 RX ORDER — CELECOXIB 200 MG/1
200 CAPSULE ORAL 2 TIMES DAILY
Qty: 30 CAPSULE | Refills: 5 | Status: SHIPPED | OUTPATIENT
Start: 2024-09-04

## 2024-09-05 ENCOUNTER — OFFICE VISIT (OUTPATIENT)
Dept: OBGYN CLINIC | Facility: CLINIC | Age: 59
End: 2024-09-05
Payer: COMMERCIAL

## 2024-09-05 VITALS
OXYGEN SATURATION: 98 % | HEART RATE: 114 BPM | BODY MASS INDEX: 38.49 KG/M2 | WEIGHT: 254 LBS | SYSTOLIC BLOOD PRESSURE: 129 MMHG | HEIGHT: 68 IN | DIASTOLIC BLOOD PRESSURE: 83 MMHG

## 2024-09-05 DIAGNOSIS — M72.2 PLANTAR FASCIITIS OF LEFT FOOT: ICD-10-CM

## 2024-09-05 DIAGNOSIS — S96.912A STRAIN OF LEFT ANKLE, INITIAL ENCOUNTER: Primary | ICD-10-CM

## 2024-09-05 DIAGNOSIS — M76.62 INSERTIONAL TENDINOPATHY OF LEFT ACHILLES TENDON: ICD-10-CM

## 2024-09-05 PROCEDURE — 99213 OFFICE O/P EST LOW 20 MIN: CPT | Performed by: FAMILY MEDICINE

## 2024-09-05 NOTE — PROGRESS NOTES
Assessment/Plan:  Assessment & Plan   Diagnoses and all orders for this visit:    Strain of left ankle, initial encounter  -     Brace    Plantar fasciitis of left foot    Insertional tendinopathy of left Achilles tendon      58-year-old female with left heel and ankle pain 6 weeks duration.  Discussed with patient physical exam, radiographs, impression, and plan.  X-rays of the left ankle unremarkable for acute osseous abnormality.  There are plantar and retrocalcaneal spurring.  Imaging studies, clinical exam, and history suggestive symptoms due to combination of plantar fasciitis and altered weightbearing causing to strain of the ankle.  [place]set treatment regimen of stabilization and stretching.  She declines injection at this time.  She is to continue with plantar fascia straps.  I provided ankle brace which she may wear for support during activity.  She may apply topical diclofenac gel 3-4 times a day as needed.  She is to do icing after activity.  She may do frozen water bottle rub and golf ball/tennis ball massage.  She may do Epsom salt soaks.  Recommend she visit athletic shoe store to be advised on supportive footwear.  She will follow-up as needed.      Subjective:   Patient ID: Teena Staples is a 58 y.o. female.  Chief Complaint   Patient presents with    Left Ankle - Pain        58-year-old female presents for evaluation of left heel and ankle pain 6 weeks duration.  She denies trauma or inciting event.  She is status post right hip arthroplasty and she has begun being more active with flat surface walking.  She reports having pain described as gradual in onset, localized to the heel, worse with bearing weight and ambulating, and improved with resting.  She reports previous history of plantar fasciitis on the contralateral lower extremity so she started with splinting at night and doing her stretching.  She has also been on Celebrex.  She reports also developing pain generalized to the ankle but worse  "to the lateral aspect, associate with swelling, worse with prolonged walking, and improved with resting.    Ankle Pain  This is a new problem. The current episode started more than 1 month ago. The problem occurs daily. The problem has been waxing and waning. Associated symptoms include arthralgias and joint swelling. Pertinent negatives include no numbness or weakness. The symptoms are aggravated by standing and walking. She has tried rest, position changes and NSAIDs (Stretching) for the symptoms. The treatment provided mild relief.               Review of Systems   Musculoskeletal:  Positive for arthralgias and joint swelling.   Neurological:  Negative for weakness and numbness.       Objective:  Vitals:    09/05/24 1133   BP: 129/83   Pulse: (!) 114   SpO2: 98%   Weight: 115 kg (254 lb)   Height: 5' 8\" (1.727 m)      Left Ankle Exam   Swelling: mild    Range of Motion   Dorsiflexion:  10   Plantar flexion:  normal   Eversion:  5 normal   Inversion:  5     Muscle Strength   Dorsiflexion:  5/5   Plantar flexion:  5/5     Other   Sensation: normal  Pulse: present          Observations   Left Ankle/Foot   Negative for deformity.     Tenderness   Left Ankle/Foot   Tenderness in the Achilles insertion, anterior talofibular ligament, deltoid ligament, lateral malleolus, medial malleolus and plantar fascia. No tenderness in the anterior ankle, fifth metatarsal base, calcaneofibular ligament, dorsum foot, mid-plantar aspect, peroneal tendon and proximal Achilles.     Additional Tenderness Details  Left  - plantar aspect calcaneus    Strength/Myotome Testing     Left Ankle/Foot   Dorsiflexion: 5  Plantar flexion: 5  Inversion: 5  Eversion: 5    Tests   Left Ankle/Foot   Negative for anterior drawer, calcaneal squeeze, posterior drawer, syndesmosis squeeze and syndesmosis external rotation.       Physical Exam  Vitals and nursing note reviewed.   Constitutional:       Appearance: Normal appearance. She is well-developed. " She is not ill-appearing or diaphoretic.   HENT:      Head: Normocephalic and atraumatic.      Right Ear: External ear normal.      Left Ear: External ear normal.   Eyes:      Conjunctiva/sclera: Conjunctivae normal.   Neck:      Trachea: No tracheal deviation.   Cardiovascular:      Comments: Tachycardic  Pulmonary:      Effort: Pulmonary effort is normal. No respiratory distress.   Abdominal:      General: There is no distension.   Musculoskeletal:         General: Swelling and tenderness present.      Left ankle: Tenderness present over the lateral malleolus, medial malleolus and ATF ligament. No CF ligament or base of 5th metatarsal tenderness. Anterior drawer test negative.      Left foot: No deformity.   Skin:     General: Skin is warm and dry.      Coloration: Skin is not jaundiced or pale.   Neurological:      Mental Status: She is alert and oriented to person, place, and time.   Psychiatric:         Mood and Affect: Mood normal.         Behavior: Behavior normal.         Thought Content: Thought content normal.         Judgment: Judgment normal.         I have personally reviewed pertinent films in PACS and my interpretation is  .  X-rays of the left ankle unremarkable for acute osseous abnormality.  There are plantar and retrocalcaneal spurring.

## 2024-09-05 NOTE — PATIENT INSTRUCTIONS
Epsom Salt  Soaks    Golf ball/tennis ball massage    Ice after activity  -frozen water bottle rub    Ankle Brace during activity    Topical diclofenac gel/voltaren  - 3-4 times daily as needed    [employer] [facility] in [place], [place]

## 2024-09-16 DIAGNOSIS — J45.909 MODERATE ASTHMA WITHOUT COMPLICATION, UNSPECIFIED WHETHER PERSISTENT: ICD-10-CM

## 2024-09-16 DIAGNOSIS — R73.03 PREDIABETES: ICD-10-CM

## 2024-09-16 DIAGNOSIS — J01.91 ACUTE RECURRENT SINUSITIS, UNSPECIFIED LOCATION: ICD-10-CM

## 2024-09-16 DIAGNOSIS — I10 HYPERTENSION, UNSPECIFIED TYPE: ICD-10-CM

## 2024-09-16 DIAGNOSIS — K21.9 GASTROESOPHAGEAL REFLUX DISEASE: ICD-10-CM

## 2024-09-16 DIAGNOSIS — I10 BENIGN ESSENTIAL HYPERTENSION: ICD-10-CM

## 2024-09-16 DIAGNOSIS — E78.5 HYPERLIPIDEMIA, UNSPECIFIED HYPERLIPIDEMIA TYPE: ICD-10-CM

## 2024-09-16 DIAGNOSIS — M54.16 LUMBAR RADICULOPATHY: ICD-10-CM

## 2024-09-16 DIAGNOSIS — L65.9 ALOPECIA: ICD-10-CM

## 2024-09-16 DIAGNOSIS — M79.89 LEG SWELLING: ICD-10-CM

## 2024-09-16 DIAGNOSIS — L98.9 SKIN LESION: ICD-10-CM

## 2024-09-17 RX ORDER — MOMETASONE FUROATE MONOHYDRATE 50 UG/1
2 SPRAY, METERED NASAL DAILY
Qty: 17 G | Refills: 0 | Status: SHIPPED | OUTPATIENT
Start: 2024-09-17

## 2024-09-19 DIAGNOSIS — E78.5 HYPERLIPIDEMIA, UNSPECIFIED HYPERLIPIDEMIA TYPE: ICD-10-CM

## 2024-09-19 DIAGNOSIS — L65.9 ALOPECIA: ICD-10-CM

## 2024-09-19 DIAGNOSIS — J45.909 MODERATE ASTHMA WITHOUT COMPLICATION, UNSPECIFIED WHETHER PERSISTENT: ICD-10-CM

## 2024-09-19 DIAGNOSIS — M54.16 LUMBAR RADICULOPATHY: ICD-10-CM

## 2024-09-19 DIAGNOSIS — I10 BENIGN ESSENTIAL HYPERTENSION: ICD-10-CM

## 2024-09-19 DIAGNOSIS — R73.03 PREDIABETES: ICD-10-CM

## 2024-09-19 DIAGNOSIS — K21.9 GASTROESOPHAGEAL REFLUX DISEASE: ICD-10-CM

## 2024-09-19 DIAGNOSIS — I10 HYPERTENSION, UNSPECIFIED TYPE: ICD-10-CM

## 2024-09-19 RX ORDER — SIMVASTATIN 5 MG
5 TABLET ORAL
Qty: 90 TABLET | Refills: 0 | Status: SHIPPED | OUTPATIENT
Start: 2024-09-19

## 2024-10-11 NOTE — TELEPHONE ENCOUNTER
Caller: Patient    Doctor: Jignesh Solano    Reason for call: Patient would like to reschedule her hip injection; right      Call back#: [phone] or [phone]
Can you get this scheduled for anytime after 11/16/2022?
Statement Selected

## 2024-10-31 ENCOUNTER — OFFICE VISIT (OUTPATIENT)
Dept: DERMATOLOGY | Facility: CLINIC | Age: 59
End: 2024-10-31
Payer: COMMERCIAL

## 2024-10-31 VITALS — HEIGHT: 68 IN | TEMPERATURE: 98 F | BODY MASS INDEX: 38.19 KG/M2 | WEIGHT: 252 LBS

## 2024-10-31 DIAGNOSIS — D18.01 CHERRY ANGIOMA: ICD-10-CM

## 2024-10-31 DIAGNOSIS — D22.5 MULTIPLE BENIGN MELANOCYTIC NEVI OF UPPER AND LOWER EXTREMITIES AND TRUNK: ICD-10-CM

## 2024-10-31 DIAGNOSIS — L82.0 INFLAMED SEBORRHEIC KERATOSIS: ICD-10-CM

## 2024-10-31 DIAGNOSIS — L29.9 PRURITUS: ICD-10-CM

## 2024-10-31 DIAGNOSIS — L82.1 SK (SEBORRHEIC KERATOSIS): ICD-10-CM

## 2024-10-31 DIAGNOSIS — Z12.83 SKIN CANCER SCREENING: Primary | ICD-10-CM

## 2024-10-31 DIAGNOSIS — L85.3 XEROSIS OF SKIN: ICD-10-CM

## 2024-10-31 DIAGNOSIS — D22.62 MULTIPLE BENIGN MELANOCYTIC NEVI OF UPPER AND LOWER EXTREMITIES AND TRUNK: ICD-10-CM

## 2024-10-31 DIAGNOSIS — D22.61 MULTIPLE BENIGN MELANOCYTIC NEVI OF UPPER AND LOWER EXTREMITIES AND TRUNK: ICD-10-CM

## 2024-10-31 DIAGNOSIS — L81.4 LENTIGINES: ICD-10-CM

## 2024-10-31 DIAGNOSIS — D22.71 MULTIPLE BENIGN MELANOCYTIC NEVI OF UPPER AND LOWER EXTREMITIES AND TRUNK: ICD-10-CM

## 2024-10-31 DIAGNOSIS — D22.72 MULTIPLE BENIGN MELANOCYTIC NEVI OF UPPER AND LOWER EXTREMITIES AND TRUNK: ICD-10-CM

## 2024-10-31 DIAGNOSIS — L21.9 SEBORRHEIC DERMATITIS: ICD-10-CM

## 2024-10-31 PROCEDURE — 17110 DESTRUCTION B9 LES UP TO 14: CPT | Performed by: DERMATOLOGY

## 2024-10-31 PROCEDURE — 99214 OFFICE O/P EST MOD 30 MIN: CPT | Performed by: DERMATOLOGY

## 2024-10-31 RX ORDER — KETOCONAZOLE 20 MG/ML
SHAMPOO TOPICAL
Qty: 120 ML | Refills: 11 | Status: SHIPPED | OUTPATIENT
Start: 2024-10-31

## 2024-10-31 RX ORDER — KETOCONAZOLE 20 MG/ML
SHAMPOO TOPICAL
Qty: 120 ML | Refills: 11 | Status: CANCELLED | OUTPATIENT
Start: 2024-10-31

## 2024-10-31 RX ORDER — CALCIPOTRIENE 0.05 MG/ML
SOLUTION TOPICAL
Qty: 60 ML | Refills: 5 | Status: SHIPPED | OUTPATIENT
Start: 2024-10-31

## 2024-10-31 RX ORDER — CLOBETASOL PROPIONATE 0.5 MG/ML
SOLUTION TOPICAL
Qty: 50 ML | Refills: 3 | Status: SHIPPED | OUTPATIENT
Start: 2024-10-31

## 2024-10-31 RX ORDER — CALCIPOTRIENE AND BETAMETHASONE DIPROPIONATE 50; .5 UG/G; MG/G
SUSPENSION TOPICAL
Qty: 120 G | Refills: 11 | Status: CANCELLED | OUTPATIENT
Start: 2024-10-31

## 2024-10-31 NOTE — PATIENT INSTRUCTIONS
"SEBORRHEIC DERMATITIS, PSORIASIS      Assessment and Plan:  Based on a thorough discussion of this condition and the management approach to it (including a comprehensive discussion of the known risks, side effects and potential benefits of treatment), the patient (family) agrees to implement the following specific plan:  Clobetasol 0.05% solution - apply twice a day for two weeks at a time to scalp and ears taking at least a weeks break in between uses   Calcipotriene - betamethasone solution - in the one to two week break from the Clobetasol solution, apply this solution one to two times a day to ears and affected areas on scalp  Ketoconazole 2% Shampoo- Daily for 2 weeks straight and then on \"Mondays, Wednesdays and Fridays\":  Lather into scalp; leave on for 5 minutes and then rinse off completely.                  Seborrheic Dermatitis   Seborrheic dermatitis is a common, chronic or relapsing form of eczema/dermatitis that mainly affects the sebaceous, gland-rich regions of the scalp, face, and trunk.  There are infantile and adult forms of seborrhoeic dermatitis. It is sometimes associated with psoriasis and, in that clinical scenario, may be referred to as \"sebo-psoriasis.\"  Seborrheic dermatitis is also known as \"seborrheic eczema.\"  Dandruff (also called \"pityriasis capitis\") is an uninflamed form of seborrhoeic dermatitis. Dandruff presents as bran-like scaly patches scattered within hair-bearing areas of the scalp.  In an infant, this condition may be referred to as \"cradle cap.\"  The cause of seborrheic dermatitis is not completely understood. It is associated with proliferation of various species of the skin commensal Malassezia, in its yeast (non-pathogenic) form. Its metabolites (such as the fatty acids oleic acid, malssezin, and indole-3-carbaldehyde) may cause an inflammatory reaction. Differences in skin barrier lipid content and function may account for individual presentations.     Infantile " "Seborrheic Dermatitis  Infantile seborrheic dermatitis affects babies under the age of 3 months and usually resolves by 6-12 months of age.  Infantile seborrheic dermatitis causes \"cradle cap\" (diffuse, greasy scaling on scalp). The rash may spread to affect armpit and groin folds (a type of \"napkin dermatitis\").  There may be associated salmon-pink colored patches that may flake or peel.  The rash in this case is usually not especially itchy, so the baby often appears undisturbed by the rash, even when more generalized.     Adult Seborrheic Dermatitis  Adult seborrheic dermatitis tends to begin in late adolescence; prevalence is greatest in young adults and in the elderly. It is more common in males than in females.     The following factors are sometimes associated with severe adult seborrheic dermatitis:  Oily skin  Familial tendency to seborrhoeic dermatitis or a family history of psoriasis  Immunosuppression: organ transplant recipient, human immunodeficiency virus (HIV) infection and patients with lymphoma  Neurological and psychiatric diseases: Parkinson disease, tardive dyskinesia, depression, epilepsy, facial nerve palsy, spinal cord injury and congenital disorders such as Down syndrome  Treatment for psoriasis with psoralen and ultraviolet A (PUVA) therapy  Lack of sleep  Stressful events.     In adults, seborrheic dermatitis may typically affect the scalp, face (creases around the nose, behind ears, within eyebrows) and upper trunk. Typical clinical features include:  Winter flares, improving in summer following sun exposure  Minimal itch most of the time  Combination oily and dry mid-facial skin  Ill-defined localized scaly patches or diffuse scale in the scalp  Blepharitis; scaly red eyelid margins  [place]-pink, thin, scaly, and ill-defined plaques in skin folds on both sides of the face  Petal or ring-shaped flaky patches on hair-line and on anterior chest  Rash in armpits, under the breasts, in the " "groin folds and genital creases  Superficial folliculitis (inflamed hair follicles) on cheeks and upper trunk     Seborrheic dermatitis is diagnosed by its clinical appearance and behavior. Skin biopsy may be helpful but is rarely necessary to make this diagnosis.      MELANOCYTIC NEVI (\"Moles\")     Assessment and Plan:  Based on a thorough discussion of this condition and the management approach to it (including a comprehensive discussion of the known risks, side effects and potential benefits of treatment), the patient (family) agrees to implement the following specific plan:  When outside we recommend using a wide brim hat, sunglasses, long sleeve and pants, sunscreen with SPF 30+ with reapplication every 2 hours, or SPF specific clothing   Benign, reassured  Annual skin check                 Melanocytic Nevi  Melanocytic nevi (\"moles\") are tan or brown, raised or flat areas of the skin which have an increased number of melanocytes. Melanocytes are the cells in our body which make pigment and account for skin color.     Some moles are present at birth (I.e., \"congenital nevi\"), while others come up later in life (i.e., \"acquired nevi\").  The sun can stimulate the body to make more moles.  Sunburns are not the only thing that triggers more moles.  Chronic sun exposure can do it too.      Clinically distinguishing a healthy mole from melanoma may be difficult, even for experienced dermatologists. The \"ABCDE's\" of moles have been suggested as a means of helping to alert a person to a suspicious mole and the possible increased risk of melanoma.  The suggestions for raising alert are as follows:     Asymmetry: Healthy moles tend to be symmetric, while melanomas are often asymmetric.  Asymmetry means if you draw a line through the mole, the two halves do not match in color, size, shape, or surface texture. Asymmetry can be a result of rapid enlargement of a mole, the development of a raised area on a previously flat " "lesion, scaling, ulceration, bleeding or scabbing within the mole.  Any mole that starts to demonstrate \"asymmetry\" should be examined promptly by a board certified dermatologist.      Border: Healthy moles tend to have discrete, even borders.  The border of a melanoma often blends into the normal skin and does not sharply delineate the mole from normal skin.  Any mole that starts to demonstrate \"uneven borders\" should be examined promptly by a board certified dermatologist.      Color: Healthy moles tend to be one color throughout.  Melanomas tend to be made up of different colors ranging from dark black, blue, white, or red.  Any mole that demonstrates a color change should be examined promptly by a board certified dermatologist.      Diameter: Healthy moles tend to be smaller than 0.6 cm in size; an exception are \"congenital nevi\" that can be larger.  Melanomas tend to grow and can often be greater than 0.6 cm (1/4 of an inch, or the size of a pencil eraser). This is only a guideline, and many normal moles may be larger than 0.6 cm without being unhealthy.  Any mole that starts to change in size (small to bigger or bigger to smaller) should be examined promptly by a board certified dermatologist.      Evolving: Healthy moles tend to \"stay the same.\"  Melanomas may often show signs of change or evolution such as a change in size, shape, color, or elevation.  Any mole that starts to itch, bleed, crust, burn, hurt, or ulcerate or demonstrate a change or evolution should be examined promptly by a board certified dermatologist.          LENTIGO     Assessment and Plan:  Based on a thorough discussion of this condition and the management approach to it (including a comprehensive discussion of the known risks, side effects and potential benefits of treatment), the patient (family) agrees to implement the following specific plan:  When outside we recommend using a wide brim hat, sunglasses, long sleeve and pants, " sunscreen with SPF 30+ with reapplication every 2 hours, or SPF specific clothing         What is a lentigo?  A lentigo is a pigmented flat or slightly raised lesion with a clearly defined edge. Unlike an ephelis (freckle), it does not fade in the winter months. There are several kinds of lentigo.  The name lentigo originally referred to its appearance resembling a small lentil. The plural of lentigo is lentigines, although “lentigos” is also in common use.     Who gets lentigines?  Lentigines can affect males and females of all ages and races. Solar lentigines are especially prevalent in fair skinned adults. Lentigines associated with syndromes are present at birth or arise during childhood.     What causes lentigines?  Common forms of lentigo are due to exposure to ultraviolet radiation:  Sun damage including sunburn   Indoor tanning   Phototherapy, especially photochemotherapy (PUVA)     Ionizing radiation, eg radiation therapy, can also cause lentigines.  Several familial syndromes associated with widespread lentigines originate from mutations in En[place]-MAP kinase, mTOR signaling and PTEN pathways.     What is the treatment for lentigines?  Most lentigines are left alone. Attempts to lighten them may not be successful. The following approaches are used:  SPF 50+ broad-spectrum sunscreen   Hydroquinone bleaching cream   Alpha hydroxy acids   Vitamin C   Retinoids   Azelaic acid   Chemical peels  Individual lesions can be permanently removed using:  Cryotherapy   Intense pulsed light   Pigment lasers     How can lentigines be prevented?  Lentigines associated with exposure ultraviolet radiation can be prevented by very careful sun protection. Clothing is more successful at preventing new lentigines than are sunscreens.     What is the outlook for lentigines?  Lentigines usually persist. They may increase in number with age and sun exposure. Some in sun-protected sites may fade and disappear.     TREVINO ANGIOMAS    "  Assessment and Plan:  Based on a thorough discussion of this condition and the management approach to it (including a comprehensive discussion of the known risks, side effects and potential benefits of treatment), the patient (family) agrees to implement the following specific plan:  Monitor for changes  Benign, reassured        Assessment and Plan:    Cherry angioma, also known as [place] spots, are benign vascular skin lesions. A \"cherry angioma\" is a firm red, blue or purple papule, 0.1-1 cm in diameter. When thrombosed, they can appear black in colour until evaluated with a dermatoscope when the red or purple colour is more easily seen. Cherry angioma may develop on any part of the body but most often appear on the scalp, face, lips and trunk.  An angioma is due to proliferating endothelial cells; these are the cells that line the inside of a blood vessel.     Angiomas can arise in early life or later in life; the most common type of angioma is a cherry angioma.  Cherry angiomas are very common in males and females of any age or race. They are more noticeable in white skin than in skin of colour. They markedly increase in number from about the age of 40. There may be a family history of similar lesions. Eruptive cherry angiomas have been rarely reported to be associated with internal malignancy. The cause of angiomas is unknown. Genetic analysis of cherry angiomas has shown that they frequently carry specific somatic missense mutations in the GNAQ and GNA11 (Q209H) genes, which are involved in other vascular and melanocytic proliferations.     INFLAMED SEBORRHEIC KERATOSIS  Assessment and Plan:  Based on a thorough discussion of this condition and the management approach to it (including a comprehensive discussion of the known risks, side effects and potential benefits of treatment), the patient (family) agrees to implement the following specific plan:  Treated with cryotherapy today due to " "associated itching and because the lesion is getting irritated; written and verbal consent obtained         Patient instructions:  Your lesions were treated with liquid nitrogen today. The treated areas will get more red, crusted over the next few days. There might be some blistering. Apply vaseline to the treated area for the next week to help it heal fully. Do not pick at the area. Return in 3-4 weeks for another round of liquid nitrogen treatment if lesion(s)  fails to fully resolve.      SEBORRHEIC KERATOSIS; NON-INFLAMED     Assessment and Plan:  Based on a thorough discussion of this condition and the management approach to it (including a comprehensive discussion of the known risks, side effects and potential benefits of treatment), the patient (family) agrees to implement the following specific plan:  Monitor for changes  Benign, reassured        Seborrheic Keratosis  A seborrheic keratosis is a harmless warty spot that appears during adult life as a common sign of skin aging.  Seborrheic keratoses can arise on any area of skin, covered or uncovered, with the usual exception of the palms and soles. They do not arise from mucous membranes. Seborrheic keratoses can have highly variable appearance.       Seborrheic keratoses are extremely common. It has been estimated that over 90% of adults over the age of 60 years have one or more of them. They occur in males and females of all races, typically beginning to erupt in the 30s or 40s. They are uncommon under the age of 20 years.  The precise cause of seborrhoeic keratoses is not known.  Seborrhoeic keratoses are considered degenerative in nature. As time goes by, seborrheic keratoses tend to become more numerous. Some people inherit a tendency to develop a very large number of them; some people may have hundreds of them.        There is no easy way to remove multiple lesions on a single occasion.  Unless a specific lesion is \"inflamed\" and is causing pain or " stinging/burning or is bleeding, most insurance companies do not authorize treatment.     PRURITUS     Assessment and Plan:  Based on a thorough discussion of this condition and the management approach to it (including a comprehensive discussion of the known risks, side effects and potential benefits of treatment), the patient (family) agrees to implement the following specific plan:  Can try Sarna anti itch lotion  Can try Pramoxine containing creams like CeraVe or Sarna     What is pruritus?  Pruritus is the medical term for itch. Itch is an unpleasant sensation on the skin that provokes the desire to rub or scratch the area to obtain relief. Itch can cause discomfort and frustration; in severe cases it can lead to disturbed sleep, anxiety and depression. Constant scratching to obtain relief can damage the skin (excoriation, lichenification) and reduce its effectiveness as a major protective barrier.      Pruritus is often a symptom of an underlying disease process such as a skin problem, a systemic disease, or abnormal nerve impulses.     What are skin signs of pruritus?   There are no specific skin signs associated with pruritus, apart from scratch marks (excoriations) and signs of the underlying condition.   Persistent scratching over a period of time may lead to:  Lichenification (thickened skin, lichen simplex)   Prurigo papules and nodules.     Who gets pruritus?  The epidemiology of pruritus depends on its underlying cause or causes. However, in general, the incidence of chronic pruritus increases with age, it is more common in women, and in those of  background.      Mechanisms underlying pruritus  Itch, like pain, can originate anywhere along the neural itch pathway, from the central nervous system (brain and spinal cord) to the peripheral nervous system and the skin.Mechanisms underlying pruritus are complex.  The itch signal is transmitted mainly through small, itch-selective C-fibers in the skin  in addition to histamine-triggered and non-histaminergic neurons.   These connect with secondary neurons which cross the opposite side of the spinothalamic tract and ascend to parts of the brain involved in sensation, emotion, reward and memory. These areas overlap with those activated by pain.   Patients with chronic pruritus usually have both peripheral and central hypersensitization (heightened reaction) which means they tend to overreact to noxious stimuli which normally inhibit itch (such as heat and scratching) and also misinterpret non-noxious stimuli as an itch (eg, light touch)     The way scratching stops itching has been explained by an interaction with pain pathways within the dorsal horn of the spinal cord.     Localized pruritus  Localized pruritus is pruritus that is confined to a certain part of the body. It can occur in association with a primary rash (eg dermatitis) or may occur because of hypersensitive nerves in the skin (neuropathic pruritus). Neuropathic pruritus is due to compression or degeneration of nerves in the skin, on route to the spine or in the spine itself. Neuropathic itch is sometimes associated with reduced or absent sweating in the affected area of skin.  Typical causes of localized itchy rashes  Scalp: seborrhoeic dermatitis, head lice   Back: [place] disease   Hands: pompholyx, irritant and/or allergic contact dermatitis   Genitals: vulvovaginal Candida albicans infection, lichen sclerosus   Legs: venous eczema   Feet: tinea pedis     Neuropathic causes of localized pruritus without primary rash  Face: trigeminal trophic syndrome   Hand: cheiralgia paraesthetica   Arm: brachioradial pruritus   Back: notalgia paraesthetica/topics/brachioradial-pruritus/   Genital: pruritus vulvae, pruritus ani   Dermatomal: herpes zoster (shingles) during recovery phase  Scratching a localised itch may lead to lichen simplex, prurigo or prurigo nodularis.     Systemic causes of pruritus  Sytemic  diseases may cause generalised pruritus. This is sometimes called metabolic itch. There is nothing wrong with the skin itself, at least until it's been scratched.  Metabolic disorders include chronic renal failure (dialysis) and liver disease (with or without cholestasis).  Uraemic pruritus arises in patients undergoing dialysis is due to a combination of xerosis (dry skin), secondary hyperparathyroidism, peripheral neuropathy (nerve changes) and inflammation.   Secondary hyperparathyroidism which also occurs in dialysis patients leads to microprecipitation (deposition) of calcium and magnesium salts in the skin, triggering mast cell degeneration, releasing serotonin and histamine.   Once chronic pruritus has occurred, there may be secondary changes in the nerves in the skin and central nervous system which heighten the sensation of itch.     Hepatogenic pruritus is more common in intrahepatic than extrahepatic cholestasis. Examples of intrahepatic cholestasis is associated with chronic viral hepatitis, primary biliary cirrhosis, pregnancy-related cholestasis. Extra-hepatic cholestasis is associated with pressure on the bile ducts, eg from pancreatic tumours or pseudocysts.   Cholestasis is thought to release toxic substances from the liver, which stimulates neural itch fibres in the skin.   Characteristically, cholestatic pruritus is most severe at night; it tends to affect the hands, feet and areas where clothes are rubbing on the skin.     Haematological disorders include iron deficiency anaemia and polycythaemia vera.  Generalized pruritus along with glossitis (tongue inflammation) and angular cheilitis (inflammation of mouth corners) are seen in iron deficiency anaemia.   In polycythaemia vera, itch is usually precipitated by contact with water (aquagenic pruritus), eg after a shower. This is thought to be mediated by the effect of platelets, serotonin and prostaglandins.     Endocrine disorders include thyroid  disease and diabetes mellitus.  In Graves' disease (thyrotoxicosis), increased blood flow, skin temperature and decreased itch threshold mediated by the increase in thyroid hormones, lead to the itch. Pruritus associated with myxoedema and hypothyroidism is rare, and if present, is more likely the result of xerosis (dry skin).   In diabetes mellitus, localized itch tends to occur in the perianal/genital region usually due to Candida albicans or dermatophyte infections. It is unclear if metabolic abnormalities such as renal impairment, autonomic failure or diabetic neuropathy contribute to this.     Paraneoplastic itch is associated with lymphoma, especially Hodgkin lymphoma, leukemia or a solid organ tumor (eg lung, colon, brain).  In Hodgkin lymphoma, pruritus is thought to be caused by histamine release, which may be related to eosinophilia.     Infections causing itch include human immunodeficiency virus infection (HIV) and hepatitis C virus.  Patients with HIV commonly complain of itch. This may be associated with skin infections/infestations, dry skin, drug reactions, hyperoesinophilia (increased eosinophil levels) and cutaneous T cell lymphoma. There is a possible correlation between intractable pruritus and increased HIV viral load.   In chronic hepatitis C infection, the mechanisms responsible for itch remain unclear. In the absence of cholestasis, pruritus may be related to antiviral therapy; it has been noted to occur in patients treated with combination therapy (interferon elizabeth and ribavirin).     Pruritic skin diseases  Pruritus is often a symptom of many skin diseases. Some of these are included in the following list.  Allergic contact dermatitis   Dry skin   Urticaria   Psoriasis   Atopic dermatitis   Folliculitis   Dermatitis herpetiformis   Lichen simplex   Lichen planus   Bullous pemphigoid   Lice   Scabies   Miliaria   Sunburn   Pityriasis rosea   Mycosis fungoides     Exposure-related  pruritus  Pruritus may arise as a result of exposure to certain external factors.  Allergens or irritants   Cold, which can cause 'winter itch'   A physical urticaria, such as dermographism   Aquagenic pruritus (itch on exposure to water)   Insects and infestations, eg scabies   Medications (topical or systemic) eg opioids, aspirin     Hormonal reasons for pruritus  About 2% of pregnant women have pruritus without any obvious dermatological cause. In some cases the itch is due to cholestasis (pooling of bile in the gall bladder and liver). It usually occurs in the 3rd trimester and is relieved after giving birth.  Generalized itch is also a common symptom of menopause.     How is pruritus diagnosed?  The first steps of evaluation of an itchy patient are medical history and examination.  A thorough history can identify constitutional symptoms that may point towards an underlying systemic disease. Drug triggers such as opioids may be identified, especially if the commencement of the drug relates to the itch.     A careful examination can identify dermatological causes for the itch (eg scabies, lichen simplex, pemphigoid) or evidence of chronic skin changes related to the itch. In dermatological causes of pruritus, primary skin lesions will usually suggest the diagnosis. Patients without primary skin lesions and little evidence of chronic scratching should be investigated for systemic, neuropathic and psychogenic causes.  The panel of investigations could include:  Full/complete blood count   Creatinine and renal function tests   Liver function tests   Thyroid function tests   Erythrocyte sedimentation rate   Chest radiography   HIV serology     What treatment is available for itch?  The management of pruritus relies on establishing the cause and then either removing or treating the cause to prevent further itching. In many cases, tests are necessary to determine the cause; while these are in progress, treatment to  provide symptomatic relief of pruritus may be given.     Topical treatments  In addition to specific therapy for any underlying skin or internal disease, topical treatment may include:  Wet dressings or tepid shower to cool the skin   Calamine lotion (contains phenol, which cools the skin): avoid on dry skin and limit use to a few days   Menthol/camphor lotion: gives a chilling sensation   Local anesthetics, such as pramoxine (also called pramocaine), applied to small itchy spots such as insect bites   Regular use of emollients, especially if skin is dry   Mild topical corticosteroids for short periods   Topical calcineurin inhibitors are also used to reduce itch associated with inflammatory skin conditions   Topical doxepin, a tricyclic antidepressant and antihistamine, is an antipruritic used in eczema.     Other measures that can be useful in preventing pruritus include avoiding precipitating factors such as rough clothing or fabrics, overheating, and vasodilators if they provoke itching (eg, caffeine, alcohol, spices). Fingernails should be kept short and clean. If the urge to scratch is irresistible then rub the area with your palm.  Topical antihistamines should not be used for chronic itch, as they may sensitize the skin and result in allergic contact dermatitis.     Systemic therapy  If pruritus is severe and sleep is disturbed, then treatment with oral medication may be necessary. Some drugs may help to relieve the itch whilst others are given solely for their sedative effects.  Antihistamines are most useful in urticaria, in which histamine is released. Use for other pruritic conditions is not supported by randomized control trials. Sedating antihistamines may be used for their sedative effects.   Doxepin and amitriptyline are tricyclic antidepressants have antipruritic action and act on the central and peripheral nervous systems.   Tetracyclic antidepressants such as mirtazepine and selective serotonin  reuptake inhibitors (paroxetine, sertraline, fluoxetine) may also help some patients with severe itch including when it is caused by cholestasis, T-cell lymphoma, malignancy or a neuropathic condition.   Anti-epileptic drugs such as sodium valproate, gabapentin and pregabalin may also be of benefit to some patients, e.g., those with itch associated with renal failure or neuropathic itch. The mechanism of action is uncertain.   Opioid antagonists such as butorphanol intranasal spray, naltrexone tablets, and naloxone have been effective in patients suffering from intractable pruritus in association with liver disease, atopic eczema and chronic urticaria. Nalfurafine, which is a kappa-opioid agonist has also been studied and shown to reduce itch associated with chronic renal impairment, however it is not widely available.   Aspirin is sometimes effective if pruritus is mediated by kinins or prostaglandins and is noted to be effective in patients with pruritus due to polycythaemia vera. Note: aspirin may cause or aggravate itch in some patients.   Thalidomide has been successful in treating nodular prurigo and chronic pruritus of various kinds but is rarely used because of serious adverse effects and expense.   Rifampicin is effective for patients with pruritus associated with cholestasis (some forms of liver disease).      Isolated case reports in severe itch associated with malignancy have reported success with the NKR1 antagonist, aprepitant (normally used short-term for postoperative or chemotherapy-induced nausea). This is under investigation for neuropathic itch and nodular prurigo.     Phototherapy  Broadband ultraviolet B or narrow-band UVB phototherapy alone, or in conjunction with UVA, has been shown to be helpful for pruritus associated with chronic kidney disease, psoriasis, atopic eczema and cutaneous T-cell lymphoma.     Behavioral therapy  Behavioral therapy may be used in conjunction with pharmacotherapy  "to modify behaviours such as coping mechanisms and stress reduction, which help interrupt the itch-scratch cycle. One randomized controlled trial showed short-term benefits in reduction in itch frequency and scratching as well as improvement in coping mechanisms.     What is the outcome for pruritus?  The management of chronic severe itch is difficult and often requires the use of combination therapy over a long period of time. Identification and treatment of underlying conditions causing pruritus may help in this process. The symptom may quickly disappear or persist for long periods of time.      XEROSIS (\"DRY SKIN\")     Assessment and Plan:  Based on a thorough discussion of this condition and the management approach to it (including a comprehensive discussion of the known risks, side effects and potential benefits of treatment), the patient (family) agrees to implement the following specific plan:  Use moisturizer like Eucerin,Cerave or Aveeno Cream 3 times a day for the dry skin               Dry skin refers to skin that feels dry to touch. Dry skin has a dull surface with a rough, scaly quality. The skin is less pliable and cracked. When dryness is severe, the skin may become inflamed and fissured.  Although any body site can be dry, dry skin tends to affect the shins more than any other site.     Dry skin is lacking moisture in the outer horny cell layer (stratum corneum) and this results in cracks in the skin surface.  Dry skin is also called xerosis, xeroderma or asteatosis (lack of fat).  It can affect males and females of all ages. There is some racial variability in water and lipid content of the skin.  Dry skin that starts in early childhood may be one of about 20 types of ichthyosis (fish-scale skin). There is often a family history of dry skin.   Dry skin is commonly seen in people with atopic dermatitis.  Nearly everyone > 60 years has dry skin.     Dry skin that begins later may be seen in people " with certain diseases and conditions.  Postmenopausal women  Hypothyroidism  Chronic renal disease   Malnutrition and weight loss   Subclinical dermatitis   Treatment with certain drugs such as oral retinoids, diuretics and epidermal growth factor receptor inhibitors        What is the treatment for dry skin?  The mainstay of treatment of dry skin and ichthyosis is moisturisers/emollients. They should be applied liberally and often enough to:  Reduce itch   Improve the barrier function   Prevent entry of irritants, bacteria   Reduce transepidermal water loss.        How can dry skin be prevented?  Eliminate aggravating factors:  Reduce the frequency of bathing.   A humidifier in winter and air conditioner in summer   Compare having a short shower with a prolonged soak in a bath.   Use lukewarm, not hot, water.   Replace standard soap with a substitute such as a synthetic detergent cleanser, water-miscible emollient, bath oil, anti-pruritic tar oil, colloidal oatmeal etc.   Apply an emollient liberally and often, particularly shortly after bathing, and when itchy. The drier the skin, the thicker this should be, especially on the hands.     What is the outlook for dry skin?  A tendency to dry skin may persist life-long, or it may improve once contributing factors are controlled.

## 2024-10-31 NOTE — PROGRESS NOTES
"[facility] Note     Patient Name: Teena Staples  Encounter Date: 10/31/2024     Have you been cared for by a [facility] Dermatologist in the last 3 years and, if so, which description applies to you?    Yes.  I have been here within the last 3 years, and my medical history has NOT changed since that time.  I am FEMALE/of child-bearing potential.    REVIEW OF SYSTEMS:  Have you recently had or currently have any of the following? No changes in my recent health.   PAST MEDICAL HISTORY:  Have you personally ever had or currently have any of the following?  If \"YES,\" then please provide more detail. No changes in my medical history.   HISTORY OF IMMUNOSUPPRESSION: Do you have a history of any of the following:  Systemic Immunosuppression such as Diabetes, Biologic or Immunotherapy, Chemotherapy, Organ Transplantation, Bone Marrow Transplantation or Prednisone?  No     Answering \"YES\" requires the addition of the dotphrase \"IMMUNOSUPPRESSED\" as the first diagnosis of the patient's visit.   FAMILY HISTORY:  Any \"first degree relatives\" (parent, brother, sister, or child) with the following?    No changes in my family's known health.   PATIENT EXPERIENCE:    Do you want the Dermatologist to perform a COMPLETE skin exam today including a clinical examination under the \"bra and underwear\" areas?  Yes-pt denied exam on feet and in underwear   If necessary, do we have your permission to call and leave a detailed message on your Preferred Phone number that includes your specific medical information?  Yes      Allergies   Allergen Reactions    Levofloxacin Hives and Tachycardia     Annotation - 15Jan2013: LIGHTHEADED    Sulfamethoxazole-Trimethoprim Other (See Comments)     unknown    Hydrocodone Bitartrate Er Other (See Comments)     Annotation - 15Jan2013: FLUSHING    CHEST/RIB PAIN      Current Outpatient Medications:     ascorbic acid (VITAMIN C) 500 MG tablet, Take 1 tablet (500 mg total) by mouth 2 (two) " times a day (Patient not taking: Reported on 8/20/2024), Disp: 60 tablet, Rfl: 1    aspirin (ECOTRIN LOW STRENGTH) 81 mg EC tablet, Take 1 tablet (81 mg total) by mouth 2 (two) times a day Take 1(one) 81 mg tablet twice daily x 35 days after total joint arthroplasty (Patient not taking: Reported on 8/20/2024), Disp: 70 tablet, Rfl: 0    celecoxib (CeleBREX) 200 mg capsule, take 1 capsule by mouth twice a day, Disp: 30 capsule, Rfl: 5    Cholecalciferol (VITAMIN D) 2000 units CAPS, Take by mouth, Disp: , Rfl:     cholestyramine (QUESTRAN) 4 GM/DOSE powder, Take 1 packet (4 g total) by mouth 2 (two) times a day As needed for diarrhea, Disp: 378 g, Rfl: 5    Diclofenac Sodium (Voltaren) 1 %, Apply 2 g topically 4 (four) times a day, Disp: , Rfl:     diphenhydrAMINE-acetaminophen (TYLENOL PM)  MG TABS, Take 1 tablet by mouth daily at bedtime as needed for sleep, Disp: , Rfl:     docusate sodium (COLACE) 100 mg capsule, Take 1 capsule (100 mg total) by mouth 2 (two) times a day (Patient not taking: Reported on 6/11/2024), Disp: 10 capsule, Rfl: 0    Elderberry-Vitamin C-Zinc ([employer] GUMMY PO), Take 2 gums by mouth daily, Disp: , Rfl:     ferrous sulfate 324 (65 Fe) mg, Take 1 tablet (324 mg total) by mouth 2 (two) times a day before meals (Patient not taking: Reported on 8/20/2024), Disp: 60 tablet, Rfl: 1    folic acid (FOLVITE) 1 mg tablet, take 1 tablet by mouth once daily (Patient not taking: Reported on 8/20/2024), Disp: 30 tablet, Rfl: 1    glucosamine-chondroitin 500-400 MG tablet, Take 1 tablet by mouth 3 (three) times a day, Disp: 100 tablet, Rfl: 2    loratadine (CLARITIN) 10 mg tablet, Take 10 mg by mouth daily, Disp: , Rfl:     methocarbamol (ROBAXIN) 500 mg tablet, Take 1 tablet (500 mg total) by mouth 3 (three) times a day as needed for muscle spasms (Patient not taking: Reported on 7/18/2024), Disp: 30 tablet, Rfl: 0    methylPREDNISolone 4 MG tablet therapy pack, Use as directed on  package, Disp: 21 each, Rfl: 0    mometasone (NASONEX) 50 mcg/act nasal spray, 2 sprays into each nostril daily, Disp: 17 g, Rfl: 0    Multiple Vitamins-Minerals (multivitamin with minerals) tablet, Take 1 tablet by mouth daily, Disp: 30 tablet, Rfl: 0    omeprazole (PriLOSEC) 10 mg delayed release capsule, Take 1 capsule (10 mg total) by mouth daily, Disp: 90 capsule, Rfl: 2    ondansetron (ZOFRAN) 4 mg tablet, Take 1 tablet (4 mg total) by mouth every 8 (eight) hours as needed for nausea or vomiting (Patient not taking: Reported on 6/11/2024), Disp: 5 tablet, Rfl: 0    Probiotic Product (ALIGN) 4 MG CAPS, Take by mouth, Disp: , Rfl:     Rhubarb (ESTROVEN COMPLETE PO), Estroven, Disp: , Rfl:     simvastatin (ZOCOR) 5 MG tablet, Take 1 tablet (5 mg total) by mouth daily at bedtime, Disp: 90 tablet, Rfl: 0    spironolactone (ALDACTONE) 50 mg tablet, Take 1 tablet (50 mg total) by mouth daily, Disp: 90 tablet, Rfl: 0    verapamil (Verelan) 180 MG 24 hr capsule, Take 1 capsule (180 mg total) by mouth daily before breakfast, Disp: 90 capsule, Rfl: 2    vitamin E, tocopherol, 400 units capsule, Take 1 capsule (400 Units total) by mouth daily, Disp: , Rfl:           Whom besides the patient is providing clinical information about today's encounter?   NO ADDITIONAL HISTORIAN (patient alone provided history)    Physical Exam and Assessment/Plan by Diagnosis:    Patient here for FBSE. Patient is having an itchy irritation on bilateral forearms and starting to go up the arms. Started in July. Using Aveeno with oatmeal 2-3x a day. Pt started Celebrex end of May and is wondering if that could be a trigger for the rash. Taking Tylenol, and only taking Celebrex in the morning. Patient also has a potential bug bite on upper right arm.      SEBORRHEIC DERMATITIS, PSORIASIS    Physical Exam:  Anatomic Location Affected:  ears, scalp  Morphological Description:  pink scaly plaques  Pertinent Positives:  Pertinent  "Negatives:    Additional History of Present Condition:  patient noted being very dry and itchy at areas    Assessment and Plan:  Based on a thorough discussion of this condition and the management approach to it (including a comprehensive discussion of the known risks, side effects and potential benefits of treatment), the patient (family) agrees to implement the following specific plan:  Clobetasol 0.05% solution - apply twice a day for two weeks at a time to scalp and ears taking at least a weeks break in between uses   Calcipotriene - solution - in the one to two week break from the Clobetasol solution, apply this solution one to two times a day to ears and affected areas on scalp  Ketoconazole 2% Shampoo- Daily for 2 weeks straight and then on \"Mondays, Wednesdays and Fridays\":  Lather into scalp; leave on for 5 minutes and then rinse off completely.      Seborrheic Dermatitis   Seborrheic dermatitis is a common, chronic or relapsing form of eczema/dermatitis that mainly affects the sebaceous, gland-rich regions of the scalp, face, and trunk.  There are infantile and adult forms of seborrhoeic dermatitis. It is sometimes associated with psoriasis and, in that clinical scenario, may be referred to as \"sebo-psoriasis.\"  Seborrheic dermatitis is also known as \"seborrheic eczema.\"  Dandruff (also called \"pityriasis capitis\") is an uninflamed form of seborrhoeic dermatitis. Dandruff presents as bran-like scaly patches scattered within hair-bearing areas of the scalp.  In an infant, this condition may be referred to as \"cradle cap.\"  The cause of seborrheic dermatitis is not completely understood. It is associated with proliferation of various species of the skin commensal Malassezia, in its yeast (non-pathogenic) form. Its metabolites (such as the fatty acids oleic acid, malssezin, and indole-3-carbaldehyde) may cause an inflammatory reaction. Differences in skin barrier lipid content and function may account for " "individual presentations.    Infantile Seborrheic Dermatitis  Infantile seborrheic dermatitis affects babies under the age of 3 months and usually resolves by 6-12 months of age.  Infantile seborrheic dermatitis causes \"cradle cap\" (diffuse, greasy scaling on scalp). The rash may spread to affect armpit and groin folds (a type of \"napkin dermatitis\").  There may be associated salmon-pink colored patches that may flake or peel.  The rash in this case is usually not especially itchy, so the baby often appears undisturbed by the rash, even when more generalized.    Adult Seborrheic Dermatitis  Adult seborrheic dermatitis tends to begin in late adolescence; prevalence is greatest in young adults and in the elderly. It is more common in males than in females.    The following factors are sometimes associated with severe adult seborrheic dermatitis:  Oily skin  Familial tendency to seborrhoeic dermatitis or a family history of psoriasis  Immunosuppression: organ transplant recipient, human immunodeficiency virus (HIV) infection and patients with lymphoma  Neurological and psychiatric diseases: Parkinson disease, tardive dyskinesia, depression, epilepsy, facial nerve palsy, spinal cord injury and congenital disorders such as Down syndrome  Treatment for psoriasis with psoralen and ultraviolet A (PUVA) therapy  Lack of sleep  Stressful events.    In adults, seborrheic dermatitis may typically affect the scalp, face (creases around the nose, behind ears, within eyebrows) and upper trunk. Typical clinical features include:  Winter flares, improving in summer following sun exposure  Minimal itch most of the time  Combination oily and dry mid-facial skin  Ill-defined localized scaly patches or diffuse scale in the scalp  Blepharitis; scaly red eyelid margins  [place]-pink, thin, scaly, and ill-defined plaques in skin folds on both sides of the face  Petal or ring-shaped flaky patches on hair-line and on anterior chest  Rash in " "armpits, under the breasts, in the groin folds and genital creases  Superficial folliculitis (inflamed hair follicles) on cheeks and upper trunk    Seborrheic dermatitis is diagnosed by its clinical appearance and behavior. Skin biopsy may be helpful but is rarely necessary to make this diagnosis.     MELANOCYTIC NEVI (\"Moles\")    Physical Exam:  Anatomic Location Affected:   Mostly on sun-exposed areas of the trunk and extremities  Morphological Description:  Scattered, 1-4mm round to ovoid, symmetrical-appearing, even bordered, skin colored to dark brown macules/papules, mostly in sun-exposed areas  Pertinent Positives:  Pertinent Negatives:    Additional History of Present Condition:      Assessment and Plan:  Based on a thorough discussion of this condition and the management approach to it (including a comprehensive discussion of the known risks, side effects and potential benefits of treatment), the patient (family) agrees to implement the following specific plan:  When outside we recommend using a wide brim hat, sunglasses, long sleeve and pants, sunscreen with SPF 30+ with reapplication every 2 hours, or SPF specific clothing   Benign, reassured  Annual skin check     Melanocytic Nevi  Melanocytic nevi (\"moles\") are tan or brown, raised or flat areas of the skin which have an increased number of melanocytes. Melanocytes are the cells in our body which make pigment and account for skin color.    Some moles are present at birth (I.e., \"congenital nevi\"), while others come up later in life (i.e., \"acquired nevi\").  The sun can stimulate the body to make more moles.  Sunburns are not the only thing that triggers more moles.  Chronic sun exposure can do it too.     Clinically distinguishing a healthy mole from melanoma may be difficult, even for experienced dermatologists. The \"ABCDE's\" of moles have been suggested as a means of helping to alert a person to a suspicious mole and the possible increased risk of " "melanoma.  The suggestions for raising alert are as follows:    Asymmetry: Healthy moles tend to be symmetric, while melanomas are often asymmetric.  Asymmetry means if you draw a line through the mole, the two halves do not match in color, size, shape, or surface texture. Asymmetry can be a result of rapid enlargement of a mole, the development of a raised area on a previously flat lesion, scaling, ulceration, bleeding or scabbing within the mole.  Any mole that starts to demonstrate \"asymmetry\" should be examined promptly by a board certified dermatologist.     Border: Healthy moles tend to have discrete, even borders.  The border of a melanoma often blends into the normal skin and does not sharply delineate the mole from normal skin.  Any mole that starts to demonstrate \"uneven borders\" should be examined promptly by a board certified dermatologist.     Color: Healthy moles tend to be one color throughout.  Melanomas tend to be made up of different colors ranging from dark black, blue, white, or red.  Any mole that demonstrates a color change should be examined promptly by a board certified dermatologist.     Diameter: Healthy moles tend to be smaller than 0.6 cm in size; an exception are \"congenital nevi\" that can be larger.  Melanomas tend to grow and can often be greater than 0.6 cm (1/4 of an inch, or the size of a pencil eraser). This is only a guideline, and many normal moles may be larger than 0.6 cm without being unhealthy.  Any mole that starts to change in size (small to bigger or bigger to smaller) should be examined promptly by a board certified dermatologist.     Evolving: Healthy moles tend to \"stay the same.\"  Melanomas may often show signs of change or evolution such as a change in size, shape, color, or elevation.  Any mole that starts to itch, bleed, crust, burn, hurt, or ulcerate or demonstrate a change or evolution should be examined promptly by a board certified dermatologist.  "       LENTIGO    Physical Exam:  Anatomic Location Affected:  trunk, arms  Morphological Description:  Light brown macules  Pertinent Positives:  Pertinent Negatives:    Additional History of Present Condition:      Assessment and Plan:  Based on a thorough discussion of this condition and the management approach to it (including a comprehensive discussion of the known risks, side effects and potential benefits of treatment), the patient (family) agrees to implement the following specific plan:  When outside we recommend using a wide brim hat, sunglasses, long sleeve and pants, sunscreen with SPF 30+ with reapplication every 2 hours, or SPF specific clothing       What is a lentigo?  A lentigo is a pigmented flat or slightly raised lesion with a clearly defined edge. Unlike an ephelis (freckle), it does not fade in the winter months. There are several kinds of lentigo.  The name lentigo originally referred to its appearance resembling a small lentil. The plural of lentigo is lentigines, although “lentigos” is also in common use.    Who gets lentigines?  Lentigines can affect males and females of all ages and races. Solar lentigines are especially prevalent in fair skinned adults. Lentigines associated with syndromes are present at birth or arise during childhood.    What causes lentigines?  Common forms of lentigo are due to exposure to ultraviolet radiation:  Sun damage including sunburn   Indoor tanning   Phototherapy, especially photochemotherapy (PUVA)    Ionizing radiation, eg radiation therapy, can also cause lentigines.  Several familial syndromes associated with widespread lentigines originate from mutations in [place]-MAP kinase, mTOR signaling and PTEN pathways.    What is the treatment for lentigines?  Most lentigines are left alone. Attempts to lighten them may not be successful. The following approaches are used:  SPF 50+ broad-spectrum sunscreen   Hydroquinone bleaching cream   Alpha hydroxy acids  "  Vitamin C   Retinoids   Azelaic acid   Chemical peels  Individual lesions can be permanently removed using:  Cryotherapy   Intense pulsed light   Pigment lasers    How can lentigines be prevented?  Lentigines associated with exposure ultraviolet radiation can be prevented by very careful sun protection. Clothing is more successful at preventing new lentigines than are sunscreens.    What is the outlook for lentigines?  Lentigines usually persist. They may increase in number with age and sun exposure. Some in sun-protected sites may fade and disappear.    TREVINO ANGIOMAS    Physical Exam:  Anatomic Location Affected:  trunk  Morphological Description:  Scattered cherry red, 1-4 mm papules.  Pertinent Positives:  Pertinent Negatives:    Additional History of Present Condition:      Assessment and Plan:  Based on a thorough discussion of this condition and the management approach to it (including a comprehensive discussion of the known risks, side effects and potential benefits of treatment), the patient (family) agrees to implement the following specific plan:  Monitor for changes  Benign, reassured      Assessment and Plan:    Cherry angioma, also known as [place]illo [place] Jaime spots, are benign vascular skin lesions. A \"cherry angioma\" is a firm red, blue or purple papule, 0.1-1 cm in diameter. When thrombosed, they can appear black in colour until evaluated with a dermatoscope when the red or purple colour is more easily seen. Cherry angioma may develop on any part of the body but most often appear on the scalp, face, lips and trunk.  An angioma is due to proliferating endothelial cells; these are the cells that line the inside of a blood vessel.    Angiomas can arise in early life or later in life; the most common type of angioma is a cherry angioma.  Cherry angiomas are very common in males and females of any age or race. They are more noticeable in white skin than in skin of colour. They markedly increase in " number from about the age of 40. There may be a family history of similar lesions. Eruptive cherry angiomas have been rarely reported to be associated with internal malignancy. The cause of angiomas is unknown. Genetic analysis of cherry angiomas has shown that they frequently carry specific somatic missense mutations in the GNAQ and GNA11 (Q209H) genes, which are involved in other vascular and melanocytic proliferations.    INFLAMED SEBORRHEIC KERATOSIS  Physical Exam:  Anatomic Location Affected:  upper right arm  Morphological Description:  slightly tan to brown stuck-on papule(s)      Assessment and Plan:  Based on a thorough discussion of this condition and the management approach to it (including a comprehensive discussion of the known risks, side effects and potential benefits of treatment), the patient (family) agrees to implement the following specific plan:  Treated with cryotherapy today due to associated itching and because the lesion is getting irritated; written and verbal consent obtained     PROCEDURE:  DESTRUCTION OF BENIGN LESIONS  After a thorough discussion of treatment options and risk/benefits/alternatives (including but not limited to local pain, scarring, dyspigmentation, blistering, and possible superinfection), verbal and written consent were obtained and the aforementioned lesions were treated on with cryotherapy using liquid nitrogen x 3 cycles for 5-10 seconds. The patient tolerated the procedure well, and after-care instructions were provided.     TOTAL NUMBER of 1 lesion(s) were treated today on the ANATOMIC LOCATION: upper rigth arm      Patient instructions:  Your lesions were treated with liquid nitrogen today. The treated areas will get more red, crusted over the next few days. There might be some blistering. Apply vaseline to the treated area for the next week to help it heal fully. Do not pick at the area. Return in 3-4 weeks for another round of liquid nitrogen treatment if  "lesion(s)  fails to fully resolve.     SEBORRHEIC KERATOSIS; NON-INFLAMED    Physical Exam:  Anatomic Location Affected:  trunk  Morphological Description:  Flat and raised, waxy, smooth to warty textured, yellow to brownish-grey to dark brown to blackish, discrete, \"stuck-on\" appearing papules.  Pertinent Positives:  Pertinent Negatives:    Additional History of Present Condition:      Assessment and Plan:  Based on a thorough discussion of this condition and the management approach to it (including a comprehensive discussion of the known risks, side effects and potential benefits of treatment), the patient (family) agrees to implement the following specific plan:  Monitor for changes  Benign, reassured      Seborrheic Keratosis  A seborrheic keratosis is a harmless warty spot that appears during adult life as a common sign of skin aging.  Seborrheic keratoses can arise on any area of skin, covered or uncovered, with the usual exception of the palms and soles. They do not arise from mucous membranes. Seborrheic keratoses can have highly variable appearance.      Seborrheic keratoses are extremely common. It has been estimated that over 90% of adults over the age of 60 years have one or more of them. They occur in males and females of all races, typically beginning to erupt in the 30s or 40s. They are uncommon under the age of 20 years.  The precise cause of seborrhoeic keratoses is not known.  Seborrhoeic keratoses are considered degenerative in nature. As time goes by, seborrheic keratoses tend to become more numerous. Some people inherit a tendency to develop a very large number of them; some people may have hundreds of them.      There is no easy way to remove multiple lesions on a single occasion.  Unless a specific lesion is \"inflamed\" and is causing pain or stinging/burning or is bleeding, most insurance companies do not authorize treatment.    PRURITUS    Physical Exam:  Anatomic Location Affected:  " forearms  Morphological Description:  clear  Pertinent Positives:  Pertinent Negatives:    Additional History of Present Condition:      Assessment and Plan:  Based on a thorough discussion of this condition and the management approach to it (including a comprehensive discussion of the known risks, side effects and potential benefits of treatment), the patient (family) agrees to implement the following specific plan:  Can try Sarna anti itch lotion  Can try Pramoxine containing creams like CeraVe or Sarna, capsaicin    What is pruritus?  Pruritus is the medical term for itch. Itch is an unpleasant sensation on the skin that provokes the desire to rub or scratch the area to obtain relief. Itch can cause discomfort and frustration; in severe cases it can lead to disturbed sleep, anxiety and depression. Constant scratching to obtain relief can damage the skin (excoriation, lichenification) and reduce its effectiveness as a major protective barrier.     Pruritus is often a symptom of an underlying disease process such as a skin problem, a systemic disease, or abnormal nerve impulses.    What are skin signs of pruritus?   There are no specific skin signs associated with pruritus, apart from scratch marks (excoriations) and signs of the underlying condition.   Persistent scratching over a period of time may lead to:  Lichenification (thickened skin, lichen simplex)   Prurigo papules and nodules.    Who gets pruritus?  The epidemiology of pruritus depends on its underlying cause or causes. However, in general, the incidence of chronic pruritus increases with age, it is more common in women, and in those of  background.     Mechanisms underlying pruritus  Itch, like pain, can originate anywhere along the neural itch pathway, from the central nervous system (brain and spinal cord) to the peripheral nervous system and the skin.Mechanisms underlying pruritus are complex.  The itch signal is transmitted mainly through  small, itch-selective C-fibers in the skin in addition to histamine-triggered and non-histaminergic neurons.   These connect with secondary neurons which cross the opposite side of the spinothalamic tract and ascend to parts of the brain involved in sensation, emotion, reward and memory. These areas overlap with those activated by pain.   Patients with chronic pruritus usually have both peripheral and central hypersensitization (heightened reaction) which means they tend to overreact to noxious stimuli which normally inhibit itch (such as heat and scratching) and also misinterpret non-noxious stimuli as an itch (eg, light touch)    The way scratching stops itching has been explained by an interaction with pain pathways within the dorsal horn of the spinal cord.    Localized pruritus  Localized pruritus is pruritus that is confined to a certain part of the body. It can occur in association with a primary rash (eg dermatitis) or may occur because of hypersensitive nerves in the skin (neuropathic pruritus). Neuropathic pruritus is due to compression or degeneration of nerves in the skin, on route to the spine or in the spine itself. Neuropathic itch is sometimes associated with reduced or absent sweating in the affected area of skin.  Typical causes of localized itchy rashes  Scalp: seborrhoeic dermatitis, head lice   Back: Suncook disease   Hands: pompholyx, irritant and/or allergic contact dermatitis   Genitals: vulvovaginal Candida albicans infection, lichen sclerosus   Legs: venous eczema   Feet: tinea pedis    Neuropathic causes of localized pruritus without primary rash  Face: trigeminal trophic syndrome   Hand: cheiralgia paraesthetica   Arm: brachioradial pruritus   Back: notalgia paraesthetica/topics/brachioradial-pruritus/   Genital: pruritus vulvae, pruritus ani   Dermatomal: herpes zoster (shingles) during recovery phase  Scratching a localised itch may lead to lichen simplex, prurigo or prurigo  nodularis.    Systemic causes of pruritus  Sytemic diseases may cause generalised pruritus. This is sometimes called metabolic itch. There is nothing wrong with the skin itself, at least until it's been scratched.  Metabolic disorders include chronic renal failure (dialysis) and liver disease (with or without cholestasis).  Uraemic pruritus arises in patients undergoing dialysis is due to a combination of xerosis (dry skin), secondary hyperparathyroidism, peripheral neuropathy (nerve changes) and inflammation.   Secondary hyperparathyroidism which also occurs in dialysis patients leads to microprecipitation (deposition) of calcium and magnesium salts in the skin, triggering mast cell degeneration, releasing serotonin and histamine.   Once chronic pruritus has occurred, there may be secondary changes in the nerves in the skin and central nervous system which heighten the sensation of itch.    Hepatogenic pruritus is more common in intrahepatic than extrahepatic cholestasis. Examples of intrahepatic cholestasis is associated with chronic viral hepatitis, primary biliary cirrhosis, pregnancy-related cholestasis. Extra-hepatic cholestasis is associated with pressure on the bile ducts, eg from pancreatic tumours or pseudocysts.   Cholestasis is thought to release toxic substances from the liver, which stimulates neural itch fibres in the skin.   Characteristically, cholestatic pruritus is most severe at night; it tends to affect the hands, feet and areas where clothes are rubbing on the skin.    Haematological disorders include iron deficiency anaemia and polycythaemia vera.  Generalized pruritus along with glossitis (tongue inflammation) and angular cheilitis (inflammation of mouth corners) are seen in iron deficiency anaemia.   In polycythaemia vera, itch is usually precipitated by contact with water (aquagenic pruritus), eg after a shower. This is thought to be mediated by the effect of platelets, serotonin and  prostaglandins.    Endocrine disorders include thyroid disease and diabetes mellitus.  In Graves' disease (thyrotoxicosis), increased blood flow, skin temperature and decreased itch threshold mediated by the increase in thyroid hormones, lead to the itch. Pruritus associated with myxoedema and hypothyroidism is rare, and if present, is more likely the result of xerosis (dry skin).   In diabetes mellitus, localized itch tends to occur in the perianal/genital region usually due to Candida albicans or dermatophyte infections. It is unclear if metabolic abnormalities such as renal impairment, autonomic failure or diabetic neuropathy contribute to this.    Paraneoplastic itch is associated with lymphoma, especially Hodgkin lymphoma, leukemia or a solid organ tumor (eg lung, colon, brain).  In Hodgkin lymphoma, pruritus is thought to be caused by histamine release, which may be related to eosinophilia.    Infections causing itch include human immunodeficiency virus infection (HIV) and hepatitis C virus.  Patients with HIV commonly complain of itch. This may be associated with skin infections/infestations, dry skin, drug reactions, hyperoesinophilia (increased eosinophil levels) and cutaneous T cell lymphoma. There is a possible correlation between intractable pruritus and increased HIV viral load.   In chronic hepatitis C infection, the mechanisms responsible for itch remain unclear. In the absence of cholestasis, pruritus may be related to antiviral therapy; it has been noted to occur in patients treated with combination therapy (interferon elizabeth and ribavirin).    Pruritic skin diseases  Pruritus is often a symptom of many skin diseases. Some of these are included in the following list.  Allergic contact dermatitis   Dry skin   Urticaria   Psoriasis   Atopic dermatitis   Folliculitis   Dermatitis herpetiformis   Lichen simplex   Lichen planus   Bullous pemphigoid   Lice   Scabies   Miliaria   Sunburn   Pityriasis rosea    Mycosis fungoides    Exposure-related pruritus  Pruritus may arise as a result of exposure to certain external factors.  Allergens or irritants   Cold, which can cause 'winter itch'   A physical urticaria, such as dermographism   Aquagenic pruritus (itch on exposure to water)   Insects and infestations, eg scabies   Medications (topical or systemic) eg opioids, aspirin    Hormonal reasons for pruritus  About 2% of pregnant women have pruritus without any obvious dermatological cause. In some cases the itch is due to cholestasis (pooling of bile in the gall bladder and liver). It usually occurs in the 3rd trimester and is relieved after giving birth.  Generalized itch is also a common symptom of menopause.    How is pruritus diagnosed?  The first steps of evaluation of an itchy patient are medical history and examination.  A thorough history can identify constitutional symptoms that may point towards an underlying systemic disease. Drug triggers such as opioids may be identified, especially if the commencement of the drug relates to the itch.    A careful examination can identify dermatological causes for the itch (eg scabies, lichen simplex, pemphigoid) or evidence of chronic skin changes related to the itch. In dermatological causes of pruritus, primary skin lesions will usually suggest the diagnosis. Patients without primary skin lesions and little evidence of chronic scratching should be investigated for systemic, neuropathic and psychogenic causes.  The panel of investigations could include:  Full/complete blood count   Creatinine and renal function tests   Liver function tests   Thyroid function tests   Erythrocyte sedimentation rate   Chest radiography   HIV serology    What treatment is available for itch?  The management of pruritus relies on establishing the cause and then either removing or treating the cause to prevent further itching. In many cases, tests are necessary to determine the cause; while  these are in progress, treatment to provide symptomatic relief of pruritus may be given.    Topical treatments  In addition to specific therapy for any underlying skin or internal disease, topical treatment may include:  Wet dressings or tepid shower to cool the skin   Calamine lotion (contains phenol, which cools the skin): avoid on dry skin and limit use to a few days   Menthol/camphor lotion: gives a chilling sensation   Local anesthetics, such as pramoxine (also called pramocaine), applied to small itchy spots such as insect bites   Regular use of emollients, especially if skin is dry   Mild topical corticosteroids for short periods   Topical calcineurin inhibitors are also used to reduce itch associated with inflammatory skin conditions   Topical doxepin, a tricyclic antidepressant and antihistamine, is an antipruritic used in eczema.    Other measures that can be useful in preventing pruritus include avoiding precipitating factors such as rough clothing or fabrics, overheating, and vasodilators if they provoke itching (eg, caffeine, alcohol, spices). Fingernails should be kept short and clean. If the urge to scratch is irresistible then rub the area with your palm.  Topical antihistamines should not be used for chronic itch, as they may sensitize the skin and result in allergic contact dermatitis.    Systemic therapy  If pruritus is severe and sleep is disturbed, then treatment with oral medication may be necessary. Some drugs may help to relieve the itch whilst others are given solely for their sedative effects.  Antihistamines are most useful in urticaria, in which histamine is released. Use for other pruritic conditions is not supported by randomized control trials. Sedating antihistamines may be used for their sedative effects.   Doxepin and amitriptyline are tricyclic antidepressants have antipruritic action and act on the central and peripheral nervous systems.   Tetracyclic antidepressants such as  mirtazepine and selective serotonin reuptake inhibitors (paroxetine, sertraline, fluoxetine) may also help some patients with severe itch including when it is caused by cholestasis, T-cell lymphoma, malignancy or a neuropathic condition.   Anti-epileptic drugs such as sodium valproate, gabapentin and pregabalin may also be of benefit to some patients, e.g., those with itch associated with renal failure or neuropathic itch. The mechanism of action is uncertain.   Opioid antagonists such as butorphanol intranasal spray, naltrexone tablets, and naloxone have been effective in patients suffering from intractable pruritus in association with liver disease, atopic eczema and chronic urticaria. Nalfurafine, which is a kappa-opioid agonist has also been studied and shown to reduce itch associated with chronic renal impairment, however it is not widely available.   Aspirin is sometimes effective if pruritus is mediated by kinins or prostaglandins and is noted to be effective in patients with pruritus due to polycythaemia vera. Note: aspirin may cause or aggravate itch in some patients.   Thalidomide has been successful in treating nodular prurigo and chronic pruritus of various kinds but is rarely used because of serious adverse effects and expense.   Rifampicin is effective for patients with pruritus associated with cholestasis (some forms of liver disease).     Isolated case reports in severe itch associated with malignancy have reported success with the NKR1 antagonist, aprepitant (normally used short-term for postoperative or chemotherapy-induced nausea). This is under investigation for neuropathic itch and nodular prurigo.    Phototherapy  Broadband ultraviolet B or narrow-band UVB phototherapy alone, or in conjunction with UVA, has been shown to be helpful for pruritus associated with chronic kidney disease, psoriasis, atopic eczema and cutaneous T-cell lymphoma.    Behavioral therapy  Behavioral therapy may be used in  "conjunction with pharmacotherapy to modify behaviours such as coping mechanisms and stress reduction, which help interrupt the itch-scratch cycle. One randomized controlled trial showed short-term benefits in reduction in itch frequency and scratching as well as improvement in coping mechanisms.    What is the outcome for pruritus?  The management of chronic severe itch is difficult and often requires the use of combination therapy over a long period of time. Identification and treatment of underlying conditions causing pruritus may help in this process. The symptom may quickly disappear or persist for long periods of time.     XEROSIS (\"DRY SKIN\")    Physical Exam:  Anatomic Location Affected:  diffuse  Morphological Description:  xerosis  Pertinent Positives:  Pertinent Negatives:    Additional History of Present Condition:      Assessment and Plan:  Based on a thorough discussion of this condition and the management approach to it (including a comprehensive discussion of the known risks, side effects and potential benefits of treatment), the patient (family) agrees to implement the following specific plan:  Use moisturizer like Eucerin,Cerave or Aveeno Cream 3 times a day for the dry skin            Dry skin refers to skin that feels dry to touch. Dry skin has a dull surface with a rough, scaly quality. The skin is less pliable and cracked. When dryness is severe, the skin may become inflamed and fissured.  Although any body site can be dry, dry skin tends to affect the shins more than any other site.    Dry skin is lacking moisture in the outer horny cell layer (stratum corneum) and this results in cracks in the skin surface.  Dry skin is also called xerosis, xeroderma or asteatosis (lack of fat).  It can affect males and females of all ages. There is some racial variability in water and lipid content of the skin.  Dry skin that starts in early childhood may be one of about 20 types of ichthyosis (fish-scale " skin). There is often a family history of dry skin.   Dry skin is commonly seen in people with atopic dermatitis.  Nearly everyone > 60 years has dry skin.    Dry skin that begins later may be seen in people with certain diseases and conditions.  Postmenopausal women  Hypothyroidism  Chronic renal disease   Malnutrition and weight loss   Subclinical dermatitis   Treatment with certain drugs such as oral retinoids, diuretics and epidermal growth factor receptor inhibitors      What is the treatment for dry skin?  The mainstay of treatment of dry skin and ichthyosis is moisturisers/emollients. They should be applied liberally and often enough to:  Reduce itch   Improve the barrier function   Prevent entry of irritants, bacteria   Reduce transepidermal water loss.      How can dry skin be prevented?  Eliminate aggravating factors:  Reduce the frequency of bathing.   A humidifier in winter and air conditioner in summer   Compare having a short shower with a prolonged soak in a bath.   Use lukewarm, not hot, water.   Replace standard soap with a substitute such as a synthetic detergent cleanser, water-miscible emollient, bath oil, anti-pruritic tar oil, colloidal oatmeal etc.   Apply an emollient liberally and often, particularly shortly after bathing, and when itchy. The drier the skin, the thicker this should be, especially on the hands.    What is the outlook for dry skin?  A tendency to dry skin may persist life-long, or it may improve once contributing factors are controlled.     Scribe Attestation      I,:  Nina Martin MA am acting as a scribe while in the presence of the attending physician.:       I,:  Marcela Jarrett MD personally performed the services described in this documentation    as scribed in my presence.:

## 2024-11-12 ENCOUNTER — OFFICE VISIT (OUTPATIENT)
Dept: INTERNAL MEDICINE CLINIC | Facility: CLINIC | Age: 59
End: 2024-11-12
Payer: COMMERCIAL

## 2024-11-12 ENCOUNTER — HOSPITAL ENCOUNTER (OUTPATIENT)
Dept: RADIOLOGY | Facility: HOSPITAL | Age: 59
Discharge: HOME | End: 2024-11-12
Payer: COMMERCIAL

## 2024-11-12 VITALS
DIASTOLIC BLOOD PRESSURE: 84 MMHG | SYSTOLIC BLOOD PRESSURE: 122 MMHG | OXYGEN SATURATION: 99 % | HEART RATE: 88 BPM | HEIGHT: 68 IN | TEMPERATURE: 97.4 F | BODY MASS INDEX: 40.19 KG/M2 | WEIGHT: 265.2 LBS | RESPIRATION RATE: 18 BRPM

## 2024-11-12 DIAGNOSIS — R20.0 NUMBNESS AND TINGLING IN BOTH HANDS: ICD-10-CM

## 2024-11-12 DIAGNOSIS — R20.2 NUMBNESS AND TINGLING IN BOTH HANDS: ICD-10-CM

## 2024-11-12 DIAGNOSIS — R20.2 PARESTHESIAS: Primary | ICD-10-CM

## 2024-11-12 DIAGNOSIS — M54.16 LUMBAR RADICULOPATHY: ICD-10-CM

## 2024-11-12 DIAGNOSIS — R20.2 PARESTHESIAS: ICD-10-CM

## 2024-11-12 PROCEDURE — 99214 OFFICE O/P EST MOD 30 MIN: CPT | Performed by: INTERNAL MEDICINE

## 2024-11-12 PROCEDURE — 72050 X-RAY EXAM NECK SPINE 4/5VWS: CPT

## 2024-11-12 RX ORDER — AMOXICILLIN 500 MG/1
CAPSULE ORAL
COMMUNITY
Start: 2024-10-19

## 2024-11-12 RX ORDER — GABAPENTIN 100 MG/1
100 CAPSULE ORAL 3 TIMES DAILY
Qty: 90 CAPSULE | Refills: 5 | Status: SHIPPED | OUTPATIENT
Start: 2024-11-12

## 2024-11-12 NOTE — PROGRESS NOTES
Ambulatory Visit  Name: Teena Staples      : 1965      MRN: [id number]  Encounter Provider: Juan Grover MD  Encounter Date: 2024   Encounter department: [facility] INTERNAL MEDICINE [place]    Assessment & Plan  Paresthesias  Itching to both forearms, saw her derm, not r/t season, dryness, heat, started since last July.   Orders:  •  EMG 2 Limb Upper Extremity; Future  •  XR spine cervical complete 4 or 5 vw non injury; Future    Numbness and tingling in both hands  Started around halloween, reports numbness and tingling in her index, middle, and thumb. Comes and goes, thumbs are constant. No alleviating factors. No exaecbating factors, she is retired, does not type. Not worse at night, Phalen's test non diagnostic.  Orders:  •  EMG 2 Limb Upper Extremity; Future    Lumbar radiculopathy  Chronic. Continue current regimen. Not controlled. Start gabapentin 100 mg TID. She was on it in the past.  Orders:  •  gabapentin (NEURONTIN) 100 mg capsule; Take 1 capsule (100 mg total) by mouth 3 (three) times a day      Depression Screening and Follow-up Plan: Patient was screened for depression during today's encounter. They screened negative with a PHQ-2 score of 0.    History of Present Illness   Here with c/o b/l tingling  to fingers.      History obtained from : patient  Review of Systems   Constitutional:  Negative for chills and fever.   HENT:  Negative for ear pain and sore throat.    Eyes:  Negative for pain and visual disturbance.   Respiratory:  Negative for cough and shortness of breath.    Cardiovascular:  Negative for chest pain and palpitations.   Gastrointestinal:  Negative for abdominal pain and vomiting.   Genitourinary:  Negative for dysuria and hematuria.   Musculoskeletal:  Negative for arthralgias and back pain.   Skin:  Negative for color change and rash.        Urticaria     Neurological:  Negative for seizures and syncope.        Numbness and tingling to middle, index, and  thumb; to b/l hand, finger tips   All other systems reviewed and are negative.    Past Medical History   Past Medical History:   Diagnosis Date   • Allergic    • Allergic rhinitis     last assessed 14    • Arthritis    • Asthma    • Bleeding per rectum     Last assessed 16    • Colon polyp    • Diverticulitis of colon    • GERD (gastroesophageal reflux disease)    • Hypercholesterolemia    • Hypertension     last assessed 3/24/15    • Hyponatremia     last assessed 9/17/15    • Obesity      Past Surgical History:   Procedure Laterality Date   •  SECTION     • COLONOSCOPY     • FL INJECTION RIGHT SHOULDER (ARTHROGRAM)  3/18/2021   • OR ARTHRP ACETBLR/PROX FEM PROSTC AGRFT/ALGRFT Right 2024    Procedure: RIGHT ARTHROPLASTY HIP TOTAL ANTERIOR;  Surgeon: Marlee Mccann MD;  Location:  MAIN OR;  Service: Orthopedics   • WISDOM TOOTH EXTRACTION     • WRIST SURGERY       Family History   Adopted: Yes   Problem Relation Age of Onset   • No Known Problems Mother    • Migraines Daughter      Current Outpatient Medications on File Prior to Visit   Medication Sig Dispense Refill   • amoxicillin (AMOXIL) 500 mg capsule Dental procedures only     • calcipotriene (DOVONEX) 0.005 % topical solution In the one to two week break from the Clobetasol solution, apply this solution one to two times a day to ears and affected areas on scalp 60 mL 5   • celecoxib (CeleBREX) 200 mg capsule take 1 capsule by mouth twice a day 30 capsule 5   • Cholecalciferol (VITAMIN D) 2000 units CAPS Take by mouth     • cholestyramine (QUESTRAN) 4 GM/DOSE powder Take 1 packet (4 g total) by mouth 2 (two) times a day As needed for diarrhea 378 g 5   • clobetasol (TEMOVATE) 0.05 % external solution Apply twice a day for two weeks at a time to scalp and ears taking at least a weeks break in between uses 50 mL 3   • Diclofenac Sodium (Voltaren) 1 % Apply 2 g topically 4 (four) times a day     • Elderberry-Vitamin C-Zinc (ELDERBERRY  "IMMUNE HEALTH GUMMY PO) Take 2 gums by mouth daily     • glucosamine-chondroitin 500-400 MG tablet Take 1 tablet by mouth 3 (three) times a day 100 tablet 2   • ketoconazole (NIZORAL) 2 % shampoo Shampoo daily for 2 weeks straight and then on \"Mondays, Wednesdays and Fridays\":  Lather into scalp; leave on for 5 minutes and then rinse off completely. 120 mL 11   • loratadine (CLARITIN) 10 mg tablet Take 10 mg by mouth daily     • mometasone (NASONEX) 50 mcg/act nasal spray 2 sprays into each nostril daily 17 g 0   • Multiple Vitamins-Minerals (multivitamin with minerals) tablet Take 1 tablet by mouth daily 30 tablet 0   • omeprazole (PriLOSEC) 10 mg delayed release capsule Take 1 capsule (10 mg total) by mouth daily 90 capsule 2   • Probiotic Product (ALIGN) 4 MG CAPS Take by mouth     • Rhubarb (ESTROVEN COMPLETE PO) Estroven     • simvastatin (ZOCOR) 5 MG tablet Take 1 tablet (5 mg total) by mouth daily at bedtime 90 tablet 0   • spironolactone (ALDACTONE) 50 mg tablet Take 1 tablet (50 mg total) by mouth daily 90 tablet 0   • verapamil (Verelan) 180 MG 24 hr capsule Take 1 capsule (180 mg total) by mouth daily before breakfast 90 capsule 2   • vitamin E, tocopherol, 400 units capsule Take 1 capsule (400 Units total) by mouth daily     • diphenhydrAMINE-acetaminophen (TYLENOL PM)  MG TABS Take 1 tablet by mouth daily at bedtime as needed for sleep (Patient not taking: Reported on 11/12/2024)     • [DISCONTINUED] ascorbic acid (VITAMIN C) 500 MG tablet Take 1 tablet (500 mg total) by mouth 2 (two) times a day (Patient not taking: Reported on 8/20/2024) 60 tablet 1   • [DISCONTINUED] aspirin (ECOTRIN LOW STRENGTH) 81 mg EC tablet Take 1 tablet (81 mg total) by mouth 2 (two) times a day Take 1(one) 81 mg tablet twice daily x 35 days after total joint arthroplasty (Patient not taking: Reported on 8/20/2024) 70 tablet 0   • [DISCONTINUED] docusate sodium (COLACE) 100 mg capsule Take 1 capsule (100 mg total) by " "mouth 2 (two) times a day (Patient not taking: Reported on 6/11/2024) 10 capsule 0   • [DISCONTINUED] ferrous sulfate 324 (65 Fe) mg Take 1 tablet (324 mg total) by mouth 2 (two) times a day before meals (Patient not taking: Reported on 8/20/2024) 60 tablet 1   • [DISCONTINUED] folic acid (FOLVITE) 1 mg tablet take 1 tablet by mouth once daily (Patient not taking: Reported on 8/20/2024) 30 tablet 1   • [DISCONTINUED] methocarbamol (ROBAXIN) 500 mg tablet Take 1 tablet (500 mg total) by mouth 3 (three) times a day as needed for muscle spasms (Patient not taking: Reported on 7/18/2024) 30 tablet 0   • [DISCONTINUED] methylPREDNISolone 4 MG tablet therapy pack Use as directed on package (Patient not taking: Reported on 10/31/2024) 21 each 0   • [DISCONTINUED] ondansetron (ZOFRAN) 4 mg tablet Take 1 tablet (4 mg total) by mouth every 8 (eight) hours as needed for nausea or vomiting (Patient not taking: Reported on 6/11/2024) 5 tablet 0     No current facility-administered medications on file prior to visit.     Allergies   Allergen Reactions   • Levofloxacin Hives and Tachycardia     Annotation - 15Jan2013: LIGHTHEADED   • Sulfamethoxazole-Trimethoprim Other (See Comments)     unknown   • Hydrocodone Bitartrate Er Other (See Comments)     Annotation - [id number]: FLUSHING    CHEST/RIB PAIN          Objective   /84 (BP Location: Left arm, Patient Position: Sitting, Cuff Size: Standard)   Pulse 88   Temp (!) 97.4 °F (36.3 °C) (Tympanic)   Resp 18   Ht 5' 8\" (1.727 m)   Wt 120 kg (265 lb 3.2 oz)   SpO2 99%   BMI 40.32 kg/m²     Physical Exam  Vitals and nursing note reviewed.   Constitutional:       General: She is not in acute distress.     Appearance: She is well-developed. She is obese.   HENT:      Head: Normocephalic and atraumatic.   Eyes:      Conjunctiva/sclera: Conjunctivae normal.   Cardiovascular:      Rate and Rhythm: Normal rate.      Heart sounds: No murmur heard.  Pulmonary:      Effort: " Pulmonary effort is normal. No respiratory distress.   Abdominal:      Tenderness: There is no abdominal tenderness.   Musculoskeletal:         General: No swelling.   Skin:     General: Skin is warm and dry.      Capillary Refill: Capillary refill takes less than 2 seconds.   Neurological:      Mental Status: She is alert.   Psychiatric:         Mood and Affect: Mood normal.     I have spent a total time of 15 minutes in caring for this patient on the day of the visit/encounter including Instructions for management, Risk factor reductions, and Reviewing / ordering tests, medicine, procedures  .

## 2024-11-12 NOTE — ASSESSMENT & PLAN NOTE
Chronic. Continue current regimen. Not controlled. Start gabapentin 100 mg TID. She was on it in the past.  Orders:    gabapentin (NEURONTIN) 100 mg capsule; Take 1 capsule (100 mg total) by mouth 3 (three) times a day

## 2024-11-13 ENCOUNTER — RESULTS FOLLOW-UP (OUTPATIENT)
Dept: INTERNAL MEDICINE CLINIC | Facility: CLINIC | Age: 59
End: 2024-11-13

## 2024-11-13 DIAGNOSIS — G56.02 LEFT CARPAL TUNNEL SYNDROME: Primary | ICD-10-CM

## 2024-11-19 DIAGNOSIS — L65.9 ALOPECIA: ICD-10-CM

## 2024-11-19 DIAGNOSIS — I10 HYPERTENSION, UNSPECIFIED TYPE: ICD-10-CM

## 2024-11-19 DIAGNOSIS — J45.909 MODERATE ASTHMA WITHOUT COMPLICATION, UNSPECIFIED WHETHER PERSISTENT: ICD-10-CM

## 2024-11-19 DIAGNOSIS — I10 BENIGN ESSENTIAL HYPERTENSION: ICD-10-CM

## 2024-11-19 DIAGNOSIS — M16.11 PRIMARY OSTEOARTHRITIS OF RIGHT HIP: ICD-10-CM

## 2024-11-19 RX ORDER — CELECOXIB 200 MG/1
200 CAPSULE ORAL 2 TIMES DAILY
Qty: 30 CAPSULE | Refills: 5 | Status: SHIPPED | OUTPATIENT
Start: 2024-11-19

## 2024-11-20 RX ORDER — SPIRONOLACTONE 50 MG/1
50 TABLET, FILM COATED ORAL DAILY
Qty: 90 TABLET | Refills: 1 | Status: SHIPPED | OUTPATIENT
Start: 2024-11-20

## 2024-12-04 ENCOUNTER — APPOINTMENT (OUTPATIENT)
Dept: LAB | Facility: CLINIC | Age: 59
End: 2024-12-04
Payer: COMMERCIAL

## 2024-12-04 ENCOUNTER — RESULTS FOLLOW-UP (OUTPATIENT)
Dept: INTERNAL MEDICINE CLINIC | Facility: CLINIC | Age: 59
End: 2024-12-04

## 2024-12-04 DIAGNOSIS — I10 HYPERTENSION, UNSPECIFIED TYPE: ICD-10-CM

## 2024-12-04 DIAGNOSIS — E78.5 HYPERLIPIDEMIA, UNSPECIFIED HYPERLIPIDEMIA TYPE: ICD-10-CM

## 2024-12-04 DIAGNOSIS — R73.03 PREDIABETES: ICD-10-CM

## 2024-12-04 LAB
ANION GAP SERPL CALCULATED.3IONS-SCNC: 5 MMOL/L (ref 4–13)
BASOPHILS # BLD AUTO: 0.05 THOUSANDS/ÂΜL (ref 0–0.1)
BASOPHILS NFR BLD AUTO: 1 % (ref 0–1)
BUN SERPL-MCNC: 15 MG/DL (ref 5–25)
CALCIUM SERPL-MCNC: 9.4 MG/DL (ref 8.4–10.2)
CHLORIDE SERPL-SCNC: 103 MMOL/L (ref 96–108)
CHOLEST SERPL-MCNC: 142 MG/DL (ref ?–200)
CO2 SERPL-SCNC: 31 MMOL/L (ref 21–32)
CREAT SERPL-MCNC: 0.74 MG/DL (ref 0.6–1.3)
EOSINOPHIL # BLD AUTO: 0.29 THOUSAND/ÂΜL (ref 0–0.61)
EOSINOPHIL NFR BLD AUTO: 7 % (ref 0–6)
ERYTHROCYTE [DISTWIDTH] IN BLOOD BY AUTOMATED COUNT: 14.4 % (ref 11.6–15.1)
EST. AVERAGE GLUCOSE BLD GHB EST-MCNC: 117 MG/DL
GFR SERPL CREATININE-BSD FRML MDRD: 88 ML/MIN/1.73SQ M
GLUCOSE P FAST SERPL-MCNC: 94 MG/DL (ref 65–99)
HBA1C MFR BLD: 5.7 %
HCT VFR BLD AUTO: 43.7 % (ref 34.8–46.1)
HDLC SERPL-MCNC: 58 MG/DL
HGB BLD-MCNC: 13.4 G/DL (ref 11.5–15.4)
IMM GRANULOCYTES # BLD AUTO: 0.01 THOUSAND/UL (ref 0–0.2)
IMM GRANULOCYTES NFR BLD AUTO: 0 % (ref 0–2)
LDLC SERPL CALC-MCNC: 71 MG/DL (ref 0–100)
LYMPHOCYTES # BLD AUTO: 1 THOUSANDS/ÂΜL (ref 0.6–4.47)
LYMPHOCYTES NFR BLD AUTO: 25 % (ref 14–44)
MCH RBC QN AUTO: 27.7 PG (ref 26.8–34.3)
MCHC RBC AUTO-ENTMCNC: 30.7 G/DL (ref 31.4–37.4)
MCV RBC AUTO: 91 FL (ref 82–98)
MONOCYTES # BLD AUTO: 0.4 THOUSAND/ÂΜL (ref 0.17–1.22)
MONOCYTES NFR BLD AUTO: 10 % (ref 4–12)
NEUTROPHILS # BLD AUTO: 2.28 THOUSANDS/ÂΜL (ref 1.85–7.62)
NEUTS SEG NFR BLD AUTO: 57 % (ref 43–75)
NRBC BLD AUTO-RTO: 0 /100 WBCS
PLATELET # BLD AUTO: 300 THOUSANDS/UL (ref 149–390)
PMV BLD AUTO: 9.7 FL (ref 8.9–12.7)
POTASSIUM SERPL-SCNC: 4.7 MMOL/L (ref 3.5–5.3)
RBC # BLD AUTO: 4.83 MILLION/UL (ref 3.81–5.12)
SODIUM SERPL-SCNC: 139 MMOL/L (ref 135–147)
TRIGL SERPL-MCNC: 67 MG/DL (ref ?–150)
TSH SERPL DL<=0.05 MIU/L-ACNC: 1.97 UIU/ML (ref 0.45–4.5)
WBC # BLD AUTO: 4.03 THOUSAND/UL (ref 4.31–10.16)

## 2024-12-04 PROCEDURE — 80061 LIPID PANEL: CPT

## 2024-12-04 PROCEDURE — 85025 COMPLETE CBC W/AUTO DIFF WBC: CPT

## 2024-12-04 PROCEDURE — 83036 HEMOGLOBIN GLYCOSYLATED A1C: CPT

## 2024-12-04 PROCEDURE — 36415 COLL VENOUS BLD VENIPUNCTURE: CPT

## 2024-12-04 PROCEDURE — 84443 ASSAY THYROID STIM HORMONE: CPT

## 2024-12-04 PROCEDURE — 80048 BASIC METABOLIC PNL TOTAL CA: CPT

## 2024-12-06 ENCOUNTER — TELEPHONE (OUTPATIENT)
Dept: OBGYN CLINIC | Facility: CLINIC | Age: 59
End: 2024-12-06

## 2024-12-06 DIAGNOSIS — I10 BENIGN ESSENTIAL HYPERTENSION: ICD-10-CM

## 2024-12-06 DIAGNOSIS — I10 HYPERTENSION, UNSPECIFIED TYPE: ICD-10-CM

## 2024-12-06 DIAGNOSIS — R73.03 PREDIABETES: ICD-10-CM

## 2024-12-06 DIAGNOSIS — K21.9 GASTROESOPHAGEAL REFLUX DISEASE: ICD-10-CM

## 2024-12-06 DIAGNOSIS — M79.89 LEG SWELLING: ICD-10-CM

## 2024-12-06 DIAGNOSIS — L98.9 SKIN LESION: ICD-10-CM

## 2024-12-06 DIAGNOSIS — L65.9 ALOPECIA: ICD-10-CM

## 2024-12-06 DIAGNOSIS — E78.5 HYPERLIPIDEMIA, UNSPECIFIED HYPERLIPIDEMIA TYPE: ICD-10-CM

## 2024-12-06 DIAGNOSIS — J45.909 MODERATE ASTHMA WITHOUT COMPLICATION, UNSPECIFIED WHETHER PERSISTENT: ICD-10-CM

## 2024-12-06 DIAGNOSIS — K21.9 GASTROESOPHAGEAL REFLUX DISEASE, UNSPECIFIED WHETHER ESOPHAGITIS PRESENT: ICD-10-CM

## 2024-12-06 DIAGNOSIS — J01.91 ACUTE RECURRENT SINUSITIS, UNSPECIFIED LOCATION: ICD-10-CM

## 2024-12-06 DIAGNOSIS — M54.16 LUMBAR RADICULOPATHY: ICD-10-CM

## 2024-12-06 RX ORDER — OMEPRAZOLE 10 MG/1
10 CAPSULE, DELAYED RELEASE ORAL DAILY
Qty: 90 CAPSULE | Refills: 1 | Status: SHIPPED | OUTPATIENT
Start: 2024-12-06

## 2024-12-09 ENCOUNTER — OFFICE VISIT (OUTPATIENT)
Dept: OBGYN CLINIC | Facility: CLINIC | Age: 59
End: 2024-12-09
Payer: COMMERCIAL

## 2024-12-09 VITALS
OXYGEN SATURATION: 98 % | DIASTOLIC BLOOD PRESSURE: 78 MMHG | HEIGHT: 68 IN | BODY MASS INDEX: 40.16 KG/M2 | WEIGHT: 265 LBS | HEART RATE: 108 BPM | SYSTOLIC BLOOD PRESSURE: 115 MMHG

## 2024-12-09 DIAGNOSIS — G89.29 CHRONIC PAIN OF LEFT ANKLE: Primary | ICD-10-CM

## 2024-12-09 DIAGNOSIS — M25.572 CHRONIC PAIN OF LEFT ANKLE: Primary | ICD-10-CM

## 2024-12-09 PROCEDURE — 99213 OFFICE O/P EST LOW 20 MIN: CPT | Performed by: FAMILY MEDICINE

## 2024-12-09 NOTE — PROGRESS NOTES
Assessment/Plan:  Assessment & Plan   Diagnoses and all orders for this visit:    Chronic pain of left ankle  -     MRI ankle/heel left  wo contrast; Future        59-year-old female with left Achilles/heel pain of more than 5 months duration.  Discussed with patient physical exam, imaging studies, impression, and plan.  X-rays left ankle noted for Achilles insertional enthesophyte.  Imaging studies, clinical exam, and history suggest that she has symptoms from Achilles tendon pathology.  Symptoms persist despite conservative management of more than 3 months of topical diclofenac, icing, ankle brace, daily stretching.  At this time I will refer her for MRI of the left ankle to evaluate for Achilles tendon tear as invasive management may be warranted.  She will return after having MRI done.        Subjective:   Patient ID: Teena Staples is a 59 y.o. female.  Chief Complaint   Patient presents with    Left Ankle - Follow-up, Pain, Swelling        59-year-old female following up for left Achilles/heel pain more than 5 months duration.  She was last seen by me 3 months ago at which point she was placed in ankle brace and advised to continue topical diclofenac.  She reports having symptoms are still bothersome.  She has pain described as localized to the Achilles/heel aspect, worse with bearing weight and ambulating, radiating to the anterior ankle, associated with swelling, and improved with resting.  She has been doing daily stretches and wearing splint at night.  She has been icing and also applying top of diclofenac.  She states that when she rests the symptoms are improved, but with resuming normal activity pain and swelling worsen.      Ankle Pain  This is a new problem. The current episode started more than 1 month ago. The problem occurs daily. The problem has been waxing and waning. Associated symptoms include arthralgias and joint swelling. Pertinent negatives include no numbness or weakness. The symptoms are  "aggravated by standing and walking. She has tried rest, position changes and ice (Topical diclofenac, ankle brace, stretching) for the symptoms. The treatment provided mild relief.             Review of Systems   Musculoskeletal:  Positive for arthralgias and joint swelling.   Neurological:  Negative for weakness and numbness.       Objective:  Vitals:    12/09/24 1417   BP: 115/78   Pulse: (!) 108   SpO2: 98%   Weight: 120 kg (265 lb)   Height: 5' 8\" (1.727 m)      Left Ankle Exam   Swelling: mild (Achilles insertion)    Range of Motion   The patient has normal left ankle ROM.     Muscle Strength   Dorsiflexion:  5/5   Plantar flexion:  5/5     Other   Pulse: present          Observations   Left Ankle/Foot   Negative for deformity.     Tenderness   Left Ankle/Foot   Tenderness in the Achilles insertion. No tenderness in the anterior ankle, anterior talofibular ligament, fifth metatarsal base, calcaneofibular ligament, deltoid ligament, dorsum foot, lateral malleolus, medial malleolus, mid-plantar aspect, peroneal tendon, plantar fascia, posterior tibial tendon and posterior talofibular ligament.     Strength/Myotome Testing     Left Ankle/Foot   Dorsiflexion: 5  Plantar flexion: 5  Inversion: 5  Eversion: 5    Tests   Left Ankle/Foot   Negative for calcaneal squeeze.       Physical Exam  Vitals and nursing note reviewed.   Constitutional:       Appearance: Normal appearance. She is well-developed. She is not ill-appearing or diaphoretic.   HENT:      Head: Normocephalic and atraumatic.      Right Ear: External ear normal.      Left Ear: External ear normal.   Eyes:      Conjunctiva/sclera: Conjunctivae normal.   Neck:      Trachea: No tracheal deviation.   Cardiovascular:      Comments: Tachycardic  Pulmonary:      Effort: Pulmonary effort is normal. No respiratory distress.   Abdominal:      General: There is no distension.   Musculoskeletal:         General: Swelling and tenderness present.      Left ankle: No " lateral malleolus, medial malleolus, ATF ligament, CF ligament, posterior TF ligament or base of 5th metatarsal tenderness.      Left foot: No deformity.   Skin:     General: Skin is warm and dry.      Coloration: Skin is not jaundiced or pale.   Neurological:      Mental Status: She is alert and oriented to person, place, and time.   Psychiatric:         Mood and Affect: Mood normal.         Behavior: Behavior normal.         Thought Content: Thought content normal.         Judgment: Judgment normal.

## 2024-12-11 ENCOUNTER — OFFICE VISIT (OUTPATIENT)
Dept: INTERNAL MEDICINE CLINIC | Facility: CLINIC | Age: 59
End: 2024-12-11
Payer: COMMERCIAL

## 2024-12-11 VITALS
WEIGHT: 264 LBS | HEART RATE: 91 BPM | HEIGHT: 68 IN | DIASTOLIC BLOOD PRESSURE: 74 MMHG | SYSTOLIC BLOOD PRESSURE: 118 MMHG | OXYGEN SATURATION: 96 % | BODY MASS INDEX: 40.01 KG/M2

## 2024-12-11 DIAGNOSIS — E78.2 MIXED HYPERLIPIDEMIA: ICD-10-CM

## 2024-12-11 DIAGNOSIS — I10 BENIGN ESSENTIAL HYPERTENSION: Primary | ICD-10-CM

## 2024-12-11 DIAGNOSIS — M54.16 LUMBAR RADICULOPATHY: ICD-10-CM

## 2024-12-11 DIAGNOSIS — L29.9 BRACHIORADIAL PRURITUS: ICD-10-CM

## 2024-12-11 DIAGNOSIS — R73.03 PREDIABETES: ICD-10-CM

## 2024-12-11 DIAGNOSIS — E78.5 HYPERLIPIDEMIA, UNSPECIFIED HYPERLIPIDEMIA TYPE: ICD-10-CM

## 2024-12-11 DIAGNOSIS — J45.909 MODERATE ASTHMA WITHOUT COMPLICATION, UNSPECIFIED WHETHER PERSISTENT: ICD-10-CM

## 2024-12-11 DIAGNOSIS — L65.9 ALOPECIA: ICD-10-CM

## 2024-12-11 DIAGNOSIS — I10 HYPERTENSION, UNSPECIFIED TYPE: ICD-10-CM

## 2024-12-11 DIAGNOSIS — L65.2 ALOPECIA MUCINOSA: ICD-10-CM

## 2024-12-11 DIAGNOSIS — K21.9 GASTROESOPHAGEAL REFLUX DISEASE: ICD-10-CM

## 2024-12-11 DIAGNOSIS — I10 BENIGN ESSENTIAL HTN: ICD-10-CM

## 2024-12-11 DIAGNOSIS — E66.01 MORBID OBESITY (HCC): ICD-10-CM

## 2024-12-11 PROCEDURE — 99214 OFFICE O/P EST MOD 30 MIN: CPT | Performed by: INTERNAL MEDICINE

## 2024-12-11 RX ORDER — VERAPAMIL HYDROCHLORIDE 180 MG/1
180 CAPSULE, EXTENDED RELEASE ORAL
Qty: 90 CAPSULE | Refills: 2 | Status: SHIPPED | OUTPATIENT
Start: 2024-12-11

## 2024-12-11 RX ORDER — SIMVASTATIN 5 MG
5 TABLET ORAL
Qty: 90 TABLET | Refills: 0 | Status: SHIPPED | OUTPATIENT
Start: 2024-12-11

## 2024-12-11 RX ORDER — GABAPENTIN 100 MG/1
200 CAPSULE ORAL 3 TIMES DAILY
Qty: 540 CAPSULE | Refills: 0 | Status: SHIPPED | OUTPATIENT
Start: 2024-12-11 | End: 2025-03-11

## 2024-12-11 NOTE — ASSESSMENT & PLAN NOTE
Lipid panel is on target.  Continue statin.  Lipid before next visit.    Orders:    Lipid Panel with Direct LDL reflex; Future

## 2024-12-11 NOTE — ASSESSMENT & PLAN NOTE
Chronic. Continue current regimen. 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      Orders:    gabapentin (NEURONTIN) 100 mg capsule; Take 2 capsules (200 mg total) by mouth 3 (three) times a day    simvastatin (ZOCOR) 5 MG tablet; Take 1 tablet (5 mg total) by mouth daily at bedtime

## 2024-12-11 NOTE — ASSESSMENT & PLAN NOTE
Discussed diet and exercise.    Orders:    Naltrexone-buPROPion HCl ER 8-90 MG TB12; Take 1 tab po QAM x 1 week, then 1 tab po BID x 1 week, then 2 tab QAM, 1 tab QPM x 1 week, then 2 tabs BID.

## 2024-12-11 NOTE — PROGRESS NOTES
Name: Teena Staples      : 1965      MRN: [id number]  Encounter Provider: Juan Grover MD  Encounter Date: 2024   Encounter department: [facility] INTERNAL MEDICINE [place]  :  Assessment & Plan  Lumbar radiculopathy  Chronic. Continue current regimen. 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      Orders:    gabapentin (NEURONTIN) 100 mg capsule; Take 2 capsules (200 mg total) by mouth 3 (three) times a day    simvastatin (ZOCOR) 5 MG tablet; Take 1 tablet (5 mg total) by mouth daily at bedtime    Benign essential hypertension  Hypertension is controlled.  Continue current regimen.  Continue verapamil.  She denies any chest pain shortness of breath headaches.      Orders:    CBC and differential; Future    Basic metabolic panel; Future    simvastatin (ZOCOR) 5 MG tablet; Take 1 tablet (5 mg total) by mouth daily at bedtime    verapamil (Verelan) 180 MG 24 hr capsule; Take 1 capsule (180 mg total) by mouth daily before breakfast    Alopecia mucinosa  Stable.  Continue spironolactone.  Labs reviewed.  potassium normal.         Mixed hyperlipidemia  Lipid panel is on target.  Continue statin.  Lipid before next visit.    Orders:    Lipid Panel with Direct LDL reflex; Future    Morbid obesity (HCC)  Discussed diet and exercise.    Orders:    Naltrexone-buPROPion HCl ER 8-90 MG TB12; Take 1 tab po QAM x 1 week, then 1 tab po BID x 1 week, then 2 tab QAM, 1 tab QPM x 1 week, then 2 tabs BID.    Prediabetes  A1c reviewed.  Diet discussed.  Avoid sweets.  Avoid carbs.  A1c with next labs.    Orders:    Hemoglobin A1C; Future    simvastatin (ZOCOR) 5 MG tablet; Take 1 tablet (5 mg total) by mouth daily at bedtime    Brachioradial pruritus  This is controlled with gabapentin.              Depression Screening and Follow-up Plan: Patient was screened for depression during today's encounter. They screened negative with a PHQ-2 score of 0.      History of  "Present Illness     Patient is here for routine follow up, reviewed chronic medical problems, ordered labs for next visit including CBC CMP TSH A1C LIPID. Reviewed labs for this visit.      Review of Systems   Constitutional:  Negative for chills and fever.   HENT:  Negative for ear pain and sore throat.    Eyes:  Negative for pain and visual disturbance.   Respiratory:  Negative for cough and shortness of breath.    Cardiovascular:  Negative for chest pain and palpitations.   Gastrointestinal:  Negative for abdominal pain and vomiting.   Genitourinary:  Negative for dysuria and hematuria.   Musculoskeletal:  Negative for arthralgias and back pain.   Skin:  Negative for color change and rash.   Neurological:  Negative for seizures and syncope.   All other systems reviewed and are negative.      Objective   /74 (BP Location: Left arm, Patient Position: Sitting, Cuff Size: Large)   Pulse 91   Ht 5' 8\" (1.727 m)   Wt 120 kg (264 lb)   SpO2 96%   BMI 40.14 kg/m²      Physical Exam  Vitals and nursing note reviewed.   Constitutional:       General: She is not in acute distress.     Appearance: She is well-developed.   HENT:      Head: Normocephalic and atraumatic.   Eyes:      Conjunctiva/sclera: Conjunctivae normal.   Cardiovascular:      Rate and Rhythm: Normal rate and regular rhythm.      Heart sounds: No murmur heard.  Pulmonary:      Effort: Pulmonary effort is normal. No respiratory distress.      Breath sounds: Normal breath sounds.   Abdominal:      Palpations: Abdomen is soft.      Tenderness: There is no abdominal tenderness.   Musculoskeletal:         General: No swelling.      Cervical back: Neck supple.   Skin:     General: Skin is warm and dry.      Capillary Refill: Capillary refill takes less than 2 seconds.   Neurological:      Mental Status: She is alert.   Psychiatric:         Mood and Affect: Mood normal.         I have spent a total time of 15 minutes in caring for this patient on the day " of the visit/encounter including Patient and family education, Impressions, and Counseling / Coordination of care.

## 2024-12-11 NOTE — ASSESSMENT & PLAN NOTE
A1c reviewed.  Diet discussed.  Avoid sweets.  Avoid carbs.  A1c with next labs.    Orders:    Hemoglobin A1C; Future    simvastatin (ZOCOR) 5 MG tablet; Take 1 tablet (5 mg total) by mouth daily at bedtime

## 2024-12-11 NOTE — ASSESSMENT & PLAN NOTE
Hypertension is controlled.  Continue current regimen.  Continue verapamil.  She denies any chest pain shortness of breath headaches.      Orders:    CBC and differential; Future    Basic metabolic panel; Future    simvastatin (ZOCOR) 5 MG tablet; Take 1 tablet (5 mg total) by mouth daily at bedtime    verapamil (Verelan) 180 MG 24 hr capsule; Take 1 capsule (180 mg total) by mouth daily before breakfast

## 2024-12-12 ENCOUNTER — TELEPHONE (OUTPATIENT)
Age: 59
End: 2024-12-12

## 2024-12-12 NOTE — TELEPHONE ENCOUNTER
PA for Naltrexone-buPROPion HCl ER 8-90 MG TB12 SUBMITTED to     via    []CMM-KEY:   [x]Surescripts-Case ID # [id number]   []Availity-Auth ID # NDC #   []Faxed to plan   []Other website   []Phone call Case ID #     [x]PA sent as URGENT    All office notes, labs and other pertaining documents and studies sent. Clinical questions answered. Awaiting determination from insurance company.     Turnaround time for your insurance to make a decision on your Prior Authorization can take 7-21 business days.

## 2024-12-12 NOTE — TELEPHONE ENCOUNTER
PA for Naltrexone-buPROPion HCl ER 8-90 MG TB12  APPROVED     Date(s) approved November 12, 2024 to Cara 10, 2025     Case #[id number]     Patient advised by          []MyChart Message  [x]Phone call   []LMOM  []L/M to call office as no active Communication consent on file  []Unable to leave detailed message as VM not approved on Communication consent       Pharmacy advised by    [x]Fax  []Phone call    Approval letter scanned into Media Yes

## 2024-12-31 ENCOUNTER — HOSPITAL ENCOUNTER (OUTPATIENT)
Dept: MRI IMAGING | Facility: CLINIC | Age: 59
Discharge: HOME | End: 2024-12-31
Payer: COMMERCIAL

## 2024-12-31 DIAGNOSIS — G89.29 CHRONIC PAIN OF LEFT ANKLE: ICD-10-CM

## 2024-12-31 DIAGNOSIS — M25.572 CHRONIC PAIN OF LEFT ANKLE: ICD-10-CM

## 2024-12-31 PROCEDURE — 73721 MRI JNT OF LWR EXTRE W/O DYE: CPT

## 2025-01-06 ENCOUNTER — PROCEDURE VISIT (OUTPATIENT)
Dept: NEUROLOGY | Facility: CLINIC | Age: 60
End: 2025-01-06
Payer: COMMERCIAL

## 2025-01-06 ENCOUNTER — TELEPHONE (OUTPATIENT)
Dept: OBGYN CLINIC | Facility: CLINIC | Age: 60
End: 2025-01-06

## 2025-01-06 ENCOUNTER — OFFICE VISIT (OUTPATIENT)
Dept: OBGYN CLINIC | Facility: CLINIC | Age: 60
End: 2025-01-06
Payer: COMMERCIAL

## 2025-01-06 VITALS — WEIGHT: 266.6 LBS | HEIGHT: 68 IN | BODY MASS INDEX: 40.41 KG/M2

## 2025-01-06 DIAGNOSIS — R20.2 PARESTHESIAS: ICD-10-CM

## 2025-01-06 DIAGNOSIS — M67.88 ACHILLES TENDINOSIS OF LEFT LOWER EXTREMITY: Primary | ICD-10-CM

## 2025-01-06 DIAGNOSIS — R20.0 NUMBNESS AND TINGLING IN BOTH HANDS: ICD-10-CM

## 2025-01-06 DIAGNOSIS — R20.2 NUMBNESS AND TINGLING IN BOTH HANDS: ICD-10-CM

## 2025-01-06 DIAGNOSIS — S86.012D ACHILLES TENDON TEAR, LEFT, SUBSEQUENT ENCOUNTER: ICD-10-CM

## 2025-01-06 PROCEDURE — 95912 NRV CNDJ TEST 11-12 STUDIES: CPT | Performed by: PHYSICAL MEDICINE & REHABILITATION

## 2025-01-06 PROCEDURE — 99213 OFFICE O/P EST LOW 20 MIN: CPT | Performed by: FAMILY MEDICINE

## 2025-01-06 PROCEDURE — 95886 MUSC TEST DONE W/N TEST COMP: CPT | Performed by: PHYSICAL MEDICINE & REHABILITATION

## 2025-01-06 NOTE — PATIENT INSTRUCTIONS
Consider PRP injection (Platelet Rich Plasma)  - insurance does not cover  -[facility] approx $300    [place]

## 2025-01-06 NOTE — PROGRESS NOTES
Assessment/Plan:  Assessment & Plan   Diagnoses and all orders for this visit:    Achilles tendinosis of left lower extremity  -     Ambulatory Referral to Orthopedic Surgery; Future    Achilles tendon tear, left, subsequent encounter  -     Ambulatory Referral to Orthopedic Surgery; Future        59-year-old female with left Achilles/heel pain of more than 6 months duration.  Discussed with patient MRI results, impression, and plan.  MRI left ankle heel noted for moderate thickening mid to distal Achilles tendon compatible with prominent tendinosis with low-grade partial interstitial tear distal Achilles tendon measuring approximately 2 cm in length, chronically torn ATFL.  Imaging studies and history suggest that she has symptoms from Achilles tendinopathy.  I advised patient may consider evaluation by orthopedic surgeon to which she declined at this time.  I discussed treatment option of PRP, however I advised is not usually covered by insurance.  I will refer her to my sports medicine colleague for evaluation and consideration of PRP.  She may continue with topical anesthetics and may also do Epsom salt soaks.  She was advised she may continue working activity as tolerated however to refrain from high-impact activities such as jumping.  She will follow-up as needed.      Subjective:   Patient ID: Teena Staples is a 59 y.o. female.  Chief Complaint   Patient presents with    Left Ankle - Follow-up, Pain, Swelling        59-year-old female following up for left Achilles/heel pain of more than 6 months duration.  She was last seen by me 4 weeks ago at which point she was referred for MRI of the left ankle/heel.  She states that symptoms have been been more manageable since she has decreased her level of activity/walking.  She has pain described localized to the Achilles/heel aspect, achy and burning, worse with prolonged standing and ambulation, and improved with resting.    Ankle Pain  This is a new problem. The  "current episode started more than 1 month ago. The problem occurs daily. The problem has been waxing and waning. Associated symptoms include arthralgias and joint swelling. Pertinent negatives include no numbness or weakness. The symptoms are aggravated by standing and walking. She has tried rest, position changes, immobilization and ice (Stretching, topical diclofenac, cam boot) for the symptoms. The treatment provided mild relief.               Review of Systems   Musculoskeletal:  Positive for arthralgias and joint swelling.   Neurological:  Negative for weakness and numbness.       Objective:  Vitals:    01/06/25 1020   Weight: 121 kg (266 lb 9.6 oz)   Height: 5' 8\" (1.727 m)      Ortho Exam    Physical Exam  Vitals and nursing note reviewed.   Constitutional:       Appearance: Normal appearance. She is well-developed. She is not ill-appearing or diaphoretic.   HENT:      Head: Normocephalic and atraumatic.      Right Ear: External ear normal.      Left Ear: External ear normal.   Eyes:      Conjunctiva/sclera: Conjunctivae normal.   Neck:      Trachea: No tracheal deviation.   Pulmonary:      Effort: Pulmonary effort is normal. No respiratory distress.   Abdominal:      General: There is no distension.   Skin:     General: Skin is warm and dry.      Coloration: Skin is not jaundiced or pale.   Neurological:      Mental Status: She is alert and oriented to person, place, and time.   Psychiatric:         Mood and Affect: Mood normal.         Behavior: Behavior normal.         Thought Content: Thought content normal.         Judgment: Judgment normal.             More than 20 minutes spent reviewing patient chart, reviewing and interpreting imaging studies, obtaining history from patient, discussing and implementing treatment plan, coordinating care with placing orders.      "

## 2025-01-20 ENCOUNTER — OFFICE VISIT (OUTPATIENT)
Dept: OBGYN CLINIC | Facility: CLINIC | Age: 60
End: 2025-01-20
Payer: COMMERCIAL

## 2025-01-20 VITALS — WEIGHT: 266 LBS | BODY MASS INDEX: 40.32 KG/M2 | HEIGHT: 68 IN

## 2025-01-20 DIAGNOSIS — G56.01 CARPAL TUNNEL SYNDROME ON RIGHT: Primary | ICD-10-CM

## 2025-01-20 DIAGNOSIS — G56.02 LEFT CARPAL TUNNEL SYNDROME: ICD-10-CM

## 2025-01-20 PROCEDURE — 20526 THER INJECTION CARP TUNNEL: CPT | Performed by: STUDENT IN AN ORGANIZED HEALTH CARE EDUCATION/TRAINING PROGRAM

## 2025-01-20 PROCEDURE — 99214 OFFICE O/P EST MOD 30 MIN: CPT | Performed by: STUDENT IN AN ORGANIZED HEALTH CARE EDUCATION/TRAINING PROGRAM

## 2025-01-20 RX ORDER — BETAMETHASONE SODIUM PHOSPHATE AND BETAMETHASONE ACETATE 3; 3 MG/ML; MG/ML
6 INJECTION, SUSPENSION INTRA-ARTICULAR; INTRALESIONAL; INTRAMUSCULAR; SOFT TISSUE
Status: COMPLETED | OUTPATIENT
Start: 2025-01-20 | End: 2025-01-20

## 2025-01-20 RX ORDER — ROPIVACAINE HYDROCHLORIDE 5 MG/ML
1 INJECTION, SOLUTION EPIDURAL; INFILTRATION; PERINEURAL
Status: COMPLETED | OUTPATIENT
Start: 2025-01-20 | End: 2025-01-20

## 2025-01-20 RX ADMIN — BETAMETHASONE SODIUM PHOSPHATE AND BETAMETHASONE ACETATE 6 MG: 3; 3 INJECTION, SUSPENSION INTRA-ARTICULAR; INTRALESIONAL; INTRAMUSCULAR; SOFT TISSUE at 14:30

## 2025-01-20 RX ADMIN — ROPIVACAINE HYDROCHLORIDE 1 ML: 5 INJECTION, SOLUTION EPIDURAL; INFILTRATION; PERINEURAL at 14:30

## 2025-01-20 NOTE — PROGRESS NOTES
ORTHOPAEDIC HAND, WRIST, AND ELBOW OFFICE  VISIT      ASSESSMENT/PLAN:      Diagnoses and all orders for this visit:    Carpal tunnel syndrome on right  -     Hand/upper extremity injection: bilateral carpal tunnel    Left carpal tunnel syndrome  -     Ambulatory Referral to Orthopedic Surgery  -     Hand/upper extremity injection: bilateral carpal tunnel          59 y.o. female with bilateral carpal tunnel syndrome (left confirmed on EMG)  Anatomy of the condition/s as well as treatment options and expected outcomes were discussed.  Any pertinent imaging or lab results were reviewed with the patient.   The patient verbalized understanding of exam findings and treatment plan.   The patient was given the opportunity to ask questions.  Questions were answered to the patient's satisfaction.  The patient decided to move forward with nighttime bracing and carpal tunnel CSI's. Bilateral carpal tunnel CSI's were performed in the office without complication. Post injection protocol/expectations were reviewed.   We discussed carpal tunnel releases if symptoms worsen or fail to improve.   I will see her back in a few months as needed, may cancel if doing well.       Follow Up:  3 months PRN       To Do Next Visit:  Re-evaluation of current issue      Discussions:  Carpal Tunnel Syndrome: The anatomy and physiology of carpal tunnel syndrome was discussed with the patient today.  Increase pressure localized under the transverse carpal ligament can cause pain, numbness, tingling, or dysesthesias within the median nerve distribution as well as feelings of fatigue, clumsiness, or awkwardness.  These symptoms typically occur at night and worse in the morning upon waking.  Eventually, untreated carpal tunnel syndrome can result in weakness and permanent loss of muscle within the thenar compartment of the hand.  Treatment options were discussed with the patient.  Conservative treatment includes nocturnal resting splints to keep the  nerve in a neutral position, ergonomic changes within the work or home environment, activity modification, and tendon gliding exercises. Vitamin B6 one tablet daily over the counter may helpful to reduce symptoms.   Steroid injections within the carpal canal can help a majority of patients, however this is often self-limited in a majority of patients.  Surgical intervention to divide the transverse carpal ligament typically results in a long-lasting relief of the patient's complaints, with the recurrence rate of less than 1%.                                                                                                                                                                               Armando Kamara MD  Attending, Orthopaedic Surgery  Hand, Wrist, and Elbow Surgery  [facility]    ______________________________________________________________________________________________    CHIEF COMPLAINT:  Chief Complaint   Patient presents with    Left Hand - Pain    Right Hand - Pain       SUBJECTIVE:  Patient is a 59 y.o. RHD female who presents today for evaluation and treatment of bilateral hand numbness and tingling. I am seeing Teena in consultation at the request of Dr. Juan Grover. She notes her symptoms have been ongoing since October. Numbness and tingling is constant and affects her radial 3 digits. She feels use makes her symptoms worse as well as cold weather. She notes achy pain at night. She started wearing a left cock up wrist brace at night, which does improve the numbness and tingling.      Occupation: retired human resources (office work)    I have personally reviewed all the relevant PMH, PSH, SH, FH, Medications and allergies      PAST MEDICAL HISTORY:  Past Medical History:   Diagnosis Date    Allergic     Allergic rhinitis     last assessed 11/17/14     Arthritis     Asthma     Bleeding per rectum     Last assessed 8/1/16     Colon polyp     Diverticulitis of  colon     GERD (gastroesophageal reflux disease)     Hypercholesterolemia     Hypertension     last assessed 3/24/15     Hyponatremia     last assessed 9/17/15     Obesity        PAST SURGICAL HISTORY:  Past Surgical History:   Procedure Laterality Date     SECTION      COLONOSCOPY      FL INJECTION RIGHT SHOULDER (ARTHROGRAM)  3/18/2021    HI ARTHRP ACETBLR/PROX FEM PROSTC AGRFT/ALGRFT Right 2024    Procedure: RIGHT ARTHROPLASTY HIP TOTAL ANTERIOR;  Surgeon: Marlee Mccann MD;  Location:  MAIN OR;  Service: Orthopedics    WISDOM TOOTH EXTRACTION      WRIST SURGERY         FAMILY HISTORY:  Family History   Adopted: Yes   Problem Relation Age of Onset    No Known Problems Mother     Migraines Daughter        SOCIAL HISTORY:  Social History     Tobacco Use    Smoking status: Never    Smokeless tobacco: Never   Vaping Use    Vaping status: Never Used   Substance Use Topics    Alcohol use: Yes     Comment: rare /    Drug use: No       MEDICATIONS:    Current Outpatient Medications:     calcipotriene (DOVONEX) 0.005 % topical solution, In the one to two week break from the Clobetasol solution, apply this solution one to two times a day to ears and affected areas on scalp, Disp: 60 mL, Rfl: 5    celecoxib (CeleBREX) 200 mg capsule, take 1 capsule by mouth twice a day, Disp: 30 capsule, Rfl: 5    Cholecalciferol (VITAMIN D) 2000 units CAPS, Take by mouth, Disp: , Rfl:     cholestyramine (QUESTRAN) 4 GM/DOSE powder, Take 1 packet (4 g total) by mouth 2 (two) times a day As needed for diarrhea, Disp: 378 g, Rfl: 5    clobetasol (TEMOVATE) 0.05 % external solution, Apply twice a day for two weeks at a time to scalp and ears taking at least a weeks break in between uses, Disp: 50 mL, Rfl: 3    Diclofenac Sodium (Voltaren) 1 %, Apply 2 g topically 4 (four) times a day, Disp: , Rfl:     diphenhydrAMINE-acetaminophen (TYLENOL PM)  MG TABS, Take 1 tablet by mouth daily at bedtime as needed for sleep, Disp:  ", Rfl:     Elderberry-Vitamin C-Zinc (ELDERBERRY IMMUNE HEALTH GUMMY PO), Take 2 gums by mouth daily, Disp: , Rfl:     gabapentin (NEURONTIN) 100 mg capsule, Take 2 capsules (200 mg total) by mouth 3 (three) times a day, Disp: 540 capsule, Rfl: 0    glucosamine-chondroitin 500-400 MG tablet, Take 1 tablet by mouth 3 (three) times a day, Disp: 100 tablet, Rfl: 2    ketoconazole (NIZORAL) 2 % shampoo, Shampoo daily for 2 weeks straight and then on \"Mondays, Wednesdays and Fridays\":  Lather into scalp; leave on for 5 minutes and then rinse off completely., Disp: 120 mL, Rfl: 11    loratadine (CLARITIN) 10 mg tablet, Take 10 mg by mouth daily, Disp: , Rfl:     mometasone (NASONEX) 50 mcg/act nasal spray, 2 sprays into each nostril daily, Disp: 17 g, Rfl: 0    Multiple Vitamins-Minerals (multivitamin with minerals) tablet, Take 1 tablet by mouth daily, Disp: 30 tablet, Rfl: 0    Naltrexone-buPROPion HCl ER 8-90 MG TB12, Take 1 tab po QAM x 1 week, then 1 tab po BID x 1 week, then 2 tab QAM, 1 tab QPM x 1 week, then 2 tabs BID., Disp: 90 tablet, Rfl: 0    omeprazole (PriLOSEC) 10 mg delayed release capsule, Take 1 capsule (10 mg total) by mouth daily, Disp: 90 capsule, Rfl: 1    Probiotic Product (ALIGN) 4 MG CAPS, Take by mouth, Disp: , Rfl:     Rhubarb (ESTROVEN COMPLETE PO), Estroven, Disp: , Rfl:     simvastatin (ZOCOR) 5 MG tablet, Take 1 tablet (5 mg total) by mouth daily at bedtime, Disp: 90 tablet, Rfl: 0    spironolactone (ALDACTONE) 50 mg tablet, Take 1 tablet (50 mg total) by mouth daily, Disp: 90 tablet, Rfl: 1    verapamil (Verelan) 180 MG 24 hr capsule, Take 1 capsule (180 mg total) by mouth daily before breakfast, Disp: 90 capsule, Rfl: 2    vitamin E, tocopherol, 400 units capsule, Take 1 capsule (400 Units total) by mouth daily, Disp: , Rfl:     amoxicillin (AMOXIL) 500 mg capsule, Dental procedures only (Patient taking differently: if needed Dental procedures only), Disp: , Rfl:     ALLERGIES:  Allergies "   Allergen Reactions    Levofloxacin Hives and Tachycardia     Annotation - 15Jan2013: LIGHTHEADED    Sulfamethoxazole-Trimethoprim Other (See Comments)     unknown    Hydrocodone Bitartrate Er Other (See Comments)     Annotation - 15Jan2013: FLUSHING    CHEST/RIB PAIN           REVIEW OF SYSTEMS:  Musculoskeletal:        As noted in HPI.   All other systems reviewed and are negative.    VITALS:  There were no vitals filed for this visit.    LABS:  HgA1c:   Lab Results   Component Value Date    HGBA1C 5.7 (H) 12/04/2024     BMP:   Lab Results   Component Value Date    GLUCOSE 94 09/21/2015    CALCIUM 9.4 12/04/2024     09/21/2015    K 4.7 12/04/2024    CO2 31 12/04/2024     12/04/2024    BUN 15 12/04/2024    CREATININE 0.74 12/04/2024       _____________________________________________________  PHYSICAL EXAMINATION:  General: Well developed and well nourished, alert & oriented x 3, appears comfortable  Psychiatric: Normal  HEENT: Normocephalic, Atraumatic Trachea Midline, No torticollis  Pulmonary: No audible wheezing or respiratory distress   Abdomen/GI: Non tender, non distended   Cardiovascular: No pitting edema, 2+ radial pulse   Skin: No masses, erythema, lacerations, fluctation, ulcerations  Neurovascular: Sensation Intact to the Median, Ulnar, Radial Nerve, Motor Intact to the Median, Ulnar, Radial Nerve, and Pulses Intact  Musculoskeletal: Normal, except as noted in detailed exam and in HPI.      MUSCULOSKELETAL EXAMINATION:    Bilateral upper extremity:     APB 4/5  Mild thenar atrophy bilaterally  + tinel's at the wrist bilaterally  - tinel's bilateral elbows  No evidence of ulnar nerve subluxation bilaterally  Full digital extension   Full composite fist     ___________________________________________________  STUDIES REVIEWED:  EMG under procedure tab with order date of 11/12/24 and performing date of 1/6/25 was personally reviewed and independently interpreted by Dr. Kamara and  demonstrates mild left carpal tunnel syndrome.       PROCEDURES PERFORMED:  Hand/upper extremity injection: bilateral carpal tunnel  Delight Protocol:  procedure performed by consultantConsent: Verbal consent obtained. Written consent not obtained.  Risks and benefits: risks, benefits and alternatives were discussed  Consent given by: patient  Patient understanding: patient states understanding of the procedure being performed  Site marked: the operative site was marked  Patient identity confirmed: verbally with patient  Supporting Documentation  Indications: pain   Procedure Details  Condition:carpal tunnel syndrome Site: bilateral carpal tunnel   Needle size: 25 G  Ultrasound guidance: no  Medications (Right): 1 mL ropivacaine 0.5 %; 6 mg betamethasone acetate-betamethasone sodium phosphate 6 (3-3) mg/mLMedications (Left): 1 mL ropivacaine 0.5 %; 6 mg betamethasone acetate-betamethasone sodium phosphate 6 (3-3) mg/mL   Patient tolerance: patient tolerated the procedure well with no immediate complications  Dressing:  Sterile dressing applied              _____________________________________________________      Scribe Attestation      I,:  Emily Soares MA am acting as a scribe while in the presence of the attending physician.:       I,:  Armando Kamara MD personally performed the services described in this documentation    as scribed in my presence.:

## 2025-02-06 DIAGNOSIS — M16.11 PRIMARY OSTEOARTHRITIS OF RIGHT HIP: ICD-10-CM

## 2025-02-07 RX ORDER — CELECOXIB 200 MG/1
200 CAPSULE ORAL 2 TIMES DAILY
Qty: 30 CAPSULE | Refills: 5 | OUTPATIENT
Start: 2025-02-07

## 2025-02-17 ENCOUNTER — OFFICE VISIT (OUTPATIENT)
Dept: OBGYN CLINIC | Facility: CLINIC | Age: 60
End: 2025-02-17
Payer: COMMERCIAL

## 2025-02-17 ENCOUNTER — APPOINTMENT (OUTPATIENT)
Dept: LAB | Facility: HOSPITAL | Age: 60
End: 2025-02-17
Attending: STUDENT IN AN ORGANIZED HEALTH CARE EDUCATION/TRAINING PROGRAM
Payer: COMMERCIAL

## 2025-02-17 VITALS — WEIGHT: 264 LBS | HEIGHT: 68 IN | BODY MASS INDEX: 40.01 KG/M2

## 2025-02-17 DIAGNOSIS — G56.01 CARPAL TUNNEL SYNDROME ON RIGHT: ICD-10-CM

## 2025-02-17 DIAGNOSIS — E78.2 MIXED HYPERLIPIDEMIA: ICD-10-CM

## 2025-02-17 DIAGNOSIS — G56.01 CARPAL TUNNEL SYNDROME ON RIGHT: Primary | ICD-10-CM

## 2025-02-17 DIAGNOSIS — R73.03 PREDIABETES: ICD-10-CM

## 2025-02-17 DIAGNOSIS — I10 BENIGN ESSENTIAL HYPERTENSION: ICD-10-CM

## 2025-02-17 LAB
ANION GAP SERPL CALCULATED.3IONS-SCNC: 8 MMOL/L (ref 4–13)
BASOPHILS # BLD AUTO: 0.06 THOUSANDS/ΜL (ref 0–0.1)
BASOPHILS NFR BLD AUTO: 1 % (ref 0–1)
BUN SERPL-MCNC: 20 MG/DL (ref 5–25)
CALCIUM SERPL-MCNC: 9.8 MG/DL (ref 8.4–10.2)
CHLORIDE SERPL-SCNC: 102 MMOL/L (ref 96–108)
CO2 SERPL-SCNC: 28 MMOL/L (ref 21–32)
CREAT SERPL-MCNC: 0.86 MG/DL (ref 0.6–1.3)
EOSINOPHIL # BLD AUTO: 0.26 THOUSAND/ΜL (ref 0–0.61)
EOSINOPHIL NFR BLD AUTO: 5 % (ref 0–6)
ERYTHROCYTE [DISTWIDTH] IN BLOOD BY AUTOMATED COUNT: 13.8 % (ref 11.6–15.1)
GFR SERPL CREATININE-BSD FRML MDRD: 74 ML/MIN/1.73SQ M
GLUCOSE SERPL-MCNC: 100 MG/DL (ref 65–140)
HCT VFR BLD AUTO: 46.3 % (ref 34.8–46.1)
HGB BLD-MCNC: 14.5 G/DL (ref 11.5–15.4)
IMM GRANULOCYTES # BLD AUTO: 0.02 THOUSAND/UL (ref 0–0.2)
IMM GRANULOCYTES NFR BLD AUTO: 0 % (ref 0–2)
LYMPHOCYTES # BLD AUTO: 1.14 THOUSANDS/ΜL (ref 0.6–4.47)
LYMPHOCYTES NFR BLD AUTO: 22 % (ref 14–44)
MCH RBC QN AUTO: 28.3 PG (ref 26.8–34.3)
MCHC RBC AUTO-ENTMCNC: 31.3 G/DL (ref 31.4–37.4)
MCV RBC AUTO: 90 FL (ref 82–98)
MONOCYTES # BLD AUTO: 0.45 THOUSAND/ΜL (ref 0.17–1.22)
MONOCYTES NFR BLD AUTO: 9 % (ref 4–12)
NEUTROPHILS # BLD AUTO: 3.24 THOUSANDS/ΜL (ref 1.85–7.62)
NEUTS SEG NFR BLD AUTO: 63 % (ref 43–75)
NRBC BLD AUTO-RTO: 0 /100 WBCS
PLATELET # BLD AUTO: 319 THOUSANDS/UL (ref 149–390)
PMV BLD AUTO: 9.4 FL (ref 8.9–12.7)
POTASSIUM SERPL-SCNC: 4.3 MMOL/L (ref 3.5–5.3)
RBC # BLD AUTO: 5.13 MILLION/UL (ref 3.81–5.12)
SODIUM SERPL-SCNC: 138 MMOL/L (ref 135–147)
WBC # BLD AUTO: 5.17 THOUSAND/UL (ref 4.31–10.16)

## 2025-02-17 PROCEDURE — 80048 BASIC METABOLIC PNL TOTAL CA: CPT

## 2025-02-17 PROCEDURE — 85025 COMPLETE CBC W/AUTO DIFF WBC: CPT

## 2025-02-17 PROCEDURE — 99214 OFFICE O/P EST MOD 30 MIN: CPT | Performed by: STUDENT IN AN ORGANIZED HEALTH CARE EDUCATION/TRAINING PROGRAM

## 2025-02-17 PROCEDURE — 36415 COLL VENOUS BLD VENIPUNCTURE: CPT

## 2025-02-17 RX ORDER — CHLORHEXIDINE GLUCONATE ORAL RINSE 1.2 MG/ML
15 SOLUTION DENTAL ONCE
OUTPATIENT
Start: 2025-02-17 | End: 2025-02-17

## 2025-02-17 NOTE — PROGRESS NOTES
ORTHOPAEDIC HAND, WRIST, AND ELBOW OFFICE  VISIT      ASSESSMENT/PLAN:      Diagnoses and all orders for this visit:    Carpal tunnel syndrome on right  -     Case request operating room: RELEASE CARPAL TUNNEL ENDOSCOPIC VERSUS OPEN; Standing  -     Basic metabolic panel; Future  -     CBC and differential; Future  -     EKG 12 lead; Future  -     Case request operating room: RELEASE CARPAL TUNNEL ENDOSCOPIC VERSUS OPEN    Other orders  -     Diet NPO; Sips with meds; Standing  -     Nursing Communication Warmimg Interventions Implemented; Standing  -     Nursing Communication CHG bath, have staff wash entire body (neck down) per pre-op bathing protocol. Routine, evening prior to, and day of surgery.; Standing  -     Nursing Communication Swab both nares with Povidone-Iodine solution, EXCLUDE if patient has shellfish/Iodine allergy. Routine, day of surgery, on call to OR; Standing  -     chlorhexidine (PERIDEX) 0.12 % oral rinse 15 mL  -     Void on call to OR; Standing  -     Insert peripheral IV; Standing            59 y.o. female with bilateral carpal tunnel syndrome (left confirmed on EMG, negative on EMG on right)  Anatomy of the condition/s as well as treatment options and expected outcomes were discussed.  Any pertinent imaging or lab results were reviewed with the patient.   The patient verbalized understanding of exam findings and treatment plan.   The patient was given the opportunity to ask questions.  Questions were answered to the patient's satisfaction.  Discussed she can continue bracing, surgery or live with it and monitor symptoms. She had mild improvement with splinting and no improvement with injections.  Given failure of adequate relief with conservative management, patient elected to undergo surgery with Right Carpal tunnel release      Follow Up:  After surgery PRN       To Do Next Visit:  Re-evaluation of current issue      Discussions:  Carpal Tunnel Syndrome: The anatomy and physiology of  carpal tunnel syndrome was discussed with the patient today.  Increase pressure localized under the transverse carpal ligament can cause pain, numbness, tingling, or dysesthesias within the median nerve distribution as well as feelings of fatigue, clumsiness, or awkwardness.  These symptoms typically occur at night and worse in the morning upon waking.  Eventually, untreated carpal tunnel syndrome can result in weakness and permanent loss of muscle within the thenar compartment of the hand.  Treatment options were discussed with the patient.  Conservative treatment includes nocturnal resting splints to keep the nerve in a neutral position, ergonomic changes within the work or home environment, activity modification, and tendon gliding exercises. Vitamin B6 one tablet daily over the counter may helpful to reduce symptoms.   Steroid injections within the carpal canal can help a majority of patients, however this is often self-limited in a majority of patients.  Surgical intervention to divide the transverse carpal ligament typically results in a long-lasting relief of the patient's complaints, with the recurrence rate of less than 1%.                                                                                                                                                                          and Endoscopic Carpal Tunnel Release:   The anatomy and physiology of carpal tunnel syndrome was discussed with the patient today.  Increase pressure localized under the transverse carpal ligament can cause pain, numbness, tingling, or dysesthesias within the median nerve distribution as well as feelings of fatigue, clumsiness, or awkwardness.  These symptoms typically occur at night and worse in the morning upon waking.  Eventually, untreated carpal tunnel syndrome can result in weakness and permanent loss of muscle within the thenar compartment of the hand.  Treatment options were discussed with the patient.   Conservative treatment includes nocturnal resting splints to keep the nerve in a neutral position, ergonomic changes within the work or home environment, activity modification, and tendon gliding exercises.  Vitamin B6 one tablet daily over the counter may helpful to reduce symptoms.  Steroid injections within the carpal canal can help a majority of patients, however this is often self-limited in a majority of patients.  Surgical intervention to divide the transverse carpal ligament typically results in a long-lasting relief of the patient's complaints, with the recurrence rate of less than 1%. The patient has elected to undergo an endoscopic carpal tunnel release.  The single incision technique was discussed with the patient, which results in approximately a two-week recovery time less wound complications.  In the postoperative period, light activities are allowed immediately, driving is allowed when narcotic medication has stopped, and the bandages may be removed and incision may get wet after 2 days.  Heavy activities (lifting more than approximately 10 pounds) will be allowed after follow up appointment in 1-2 weeks.  While night symptoms (waking from sleep, pain, and discomfort in the hands) generally improves rapidly, the numbness and tingling as well as the strength will slowly improve over weeks to months depending on the chronicity and severity of the carpal tunnel syndrome.  Pillar pain and scar discomfort were discussed with the patient which are self-limiting conditions.  The risks of bleeding and infection from the surgery are less than 1%.  Risk of recurrence is approximately 0.5%.  The risks of nerve injury or nerve damage or damage to the blood vessels is approximately 1 in 1200. The patient has an understanding of the above mentioned discussion.The risks and benefits of the procedure were explained to the patient, which include, but are not limited to: Bleeding, infection, recurrence, pain, scar,  damage to tendons, damage to nerves, and damage to blood vessels, failure to give desired results and complications related to anesthesia.  These risks, along with alternative conservative treatment options, and postoperative protocols were voiced back and understood by the patient.  All questions were answered to the patient's satisfaction.  The patient agrees to comply with a standard postoperative protocol, and is willing to proceed.  Education was provided via written and auditory forms.  There were no barriers to learning. Written handouts regarding wound care, incision and scar care, and general preoperative information was provided to the patient.  Prior to surgery, the patient may be requested to stop all anti-inflammatory medications.  Prophylactic aspirin, Plavix, and Coumadin may be allowed to be continued.  Medications including vitamin E., ginkgo, & fish oil are requested to be stopped about one week.      Armando Kamara MD  Attending, Orthopaedic Surgery  Hand, Wrist, and Elbow Surgery  [facility]    ______________________________________________________________________________________________    CHIEF COMPLAINT:  Chief Complaint   Patient presents with    Left Hand - Pain    Right Hand - Pain       SUBJECTIVE:  Patient is a 59 y.o. RHD female who presents today for follow up and treatment of bilateral hand numbness and tingling.   Patient has recently called to state the injections have not provided any relief and she is here to discuss treatment options. Her Index and thumb are more numb to her since last visit. She feels the muscle in her hands are tight and she has trouble using her hands due to muscles tightness, especially in her Right hand  She states symptoms are not worse at night and do not wake her. She is still bracing but does not feel it is helping anymore      Occupation: retired human resources (office work)    I have personally reviewed all the relevant [facility],  PSH, SH, FH, Medications and allergies      PAST MEDICAL HISTORY:  Past Medical History:   Diagnosis Date    Allergic     Allergic rhinitis     last assessed 14     Arthritis     Asthma     Bleeding per rectum     Last assessed 16     Colon polyp     Diverticulitis of colon     GERD (gastroesophageal reflux disease)     Hypercholesterolemia     Hypertension     last assessed 3/24/15     Hyponatremia     last assessed 9/17/15     Obesity        PAST SURGICAL HISTORY:  Past Surgical History:   Procedure Laterality Date     SECTION      COLONOSCOPY      FL INJECTION RIGHT SHOULDER (ARTHROGRAM)  3/18/2021    [place] ARTHRP ACETBLR/PROX FEM PROSTC AGRFT/ALGRFT Right 2024    Procedure: RIGHT ARTHROPLASTY HIP TOTAL ANTERIOR;  Surgeon: Marlee Mccann MD;  Location:  MAIN OR;  Service: Orthopedics    WISDOM TOOTH EXTRACTION      WRIST SURGERY         FAMILY HISTORY:  Family History   Adopted: Yes   Problem Relation Age of Onset    No Known Problems Mother     Migraines Daughter        SOCIAL HISTORY:  Social History     Tobacco Use    Smoking status: Never    Smokeless tobacco: Never   Vaping Use    Vaping status: Never Used   Substance Use Topics    Alcohol use: Yes     Comment: rare /    Drug use: No       MEDICATIONS:    Current Outpatient Medications:     amoxicillin (AMOXIL) 500 mg capsule, Dental procedures only, Disp: , Rfl:     calcipotriene (DOVONEX) 0.005 % topical solution, In the one to two week break from the Clobetasol solution, apply this solution one to two times a day to ears and affected areas on scalp, Disp: 60 mL, Rfl: 5    celecoxib (CeleBREX) 200 mg capsule, take 1 capsule by mouth twice a day, Disp: 30 capsule, Rfl: 5    Cholecalciferol (VITAMIN D) 2000 units CAPS, Take by mouth, Disp: , Rfl:     cholestyramine (QUESTRAN) 4 GM/DOSE powder, Take 1 packet (4 g total) by mouth 2 (two) times a day As needed for diarrhea, Disp: 378 g, Rfl: 5    clobetasol (TEMOVATE) 0.05 % external  "solution, Apply twice a day for two weeks at a time to scalp and ears taking at least a weeks break in between uses, Disp: 50 mL, Rfl: 3    Diclofenac Sodium (Voltaren) 1 %, Apply 2 g topically 4 (four) times a day, Disp: , Rfl:     diphenhydrAMINE-acetaminophen (TYLENOL PM)  MG TABS, Take 1 tablet by mouth daily at bedtime as needed for sleep, Disp: , Rfl:     Elderberry-Vitamin C-Zinc (Textronics GUMMY PO), Take 2 gums by mouth daily, Disp: , Rfl:     gabapentin (NEURONTIN) 100 mg capsule, Take 2 capsules (200 mg total) by mouth 3 (three) times a day, Disp: 540 capsule, Rfl: 0    glucosamine-chondroitin 500-400 MG tablet, Take 1 tablet by mouth 3 (three) times a day, Disp: 100 tablet, Rfl: 2    ketoconazole (NIZORAL) 2 % shampoo, Shampoo daily for 2 weeks straight and then on \"Mondays, Wednesdays and Fridays\":  Lather into scalp; leave on for 5 minutes and then rinse off completely., Disp: 120 mL, Rfl: 11    loratadine (CLARITIN) 10 mg tablet, Take 10 mg by mouth daily, Disp: , Rfl:     mometasone (NASONEX) 50 mcg/act nasal spray, 2 sprays into each nostril daily, Disp: 17 g, Rfl: 0    Multiple Vitamins-Minerals (multivitamin with minerals) tablet, Take 1 tablet by mouth daily, Disp: 30 tablet, Rfl: 0    Naltrexone-buPROPion HCl ER 8-90 MG TB12, Take 1 tab po QAM x 1 week, then 1 tab po BID x 1 week, then 2 tab QAM, 1 tab QPM x 1 week, then 2 tabs BID., Disp: 90 tablet, Rfl: 0    omeprazole (PriLOSEC) 10 mg delayed release capsule, Take 1 capsule (10 mg total) by mouth daily, Disp: 90 capsule, Rfl: 1    Probiotic Product (ALIGN) 4 MG CAPS, Take by mouth, Disp: , Rfl:     Rhubarb (ESTROVEN COMPLETE PO), Estroven, Disp: , Rfl:     simvastatin (ZOCOR) 5 MG tablet, Take 1 tablet (5 mg total) by mouth daily at bedtime, Disp: 90 tablet, Rfl: 0    spironolactone (ALDACTONE) 50 mg tablet, Take 1 tablet (50 mg total) by mouth daily, Disp: 90 tablet, Rfl: 1    verapamil (Verelan) 180 MG 24 hr capsule, " Take 1 capsule (180 mg total) by mouth daily before breakfast, Disp: 90 capsule, Rfl: 2    vitamin E, tocopherol, 400 units capsule, Take 1 capsule (400 Units total) by mouth daily, Disp: , Rfl:     ALLERGIES:  Allergies   Allergen Reactions    Levofloxacin Hives and Tachycardia     Annotation - 15Jan2013: LIGHTHEADED    Sulfamethoxazole-Trimethoprim Other (See Comments)     unknown    Hydrocodone Bitartrate Er Other (See Comments)     Annotation - 15Jan2013: FLUSHING    CHEST/RIB PAIN           REVIEW OF SYSTEMS:  Musculoskeletal:        As noted in HPI.   All other systems reviewed and are negative.    VITALS:  There were no vitals filed for this visit.    LABS:  HgA1c:   Lab Results   Component Value Date    HGBA1C 5.7 (H) 12/04/2024     BMP:   Lab Results   Component Value Date    GLUCOSE 94 09/21/2015    CALCIUM 9.4 12/04/2024     09/21/2015    K 4.7 12/04/2024    CO2 31 12/04/2024     12/04/2024    BUN 15 12/04/2024    CREATININE 0.74 12/04/2024       _____________________________________________________  PHYSICAL EXAMINATION:  General: Well developed and well nourished, alert & oriented x 3, appears comfortable  Psychiatric: Normal  HEENT: Normocephalic, Atraumatic Trachea Midline, No torticollis  Pulmonary: No audible wheezing or respiratory distress   Abdomen/GI: Non tender, non distended   Cardiovascular: No pitting edema, 2+ radial pulse   Skin: No masses, erythema, lacerations, fluctation, ulcerations  Neurovascular: Sensation Intact to the Median, Ulnar, Radial Nerve, Motor Intact to the Median, Ulnar, Radial Nerve, and Pulses Intact  Musculoskeletal: Normal, except as noted in detailed exam and in HPI.      MUSCULOSKELETAL EXAMINATION:    Bilateral upper extremity:     APB 4/5 Bilaterally  Mild thenar atrophy bilaterally  + tinel's at the wrist bilaterally  + Phalan's bilaterally  - tinel's bilateral elbows  No evidence of ulnar nerve subluxation bilaterally  Full digital extension   Full  composite fist        Right radial (mm) Right ulnar (mm) Left radial (mm) Left ulnar (mm)   Thumb 7 7 5 7   Index 5 7 6 5   Long 5 5 5 5   Ring 5 5 5 5   Small 5 5 5 5         ___________________________________________________  STUDIES REVIEWED:  No new images obtained/reviewed        EMG under procedure tab with order date of 11/12/24 and performing date of 1/6/25 was personally reviewed and independently interpreted by Dr. Kamara and demonstrates mild left carpal tunnel syndrome.       PROCEDURES PERFORMED:  Procedures   No procedures performed    _____________________________________________________      Scribe Attestation      I,:  Stefano Hernandez am acting as a scribe while in the presence of the attending physician.:       I,:  Armando Kamara MD personally performed the services described in this documentation    as scribed in my presence.:

## 2025-02-17 NOTE — H&P (VIEW-ONLY)
ORTHOPAEDIC HAND, WRIST, AND ELBOW OFFICE  VISIT      ASSESSMENT/PLAN:      Diagnoses and all orders for this visit:    Carpal tunnel syndrome on right  -     Case request operating room: RELEASE CARPAL TUNNEL ENDOSCOPIC VERSUS OPEN; Standing  -     Basic metabolic panel; Future  -     CBC and differential; Future  -     EKG 12 lead; Future  -     Case request operating room: RELEASE CARPAL TUNNEL ENDOSCOPIC VERSUS OPEN    Other orders  -     Diet NPO; Sips with meds; Standing  -     Nursing Communication Warmimg Interventions Implemented; Standing  -     Nursing Communication CHG bath, have staff wash entire body (neck down) per pre-op bathing protocol. Routine, evening prior to, and day of surgery.; Standing  -     Nursing Communication Swab both nares with Povidone-Iodine solution, EXCLUDE if patient has shellfish/Iodine allergy. Routine, day of surgery, on call to OR; Standing  -     chlorhexidine (PERIDEX) 0.12 % oral rinse 15 mL  -     Void on call to OR; Standing  -     Insert peripheral IV; Standing            59 y.o. female with bilateral carpal tunnel syndrome (left confirmed on EMG, negative on EMG on right)  Anatomy of the condition/s as well as treatment options and expected outcomes were discussed.  Any pertinent imaging or lab results were reviewed with the patient.   The patient verbalized understanding of exam findings and treatment plan.   The patient was given the opportunity to ask questions.  Questions were answered to the patient's satisfaction.  Discussed she can continue bracing, surgery or live with it and monitor symptoms. She had mild improvement with splinting and no improvement with injections.  Given failure of adequate relief with conservative management, patient elected to undergo surgery with Right Carpal tunnel release      Follow Up:  After surgery PRN       To Do Next Visit:  Re-evaluation of current issue      Discussions:  Carpal Tunnel Syndrome: The anatomy and physiology of  carpal tunnel syndrome was discussed with the patient today.  Increase pressure localized under the transverse carpal ligament can cause pain, numbness, tingling, or dysesthesias within the median nerve distribution as well as feelings of fatigue, clumsiness, or awkwardness.  These symptoms typically occur at night and worse in the morning upon waking.  Eventually, untreated carpal tunnel syndrome can result in weakness and permanent loss of muscle within the thenar compartment of the hand.  Treatment options were discussed with the patient.  Conservative treatment includes nocturnal resting splints to keep the nerve in a neutral position, ergonomic changes within the work or home environment, activity modification, and tendon gliding exercises. Vitamin B6 one tablet daily over the counter may helpful to reduce symptoms.   Steroid injections within the carpal canal can help a majority of patients, however this is often self-limited in a majority of patients.  Surgical intervention to divide the transverse carpal ligament typically results in a long-lasting relief of the patient's complaints, with the recurrence rate of less than 1%.                                                                                                                                                                          and Endoscopic Carpal Tunnel Release:   The anatomy and physiology of carpal tunnel syndrome was discussed with the patient today.  Increase pressure localized under the transverse carpal ligament can cause pain, numbness, tingling, or dysesthesias within the median nerve distribution as well as feelings of fatigue, clumsiness, or awkwardness.  These symptoms typically occur at night and worse in the morning upon waking.  Eventually, untreated carpal tunnel syndrome can result in weakness and permanent loss of muscle within the thenar compartment of the hand.  Treatment options were discussed with the patient.   Conservative treatment includes nocturnal resting splints to keep the nerve in a neutral position, ergonomic changes within the work or home environment, activity modification, and tendon gliding exercises.  Vitamin B6 one tablet daily over the counter may helpful to reduce symptoms.  Steroid injections within the carpal canal can help a majority of patients, however this is often self-limited in a majority of patients.  Surgical intervention to divide the transverse carpal ligament typically results in a long-lasting relief of the patient's complaints, with the recurrence rate of less than 1%. The patient has elected to undergo an endoscopic carpal tunnel release.  The single incision technique was discussed with the patient, which results in approximately a two-week recovery time less wound complications.  In the postoperative period, light activities are allowed immediately, driving is allowed when narcotic medication has stopped, and the bandages may be removed and incision may get wet after 2 days.  Heavy activities (lifting more than approximately 10 pounds) will be allowed after follow up appointment in 1-2 weeks.  While night symptoms (waking from sleep, pain, and discomfort in the hands) generally improves rapidly, the numbness and tingling as well as the strength will slowly improve over weeks to months depending on the chronicity and severity of the carpal tunnel syndrome.  Pillar pain and scar discomfort were discussed with the patient which are self-limiting conditions.  The risks of bleeding and infection from the surgery are less than 1%.  Risk of recurrence is approximately 0.5%.  The risks of nerve injury or nerve damage or damage to the blood vessels is approximately 1 in 1200. The patient has an understanding of the above mentioned discussion.The risks and benefits of the procedure were explained to the patient, which include, but are not limited to: Bleeding, infection, recurrence, pain, scar,  damage to tendons, damage to nerves, and damage to blood vessels, failure to give desired results and complications related to anesthesia.  These risks, along with alternative conservative treatment options, and postoperative protocols were voiced back and understood by the patient.  All questions were answered to the patient's satisfaction.  The patient agrees to comply with a standard postoperative protocol, and is willing to proceed.  Education was provided via written and auditory forms.  There were no barriers to learning. Written handouts regarding wound care, incision and scar care, and general preoperative information was provided to the patient.  Prior to surgery, the patient may be requested to stop all anti-inflammatory medications.  Prophylactic aspirin, Plavix, and Coumadin may be allowed to be continued.  Medications including vitamin E., ginkgo, & fish oil are requested to be stopped about one week.      Armando Kamara MD  Attending, Orthopaedic Surgery  Hand, Wrist, and Elbow Surgery  [facility]    ______________________________________________________________________________________________    CHIEF COMPLAINT:  Chief Complaint   Patient presents with    Left Hand - Pain    Right Hand - Pain       SUBJECTIVE:  Patient is a 59 y.o. RHD female who presents today for follow up and treatment of bilateral hand numbness and tingling.   Patient has recently called to state the injections have not provided any relief and she is here to discuss treatment options. Her Index and thumb are more numb to her since last visit. She feels the muscle in her hands are tight and she has trouble using her hands due to muscles tightness, especially in her Right hand  She states symptoms are not worse at night and do not wake her. She is still bracing but does not feel it is helping anymore      Occupation: retired human resources (office work)    I have personally reviewed all the relevant [facility],  PSH, SH, FH, Medications and allergies      PAST MEDICAL HISTORY:  Past Medical History:   Diagnosis Date    Allergic     Allergic rhinitis     last assessed 14     Arthritis     Asthma     Bleeding per rectum     Last assessed 16     Colon polyp     Diverticulitis of colon     GERD (gastroesophageal reflux disease)     Hypercholesterolemia     Hypertension     last assessed 3/24/15     Hyponatremia     last assessed 9/17/15     Obesity        PAST SURGICAL HISTORY:  Past Surgical History:   Procedure Laterality Date     SECTION      COLONOSCOPY      FL INJECTION RIGHT SHOULDER (ARTHROGRAM)  3/18/2021    ND ARTHRP ACETBLR/PROX FEM PROSTC AGRFT/ALGRFT Right 2024    Procedure: RIGHT ARTHROPLASTY HIP TOTAL ANTERIOR;  Surgeon: Marlee Mccann MD;  Location:  MAIN OR;  Service: Orthopedics    WISDOM TOOTH EXTRACTION      WRIST SURGERY         FAMILY HISTORY:  Family History   Adopted: Yes   Problem Relation Age of Onset    No Known Problems Mother     Migraines Daughter        SOCIAL HISTORY:  Social History     Tobacco Use    Smoking status: Never    Smokeless tobacco: Never   Vaping Use    Vaping status: Never Used   Substance Use Topics    Alcohol use: Yes     Comment: rare /    Drug use: No       MEDICATIONS:    Current Outpatient Medications:     amoxicillin (AMOXIL) 500 mg capsule, Dental procedures only, Disp: , Rfl:     calcipotriene (DOVONEX) 0.005 % topical solution, In the one to two week break from the Clobetasol solution, apply this solution one to two times a day to ears and affected areas on scalp, Disp: 60 mL, Rfl: 5    celecoxib (CeleBREX) 200 mg capsule, take 1 capsule by mouth twice a day, Disp: 30 capsule, Rfl: 5    Cholecalciferol (VITAMIN D) 2000 units CAPS, Take by mouth, Disp: , Rfl:     cholestyramine (QUESTRAN) 4 GM/DOSE powder, Take 1 packet (4 g total) by mouth 2 (two) times a day As needed for diarrhea, Disp: 378 g, Rfl: 5    clobetasol (TEMOVATE) 0.05 % external  "solution, Apply twice a day for two weeks at a time to scalp and ears taking at least a weeks break in between uses, Disp: 50 mL, Rfl: 3    Diclofenac Sodium (Voltaren) 1 %, Apply 2 g topically 4 (four) times a day, Disp: , Rfl:     diphenhydrAMINE-acetaminophen (TYLENOL PM)  MG TABS, Take 1 tablet by mouth daily at bedtime as needed for sleep, Disp: , Rfl:     Elderberry-Vitamin C-Zinc ([employer] GUMMY PO), Take 2 gums by mouth daily, Disp: , Rfl:     gabapentin (NEURONTIN) 100 mg capsule, Take 2 capsules (200 mg total) by mouth 3 (three) times a day, Disp: 540 capsule, Rfl: 0    glucosamine-chondroitin 500-400 MG tablet, Take 1 tablet by mouth 3 (three) times a day, Disp: 100 tablet, Rfl: 2    ketoconazole (NIZORAL) 2 % shampoo, Shampoo daily for 2 weeks straight and then on \"Mondays, Wednesdays and Fridays\":  Lather into scalp; leave on for 5 minutes and then rinse off completely., Disp: 120 mL, Rfl: 11    loratadine (CLARITIN) 10 mg tablet, Take 10 mg by mouth daily, Disp: , Rfl:     mometasone (NASONEX) 50 mcg/act nasal spray, 2 sprays into each nostril daily, Disp: 17 g, Rfl: 0    Multiple Vitamins-Minerals (multivitamin with minerals) tablet, Take 1 tablet by mouth daily, Disp: 30 tablet, Rfl: 0    Naltrexone-buPROPion HCl ER 8-90 MG TB12, Take 1 tab po QAM x 1 week, then 1 tab po BID x 1 week, then 2 tab QAM, 1 tab QPM x 1 week, then 2 tabs BID., Disp: 90 tablet, Rfl: 0    omeprazole (PriLOSEC) 10 mg delayed release capsule, Take 1 capsule (10 mg total) by mouth daily, Disp: 90 capsule, Rfl: 1    Probiotic Product (ALIGN) 4 MG CAPS, Take by mouth, Disp: , Rfl:     Rhubarb (ESTROVEN COMPLETE PO), [place], Disp: , Rfl:     simvastatin (ZOCOR) 5 MG tablet, Take 1 tablet (5 mg total) by mouth daily at bedtime, Disp: 90 tablet, Rfl: 0    spironolactone (ALDACTONE) 50 mg tablet, Take 1 tablet (50 mg total) by mouth daily, Disp: 90 tablet, Rfl: 1    verapamil (Verelan) 180 MG 24 hr capsule, " Take 1 capsule (180 mg total) by mouth daily before breakfast, Disp: 90 capsule, Rfl: 2    vitamin E, tocopherol, 400 units capsule, Take 1 capsule (400 Units total) by mouth daily, Disp: , Rfl:     ALLERGIES:  Allergies   Allergen Reactions    Levofloxacin Hives and Tachycardia     Annotation - 15Jan2013: LIGHTHEADED    Sulfamethoxazole-Trimethoprim Other (See Comments)     unknown    Hydrocodone Bitartrate Er Other (See Comments)     Annotation - 15Jan2013: FLUSHING    CHEST/RIB PAIN           REVIEW OF SYSTEMS:  Musculoskeletal:        As noted in HPI.   All other systems reviewed and are negative.    VITALS:  There were no vitals filed for this visit.    LABS:  HgA1c:   Lab Results   Component Value Date    HGBA1C 5.7 (H) 12/04/2024     BMP:   Lab Results   Component Value Date    GLUCOSE 94 09/21/2015    CALCIUM 9.4 12/04/2024     09/21/2015    K 4.7 12/04/2024    CO2 31 12/04/2024     12/04/2024    BUN 15 12/04/2024    CREATININE 0.74 12/04/2024       _____________________________________________________  PHYSICAL EXAMINATION:  General: Well developed and well nourished, alert & oriented x 3, appears comfortable  Psychiatric: Normal  HEENT: Normocephalic, Atraumatic Trachea Midline, No torticollis  Pulmonary: No audible wheezing or respiratory distress   Abdomen/GI: Non tender, non distended   Cardiovascular: No pitting edema, 2+ radial pulse   Skin: No masses, erythema, lacerations, fluctation, ulcerations  Neurovascular: Sensation Intact to the Median, Ulnar, Radial Nerve, Motor Intact to the Median, Ulnar, Radial Nerve, and Pulses Intact  Musculoskeletal: Normal, except as noted in detailed exam and in HPI.      MUSCULOSKELETAL EXAMINATION:    Bilateral upper extremity:     APB 4/5 Bilaterally  Mild thenar atrophy bilaterally  + tinel's at the wrist bilaterally  + Phalan's bilaterally  - tinel's bilateral elbows  No evidence of ulnar nerve subluxation bilaterally  Full digital extension   Full  composite fist        Right radial (mm) Right ulnar (mm) Left radial (mm) Left ulnar (mm)   Thumb 7 7 5 7   Index 5 7 6 5   Long 5 5 5 5   Ring 5 5 5 5   Small 5 5 5 5         ___________________________________________________  STUDIES REVIEWED:  No new images obtained/reviewed        EMG under procedure tab with order date of 11/12/24 and performing date of 1/6/25 was personally reviewed and independently interpreted by Dr. Kamara and demonstrates mild left carpal tunnel syndrome.       PROCEDURES PERFORMED:  Procedures   No procedures performed    _____________________________________________________      Scribe Attestation      I,:  Stefano Hernandez am acting as a scribe while in the presence of the attending physician.:       I,:  Armando Kamara MD personally performed the services described in this documentation    as scribed in my presence.:

## 2025-02-20 ENCOUNTER — ANESTHESIA EVENT (OUTPATIENT)
Dept: PERIOP | Facility: AMBULARY SURGERY CENTER | Age: 60
End: 2025-02-20
Payer: COMMERCIAL

## 2025-02-20 ENCOUNTER — TELEPHONE (OUTPATIENT)
Age: 60
End: 2025-02-20

## 2025-02-20 NOTE — TELEPHONE ENCOUNTER
Pt called, having carpal tunnel surgery on 3/5, pre op nurse instructed her to stop smoking and taking any weight lose meds before the surgery. She asked them about the Contrave she uses and they said to contact pcp on when she should stop taking this. Whether 7 or 14 days before surgery or if she should stop now to be safe. Pt typically takes 2 pills in the morning however she did not this morning until she heard from pcp. Please advise and give pt a call back.

## 2025-02-21 LAB
ATRIAL RATE: 77 BPM
P AXIS: 75 DEGREES
PR INTERVAL: 172 MS
QRS AXIS: 9 DEGREES
QRSD INTERVAL: 70 MS
QT INTERVAL: 376 MS
QTC INTERVAL: 426 MS
T WAVE AXIS: 55 DEGREES
VENTRICULAR RATE: 77 BPM

## 2025-02-21 PROCEDURE — 93010 ELECTROCARDIOGRAM REPORT: CPT | Performed by: INTERNAL MEDICINE

## 2025-02-27 ENCOUNTER — APPOINTMENT (OUTPATIENT)
Dept: LAB | Facility: CLINIC | Age: 60
End: 2025-02-27
Payer: COMMERCIAL

## 2025-02-27 LAB
ANION GAP SERPL CALCULATED.3IONS-SCNC: 9 MMOL/L (ref 4–13)
BASOPHILS # BLD AUTO: 0.07 THOUSANDS/ÂΜL (ref 0–0.1)
BASOPHILS NFR BLD AUTO: 2 % (ref 0–1)
BUN SERPL-MCNC: 12 MG/DL (ref 5–25)
CALCIUM SERPL-MCNC: 9.2 MG/DL (ref 8.4–10.2)
CHLORIDE SERPL-SCNC: 104 MMOL/L (ref 96–108)
CHOLEST SERPL-MCNC: 142 MG/DL (ref ?–200)
CO2 SERPL-SCNC: 27 MMOL/L (ref 21–32)
CREAT SERPL-MCNC: 0.83 MG/DL (ref 0.6–1.3)
EOSINOPHIL # BLD AUTO: 0.28 THOUSAND/ÂΜL (ref 0–0.61)
EOSINOPHIL NFR BLD AUTO: 6 % (ref 0–6)
ERYTHROCYTE [DISTWIDTH] IN BLOOD BY AUTOMATED COUNT: 13.8 % (ref 11.6–15.1)
EST. AVERAGE GLUCOSE BLD GHB EST-MCNC: 123 MG/DL
GFR SERPL CREATININE-BSD FRML MDRD: 77 ML/MIN/1.73SQ M
GLUCOSE P FAST SERPL-MCNC: 104 MG/DL (ref 65–99)
HBA1C MFR BLD: 5.9 %
HCT VFR BLD AUTO: 43.4 % (ref 34.8–46.1)
HDLC SERPL-MCNC: 57 MG/DL
HGB BLD-MCNC: 13.5 G/DL (ref 11.5–15.4)
IMM GRANULOCYTES # BLD AUTO: 0.01 THOUSAND/UL (ref 0–0.2)
IMM GRANULOCYTES NFR BLD AUTO: 0 % (ref 0–2)
LDLC SERPL CALC-MCNC: 69 MG/DL (ref 0–100)
LYMPHOCYTES # BLD AUTO: 1.1 THOUSANDS/ÂΜL (ref 0.6–4.47)
LYMPHOCYTES NFR BLD AUTO: 25 % (ref 14–44)
MCH RBC QN AUTO: 28.6 PG (ref 26.8–34.3)
MCHC RBC AUTO-ENTMCNC: 31.1 G/DL (ref 31.4–37.4)
MCV RBC AUTO: 92 FL (ref 82–98)
MONOCYTES # BLD AUTO: 0.44 THOUSAND/ÂΜL (ref 0.17–1.22)
MONOCYTES NFR BLD AUTO: 10 % (ref 4–12)
NEUTROPHILS # BLD AUTO: 2.5 THOUSANDS/ÂΜL (ref 1.85–7.62)
NEUTS SEG NFR BLD AUTO: 57 % (ref 43–75)
NRBC BLD AUTO-RTO: 0 /100 WBCS
PLATELET # BLD AUTO: 294 THOUSANDS/UL (ref 149–390)
PMV BLD AUTO: 9.5 FL (ref 8.9–12.7)
POTASSIUM SERPL-SCNC: 4.3 MMOL/L (ref 3.5–5.3)
RBC # BLD AUTO: 4.72 MILLION/UL (ref 3.81–5.12)
SODIUM SERPL-SCNC: 140 MMOL/L (ref 135–147)
TRIGL SERPL-MCNC: 81 MG/DL (ref ?–150)
WBC # BLD AUTO: 4.4 THOUSAND/UL (ref 4.31–10.16)

## 2025-02-27 PROCEDURE — 36415 COLL VENOUS BLD VENIPUNCTURE: CPT

## 2025-02-27 PROCEDURE — 85025 COMPLETE CBC W/AUTO DIFF WBC: CPT

## 2025-02-27 PROCEDURE — 80061 LIPID PANEL: CPT

## 2025-02-27 PROCEDURE — 83036 HEMOGLOBIN GLYCOSYLATED A1C: CPT

## 2025-02-27 PROCEDURE — 80048 BASIC METABOLIC PNL TOTAL CA: CPT

## 2025-02-27 NOTE — PRE-PROCEDURE INSTRUCTIONS
Pre-Surgery Instructions:   Medication Instructions    calcipotriene (DOVONEX) 0.005 % topical solution Stop taking 1 day prior to surgery.    celecoxib (CeleBREX) 200 mg capsule Stop taking 7 days prior to surgery.    Cholecalciferol (VITAMIN D) 2000 units CAPS Stop taking 7 days prior to surgery.    cholestyramine (QUESTRAN) 4 GM/DOSE powder Hold day of surgery.    clobetasol (TEMOVATE) 0.05 % external solution Stop taking 1 day prior to surgery.    Diclofenac Sodium (Voltaren) 1 % Stop taking 7 days prior to surgery.    diclofenac sodium (VOLTAREN) 50 mg EC tablet Stop taking 7 days prior to surgery.    Elderberry-Vitamin C-Zinc ([employer] GUMMY PO) Stop taking 7 days prior to surgery.    gabapentin (NEURONTIN) 100 mg capsule Take day of surgery.    glucosamine-chondroitin 500-400 MG tablet Stop taking 7 days prior to surgery.    ketoconazole (NIZORAL) 2 % shampoo Hold day of surgery.    loratadine (CLARITIN) 10 mg tablet Take day of surgery.    mometasone (NASONEX) 50 mcg/act nasal spray Uses PRN- OK to take day of surgery    Multiple Vitamins-Minerals (multivitamin with minerals) tablet Stop taking 7 days prior to surgery.    Naltrexone-buPROPion HCl ER 8-90 MG TB12 Stop taking 3 days prior to surgery.    omeprazole (PriLOSEC) 10 mg delayed release capsule Take day of surgery.    Probiotic Product (ALIGN) 4 MG CAPS Hold day of surgery.    Rhubarb (ESTROVEN COMPLETE PO) Stop taking 7 days prior to surgery.    simvastatin (ZOCOR) 5 MG tablet Take night before surgery    spironolactone (ALDACTONE) 50 mg tablet Take night before surgery    verapamil (Verelan) 180 MG 24 hr capsule Take day of surgery.    vitamin E, tocopherol, 400 units capsule Stop taking 7 days prior to surgery.      Medication instructions for day of surgery reviewed. Please take all instructed medications with only a sip of water.       You will receive a call one business day prior to surgery with an arrival time and hospital  directions. If your surgery is scheduled on a Monday, the hospital will be calling you on the Friday prior to your surgery. If you have not heard from anyone by 8pm, please call the hospital supervisor through the hospital  at [phone]. ([place] [phone] or [place] [phone]).    Do not eat or drink anything after midnight the night before your surgery, including candy, mints, lifesavers, or chewing gum. Do not drink alcohol 24hrs before your surgery. Try not to smoke at least 24hrs before your surgery.       Follow the pre surgery showering instructions as listed in the “My Surgical Experience Booklet” or otherwise provided by your surgeon's office. Do not use a blade to shave the surgical area 1 week before surgery. It is okay to use a clean electric clippers up to 24 hours before surgery. Do not apply any lotions, creams, including makeup, cologne, deodorant, or perfumes after showering on the day of your surgery. Do not use dry shampoo, hair spray, hair gel, or any type of hair products.     No contact lenses, eye make-up, or artificial eyelashes. Remove nail polish, including gel polish, and any artificial, gel, or acrylic nails if possible. Remove all jewelry including rings and body piercing jewelry.     Wear causal clothing that is easy to take on and off. Consider your type of surgery.    Keep any valuables, jewelry, piercings at home. Please bring any specially ordered equipment (sling, braces) if indicated.    Arrange for a responsible person to drive you to and from the hospital on the day of your surgery. Please confirm the visitor policy for the day of your procedure when you receive your phone call with an arrival time.     Call the surgeon's office with any new illnesses, exposures, or additional questions prior to surgery.    Please reference your “My Surgical Experience Booklet” for additional information to prepare for your upcoming surgery.

## 2025-02-28 ENCOUNTER — RESULTS FOLLOW-UP (OUTPATIENT)
Dept: INTERNAL MEDICINE CLINIC | Facility: CLINIC | Age: 60
End: 2025-02-28

## 2025-03-05 ENCOUNTER — HOSPITAL ENCOUNTER (OUTPATIENT)
Facility: AMBULARY SURGERY CENTER | Age: 60
Setting detail: OUTPATIENT SURGERY
Discharge: HOME | End: 2025-03-05
Attending: STUDENT IN AN ORGANIZED HEALTH CARE EDUCATION/TRAINING PROGRAM | Admitting: STUDENT IN AN ORGANIZED HEALTH CARE EDUCATION/TRAINING PROGRAM
Payer: COMMERCIAL

## 2025-03-05 ENCOUNTER — ANESTHESIA (OUTPATIENT)
Dept: PERIOP | Facility: AMBULARY SURGERY CENTER | Age: 60
End: 2025-03-05
Payer: COMMERCIAL

## 2025-03-05 VITALS
DIASTOLIC BLOOD PRESSURE: 78 MMHG | RESPIRATION RATE: 18 BRPM | HEART RATE: 83 BPM | HEIGHT: 68 IN | OXYGEN SATURATION: 96 % | BODY MASS INDEX: 38.8 KG/M2 | TEMPERATURE: 97 F | SYSTOLIC BLOOD PRESSURE: 134 MMHG | WEIGHT: 256 LBS

## 2025-03-05 DIAGNOSIS — G56.01 CARPAL TUNNEL SYNDROME OF RIGHT WRIST: Primary | ICD-10-CM

## 2025-03-05 PROCEDURE — 29848 WRIST ENDOSCOPY/SURGERY: CPT | Performed by: PHYSICIAN ASSISTANT

## 2025-03-05 PROCEDURE — 29848 WRIST ENDOSCOPY/SURGERY: CPT | Performed by: STUDENT IN AN ORGANIZED HEALTH CARE EDUCATION/TRAINING PROGRAM

## 2025-03-05 RX ORDER — SODIUM CHLORIDE, SODIUM LACTATE, POTASSIUM CHLORIDE, CALCIUM CHLORIDE 600; 310; 30; 20 MG/100ML; MG/100ML; MG/100ML; MG/100ML
INJECTION, SOLUTION INTRAVENOUS CONTINUOUS PRN
Status: DISCONTINUED | OUTPATIENT
Start: 2025-03-05 | End: 2025-03-05

## 2025-03-05 RX ORDER — ONDANSETRON 2 MG/ML
4 INJECTION INTRAMUSCULAR; INTRAVENOUS EVERY 4 HOURS PRN
Status: DISCONTINUED | OUTPATIENT
Start: 2025-03-05 | End: 2025-03-05 | Stop reason: HOSPADM

## 2025-03-05 RX ORDER — MIDAZOLAM HYDROCHLORIDE 2 MG/2ML
INJECTION, SOLUTION INTRAMUSCULAR; INTRAVENOUS AS NEEDED
Status: DISCONTINUED | OUTPATIENT
Start: 2025-03-05 | End: 2025-03-05

## 2025-03-05 RX ORDER — PROPOFOL 10 MG/ML
INJECTION, EMULSION INTRAVENOUS CONTINUOUS PRN
Status: DISCONTINUED | OUTPATIENT
Start: 2025-03-05 | End: 2025-03-05

## 2025-03-05 RX ORDER — FENTANYL CITRATE/PF 50 MCG/ML
25 SYRINGE (ML) INJECTION
Refills: 0 | Status: DISCONTINUED | OUTPATIENT
Start: 2025-03-05 | End: 2025-03-05 | Stop reason: HOSPADM

## 2025-03-05 RX ORDER — CHLORHEXIDINE GLUCONATE ORAL RINSE 1.2 MG/ML
15 SOLUTION DENTAL ONCE
Status: COMPLETED | OUTPATIENT
Start: 2025-03-05 | End: 2025-03-05

## 2025-03-05 RX ORDER — OXYCODONE AND ACETAMINOPHEN 5; 325 MG/1; MG/1
1 TABLET ORAL EVERY 6 HOURS PRN
Qty: 5 TABLET | Refills: 0 | Status: SHIPPED | OUTPATIENT
Start: 2025-03-05

## 2025-03-05 RX ORDER — MAGNESIUM HYDROXIDE 1200 MG/15ML
LIQUID ORAL AS NEEDED
Status: DISCONTINUED | OUTPATIENT
Start: 2025-03-05 | End: 2025-03-05 | Stop reason: HOSPADM

## 2025-03-05 RX ORDER — PROPOFOL 10 MG/ML
INJECTION, EMULSION INTRAVENOUS AS NEEDED
Status: DISCONTINUED | OUTPATIENT
Start: 2025-03-05 | End: 2025-03-05

## 2025-03-05 RX ORDER — FENTANYL CITRATE 50 UG/ML
INJECTION, SOLUTION INTRAMUSCULAR; INTRAVENOUS AS NEEDED
Status: DISCONTINUED | OUTPATIENT
Start: 2025-03-05 | End: 2025-03-05

## 2025-03-05 RX ADMIN — PROPOFOL 30 MG: 10 INJECTION, EMULSION INTRAVENOUS at 07:28

## 2025-03-05 RX ADMIN — CHLORHEXIDINE GLUCONATE 15 ML: 1.2 SOLUTION ORAL at 06:55

## 2025-03-05 RX ADMIN — MIDAZOLAM 2 MG: 1 INJECTION INTRAMUSCULAR; INTRAVENOUS at 07:26

## 2025-03-05 RX ADMIN — FENTANYL CITRATE 50 MCG: 50 INJECTION INTRAMUSCULAR; INTRAVENOUS at 07:28

## 2025-03-05 RX ADMIN — PROPOFOL 100 MCG/KG/MIN: 10 INJECTION, EMULSION INTRAVENOUS at 07:28

## 2025-03-05 RX ADMIN — SODIUM CHLORIDE, SODIUM LACTATE, POTASSIUM CHLORIDE, AND CALCIUM CHLORIDE: .6; .31; .03; .02 INJECTION, SOLUTION INTRAVENOUS at 07:15

## 2025-03-05 NOTE — ANESTHESIA POSTPROCEDURE EVALUATION
Post-Op Assessment Note    CV Status:  Stable    Pain management: adequate       Mental Status:  Alert and awake   Hydration Status:  Euvolemic   PONV Controlled:  Controlled   Airway Patency:  Patent     Post Op Vitals Reviewed: Yes    No anethesia notable event occurred.    Staff: Anesthesiologist, CRNA           Last Filed PACU Vitals:  Vitals Value Taken Time   Temp 96.9 °F (36.1 °C) 03/05/25 0752   Pulse 77 03/05/25 0810   /80 03/05/25 0801   Resp 20 03/05/25 0810   SpO2 98 % 03/05/25 0810   Vitals shown include unfiled device data.    Modified [place]:     Vitals Value Taken Time   Activity 2 03/05/25 0752   Respiration 2 03/05/25 0752   Circulation 2 03/05/25 0752   Consciousness 2 03/05/25 0752   Oxygen Saturation 2 03/05/25 0752     Modified [place] Score: 10

## 2025-03-05 NOTE — OP NOTE
OPERATIVE REPORT  PATIENT NAME: Teena Staples    :  1965  MRN: [id number]  Pt Location: [facility] OR ROOM 04    SURGERY DATE: 3/5/2025     Surgeons and Role:     * Armando Kamara MD - Primary     * James Mane PA-C - Assisting    Physician Assistant need: A physician assistant was required during the procedure for retraction, tissue handling, dissection, and suturing. No qualified resident was available.    Pre-Op Diagnosis Codes:      * Carpal tunnel syndrome on right [G56.01]    Post-Op Diagnosis Codes:     * Carpal tunnel syndrome on right [G56.01]    Procedure(s) (LRB):  RELEASE CARPAL TUNNEL ENDOSCOPIC(Right)    Specimen(s):  * No specimens in log *    Estimated Blood Loss:   Minimal    Drains:  None    Implants:  * No implants in log *    Anesthesia Type:   IV Sedation with Anesthesia    Operative Indications:  Patient is a 59 y.o. female evaluated in clinic with symptoms and clinical exam consistent with Right carpal tunnel syndrome.  We discussed treatment options with patient including conservative management and surgical decompression. Discussed nonoperative management with splinting, injections, and observation. We discussed with surgery the possibility of continued numbness and tingling after surgery and possible recurrence of symptoms many years down the line. Patient elected for surgical management.      Operative Findings:  Thick transverse carpal ligament that was able to be released endoscopically.       Complications:   None     Procedure and Technique:  Patient was identified in the preoperative holding area.  Surgical sites were marked with patient participation. Patient was taken back to the operating theater.  Extremity was prepped and draped typical fashion.  Formal time-out was performed confirming site, patient, and procedure. All present were in agreement. A 50-50 mixture of 1% lidocaine without epinephrine and 0.25% bupivacaine without epinephrine was used for local field  block.           Tourniquet was inflated. Incision made over proximal transverse wrist crease. Palmaris longus tendon was retracted radially.  Blunt dissection was carried to the antebrachial fascia, which was entered bluntly.  The dilator was inserted below the antebrachial fascia and above the median nerve.  The dilator was taken distally. The endoscope with [employer] endoscopic knife was introduced with care to be below antebrachial fascia and above the median nerve. Good visualization was noted.  The transverse carpal ligament fibers were directly visualized.  The transverse carpal ligament was incised using endoscopic knife.  The wound was reinspected.  There was complete release of the transverse carpal ligament.  Attention was turned proximally through the wound.  The distal 1.5 cm aspect of antebrachial fascia was incised under direct visualization.  Wound was inspected and palpated with the hemostat.  There was no further sites of compression. Wound was irrigated. Skin was closed with 4-0 chromic in horizontal mattress fashion.  Wounds were dressed with Xeroform, 4x4s, cast padding, Ace bandage.   Tourniquet was deflated.      I was present for the entire procedure     Postoperative Plan:  Patient may range digits as tolerated.  We will limit weightbearing to a couple of pounds for the first 2 weeks.  We will see patient back at the 2-week postoperative yelena.      Patient Disposition:  Recovery        SIGNATURE: Armando Kamara MD  DATE: March 5, 2025  TIME: 7:50 AM

## 2025-03-05 NOTE — DISCHARGE INSTR - AVS FIRST PAGE
Post Operative Instructions    You have had surgery on your arm today, please read and follow the information below:  Elevate your hand above your elbow during the next 24-48 hours to help with swelling.  Place your hand and arm over your head with motion at your shoulder three times a day.  Do not apply any cream/ointment/oil to your incisions including antibiotics (I.e.Neosporin)  Do not use peroxide to clean your wound   Do not soak your hands in standing water (dishwater, tubs, Jacuzzi's, pools, etc.) until given permission (typically 2-3 weeks after injury)    Call the office if you notice any:  Increased numbness or tingling of your hand or fingers that is not relieved with elevation.  Increasing pain that is not controlled with medication.  Difficulty chewing, breathing, swallowing.  Chest pains or shortness of breath.  Fever over 101.4 degrees.    Bandage: Please DO NOT remove your surgical bandage. This will be removed at either your postop visit or your 1st visit with your hand therapist.    Motion: Move fingers into a fist 5 times a day, DO NOT move any splinted fingers.    Weight bearing status: Avoid heavy lifting (>2 pounds) with the extremity that was operated on until follow up appointment. Normal activities of daily living are OK.    Ice: Ice for 10 minutes every hour as needed for swelling x 24 hours.    Sling: No sling necessary.    Pain medication:   Oxycodone 1 tab every 6 hours AS NEEDED for pain  *   You may take Tylenol (acetaminophen) in addition to your pain medication. This is over the counter. Please follow the instructions on the bottle. BE AWARE - your pain medication (hydrocodone/oxycodone) may already include acetaminophen in it. If it does, you must include this in your daily amount and make sure not take more than 3000 mg per day.   *   You may take an antiinflammatory medication (i.e. ibuprofen/naproxen) in addition to your pain medication. These are found over the counter, but  higher prescription doses are available if needed. Please follow the dosing instructions on the bottle and it is recommended you take these with food. DO NOT take these medications if you are on a blood thinner, have history of gastrointestinal bleeding, chronic kidney disease, or if you have ever been told by a physician not to. You CAN take this with Tylenol (acetaminophen), but should only take ONE antiinflammatory at a time.    Antibiotics:  None     Therapy: No therapy needed at this time.    Follow-up Appointment: 10-14 days.        Please call the office if you have any questions or concerns regarding your post-operative care.

## 2025-03-05 NOTE — ANESTHESIA POSTPROCEDURE EVALUATION
Post-Op Assessment Note    CV Status:  Stable    Pain management: adequate       Mental Status:  Alert and awake   Hydration Status:  Euvolemic   PONV Controlled:  Controlled   Airway Patency:  Patent     Post Op Vitals Reviewed: Yes    No anethesia notable event occurred.    Staff: CRNA           Last Filed PACU Vitals:  Vitals Value Taken Time   Temp     Pulse 73    /66    Resp 14    SpO2 98

## 2025-03-05 NOTE — INTERVAL H&P NOTE
H&P reviewed. After examining the patient I find no changes in the patients condition since the H&P had been written.    Vitals:    03/05/25 0658   BP: 129/73   Pulse: 62   Resp: 18   Temp: (!) 97 °F (36.1 °C)   SpO2: 99%

## 2025-03-05 NOTE — ANESTHESIA PREPROCEDURE EVALUATION
Procedure:  RELEASE CARPAL TUNNEL ENDOSCOPIC VERSUS OPEN (Right: Wrist)    Relevant Problems   ANESTHESIA (within normal limits)      CARDIO   (+) Benign essential hypertension   (+) Mixed hyperlipidemia      ENDO (within normal limits)      GI/HEPATIC   (+) GERD (gastroesophageal reflux disease)      /RENAL (within normal limits)      GYN (within normal limits)      HEMATOLOGY (within normal limits)      MUSCULOSKELETAL   (+) Chondromalacia of right patella   (+) Osteoarthritis of knees, bilateral   (+) Primary osteoarthritis of one hip, right      NEURO/PSYCH   (+) Other headache syndrome      PULMONARY   (+) Asthma      Took verapamil and Prilosec this AM  Physical Exam    Airway    Mallampati score: II  TM Distance: >3 FB  Neck ROM: full     Dental   No notable dental hx     Cardiovascular  Rhythm: regular, Rate: normal, Cardiovascular exam normal    Pulmonary  Pulmonary exam normal Breath sounds clear to auscultation    Other Findings  post-pubertal.      Anesthesia Plan  ASA Score- 2     Anesthesia Type- IV sedation with anesthesia with ASA Monitors.         Additional Monitors:     Airway Plan:            Plan Factors-Exercise tolerance (METS): >4 METS.    Chart reviewed.   Existing labs reviewed. Patient summary reviewed.          Obstructive sleep apnea risk education given perioperatively.        Induction- intravenous.    Postoperative Plan-     Perioperative Resuscitation Plan - Level 1 - Full Code.       Informed Consent- Anesthetic plan and risks discussed with patient.  I personally reviewed this patient with the CRNA. Discussed and agreed on the Anesthesia Plan with the CRNA..      NPO Status:  Vitals Value Taken Time   Date of last liquid 03/05/25 03/05/25 0653   Time of last liquid 0500 03/05/25 0653   Date of last solid 03/04/25 03/05/25 0653   Time of last solid 2130 03/05/25 0653

## 2025-03-14 ENCOUNTER — OFFICE VISIT (OUTPATIENT)
Dept: INTERNAL MEDICINE CLINIC | Facility: CLINIC | Age: 60
End: 2025-03-14
Payer: COMMERCIAL

## 2025-03-14 VITALS
HEART RATE: 96 BPM | SYSTOLIC BLOOD PRESSURE: 122 MMHG | BODY MASS INDEX: 37.89 KG/M2 | OXYGEN SATURATION: 97 % | HEIGHT: 68 IN | TEMPERATURE: 98 F | DIASTOLIC BLOOD PRESSURE: 78 MMHG | WEIGHT: 250 LBS

## 2025-03-14 DIAGNOSIS — E78.00 HYPERCHOLESTEROLEMIA: ICD-10-CM

## 2025-03-14 DIAGNOSIS — J45.909 MODERATE ASTHMA WITHOUT COMPLICATION, UNSPECIFIED WHETHER PERSISTENT: ICD-10-CM

## 2025-03-14 DIAGNOSIS — E66.01 MORBID OBESITY (HCC): ICD-10-CM

## 2025-03-14 DIAGNOSIS — M54.16 LUMBAR RADICULOPATHY: ICD-10-CM

## 2025-03-14 DIAGNOSIS — I10 BENIGN ESSENTIAL HYPERTENSION: Primary | ICD-10-CM

## 2025-03-14 PROCEDURE — 99214 OFFICE O/P EST MOD 30 MIN: CPT | Performed by: INTERNAL MEDICINE

## 2025-03-14 NOTE — ASSESSMENT & PLAN NOTE
Lipid panel is on target.  Continue statin.  Lipid before next visit.    The 10-year ASCVD risk score (Mary ALCANTAR, et al., 2019) is: 2.7%    Values used to calculate the score:      Age: 59 years      Sex: Female      Is Non- : No      Diabetic: No      Tobacco smoker: No      Systolic Blood Pressure: 122 mmHg      Is BP treated: Yes      HDL Cholesterol: 57 mg/dL      Total Cholesterol: 142 mg/dL

## 2025-03-14 NOTE — PROGRESS NOTES
Name: Teena Staples      : 1965      MRN: [id number]  Encounter Provider: Juan Grover MD  Encounter Date: 3/14/2025   Encounter department: [facility] INTERNAL MEDICINE [place]  :  Assessment & Plan  Moderate asthma without complication, unspecified whether persistent  Controlled.       Benign essential hypertension  Hypertension is controlled.  Continue current regimen.  Continue verapamil.  She denies any chest pain shortness of breath headaches.             Lumbar radiculopathy  Chronic. Continue current regimen. 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             Morbid obesity (HCC)  Discussed diet and exercise.    Continue contrave.    Orders:    Naltrexone-buPROPion HCl ER 8-90 MG TB12; Take 2 tablets by mouth 2 (two) times a day      Hypercholesterolemia  Lipid panel is on target.  Continue statin.  Lipid before next visit.    The 10-year ASCVD risk score (Mary ALCANTAR, et al., 2019) is: 2.7%    Values used to calculate the score:      Age: 59 years      Sex: Female      Is Non- : No      Diabetic: No      Tobacco smoker: No      Systolic Blood Pressure: 122 mmHg      Is BP treated: Yes      HDL Cholesterol: 57 mg/dL      Total Cholesterol: 142 mg/dL                 Depression Screening and Follow-up Plan:   Patient with underlying depression and was advised to continue current medications as prescribed.       History of Present Illness   Here for follow up.      Review of Systems   Constitutional:  Negative for chills and fever.   HENT:  Negative for ear pain and sore throat.    Eyes:  Negative for pain and visual disturbance.   Respiratory:  Negative for cough and shortness of breath.    Cardiovascular:  Negative for chest pain and palpitations.   Gastrointestinal:  Negative for abdominal pain and vomiting.   Genitourinary:  Negative for dysuria and hematuria.   Musculoskeletal:  Positive for arthralgias. Negative for back  "pain.   Skin:  Negative for color change and rash.   Neurological:  Negative for seizures and syncope.   All other systems reviewed and are negative.      Objective   /78 (BP Location: Left arm, Patient Position: Sitting, Cuff Size: Large)   Pulse 96   Temp 98 °F (36.7 °C) (Temporal)   Ht 5' 7.5\" (1.715 m)   Wt 113 kg (250 lb)   SpO2 97%   BMI 38.58 kg/m²      Physical Exam  Vitals and nursing note reviewed.   Constitutional:       General: She is not in acute distress.     Appearance: She is well-developed. She is obese.   HENT:      Head: Normocephalic and atraumatic.   Cardiovascular:      Rate and Rhythm: Normal rate and regular rhythm.      Heart sounds: No murmur heard.  Pulmonary:      Effort: Pulmonary effort is normal. No respiratory distress.      Breath sounds: Normal breath sounds.   Abdominal:      Tenderness: There is no abdominal tenderness.   Musculoskeletal:         General: No swelling.      Cervical back: Neck supple.   Skin:     General: Skin is warm and dry.      Capillary Refill: Capillary refill takes less than 2 seconds.   Neurological:      Mental Status: She is alert.   Psychiatric:         Mood and Affect: Mood normal.         "

## 2025-03-14 NOTE — ASSESSMENT & PLAN NOTE
Discussed diet and exercise.    Continue contrave.    Orders:    Naltrexone-buPROPion HCl ER 8-90 MG TB12; Take 2 tablets by mouth 2 (two) times a day

## 2025-03-14 NOTE — ASSESSMENT & PLAN NOTE
Chronic. Continue current regimen. 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

## 2025-03-17 ENCOUNTER — OFFICE VISIT (OUTPATIENT)
Dept: OBGYN CLINIC | Facility: CLINIC | Age: 60
End: 2025-03-17

## 2025-03-17 VITALS — WEIGHT: 258.6 LBS | HEIGHT: 68 IN | BODY MASS INDEX: 39.19 KG/M2

## 2025-03-17 DIAGNOSIS — G56.01 CARPAL TUNNEL SYNDROME ON RIGHT: Primary | ICD-10-CM

## 2025-03-17 PROCEDURE — 99024 POSTOP FOLLOW-UP VISIT: CPT | Performed by: STUDENT IN AN ORGANIZED HEALTH CARE EDUCATION/TRAINING PROGRAM

## 2025-03-17 NOTE — PROGRESS NOTES
Assessment/Plan:  Patient ID: 59 y.o. female   Surgery: Release Carpal Tunnel Endoscopic - Right  Date of Surgery: 3/5/2025    Assessment & Plan  Carpal tunnel syndrome on right    Orders:    Ambulatory Referral to PT/OT Hand Therapy; Future  12 days s/p right carpal tunnel release  Discussed and recommended hand therapy to improve her stiffness, referral was placed  Discussed that it is still early and will likely continue to improve with time and can continue use of OTC pain medication as needed  In regards to her left hand, recommended that we hold off on any surgical intervention until we see further improvement of her right  All questions were answered at this time  Patient is to follow up on her schedule appointment listed below        Follow Up:  Scheduled appointment 5/12/25          CHIEF COMPLAINT:  Chief Complaint   Patient presents with    Right Wrist - Post-op         SUBJECTIVE:  Patient is a 59 y.o. year old female who presents for follow up after Release Carpal Tunnel Endoscopic - Right. Today patient states that she has improved by 10% from preop. She states her prior numbness is now tingling.  Patient states that for her left hand she continues to have numbness and tingling in the left hand    Occupation: retired    PHYSICAL EXAMINATION:  General: Well developed and well nourished, alert and oriented x 3, appears comfortable  Psychiatric: Normal    MUSCULOSKELETAL EXAMINATION:  Incision: Clean, dry, intact  Surgery Site: normal, no evidence of infection   Range of Motion: As expected  Neurovascular status: Neuro intact, good cap refill  Done today: Sutures out  5/5 APB  Sensation intact to light touch to radial and ulnar aspects of all digits    STUDIES REVIEWED:  No new studies to review       PROCEDURES PERFORMED:  Procedures  No Procedures performed today    Scribe Attestation      I,:  Melly Reyes PA-C am acting as a scribe while in the presence of the attending physician.:       I,:  Armando  MD Anh personally performed the services described in this documentation    as scribed in my presence.:

## 2025-03-18 DIAGNOSIS — M54.16 LUMBAR RADICULOPATHY: ICD-10-CM

## 2025-03-18 DIAGNOSIS — K21.9 GASTROESOPHAGEAL REFLUX DISEASE: ICD-10-CM

## 2025-03-18 DIAGNOSIS — E78.5 HYPERLIPIDEMIA, UNSPECIFIED HYPERLIPIDEMIA TYPE: ICD-10-CM

## 2025-03-18 DIAGNOSIS — I10 BENIGN ESSENTIAL HYPERTENSION: ICD-10-CM

## 2025-03-18 DIAGNOSIS — J45.909 MODERATE ASTHMA WITHOUT COMPLICATION, UNSPECIFIED WHETHER PERSISTENT: ICD-10-CM

## 2025-03-18 DIAGNOSIS — R73.03 PREDIABETES: ICD-10-CM

## 2025-03-18 DIAGNOSIS — L65.9 ALOPECIA: ICD-10-CM

## 2025-03-18 DIAGNOSIS — I10 HYPERTENSION, UNSPECIFIED TYPE: ICD-10-CM

## 2025-03-19 RX ORDER — SIMVASTATIN 5 MG
5 TABLET ORAL
Qty: 90 TABLET | Refills: 1 | Status: SHIPPED | OUTPATIENT
Start: 2025-03-19

## 2025-04-08 ENCOUNTER — HOSPITAL ENCOUNTER (OUTPATIENT)
Dept: RADIOLOGY | Facility: HOSPITAL | Age: 60
Discharge: HOME | End: 2025-04-08
Attending: ORTHOPAEDIC SURGERY
Payer: COMMERCIAL

## 2025-04-08 ENCOUNTER — OFFICE VISIT (OUTPATIENT)
Dept: OBGYN CLINIC | Facility: CLINIC | Age: 60
End: 2025-04-08
Payer: COMMERCIAL

## 2025-04-08 VITALS — HEIGHT: 68 IN | BODY MASS INDEX: 39.9 KG/M2

## 2025-04-08 DIAGNOSIS — Z96.641 S/P HIP REPLACEMENT, RIGHT: ICD-10-CM

## 2025-04-08 DIAGNOSIS — M70.61 GREATER TROCHANTERIC BURSITIS OF RIGHT HIP: ICD-10-CM

## 2025-04-08 DIAGNOSIS — Z96.641 S/P HIP REPLACEMENT, RIGHT: Primary | ICD-10-CM

## 2025-04-08 PROCEDURE — 73502 X-RAY EXAM HIP UNI 2-3 VIEWS: CPT

## 2025-04-08 PROCEDURE — 99214 OFFICE O/P EST MOD 30 MIN: CPT | Performed by: ORTHOPAEDIC SURGERY

## 2025-04-08 RX ORDER — AMOXICILLIN 500 MG/1
CAPSULE ORAL
Qty: 4 CAPSULE | Refills: 3 | Status: SHIPPED | OUTPATIENT
Start: 2025-04-08 | End: 2025-04-15

## 2025-04-08 RX ORDER — CELECOXIB 200 MG/1
200 CAPSULE ORAL DAILY
Qty: 30 CAPSULE | Refills: 5 | Status: SHIPPED | OUTPATIENT
Start: 2025-04-08

## 2025-04-08 NOTE — PROGRESS NOTES
Name: Teena Staples      : 1965      MRN: [id number]  Encounter Provider: Marlee Mccann MD  Encounter Date: 2025   Encounter department: [facility]  :  Assessment & Plan  S/P hip replacement, right    Orders:    XR hip/pelv 2-3 vws right if performed; Future    celecoxib (CeleBREX) 200 mg capsule; Take 1 capsule (200 mg total) by mouth daily    amoxicillin (AMOXIL) 500 mg capsule; Take 4 capsules one hour before dental procedure  weightbearing as tolerated   X-ray right hip was obtained and reviewed in the office today which demonstrates s/p ADAN adequate alignment of hip with no sign of loosening   Provided patient with greater troch stretching exercises, hip flexor stretching and strengthening exercises   Prescribed patient Celebrex 200 mg for pain relief  Patient is aware she will be on lifetime antibiotics for dental prophylaxis.  Amoxicillin 500 mg was sent to the pharmacy today  Greater trochanteric bursitis of right hip  Greater troches stretching exercises was provided today to the patient  Recommend patient to use Voltaren gel and apply over to the lateral hip for pain relief         Hip Flexor tightness   Provided patient with hip flexor stretching and strengthening exercises.    Follow up visit :  1 year and at that time repeat x-ray of the right hip        The above stated was discussed in layman's terms and the patient expressed understanding.  All questions were answered to the patient's satisfaction.       Subjective:   Teena Staples is a 59 y.o. female who presents today 10.5 months status post right anterior total hip arthroplasty performed on 2024.  Patient states she was doing well with her right hip until 3 weeks ago.  She states she went down to [place] to visit her daughter and did a lot of walking and started having pain over the lateral and anterior aspect of the right hip.  She states she tried to sleep longer on the right side  of her hip at night but the next day she was having increased pain.  She states she feels weakness when exiting her right hip.  She notes she did stop doing the exercises 3 months ago and restarted walking recently.  She did apply Voltaren gel over the lateral aspect of the right hip for pain relief.  She denies any numbness or tingling.      Review of systems negative unless otherwise specified in HPI    Past Medical History:   Diagnosis Date    Allergic     Allergic rhinitis     last assessed 14     Arthritis     Asthma     Bleeding per rectum     Last assessed 16     Colon polyp     Diverticulitis of colon     GERD (gastroesophageal reflux disease)     Hypercholesterolemia     Hypertension     last assessed 3/24/15     Hyponatremia     last assessed 9/17/15     Obesity        Past Surgical History:   Procedure Laterality Date     SECTION      x2    COLONOSCOPY      FL INJECTION RIGHT SHOULDER (ARTHROGRAM)  2021    [place] ARTHRP ACETBLR/PROX FEM PROSTC AGRFT/ALGRFT Right 2024    Procedure: RIGHT ARTHROPLASTY HIP TOTAL ANTERIOR;  Surgeon: Marlee Mccann MD;  Location: [facility] MAIN OR;  Service: Orthopedics    [place] NDSC WRST SURG W/RLS TRANSVRS CARPL LIGM Right 3/5/2025    Procedure: RELEASE CARPAL TUNNEL ENDOSCOPIC;  Surgeon: Armando Kamara MD;  [facility] MA[facility];  Service: Orthopedics    WISDOM TOOTH EXTRACTION      WRIST SURGERY         Family History   Adopted: Yes   Problem Relation Age of Onset    No Known Problems Mother     Migraines Daughter        Social History     Occupational History     Comment: full time employment    Tobacco Use    Smoking status: Never    Smokeless tobacco: Never   Vaping Use    Vaping status: Never Used   Substance and Sexual Activity    Alcohol use: Yes     Comment: rare /    Drug use: No    Sexual activity: Yes         Current Outpatient Medications:     amoxicillin (AMOXIL) 500 mg capsule, Dental procedures only, Disp: , Rfl:     calcipotriene  "(DOVONEX) 0.005 % topical solution, In the one to two week break from the Clobetasol solution, apply this solution one to two times a day to ears and affected areas on scalp, Disp: 60 mL, Rfl: 5    celecoxib (CeleBREX) 200 mg capsule, take 1 capsule by mouth twice a day, Disp: 30 capsule, Rfl: 5    Cholecalciferol (VITAMIN D) 2000 units CAPS, Take by mouth, Disp: , Rfl:     cholestyramine (QUESTRAN) 4 GM/DOSE powder, Take 1 packet (4 g total) by mouth 2 (two) times a day As needed for diarrhea, Disp: 378 g, Rfl: 5    clobetasol (TEMOVATE) 0.05 % external solution, Apply twice a day for two weeks at a time to scalp and ears taking at least a weeks break in between uses, Disp: 50 mL, Rfl: 3    Diclofenac Sodium (Voltaren) 1 %, Apply 2 g topically 4 (four) times a day, Disp: , Rfl:     diclofenac sodium (VOLTAREN) 50 mg EC tablet, Take 50 mg by mouth 2 (two) times a day, Disp: , Rfl:     diphenhydrAMINE-acetaminophen (TYLENOL PM)  MG TABS, Take 1 tablet by mouth daily at bedtime as needed for sleep, Disp: , Rfl:     Elderberry-Vitamin C-Zinc (Iotelligent GUMMY PO), Take 2 gums by mouth daily, Disp: , Rfl:     glucosamine-chondroitin 500-400 MG tablet, Take 1 tablet by mouth 3 (three) times a day, Disp: 100 tablet, Rfl: 2    ketoconazole (NIZORAL) 2 % shampoo, Shampoo daily for 2 weeks straight and then on \"Mondays, Wednesdays and Fridays\":  Lather into scalp; leave on for 5 minutes and then rinse off completely., Disp: 120 mL, Rfl: 11    loratadine (CLARITIN) 10 mg tablet, Take 10 mg by mouth daily, Disp: , Rfl:     mometasone (NASONEX) 50 mcg/act nasal spray, 2 sprays into each nostril daily, Disp: 17 g, Rfl: 0    Multiple Vitamins-Minerals (multivitamin with minerals) tablet, Take 1 tablet by mouth daily, Disp: 30 tablet, Rfl: 0    Naltrexone-buPROPion HCl ER 8-90 MG TB12, Take 2 tablets by mouth 2 (two) times a day, Disp: 360 tablet, Rfl: 0    omeprazole (PriLOSEC) 10 mg delayed release capsule, " Take 1 capsule (10 mg total) by mouth daily, Disp: 90 capsule, Rfl: 1    Probiotic Product (ALIGN) 4 MG CAPS, Take by mouth, Disp: , Rfl:     Rhubarb (ESTROVEN COMPLETE PO), Estroven, Disp: , Rfl:     simvastatin (ZOCOR) 5 MG tablet, Take 1 tablet (5 mg total) by mouth daily at bedtime, Disp: 90 tablet, Rfl: 1    spironolactone (ALDACTONE) 50 mg tablet, Take 1 tablet (50 mg total) by mouth daily (Patient taking differently: Take 50 mg by mouth daily at bedtime), Disp: 90 tablet, Rfl: 1    verapamil (Verelan) 180 MG 24 hr capsule, Take 1 capsule (180 mg total) by mouth daily before breakfast, Disp: 90 capsule, Rfl: 2    vitamin E, tocopherol, 400 units capsule, Take 1 capsule (400 Units total) by mouth daily, Disp: , Rfl:     gabapentin (NEURONTIN) 100 mg capsule, Take 2 capsules (200 mg total) by mouth 3 (three) times a day, Disp: 540 capsule, Rfl: 0    oxyCODONE-acetaminophen (Percocet) 5-325 mg per tablet, Take 1 tablet by mouth every 6 (six) hours as needed for severe pain for up to 12 doses Max Daily Amount: 4 tablets (Patient not taking: Reported on 4/8/2025), Disp: 5 tablet, Rfl: 0    Allergies   Allergen Reactions    Levofloxacin Hives and Tachycardia     Annotation - 15Jan2013: LIGHTHEADED    Sulfamethoxazole-Trimethoprim Other (See Comments)     unknown    Hydrocodone Bitartrate Er Other (See Comments)     Annotation - 15Jan2013: FLUSHING    CHEST/RIB PAIN          There were no vitals filed for this visit.  Body mass index is 39.9 kg/m².  Wt Readings from Last 3 Encounters:   03/17/25 117 kg (258 lb 9.6 oz)   03/14/25 113 kg (250 lb)   02/27/25 116 kg (256 lb)       Objective:            Physical Exam  Physical Exam:      General Appearance:    Alert, cooperative, no distress, appears stated age   Head:    Normocephalic, without obvious abnormality, atraumatic   Eyes:    conjunctiva/corneas clear, both eyes         Nose:   Nares normal, septum midline, no drainage    Throat:   Lips normal; teeth and gums  "normal   Neck:    symmetrical, trachea midline, ;     thyroid:  no enlargement/   Back:     Symmetric, no curvature, ROM normal   Lungs:   No audible wheezing or labored breathing   Chest Wall:    No tenderness or deformity    Heart:    Regular rate and rhythm                         Pulses:   2+ and symmetric all extremities   Skin:   Skin color, texture, turgor normal, no rashes or lesions   Neurologic:   normal strength, sensation and reflexes     throughout                       Ortho Exam  Right hip  Skin intact  No erythema or open wounds  Negative Stinchfield  + tenderness to palpation over the greater trochanteric region  Neurovascularly Intact Distally        Diagnostics, reviewed and taken today if performed as documented:    The attending physician has personally reviewed the pertinent films in PACS and interpretation is as follows: X-ray right hip demonstrates status post ADAN adequate implant with no sign of loosening      Procedures, if performed today:    Procedures    None performed      Scribe Attestation      I,:  Haley Guy MA am acting as a scribe while in the presence of the attending physician.:       I,:  Marlee Mccann MD personally performed the services described in this documentation    as scribed in my presence.:               Portions of the record may have been created with voice recognition software.  Occasional wrong word or \"sound a like\" substitutions may have occurred due to the inherent limitations of voice recognition software.  Read the chart carefully and recognize, using context, where substitutions have occurred.  "

## 2025-04-08 NOTE — PATIENT INSTRUCTIONS
Greater troch bursitis stretching exercises                                             Anterior Hip Stretches:          Place the  foot with toe on the floor behind you and lean into the chair or table to feel the stretch in the front of the hip.   Hold for 10-20 sec and perform 10 reps. You can do this up to 3x/daily          Perform this on a stable chair with right  leg behind providing the stretching in the front of the hip  hip.   Hold for 10-20 sec and perform 10 reps. You can do this up to 3x/daily          Teena Staples  1965    [facility]                                                                                               Dr. Marlee Mccann                                                                                                            ANTIBIOTIC USE FOR JOINT REPLACEMENT PATIENTS  You have had surgery to replace one of your joints with a metal prosthesis. Going forward, you should take oral antibiotics before any dental work, including routine cleanings. These procedures are a potential source of injection. If you develop an infection, it could spread to your operative joint and cause complications.  Please show the following guidelines to your medical doctor and dentist, so he/she can prescribe the appropriate medications.  The following information is recommended by the American Heart, Dental, and Orthopedic Associations:  STANDARD GENERAL PROPHYLAXIS  antibiotic prophylaxis recommended lifetime  Recommend refraining from dental procedures until 3 months post operatively  Amoxicillin for Adults:    500mg - Take 4 capsules (2 grams) orally one hour before the procedure  If allergic to Penicillin  Clindamycin for Adults:   300mg - Take 2 capsules (600 mg) orally one hour before the procedure  Azithromycin for Adults:  250mg - Take 2 capsules (500 mg) orally one hour before the procedure  Cephalexin for Adults:     500mg - Take 4 capsules (2  grams) orally one hour before the procedure  Note: Cephalosporins should not be used if you have ever developed an immediate hypersensitivity reaction (i.e., hives, swelling, severe itching, difficulty breathing) to Penicillin  Please feel free to contact our office at [phone] with questions or concerns.

## 2025-05-07 DIAGNOSIS — I10 HYPERTENSION, UNSPECIFIED TYPE: ICD-10-CM

## 2025-05-07 DIAGNOSIS — L65.9 ALOPECIA: ICD-10-CM

## 2025-05-07 DIAGNOSIS — I10 BENIGN ESSENTIAL HYPERTENSION: ICD-10-CM

## 2025-05-07 DIAGNOSIS — J45.909 MODERATE ASTHMA WITHOUT COMPLICATION, UNSPECIFIED WHETHER PERSISTENT: ICD-10-CM

## 2025-05-07 RX ORDER — SPIRONOLACTONE 50 MG/1
50 TABLET, FILM COATED ORAL
Qty: 90 TABLET | Refills: 3 | Status: SHIPPED | OUTPATIENT
Start: 2025-05-07

## 2025-05-08 DIAGNOSIS — L65.9 ALOPECIA: ICD-10-CM

## 2025-05-08 DIAGNOSIS — K21.9 GASTROESOPHAGEAL REFLUX DISEASE: ICD-10-CM

## 2025-05-08 DIAGNOSIS — J45.909 MODERATE ASTHMA WITHOUT COMPLICATION, UNSPECIFIED WHETHER PERSISTENT: ICD-10-CM

## 2025-05-08 DIAGNOSIS — L98.9 SKIN LESION: ICD-10-CM

## 2025-05-08 DIAGNOSIS — M79.89 LEG SWELLING: ICD-10-CM

## 2025-05-08 DIAGNOSIS — I10 BENIGN ESSENTIAL HYPERTENSION: ICD-10-CM

## 2025-05-08 DIAGNOSIS — E78.5 HYPERLIPIDEMIA, UNSPECIFIED HYPERLIPIDEMIA TYPE: ICD-10-CM

## 2025-05-08 DIAGNOSIS — I10 HYPERTENSION, UNSPECIFIED TYPE: ICD-10-CM

## 2025-05-08 DIAGNOSIS — R73.03 PREDIABETES: ICD-10-CM

## 2025-05-08 DIAGNOSIS — M54.16 LUMBAR RADICULOPATHY: ICD-10-CM

## 2025-05-08 DIAGNOSIS — J01.91 ACUTE RECURRENT SINUSITIS, UNSPECIFIED LOCATION: ICD-10-CM

## 2025-05-09 RX ORDER — MOMETASONE FUROATE MONOHYDRATE 50 UG/1
2 SPRAY, METERED NASAL DAILY
Qty: 17 G | Refills: 1 | Status: SHIPPED | OUTPATIENT
Start: 2025-05-09

## 2025-05-10 DIAGNOSIS — M54.16 LUMBAR RADICULOPATHY: ICD-10-CM

## 2025-05-11 RX ORDER — GABAPENTIN 100 MG/1
200 CAPSULE ORAL 3 TIMES DAILY
Qty: 540 CAPSULE | Refills: 0 | Status: SHIPPED | OUTPATIENT
Start: 2025-05-11 | End: 2025-08-09

## 2025-05-12 ENCOUNTER — OFFICE VISIT (OUTPATIENT)
Dept: OBGYN CLINIC | Facility: CLINIC | Age: 60
End: 2025-05-12

## 2025-05-12 ENCOUNTER — TELEPHONE (OUTPATIENT)
Dept: GASTROENTEROLOGY | Facility: CLINIC | Age: 60
End: 2025-05-12

## 2025-05-12 VITALS — WEIGHT: 250.8 LBS | BODY MASS INDEX: 38.7 KG/M2

## 2025-05-12 DIAGNOSIS — G56.01 CARPAL TUNNEL SYNDROME ON RIGHT: ICD-10-CM

## 2025-05-12 DIAGNOSIS — M48.02 CERVICAL SPINAL STENOSIS: Primary | ICD-10-CM

## 2025-05-12 PROCEDURE — 99024 POSTOP FOLLOW-UP VISIT: CPT | Performed by: STUDENT IN AN ORGANIZED HEALTH CARE EDUCATION/TRAINING PROGRAM

## 2025-05-12 NOTE — PROGRESS NOTES
Assessment/Plan:  Patient ID: 59 y.o. female   Surgery: Release Carpal Tunnel Endoscopic - Right  Date of Surgery: 3/5/2025    Assessment & Plan  Cervical spinal stenosis  - Worsening right hand numbness despite carpal tunnel release  - Suspect a component of this may be coming from the cspine, double-crush syndrome  - Refer to Dr. Molina for consideration of EDGAR        Carpal tunnel syndrome on right  - Wound healing well         None    Follow Up:  3 months    To Do Next Visit:  Re-evaluation of current issue      CHIEF COMPLAINT:  Chief Complaint   Patient presents with    Right Hand - Pain, Follow-up    Left Hand - Pain, Follow-up         SUBJECTIVE:  Patient is a 59 y.o. year old female who presents for follow up after Release Carpal Tunnel Endoscopic - Right. Today patient states she doesn't have much pain, but her numbness is worse compared to her previous visit.  She did not remember that we referred her to therapy.  Her CTS was negative on EMG and she did not get relive from the CTS injection pre-operatively.    Occupation: ret    PHYSICAL EXAMINATION:  General: Well developed and well nourished, alert and oriented x 3, appears comfortable  Psychiatric: Normal    MUSCULOSKELETAL EXAMINATION:  Right hand  Incision: healed  Surgery Site: normal, no evidence of infection   Range of Motion: As expected  Neurovascular status: cap refill intact   Neg tinels at the wrist and elbow on the right  Neg tinels at he left wrist  - tinels at the left elbow  4/5 APB  Sensation intact to light touch to all digits    STUDIES REVIEWED:  A Cspine xray from 11/12/24 was reviewed.  This showed significant, multilevel DDD      PROCEDURES PERFORMED:  Procedures  -    Scribe Attestation      I,:   am acting as a scribe while in the presence of the attending physician.:       I,:   personally performed the services described in this documentation    as scribed in my presence.:

## 2025-05-12 NOTE — ASSESSMENT & PLAN NOTE
- Worsening right hand numbness despite carpal tunnel release  - Suspect a component of this may be coming from the cspine, double-crush syndrome  - Refer to Dr. Molina for consideration of EDGAR

## 2025-05-12 NOTE — TELEPHONE ENCOUNTER
Left message to call back to schedule her repeat colonoscopy due in June.    Last colon 6/25/20   5 yr recall   [place]

## 2025-05-20 ENCOUNTER — OFFICE VISIT (OUTPATIENT)
Dept: OBGYN CLINIC | Facility: CLINIC | Age: 60
End: 2025-05-20
Payer: COMMERCIAL

## 2025-05-20 VITALS — BODY MASS INDEX: 38.01 KG/M2 | HEIGHT: 68 IN | WEIGHT: 250.8 LBS

## 2025-05-20 DIAGNOSIS — Z96.641 S/P HIP REPLACEMENT, RIGHT: Primary | ICD-10-CM

## 2025-05-20 DIAGNOSIS — M54.16 LUMBAR RADICULOPATHY: ICD-10-CM

## 2025-05-20 DIAGNOSIS — M70.61 GREATER TROCHANTERIC BURSITIS OF RIGHT HIP: ICD-10-CM

## 2025-05-20 PROCEDURE — 99213 OFFICE O/P EST LOW 20 MIN: CPT | Performed by: ORTHOPAEDIC SURGERY

## 2025-05-20 NOTE — PATIENT INSTRUCTIONS
Teena Staples  1965    [facility]                                                                                               Dr. Marlee Mccann                                                                                                            ANTIBIOTIC USE FOR JOINT REPLACEMENT PATIENTS  You have had surgery to replace one of your joints with a metal prosthesis. Going forward, you should take oral antibiotics before any dental work, including routine cleanings. These procedures are a potential source of injection. If you develop an infection, it could spread to your operative joint and cause complications.  Please show the following guidelines to your medical doctor and dentist, so he/she can prescribe the appropriate medications.  The following information is recommended by the American [employer], Dental, and Orthopedic Associations:  STANDARD GENERAL PROPHYLAXIS  antibiotic prophylaxis recommended lifetime  Recommend refraining from dental procedures until 3 months post operatively  Amoxicillin for Adults:    500mg - Take 4 capsules (2 grams) orally one hour before the procedure  If allergic to Penicillin  Clindamycin for Adults:   300mg - Take 2 capsules (600 mg) orally one hour before the procedure  Azithromycin for Adults:  250mg - Take 2 capsules (500 mg) orally one hour before the procedure  Cephalexin for Adults:     500mg - Take 4 capsules (2 grams) orally one hour before the procedure  Note: Cephalosporins should not be used if you have ever developed an immediate hypersensitivity reaction (i.e., hives, swelling, severe itching, difficulty breathing) to Penicillin  Please feel free to contact our office at [phone] with questions or concerns.

## 2025-05-20 NOTE — PROGRESS NOTES
Assessment:  Assessment & Plan  S/P hip replacement, right  New x-rays of the right hip/pelvis were obtained today and reviewed with patient noting that the prosthesis is in good anatomic position with no signs of loosening, fracture or dislocation.  On exam patient has no groin pain with passive motion, but has pain on the lateral aspect of the hip with external rotation associated with trochanteric bursitis.  Patient also has difficulty with active hip flexion associated with possible lumbar pathology.  Reminded to the patient to take antibiotics prior to any dental cleanings or procedures.  This information was placed in her AVS.  Will follow-up with the patient in 1 year with new x-rays of the right hip.       Greater trochanteric bursitis of right hip  On exam she has tenderness along with pain with external rotation.       Lumbar radiculopathy  On exam patient has significant weakness with active hip flexion and knee extension.  When patient is standing patient can perform a standing march when holding onto something.  Updated MRI of the lumbar spine was ordered to be followed up and reviewed with [facility]  There is an old MRI from 2023 to compare new MRI with.  Orders:    MRI lumbar spine wo contrast; Future    Ambulatory Referral to Orthopedic Surgery; Future      To do next visit:  Return in about 1 year (around 5/20/2026) for For right hip arthroplasty.    The above stated was discussed in layman's terms and the patient expressed understanding.  All questions were answered to the patient's satisfaction.       Scribe Attestation      I,:  Oly Lawrence am acting as a scribe while in the presence of the attending physician.:       I,:  Marlee Mccann MD personally performed the services described in this documentation    as scribed in my presence.:               Subjective:   Teena Staples is a 59 y.o. female who presents today for 9-month  follow-up for her right hip.  Patient is 1 year status post right anterior total hip arthroplasty performed on 5/22/2024.  Since the patient's last visit on 4/8/2025, patient continues to have right lateral hip pain that started when she visited her daughter in [place] and was doing a lot of walking.  Patient continues to demonstrate weakness with hip flexion which is new since the trip to [place].  Postoperatively patient was able to actively hip flex without assistance.  Patient denies numbness or tingling into the lower extremity, but does have pain that goes down the anterior shin and at times to the ankle. Walking helps the anterior shin pain.   She has treated in the past for her right trochanteric bursitis with injections, last injection 11/2/2023 by Dr. Bonilla.  Patient reports that she takes Celebrex in the morning and takes diclofenac sodium at night for pain and discomfort.  Patient has a history of lumbar radiculopathy that was treated by her primary care but has not been treated by any spine specialist.    Review of systems negative unless otherwise specified in HPI  Review of Systems   Constitutional:  Negative for chills, fever and unexpected weight change.   HENT:  Negative for hearing loss, nosebleeds and sore throat.    Eyes:  Negative for pain, redness and visual disturbance.   Respiratory:  Negative for cough, shortness of breath and wheezing.    Cardiovascular:  Negative for chest pain, palpitations and leg swelling.   Gastrointestinal:  Negative for abdominal pain and nausea.   Genitourinary:  Negative for dyspareunia, dysuria and frequency.   Musculoskeletal:  Positive for arthralgias.   Skin:  Negative for rash and wound.   Neurological:  Negative for dizziness, numbness and headaches.   Psychiatric/Behavioral:  Negative for decreased concentration and suicidal ideas. The patient is not nervous/anxious.        Past Medical History:   Diagnosis Date    Allergic     Allergic rhinitis      last assessed 14     Arthritis     Asthma     Bleeding per rectum     Last assessed 16     Colon polyp     Diverticulitis of colon     GERD (gastroesophageal reflux disease)     Hypercholesterolemia     Hypertension     last assessed 3/24/15     Hyponatremia     last assessed 9/17/15     Obesity        Past Surgical History:   Procedure Laterality Date     SECTION      x2    COLONOSCOPY      FL INJECTION RIGHT SHOULDER (ARTHROGRAM)  2021    NV ARTHRP ACETBLR/PROX FEM PROSTC AGRFT/ALGRFT Right 2024    Procedure: RIGHT ARTHROPLASTY HIP TOTAL ANTERIOR;  Surgeon: Marlee Mccann MD;  Location: [facility] MAIN OR;  Service: Orthopedics    NV NDSC WRST SURG W/RLS TRANSVRS CARPL LIGM Right 3/5/2025    Procedure: RELEASE CARPAL TUNNEL ENDOSCOPIC;  Surgeon: Armando Kamara MD;  Location: [facility];  Service: Orthopedics    WISDOM TOOTH EXTRACTION      WRIST SURGERY         Family History   Adopted: Yes   Problem Relation Age of Onset    No Known Problems Mother     Migraines Daughter        Social History     Occupational History     Comment: full time employment    Tobacco Use    Smoking status: Never    Smokeless tobacco: Never   Vaping Use    Vaping status: Never Used   Substance and Sexual Activity    Alcohol use: Yes     Comment: rare /    Drug use: No    Sexual activity: Yes       Current Medications[1]    Allergies[2]       There were no vitals filed for this visit.    Body mass index is 38.7 kg/m².  Wt Readings from Last 3 Encounters:   25 114 kg (250 lb 12.8 oz)   25 114 kg (250 lb 12.8 oz)   25 117 kg (258 lb 9.6 oz)       Objective:                    Right Knee Exam     Comments:  Weakness with knee extension       Right Hip Exam     Tenderness   The patient is experiencing tenderness in the lateral and anterior (anterior lateral aspect of leg,).    Range of Motion   External rotation:  30   Internal rotation:  20     Muscle Strength   Abduction: 5/5   Adduction:  "5/5   Flexion: 3/5     Tests   HODA: positive    Other   Erythema: absent  Sensation: normal  Pulse: present    Comments:  Calf is soft and non tender  Pain down the anterior tibia to ankle  4-/5 knee extension   DF 5/5  NVI  Gait is altered  SPN, DPN intact  Patient is standing she can perform standing marching, but sitting perform it.             Diagnostics, reviewed and taken today if performed as documented:    None performed:    None performed but reviewed:      The attending physician has personally reviewed the pertinent films in PACS and interpretation is as follows:  Xrays of the right hip/pelvis 2-3 views: Prosthesis is in good intact position no signs of loosening, fracture or dislocation    MRI of the lumbar spine reviewed from 7/3/2023    Procedures, if performed today:    Procedures    None performed      Portions of the record may have been created with voice recognition software.  Occasional wrong word or \"sound a like\" substitutions may have occurred due to the inherent limitations of voice recognition software.  Read the chart carefully and recognize, using context, where substitutions have occurred.         [1]   Current Outpatient Medications:     amoxicillin (AMOXIL) 500 mg capsule, Dental procedures only, Disp: , Rfl:     calcipotriene (DOVONEX) 0.005 % topical solution, In the one to two week break from the Clobetasol solution, apply this solution one to two times a day to ears and affected areas on scalp, Disp: 60 mL, Rfl: 5    celecoxib (CeleBREX) 200 mg capsule, Take 1 capsule (200 mg total) by mouth daily, Disp: 30 capsule, Rfl: 5    Cholecalciferol (VITAMIN D) 2000 units CAPS, Take by mouth, Disp: , Rfl:     cholestyramine (QUESTRAN) 4 GM/DOSE powder, Take 1 packet (4 g total) by mouth 2 (two) times a day As needed for diarrhea, Disp: 378 g, Rfl: 5    clobetasol (TEMOVATE) 0.05 % external solution, Apply twice a day for two weeks at a time to scalp and ears taking at least a weeks break " "in between uses, Disp: 50 mL, Rfl: 3    Diclofenac Sodium (Voltaren) 1 %, Apply 2 g topically in the morning and 2 g at noon and 2 g in the evening and 2 g before bedtime., Disp: , Rfl:     diclofenac sodium (VOLTAREN) 50 mg EC tablet, Take 50 mg by mouth in the morning and 50 mg in the evening., Disp: , Rfl:     diphenhydrAMINE-acetaminophen (TYLENOL PM)  MG TABS, Take 1 tablet by mouth daily at bedtime as needed for sleep, Disp: , Rfl:     Elderberry-Vitamin C-Zinc (Purple GUMMY PO), Take 2 gums by mouth in the morning., Disp: , Rfl:     gabapentin (NEURONTIN) 100 mg capsule, Take 2 capsules (200 mg total) by mouth 3 (three) times a day, Disp: 540 capsule, Rfl: 0    glucosamine-chondroitin 500-400 MG tablet, Take 1 tablet by mouth 3 (three) times a day, Disp: 100 tablet, Rfl: 2    ketoconazole (NIZORAL) 2 % shampoo, Shampoo daily for 2 weeks straight and then on \"Mondays, Wednesdays and Fridays\":  Lather into scalp; leave on for 5 minutes and then rinse off completely., Disp: 120 mL, Rfl: 11    loratadine (CLARITIN) 10 mg tablet, Take 10 mg by mouth in the morning., Disp: , Rfl:     mometasone (NASONEX) 50 mcg/act nasal spray, 2 sprays into each nostril daily, Disp: 17 g, Rfl: 1    Multiple Vitamins-Minerals (multivitamin with minerals) tablet, Take 1 tablet by mouth daily, Disp: 30 tablet, Rfl: 0    Naltrexone-buPROPion HCl ER 8-90 MG TB12, Take 2 tablets by mouth 2 (two) times a day, Disp: 360 tablet, Rfl: 0    omeprazole (PriLOSEC) 10 mg delayed release capsule, Take 1 capsule (10 mg total) by mouth daily, Disp: 90 capsule, Rfl: 1    Probiotic Product (ALIGN) 4 MG CAPS, Take by mouth, Disp: , Rfl:     Rhubarb (ESTROVEN COMPLETE PO), , Disp: , Rfl:     simvastatin (ZOCOR) 5 MG tablet, Take 1 tablet (5 mg total) by mouth daily at bedtime, Disp: 90 tablet, Rfl: 1    spironolactone (ALDACTONE) 50 mg tablet, Take 1 tablet (50 mg total) by mouth daily at bedtime, Disp: 90 tablet, Rfl: 3    " verapamil (Verelan) 180 MG 24 hr capsule, Take 1 capsule (180 mg total) by mouth daily before breakfast, Disp: 90 capsule, Rfl: 2    vitamin E, tocopherol, 400 units capsule, Take 1 capsule (400 Units total) by mouth daily, Disp: , Rfl:     oxyCODONE-acetaminophen (Percocet) 5-325 mg per tablet, Take 1 tablet by mouth every 6 (six) hours as needed for severe pain for up to 12 doses Max Daily Amount: 4 tablets (Patient not taking: Reported on 5/20/2025), Disp: 5 tablet, Rfl: 0  [2]   Allergies  Allergen Reactions    Levofloxacin Hives and Tachycardia     Annotation - 15Jan2013: LIGHTHEADED    Sulfamethoxazole-Trimethoprim Other (See Comments)     unknown    Hydrocodone Bitartrate Er Other (See Comments)     Annotation - 15Jan2013: FLUSHING    CHEST/RIB PAIN

## 2025-05-20 NOTE — ASSESSMENT & PLAN NOTE
On exam patient has significant weakness with active hip flexion and knee extension.  When patient is standing patient can perform a standing march when holding onto something.  Updated MRI of the lumbar spine was ordered to be followed up and reviewed with Dr. Cheo Adamson our spine surgeon at [facility].  There is an old MRI from 2023 to compare new MRI with.  Orders:    MRI lumbar spine wo contrast; Future    Ambulatory Referral to Orthopedic Surgery; Future

## 2025-05-27 ENCOUNTER — HOSPITAL ENCOUNTER (OUTPATIENT)
Dept: MRI IMAGING | Facility: HOSPITAL | Age: 60
Discharge: HOME | End: 2025-05-27
Attending: ORTHOPAEDIC SURGERY
Payer: COMMERCIAL

## 2025-05-27 DIAGNOSIS — M54.16 LUMBAR RADICULOPATHY: ICD-10-CM

## 2025-05-27 PROCEDURE — 72148 MRI LUMBAR SPINE W/O DYE: CPT

## 2025-06-03 ENCOUNTER — OFFICE VISIT (OUTPATIENT)
Dept: OBGYN CLINIC | Facility: HOSPITAL | Age: 60
End: 2025-06-03
Payer: COMMERCIAL

## 2025-06-03 ENCOUNTER — HOSPITAL ENCOUNTER (OUTPATIENT)
Dept: RADIOLOGY | Facility: HOSPITAL | Age: 60
Discharge: HOME | End: 2025-06-03
Attending: ORTHOPAEDIC SURGERY
Payer: COMMERCIAL

## 2025-06-03 VITALS — WEIGHT: 249.12 LBS | HEIGHT: 68 IN | BODY MASS INDEX: 37.76 KG/M2

## 2025-06-03 DIAGNOSIS — M51.369 DEGENERATION OF INTERVERTEBRAL DISC OF LUMBAR REGION, UNSPECIFIED WHETHER PAIN PRESENT: Primary | ICD-10-CM

## 2025-06-03 DIAGNOSIS — M50.30 DDD (DEGENERATIVE DISC DISEASE), CERVICAL: ICD-10-CM

## 2025-06-03 DIAGNOSIS — M46.1 SACROILIITIS (HCC): ICD-10-CM

## 2025-06-03 DIAGNOSIS — M70.61 GREATER TROCHANTERIC BURSITIS OF RIGHT HIP: ICD-10-CM

## 2025-06-03 PROCEDURE — 99213 OFFICE O/P EST LOW 20 MIN: CPT | Performed by: ORTHOPAEDIC SURGERY

## 2025-06-03 PROCEDURE — 72110 X-RAY EXAM L-2 SPINE 4/>VWS: CPT

## 2025-06-03 NOTE — PROGRESS NOTES
Name: Teena Staples      : 1965       MRN: [id number]   Encounter Provider: Cheo Adamson MD   Encounter Date: 25  Encounter department: [facility] ORTHOPEDIC SPINE SURGERY    Medical Decision Making:     Assessment & Plan  Degeneration of intervertebral disc of lumbar region, unspecified whether pain present  Sacroiliitis (HCC)  Greater trochanteric bursitis of right hip    The clinical, physical and imaging findings were reviewed with the patient  Reviewed MRI in detail   Discussed the etiology and pathomechanics of lumbar disc degeneration, SI joint dysfunction, greater trochanteric bursitis   Discussed Non operative treatment options which include physical therapy, at home exercises, activity modifications, chiropractic medicine, acupuncture, oral medications, and interventional spine procedures  After discussion with the patient we agreed upon continued conservative treatments  We will defer right GT injection to total joint team given patient has right ADAN impant  Recommend continued physical therapy/HEP to work on core strengthening, lumbar ROM, strengthening, and stretching exercises.  Recommend IT band stretching.   Ice/heat, OTC pain medication as needed. Voltaren gel.   Follow-up:  as needed.     Orders:    XR spine lumbar minimum 4 views non injury; Future           Diagnostic Tests   IMAGING: I have personally reviewed the images and these are my findings:  Lumbar Spine X-rays from 2025: multi level lumbar spondylosis with loss of disc height, facet hypertrophy, no apparent spondylolisthesis, no appreciated lytic/blastic lesions, no obvious instability    Lumbar Spine MRI from 2025 : multi level lumbar disc degeneration with disc desiccation & loss of disc height. Marrow lesion in the left L1 pedicle is stable from  possibly an atypical hemangioma or other nonaggressive lesion.  Varying degrees of mild and moderate  foraminal stenosis, no significant central stenosis      Subjective:      Chief Complaint: Back Pain    HPI:  Teena Staples is a 59 y.o. female presenting for initial visit with chief complaint of right lateral hip pain in the setting of low back pain and right-sided lumbar radicular pain.  Patient reports a longstanding history of low back pain worse on the right side however her chief complaint today is lateral right hip pain.  Also has SI joint pain, R>L. Patient has history of right anterior total hip arthroplasty performed by Dr. Mccann on 5/22/2024.  Since her surgery, she reports inability to lift the right leg while seated but can lift her right leg into a large while standing.  She endorses right lateral hip pain located at the greater trochanter bursitis present since March 2025 that started after prolonged walking in [place]. She stopped her HEP at that time. Her pain worsens when trying to get in and out of a car, driving, sitting for long periods at restaurant, and sleeping. She has tried the following treatments as outlined below.  She was seen by Dr. Mccann's team who referred the patient to Dr. Adamson today with new MRI of the lumbar spine.  She reports balance issues.had numbness into the inner thigh the past couple days. Reports anterior thigh numbness since surgery. Has history of herniated disc at age 17.    Conservative therapy includes the following:   Medications: Celebrex in AM, oral diclofenac in PM which helps her back, Australian dream cream, topical lidocaine, topical voltaren gel, gabapentin  Injections: History of right SI joint CSI performed on 01/18/2024 by Dr. Whatley she is unsure how much this benefited her. History of 5 right greater trochanter bursa CSI's last performed on 11/2/2023.     Physical Therapy: postop PT s/p right ADAN thru August 2024  Chiropractic Medicine: has not attempted  Accupunture/Massage Therapy: has not attempted   Has tried heat.    Nicotine dependent:  "No  Occupation: Unknown  Living situation: Lives with family   ADLs: patient is able to perform     Objective:     Family History[1]    Past Medical History[2]    Current Medications[3]    Past Surgical History[4]    Social History     Socioeconomic History    Marital status: /Civil Union     Spouse name: Not on file    Number of children: Not on file    Years of education: Not on file    Highest education level: Not on file   Occupational History     Comment: full time employment    Tobacco Use    Smoking status: Never    Smokeless tobacco: Never   Vaping Use    Vaping status: Never Used   Substance and Sexual Activity    Alcohol use: Yes     Comment: rare /    Drug use: No    Sexual activity: Yes   Other Topics Concern    Not on file   Social History Narrative    Lives with spouse     Social Drivers of Health     Financial Resource Strain: Not on file   Food Insecurity: Not on file   Transportation Needs: Not on file   Physical Activity: Not on file   Stress: Not on file   Social Connections: Unknown (6/18/2024)    Received from [facility]     How often do you feel lonely or isolated from those around you? (Adult - for ages 18 years and over): Not on file   Intimate Partner Violence: Not on file   Housing Stability: Not on file       Allergies[5]    Review of Systems  General- denies fever/chills  HEENT- denies hearing loss or sore throat  Eyes- denies eye pain or visual disturbances, denies red eyes  Respiratory- denies cough or SOB  Cardio- denies chest pain or palpitations  GI- denies abdominal pain  Endocrine- denies urinary frequency  Urinary- denies pain with urination  Musculoskeletal- Negative except noted above  Skin- denies rashes or wounds  Neurological- denies dizziness or headache  Psychiatric- denies anxiety or difficulty concentrating    Physical Exam  Ht 5' 7.5\" (1.715 m)   Wt 113 kg (249 lb 1.9 oz)   BMI 38.44 kg/m²     General/Constitutional: No apparent distress: " "well-nourished and well developed.  Lymphatic: No appreciable lymphadenopathy  Respiratory: Non-labored breathing  Vascular: No edema, swelling or tenderness, except as noted in detailed exam.  Integumentary: No impressive skin lesions present, except as noted in detailed exam.  Psych: Normal mood and affect, oriented to person, place and time.  MSK: normal other than stated in HPI and exam  Gait & balance: no evidence of myelopathic gait, ambulates Independently     Lumbar spine range of motion:  -Forward flexion to 90  -Extension to neutral  There is mild tenderness with palpation along lumbar paraspinal musculature, no midline tenderness     Neurologic:    Lower Extremity Motor Function    Right  Left    Iliopsoas  5/5  5/5    Quadriceps 2/5 with lateral hip pain and loss of strength seated  5/5 when standing 5/5   Tibialis anterior  5/5  5/5    EHL  5/5  5/5    Gastroc. muscle  5/5  5/5      Sensory: light touch is intact to bilateral lower extremities     Reflexes:  Intact    Other tests:  Straight Leg Raise: negative  Shayy SI: positive  HODA SI: positive  Greater troch: significant tenderness   Significant tenderness at right SI joint  Internal/external hip ROM: intact, no pain   Flexion/extension knee ROM: intact, no pain   Vascular: WWP extremities  Antalgic gait    Electronic Medical Records were reviewed including Dr. Mccann notes.    Procedures, if performed today     None performed       Portions of the record may have been created with voice recognition software.  Occasional wrong word or \"sound a like\" substitutions may have occurred due to the inherent limitations of voice recognition software.  Read the chart carefully and recognize, using context, where substitutions have occurred.    Scribe Attestation      I,:  Tabitha Butler am acting as a scribe while in the presence of the attending physician.:       I,:  Cheo Adamson MD personally performed the services described in this " documentation    as scribed in my presence.:                  [1]   Family History  Adopted: Yes   Problem Relation Name Age of Onset    No Known Problems Mother      Migraines Daughter     [2]   Past Medical History:  Diagnosis Date    Allergic     Allergic rhinitis     last assessed 11/17/14     Arthritis     Asthma     Bleeding per rectum     Last assessed 8/1/16     Colon polyp     Diverticulitis of colon     GERD (gastroesophageal reflux disease)     Hypercholesterolemia     Hypertension     last assessed 3/24/15     Hyponatremia     last assessed 9/17/15     Obesity    [3]   Current Outpatient Medications   Medication Sig Dispense Refill    amoxicillin (AMOXIL) 500 mg capsule Dental procedures only      calcipotriene (DOVONEX) 0.005 % topical solution In the one to two week break from the Clobetasol solution, apply this solution one to two times a day to ears and affected areas on scalp 60 mL 5    celecoxib (CeleBREX) 200 mg capsule Take 1 capsule (200 mg total) by mouth daily 30 capsule 5    Cholecalciferol (VITAMIN D) 2000 units CAPS Take by mouth      cholestyramine (QUESTRAN) 4 GM/DOSE powder Take 1 packet (4 g total) by mouth 2 (two) times a day As needed for diarrhea 378 g 5    clobetasol (TEMOVATE) 0.05 % external solution Apply twice a day for two weeks at a time to scalp and ears taking at least a weeks break in between uses 50 mL 3    Diclofenac Sodium (Voltaren) 1 % Apply 2 g topically in the morning and 2 g at noon and 2 g in the evening and 2 g before bedtime.      diclofenac sodium (VOLTAREN) 50 mg EC tablet Take 50 mg by mouth in the morning and 50 mg in the evening.      diphenhydrAMINE-acetaminophen (TYLENOL PM)  MG TABS Take 1 tablet by mouth daily at bedtime as needed for sleep      Elderberry-Vitamin C-Zinc (FlowBelow Aero GUMMY PO) Take 2 gums by mouth in the morning.      gabapentin (NEURONTIN) 100 mg capsule Take 2 capsules (200 mg total) by mouth 3 (three) times a day  "540 capsule 0    glucosamine-chondroitin 500-400 MG tablet Take 1 tablet by mouth 3 (three) times a day 100 tablet 2    ketoconazole (NIZORAL) 2 % shampoo Shampoo daily for 2 weeks straight and then on \",  and \":  Lather into scalp; leave on for 5 minutes and then rinse off completely. 120 mL 11    loratadine (CLARITIN) 10 mg tablet Take 10 mg by mouth in the morning.      mometasone (NASONEX) 50 mcg/act nasal spray 2 sprays into each nostril daily 17 g 1    Multiple Vitamins-Minerals (multivitamin with minerals) tablet Take 1 tablet by mouth daily 30 tablet 0    Naltrexone-buPROPion HCl ER 8-90 MG TB12 Take 2 tablets by mouth 2 (two) times a day 360 tablet 0    omeprazole (PriLOSEC) 10 mg delayed release capsule Take 1 capsule (10 mg total) by mouth daily 90 capsule 1    oxyCODONE-acetaminophen (Percocet) 5-325 mg per tablet Take 1 tablet by mouth every 6 (six) hours as needed for severe pain for up to 12 doses Max Daily Amount: 4 tablets (Patient not taking: Reported on 2025) 5 tablet 0    Probiotic Product (ALIGN) 4 MG CAPS Take by mouth      Rhubarb (ESTROVEN COMPLETE PO)       simvastatin (ZOCOR) 5 MG tablet Take 1 tablet (5 mg total) by mouth daily at bedtime 90 tablet 1    spironolactone (ALDACTONE) 50 mg tablet Take 1 tablet (50 mg total) by mouth daily at bedtime 90 tablet 3    verapamil (Verelan) 180 MG 24 hr capsule Take 1 capsule (180 mg total) by mouth daily before breakfast 90 capsule 2    vitamin E, tocopherol, 400 units capsule Take 1 capsule (400 Units total) by mouth daily       No current facility-administered medications for this visit.   [4]   Past Surgical History:  Procedure Laterality Date     SECTION      x2    COLONOSCOPY      FL INJECTION RIGHT SHOULDER (ARTHROGRAM)  2021    CA ARTHRP ACETBLR/PROX FEM PROSTC AGRFT/ALGRFT Right 2024    Procedure: RIGHT ARTHROPLASTY HIP TOTAL ANTERIOR;  Surgeon: Marlee Mccann MD;  Location:  MAIN OR;  " Service: Orthopedics    NE NDSC WRST SURG W/RLS TRANSVRS CARPL LIGM Right 3/5/2025    Procedure: RELEASE CARPAL TUNNEL ENDOSCOPIC;  Surgeon: Armando Kamara MD;  [facility]: [facility] OR;  Service: Orthopedics    WISDOM TOOTH EXTRACTION      WRIST SURGERY     [5]   Allergies  Allergen Reactions    Levofloxacin Hives and Tachycardia     Annotation - 15Jan2013: LIGHTHEADED    Sulfamethoxazole-Trimethoprim Other (See Comments)     unknown    Hydrocodone Bitartrate Er Other (See Comments)     Annotation - 15Jan2013: FLUSHING    CHEST/RIB PAIN

## 2025-06-09 ENCOUNTER — OFFICE VISIT (OUTPATIENT)
Dept: PAIN MEDICINE | Facility: CLINIC | Age: 60
End: 2025-06-09
Payer: COMMERCIAL

## 2025-06-09 VITALS — HEIGHT: 67 IN | WEIGHT: 248 LBS | BODY MASS INDEX: 38.92 KG/M2

## 2025-06-09 DIAGNOSIS — R29.2 HOFFMAN REFLEX POSITIVE: ICD-10-CM

## 2025-06-09 DIAGNOSIS — M47.816 LUMBAR SPONDYLOSIS: ICD-10-CM

## 2025-06-09 DIAGNOSIS — M54.16 LUMBAR RADICULOPATHY: Primary | ICD-10-CM

## 2025-06-09 DIAGNOSIS — M70.61 GREATER TROCHANTERIC BURSITIS OF RIGHT HIP: ICD-10-CM

## 2025-06-09 DIAGNOSIS — M53.3 PAIN OF RIGHT SACROILIAC JOINT: ICD-10-CM

## 2025-06-09 PROCEDURE — 99204 OFFICE O/P NEW MOD 45 MIN: CPT | Performed by: PAIN MEDICINE

## 2025-06-09 RX ORDER — GABAPENTIN 400 MG/1
400 CAPSULE ORAL 3 TIMES DAILY
Qty: 90 CAPSULE | Refills: 1 | Status: SHIPPED | OUTPATIENT
Start: 2025-06-09

## 2025-06-09 NOTE — PROGRESS NOTES
Name: Teena Staples      : 1965      MRN: [id number]  Encounter Provider: Matty Molina MD  Encounter Date: 2025   Encounter department: [facility]  :  Assessment & Plan  Lumbar radiculopathy  Teena is a pleasant 59-year-old female history of right hip replacement with persistent right-sided leg pain that extends in the L4-5 distribution on her MRI there is loss of disc height at L4-5 based on my interpretation along with the right neural foraminal disc protrusion.  This may be the setting of her current pain symptoms we will schedule the patient for right paramedial for 5 interlaminar epidural.  There is hip extension and flexion weakness on exam as well.  Will obtain an EMG of her lower extremities to evaluate further  Increase gabapentin to 400 mg tid  Orders:  •  EMG 2 limb lower extremity; Future  •  FL spine and pain procedure; Future  •  gabapentin (NEURONTIN) 400 mg capsule; Take 1 capsule (400 mg total) by mouth 3 (three) times a day    Lumbar spondylosis  Tenderness to palpation lumbar facets consider bilateral L3-4-5 medial branch block       Pain of right sacroiliac joint  3 provocative test on exam consistent with right SI joint pain consider right SI joint injection       Greater trochanteric bursitis of right hip  To Dr. Mccann to discuss possible right GTB injection       Phillips reflex positive  Concerning symptoms for possible cervical cord compression in the cervical spine we will obtain a MRI of her cervical spine.  Orders:  •  MRI cervical spine wo contrast; Future            My impressions and treatment recommendations were discussed in detail with the patient who verbalized understanding and had no further questions.  Discharge instructions were provided. I personally saw and examined the patient and I agree with the above discussed plan of care.    History of Present Illness       Teena Staples is a 59 y.o. female with relevant PMH of with  "history of right hip replacement approximate 1 year prior    Presenting to  [facility] for chief complaint of low back right hip and right leg numbness and tingling pain extending past the knee occasional pain that radiates into the right groin and inner thigh pain with driving getting up from seated position pain is nearly constant currently on gabapentin  Symptoms have been present for over 1 year and include as above. Quality of pain: Moderate to severe.  Symptoms are worst: With driving sitting she has weakness with hip extension when sitting but no problems with standing alleviating factors identifiable by patient are: Lying flat on a scale of 1-10, pain typically increases to 8 out of 10, currently impacting quality of life she also has a chief complaint of bilateral hand numbness and tingling digits 1-3 status post carpal tunnel release without improvement in her symptoms      Conservative therapy (PT/chiro/accupuncture): Completed for back and hip 6 weeks in last 6 months  Previous treatments: Prior carpal tunnel release  Prior surgeries of the spine: none  Bowel/bladder incontinence or saddle anesthesia: none  Relevant imaging: MRI L spine  Anticoagulants: none  Contrast allergy: none    I have personally reviewed and/or updated the patient's past medical history, past surgical history, family history, social history, current medications, allergies, and vital signs today.             Review of Systems   Musculoskeletal:  Positive for arthralgias, joint swelling and myalgias.   All other systems reviewed and are negative.    Medical History Reviewed by provider this encounter:     .       Objective   Ht 5' 7\" (1.702 m)   Wt 112 kg (248 lb)   BMI 38.84 kg/m²         Physical Exam  Lumbar Spine:  No masses or atrophy,    Range of motion - Decreased extension/flexion  Palpation -  Tenderness to palpation in the lumbar parapsinals   PSIS tenderness + right  Calvin's/HODA + right  Gaenslen's + " right  SLR positive right       Strength Right Left   Hip flexion L1,2 4 when sitting 5   Knee extension L3 5 5   Ankle dorsiflexion L4 5 5   Great toe extension L5 5 5   Ankle Plantarflexion S1 5 5       Sensory Exam:  intact to light touch bilateral LE       Reflexes:     Right Left   Biceps 2+ 2+   Triceps 2+ 2+   Brachioradialis 2+ 2+   Patellar 2+ 2+   Achilles 2+ 2+   Babinski neg neg        Gait antalgic    Cervical Spine:  No masses or atrophy, No TTP cervical facets   Range of motion - full  Spurling's - negative    Strength Right Left   Elbow flexion/deltoid (C5) 5 5   Wrist extension (c6) 5 5   Elbow /finger extension (c7) 5 5   Finger flexion (c8) 5 5   Intrinsics (t1) 5 5        Reflexes:     Right Left   Biceps 2+ 2+   Triceps 2+ 2+   Brachioradialis 2+ 2+   Patellar 3+ 3+   Achilles 2+ 2+   Babinski neg neg        Sensory Exam: Full and equal sensation to light touch throughout bilateral UE  Reflexes: Phillips POSITIVE Bilaterally; Clonus negative bilaterally      Gait normal      MRI L spine         IMPRESSION:     Narrative & Impression   MRI LUMBAR SPINE WITHOUT CONTRAST     INDICATION: M54.16: Radiculopathy, lumbar region.     COMPARISON: 7/3/2023; 5/17/2022     TECHNIQUE:  Multiplanar, multisequence imaging of the lumbar spine was performed. .        IMAGE QUALITY:  Diagnostic     FINDINGS:     VERTEBRAL BODIES:  There are 5 lumbar type vertebral bodies. Trace grade 1 retrolistheses of L1 on L2 and L2 on L3 and L3 on L4 are stable from the prior study scattered degenerative endplate changes noted. Additionally in the left pedicle there is   redemonstrated T2 and FLAIR hyperintense stippled lesion best seen on series 5 image 5, demonstrating hypointensity on T1-weighted images, measuring 1.3 x 1.2 cm. This is unchanged from 2022 and may represent an atypical hemangioma or other nonaggressive   lesion. No new lesions. Type I Modic endplate changes superior endplate of L5.     SACRUM: Type II Modic  endplate changes about L5-S1.     DISTAL CORD AND CONUS:  Normal size and signal within the distal cord and conus. The conus medullaris terminates at the L1 level.     PARASPINAL SOFT TISSUES:  Paraspinal soft tissues are unremarkable.     LOWER THORACIC DISC SPACES: T10-T11 and T12-L1: There is no focal disk herniation, central canal or neural foraminal narrowing..     T11-T12: There is a small central disc herniation, protrusion type. Mild central canal narrowing. Neural foramina patent bilaterally. This level is similar to the prior study.     LUMBAR DISC SPACES:     L1-L2: There is uncovering the intervertebral disc space. There is bilateral facet hypertrophy. There is a central/left paracentral disc herniation, protrusion type. Moderate left neural foraminal narrowing. Mild central canal narrowing. Minimal right   neural foraminal narrowing. This level is similar to the prior study.     L2-L3: There is bilateral facet hypertrophy. There is loss of disc height and signal. There is a right neural foraminal disc protrusion. Mild central canal and right neural foraminal narrowing. Minimal left neural foraminal narrowing. This level is   similar to the prior study.     L3-L4: There is uncovering the intervertebral disc space. There is bilateral facet hypertrophy. There is a left neural foraminal disc protrusion. There is moderate left neural foraminal narrowing. Mild central canal and right neural foraminal narrowing.   This level is similar to the prior study.     L4-L5: There is loss of disc height and signal. There is bilateral facet hypertrophy. There is a right neural foraminal disc protrusion. Moderate right neural foraminal narrowing. Mild central canal narrowing. Mild left neural foraminal narrowing. This   level is similar to the prior study.     L5-S1: There is a disc osteophyte complex with a superimposed right neural foraminal disc protrusion. Mild right neural foraminal narrowing. Mild central canal  narrowing. Left neural foramen patent. This level is similar to the prior study.       1.  Marrow lesion in the left L1 pedicle is stable from 2022 possibly an atypical hemangioma or other nonaggressive lesion.  2.  Scattered multilevel spondylosis also stable.

## 2025-06-09 NOTE — ASSESSMENT & PLAN NOTE
Teena is a pleasant 59-year-old female history of right hip replacement with persistent right-sided leg pain that extends in the L4-5 distribution on her MRI there is loss of disc height at L4-5 based on my interpretation along with the right neural foraminal disc protrusion.  This may be the setting of her current pain symptoms we will schedule the patient for right paramedial for 5 interlaminar epidural.  There is hip extension and flexion weakness on exam as well.  Will obtain an EMG of her lower extremities to evaluate further  Increase gabapentin to 400 mg tid  Orders:  •  EMG 2 limb lower extremity; Future  •  FL spine and pain procedure; Future  •  gabapentin (NEURONTIN) 400 mg capsule; Take 1 capsule (400 mg total) by mouth 3 (three) times a day

## 2025-06-10 ENCOUNTER — TELEPHONE (OUTPATIENT)
Dept: OBGYN CLINIC | Facility: MEDICAL CENTER | Age: 60
End: 2025-06-10

## 2025-06-10 ENCOUNTER — TELEPHONE (OUTPATIENT)
Dept: OBGYN CLINIC | Facility: CLINIC | Age: 60
End: 2025-06-10

## 2025-06-10 NOTE — TELEPHONE ENCOUNTER
Caller: Teena     Doctor: Dr. Mccann / Lalit / [place]     Reason for call: Patient is scheduled for epidural injection with Dr Molina ([facility]) on Friday, June 20.    Patient is seen by Dr Mccann for hip pain.    Per message in my chart from Lalit, patient may come in for greater troches bursa injection.    1.. How soon after the epidural injection can patient have this bursa injection?   2.  Is this done at the office ?     Please call to advise patient and schedule the soonest date with Lalit or Dr. Mccann at any location       Call back#: [phone]

## 2025-06-12 ENCOUNTER — TELEPHONE (OUTPATIENT)
Dept: OBGYN CLINIC | Facility: HOSPITAL | Age: 60
End: 2025-06-12

## 2025-06-12 ENCOUNTER — TELEPHONE (OUTPATIENT)
Dept: RADIOLOGY | Facility: HOSPITAL | Age: 60
End: 2025-06-12

## 2025-06-12 NOTE — TELEPHONE ENCOUNTER
Caller: Patient    Doctor: Dr. Mccann    Reason for call: patient is having trouble with walking and would like to request for a temp handicap jermain card please contact patient    Call back#: [phone]

## 2025-06-12 NOTE — TELEPHONE ENCOUNTER
----- Message from Matty Molina MD sent at 6/12/2025 12:33 PM EDT -----  Per ortho ok to do bursae injection on the hipWe can schedule something in the office if interested

## 2025-06-14 DIAGNOSIS — E66.01 MORBID OBESITY (HCC): ICD-10-CM

## 2025-06-14 DIAGNOSIS — L21.9 SEBORRHEIC DERMATITIS: ICD-10-CM

## 2025-06-16 DIAGNOSIS — R73.03 PREDIABETES: ICD-10-CM

## 2025-06-16 DIAGNOSIS — M54.16 LUMBAR RADICULOPATHY: ICD-10-CM

## 2025-06-16 DIAGNOSIS — J45.909 MODERATE ASTHMA WITHOUT COMPLICATION, UNSPECIFIED WHETHER PERSISTENT: ICD-10-CM

## 2025-06-16 DIAGNOSIS — E78.5 HYPERLIPIDEMIA, UNSPECIFIED HYPERLIPIDEMIA TYPE: ICD-10-CM

## 2025-06-16 DIAGNOSIS — K21.9 GASTROESOPHAGEAL REFLUX DISEASE: ICD-10-CM

## 2025-06-16 DIAGNOSIS — I10 HYPERTENSION, UNSPECIFIED TYPE: ICD-10-CM

## 2025-06-16 DIAGNOSIS — I10 BENIGN ESSENTIAL HYPERTENSION: ICD-10-CM

## 2025-06-16 DIAGNOSIS — L65.9 ALOPECIA: ICD-10-CM

## 2025-06-16 RX ORDER — CLOBETASOL PROPIONATE 0.5 MG/ML
SOLUTION TOPICAL
Qty: 50 ML | Refills: 0 | Status: SHIPPED | OUTPATIENT
Start: 2025-06-16

## 2025-06-16 RX ORDER — VERAPAMIL HYDROCHLORIDE 180 MG/1
180 CAPSULE, DELAYED RELEASE ORAL
Qty: 90 CAPSULE | Refills: 1 | Status: SHIPPED | OUTPATIENT
Start: 2025-06-16

## 2025-06-16 RX ORDER — SIMVASTATIN 5 MG
5 TABLET ORAL
Qty: 90 TABLET | Refills: 1 | Status: SHIPPED | OUTPATIENT
Start: 2025-06-16

## 2025-06-17 ENCOUNTER — TELEPHONE (OUTPATIENT)
Age: 60
End: 2025-06-17

## 2025-06-17 NOTE — TELEPHONE ENCOUNTER
PA for Naltrexone-buPROPion HCl ER 8-90 MG TB12 SUBMITTED to     via    []CMM-KEY:   [x]Surescripts-Case ID # [id number]   []Availity-Auth ID # NDC #   []Faxed to plan   []Other website   []Phone call Case ID #     [x]PA sent as URGENT    All office notes, labs and other pertaining documents and studies sent. Clinical questions answered. Awaiting determination from insurance company.     Turnaround time for your insurance to make a decision on your Prior Authorization can take 7-21 business days.

## 2025-06-18 DIAGNOSIS — L21.9 SEBORRHEIC DERMATITIS: ICD-10-CM

## 2025-06-18 NOTE — TELEPHONE ENCOUNTER
PA for Naltrexone-buPROPion HCl ER 8-90 MG TB12  DENIED    Reason:(Screenshot if applicable)        Message sent to office clinical pool Yes    Denial letter scanned into Media Yes    We can gladly do an appeal but the process can take about 30-60 days to provide determination. Please have the office staff schedule a Peer to Peer at phone [phone] . If an appeal is truly warranted please have Provider send clinical documentation to the PA department to support the appeal.     **Please follow up with your patient regarding denial and next steps**

## 2025-06-19 RX ORDER — KETOCONAZOLE 20 MG/ML
SHAMPOO, SUSPENSION TOPICAL
Qty: 120 ML | Refills: 11 | Status: SHIPPED | OUTPATIENT
Start: 2025-06-19

## 2025-06-20 ENCOUNTER — HOSPITAL ENCOUNTER (OUTPATIENT)
Dept: RADIOLOGY | Facility: HOSPITAL | Age: 60
Discharge: HOME | End: 2025-06-20
Attending: PAIN MEDICINE
Payer: COMMERCIAL

## 2025-06-20 ENCOUNTER — TELEPHONE (OUTPATIENT)
Dept: RADIOLOGY | Facility: HOSPITAL | Age: 60
End: 2025-06-20

## 2025-06-20 VITALS
HEART RATE: 86 BPM | TEMPERATURE: 98.1 F | DIASTOLIC BLOOD PRESSURE: 96 MMHG | OXYGEN SATURATION: 96 % | SYSTOLIC BLOOD PRESSURE: 132 MMHG | RESPIRATION RATE: 18 BRPM

## 2025-06-20 DIAGNOSIS — M54.16 LUMBAR RADICULOPATHY: Primary | ICD-10-CM

## 2025-06-20 DIAGNOSIS — M54.16 LUMBAR RADICULOPATHY: ICD-10-CM

## 2025-06-20 RX ORDER — METHYLPREDNISOLONE ACETATE 80 MG/ML
80 INJECTION, SUSPENSION INTRA-ARTICULAR; INTRALESIONAL; INTRAMUSCULAR; PARENTERAL; SOFT TISSUE ONCE
Status: DISCONTINUED | OUTPATIENT
Start: 2025-06-20 | End: 2025-06-21 | Stop reason: HOSPADM

## 2025-06-20 RX ORDER — LIDOCAINE HYDROCHLORIDE 10 MG/ML
2 INJECTION, SOLUTION EPIDURAL; INFILTRATION; INTRACAUDAL; PERINEURAL ONCE
Status: DISCONTINUED | OUTPATIENT
Start: 2025-06-20 | End: 2025-06-21 | Stop reason: HOSPADM

## 2025-06-20 RX ORDER — TRAMADOL HYDROCHLORIDE 50 MG/1
50 TABLET ORAL EVERY 6 HOURS PRN
Qty: 28 TABLET | Refills: 1 | Status: SHIPPED | OUTPATIENT
Start: 2025-06-20

## 2025-06-20 NOTE — H&P
History of Present Illness: The patient is a 59 y.o. female who presents with complaints of low back and right leg pain    Past Medical History[1]    Past Surgical History[2]    Current Medications[3]    Allergies[4]    Physical Exam:   Vitals:    25 1329   BP: 152/75   Pulse: 89   Resp: 18   Temp: 98.1 °F (36.7 °C)   SpO2: 98%     General: Awake, Alert, Oriented x 3, Mood and affect appropriate  Respiratory: Respirations even and unlabored  Cardiovascular: Peripheral pulses intact; no edema  Musculoskeletal Exam: Ttp low back    ASA Score: 2    Patient/Chart Verification  Patient ID Verified: Verbal  ID Band Applied: No  H&P( within 30 days) Verified: To be obtained in the Procedural area  Allergies Reviewed: Yes  Anticoag/NSAID held?: No  Currently on antibiotics?: No    Assessment:   1. Lumbar radiculopathy        Plan: right L4-5 interlaminar epidural    The risks of the procedure were discussed in detail.  These risks include infection, increased pain, paralysis, bleeding.  Patient understands the risks and is willing to pursue with the procedure         [1]   Past Medical History:  Diagnosis Date    Allergic     Allergic rhinitis     last assessed 14     Arthritis     Asthma     Bleeding per rectum     Last assessed 16     Colon polyp     Diverticulitis of colon     GERD (gastroesophageal reflux disease)     Hypercholesterolemia     Hypertension     last assessed 3/24/15     Hyponatremia     last assessed 9/17/15     Obesity    [2]   Past Surgical History:  Procedure Laterality Date     SECTION      x2    COLONOSCOPY      FL INJECTION RIGHT SHOULDER (ARTHROGRAM)  2021    MT ARTHRP ACETBLR/PROX FEM PROSTC AGRFT/ALGRFT Right 2024    Procedure: RIGHT ARTHROPLASTY HIP TOTAL ANTERIOR;  Surgeon: Marlee Mccann MD;  Location:  MAIN OR;  Service: Orthopedics    MT NDSC WRST SURG W/RLS TRANSVRS CARPL LIGM Right 3/5/2025    Procedure: RELEASE CARPAL TUNNEL ENDOSCOPIC;  Surgeon:  "Armando Kamara MD;  Location: [facility] MA[facility] OR;  Service: Orthopedics    WISDOM TOOTH EXTRACTION      WRIST SURGERY     [3]   Current Outpatient Medications:     amoxicillin (AMOXIL) 500 mg capsule, Dental procedures only (Patient not taking: Reported on 6/9/2025), Disp: , Rfl:     calcipotriene (DOVONEX) 0.005 % topical solution, In the one to two week break from the Clobetasol solution, apply this solution one to two times a day to ears and affected areas on scalp, Disp: 60 mL, Rfl: 5    celecoxib (CeleBREX) 200 mg capsule, Take 1 capsule (200 mg total) by mouth daily, Disp: 30 capsule, Rfl: 5    Cholecalciferol (VITAMIN D) 2000 units CAPS, Take by mouth, Disp: , Rfl:     cholestyramine (QUESTRAN) 4 GM/DOSE powder, Take 1 packet (4 g total) by mouth 2 (two) times a day As needed for diarrhea, Disp: 378 g, Rfl: 5    clobetasol (TEMOVATE) 0.05 % external solution, Apply twice a day for two weeks at a time to scalp and ears taking at least a weeks break in between uses, Disp: 50 mL, Rfl: 0    Diclofenac Sodium (Voltaren) 1 %, Apply 2 g topically in the morning and 2 g at noon and 2 g in the evening and 2 g before bedtime., Disp: , Rfl:     diclofenac sodium (VOLTAREN) 50 mg EC tablet, Take 50 mg by mouth in the morning and 50 mg in the evening., Disp: , Rfl:     diphenhydrAMINE-acetaminophen (TYLENOL PM)  MG TABS, Take 1 tablet by mouth daily at bedtime as needed for sleep, Disp: , Rfl:     Elderberry-Vitamin C-Zinc (ELDERBERRY IMMUNE HEALTH GUMMY PO), Take 2 gums by mouth in the morning., Disp: , Rfl:     gabapentin (NEURONTIN) 400 mg capsule, Take 1 capsule (400 mg total) by mouth 3 (three) times a day, Disp: 90 capsule, Rfl: 1    glucosamine-chondroitin 500-400 MG tablet, Take 1 tablet by mouth 3 (three) times a day, Disp: 100 tablet, Rfl: 2    ketoconazole (NIZORAL) 2 % shampoo, Shampoo daily for 2 weeks straight and then on \"Mondays, Wednesdays and Fridays\":  Lather into scalp; leave on for 5 minutes and " then rinse off completely., Disp: 120 mL, Rfl: 11    loratadine (CLARITIN) 10 mg tablet, Take 10 mg by mouth in the morning., Disp: , Rfl:     mometasone (NASONEX) 50 mcg/act nasal spray, 2 sprays into each nostril daily, Disp: 17 g, Rfl: 1    Multiple Vitamins-Minerals (multivitamin with minerals) tablet, Take 1 tablet by mouth daily, Disp: 30 tablet, Rfl: 0    Naltrexone-buPROPion HCl ER 8-90 MG TB12, Take 2 tablets by mouth in the morning and 2 tablets before bedtime., Disp: 360 tablet, Rfl: 0    omeprazole (PriLOSEC) 10 mg delayed release capsule, Take 1 capsule (10 mg total) by mouth daily, Disp: 90 capsule, Rfl: 1    oxyCODONE-acetaminophen (Percocet) 5-325 mg per tablet, Take 1 tablet by mouth every 6 (six) hours as needed for severe pain for up to 12 doses Max Daily Amount: 4 tablets (Patient not taking: Reported on 6/9/2025), Disp: 5 tablet, Rfl: 0    Probiotic Product (ALIGN) 4 MG CAPS, Take by mouth, Disp: , Rfl:     Rhubarb (ESTROVEN COMPLETE PO), , Disp: , Rfl:     simvastatin (ZOCOR) 5 MG tablet, Take 1 tablet (5 mg total) by mouth daily at bedtime, Disp: 90 tablet, Rfl: 1    spironolactone (ALDACTONE) 50 mg tablet, Take 1 tablet (50 mg total) by mouth daily at bedtime, Disp: 90 tablet, Rfl: 3    verapamil (Verelan) 180 MG 24 hr capsule, Take 1 capsule (180 mg total) by mouth daily before breakfast, Disp: 90 capsule, Rfl: 1    vitamin E, tocopherol, 400 units capsule, Take 1 capsule (400 Units total) by mouth daily, Disp: , Rfl:   [4]   Allergies  Allergen Reactions    Levofloxacin Hives and Tachycardia     Annotation - 15Jan2013: LIGHTHEADED    Sulfamethoxazole-Trimethoprim Other (See Comments)     unknown    Hydrocodone Bitartrate Er Other (See Comments)     Annotation - 15Jan2013: FLUSHING    CHEST/RIB PAIN

## 2025-06-20 NOTE — DISCHARGE INSTR - LAB
Epidural Steroid Injection   WHAT YOU NEED TO KNOW:   An epidural steroid injection (EDGAR) is a procedure to inject steroid medicine into the epidural space. The epidural space is between your spinal cord and vertebrae. Steroids reduce inflammation and fluid buildup in your spine that may be causing pain. You may be given pain medicine along with the steroids.          ACTIVITY  Do not drive or operate machinery today.  No strenuous activity today - bending, lifting, etc.  You may resume normal activites starting tomorrow - start slowly and as tolerated.  You may shower today, but no tub baths or hot tubs.  You may have numbness for several hours from the local anesthetic. Please use caution and common sense, especially with weight-bearing activities.    CARE OF THE INJECTION SITE  If you have soreness or pain, apply ice to the area today (20 minutes on/20 minutes off).  Starting tomorrow, you may use warm, moist heat or ice if needed.  You may have an increase or change in your discomfort for 36-48 hours after your treatment.  Apply ice and continue with any pain medication you have been prescribed.  Notify the [facility] if you have any of the following: redness, drainage, swelling, headache, stiff neck or fever above 100°F.    SPECIAL INSTRUCTIONS  Our office will contact you in approximately 14 days for a progress report.    MEDICATIONS  Continue to take all routine medications.  Our office may have instructed you to hold some medications.    As no general anesthesia was used in today's procedure, you should not experience any side effects related to anesthesia.     If you are diabetic, the steroids used in today's injection may temporarily increase your blood sugar levels after the first few days after your injection. Please keep a close eye on your sugars and alert the doctor who manages your diabetes if your sugars are significantly high from your baseline or you are symptomatic.     If you have a  problem specifically related to your procedure, please call our office at [phone].  Problems not related to your procedure should be directed to your primary care physician.

## 2025-06-20 NOTE — TELEPHONE ENCOUNTER
LESI aborted per AR. Pt reported headache following procedure. Please contact pt on 06/23 for update

## 2025-06-23 DIAGNOSIS — M54.16 LUMBAR RADICULOPATHY: Primary | ICD-10-CM

## 2025-06-23 RX ORDER — DIAZEPAM 5 MG/1
TABLET ORAL
Qty: 2 TABLET | Refills: 0 | Status: SHIPPED | OUTPATIENT
Start: 2025-06-23

## 2025-06-23 NOTE — TELEPHONE ENCOUNTER
S/W pt s/p aborted LESI on 6/20/2025.  Pt reports had small headache on Saturday and took Excedrin as advised.  Pt feels fine today, and reports back to the way she felt prior to procedure.    Pt ready to move forward and reschedule procedure.  Pt appreciative of call.    Please advise

## 2025-06-23 NOTE — PROGRESS NOTES
Doing well after aborted Lumbar EDGAR with potential dural puincture, no headache symptoms at this time, nearly 72 hours following procedures  Will schedule repeating EDGAR, to change procedure to right L4-5, L5-s1 TFESI    The risks of the procedure were discussed in detail.  These risks include infection, increased pain, paralysis, bleeding.  Patient understands the risks and is willing to pursue with the procedure

## 2025-06-25 ENCOUNTER — HOSPITAL ENCOUNTER (OUTPATIENT)
Dept: NEUROLOGY | Facility: CLINIC | Age: 60
Discharge: HOME | End: 2025-06-25
Attending: PAIN MEDICINE
Payer: COMMERCIAL

## 2025-06-25 DIAGNOSIS — M54.16 LUMBAR RADICULOPATHY: ICD-10-CM

## 2025-06-25 PROCEDURE — 95886 MUSC TEST DONE W/N TEST COMP: CPT | Performed by: PSYCHIATRY & NEUROLOGY

## 2025-06-25 PROCEDURE — 95911 NRV CNDJ TEST 9-10 STUDIES: CPT | Performed by: PSYCHIATRY & NEUROLOGY

## 2025-06-28 DIAGNOSIS — R73.03 PREDIABETES: ICD-10-CM

## 2025-06-28 DIAGNOSIS — J01.91 ACUTE RECURRENT SINUSITIS, UNSPECIFIED LOCATION: ICD-10-CM

## 2025-06-28 DIAGNOSIS — L65.9 ALOPECIA: ICD-10-CM

## 2025-06-28 DIAGNOSIS — E78.5 HYPERLIPIDEMIA, UNSPECIFIED HYPERLIPIDEMIA TYPE: ICD-10-CM

## 2025-06-28 DIAGNOSIS — K21.9 GASTROESOPHAGEAL REFLUX DISEASE: ICD-10-CM

## 2025-06-28 DIAGNOSIS — L98.9 SKIN LESION: ICD-10-CM

## 2025-06-28 DIAGNOSIS — I10 BENIGN ESSENTIAL HYPERTENSION: ICD-10-CM

## 2025-06-28 DIAGNOSIS — M79.89 LEG SWELLING: ICD-10-CM

## 2025-06-28 DIAGNOSIS — M54.16 LUMBAR RADICULOPATHY: ICD-10-CM

## 2025-06-28 DIAGNOSIS — K21.9 GASTROESOPHAGEAL REFLUX DISEASE, UNSPECIFIED WHETHER ESOPHAGITIS PRESENT: ICD-10-CM

## 2025-06-28 DIAGNOSIS — I10 HYPERTENSION, UNSPECIFIED TYPE: ICD-10-CM

## 2025-06-28 DIAGNOSIS — J45.909 MODERATE ASTHMA WITHOUT COMPLICATION, UNSPECIFIED WHETHER PERSISTENT: ICD-10-CM

## 2025-06-30 RX ORDER — SPIRONOLACTONE 50 MG/1
50 TABLET, FILM COATED ORAL
Qty: 90 TABLET | Refills: 1 | Status: SHIPPED | OUTPATIENT
Start: 2025-06-30

## 2025-06-30 RX ORDER — OMEPRAZOLE 10 MG/1
10 CAPSULE, DELAYED RELEASE ORAL DAILY
Qty: 90 CAPSULE | Refills: 1 | Status: SHIPPED | OUTPATIENT
Start: 2025-06-30

## 2025-07-01 ENCOUNTER — TELEPHONE (OUTPATIENT)
Age: 60
End: 2025-07-01

## 2025-07-01 NOTE — TELEPHONE ENCOUNTER
S/W pt advised the directions for taking the valium prior to scheduled procedure. Nurse did advise pt to bring a dirver and also use a wheelchair if medication makes pt sleepy or drowsy.Pt verbalized agreement and apperceive of call.

## 2025-07-01 NOTE — TELEPHONE ENCOUNTER
Caller: Patient    Doctor: Dr. BENNETT    Reason for call: Patient would like to know if she may retake her med one hour after procedure or one hour after taking her first dose for   diazepam (VALIUM) 5 mg Sig: Take 1 tablet 2 hours prior to procedure.  May repeat after 1 hour    Call back#:

## 2025-07-03 ENCOUNTER — TELEPHONE (OUTPATIENT)
Dept: PAIN MEDICINE | Facility: MEDICAL CENTER | Age: 60
End: 2025-07-03

## 2025-07-03 ENCOUNTER — HOSPITAL ENCOUNTER (OUTPATIENT)
Dept: RADIOLOGY | Facility: HOSPITAL | Age: 60
Discharge: HOME | End: 2025-07-03
Attending: PAIN MEDICINE
Payer: COMMERCIAL

## 2025-07-03 VITALS
TEMPERATURE: 97.7 F | OXYGEN SATURATION: 97 % | HEART RATE: 72 BPM | SYSTOLIC BLOOD PRESSURE: 138 MMHG | RESPIRATION RATE: 18 BRPM | DIASTOLIC BLOOD PRESSURE: 67 MMHG

## 2025-07-03 DIAGNOSIS — M54.16 LUMBAR RADICULOPATHY: ICD-10-CM

## 2025-07-03 PROCEDURE — 64483 NJX AA&/STRD TFRM EPI L/S 1: CPT | Performed by: PAIN MEDICINE

## 2025-07-03 PROCEDURE — 64484 NJX AA&/STRD TFRM EPI L/S EA: CPT | Performed by: PAIN MEDICINE

## 2025-07-03 RX ORDER — BUPIVACAINE HCL/PF 2.5 MG/ML
2 VIAL (ML) INJECTION ONCE
Status: COMPLETED | OUTPATIENT
Start: 2025-07-03 | End: 2025-07-03

## 2025-07-03 RX ORDER — METHYLPREDNISOLONE ACETATE 80 MG/ML
80 INJECTION, SUSPENSION INTRA-ARTICULAR; INTRALESIONAL; INTRAMUSCULAR; PARENTERAL; SOFT TISSUE ONCE
Status: COMPLETED | OUTPATIENT
Start: 2025-07-03 | End: 2025-07-03

## 2025-07-03 RX ADMIN — BUPIVACAINE HYDROCHLORIDE 2 ML: 2.5 INJECTION, SOLUTION EPIDURAL; INFILTRATION; INTRACAUDAL at 13:07

## 2025-07-03 RX ADMIN — IOHEXOL 1 ML: 300 INJECTION, SOLUTION INTRAVENOUS at 13:07

## 2025-07-03 RX ADMIN — METHYLPREDNISOLONE ACETATE 80 MG: 80 INJECTION, SUSPENSION INTRA-ARTICULAR; INTRALESIONAL; INTRAMUSCULAR; SOFT TISSUE at 13:07

## 2025-07-03 NOTE — DISCHARGE INSTR - LAB
Epidural Steroid Injection   WHAT YOU NEED TO KNOW:   An epidural steroid injection (EDGAR) is a procedure to inject steroid medicine into the epidural space. The epidural space is between your spinal cord and vertebrae. Steroids reduce inflammation and fluid buildup in your spine that may be causing pain. You may be given pain medicine along with the steroids.          ACTIVITY  Do not drive or operate machinery today.  No strenuous activity today - bending, lifting, etc.  You may resume normal activites starting tomorrow - start slowly and as tolerated.  You may shower today, but no tub baths or hot tubs.  You may have numbness for several hours from the local anesthetic. Please use caution and common sense, especially with weight-bearing activities.    CARE OF THE INJECTION SITE  If you have soreness or pain, apply ice to the area today (20 minutes on/20 minutes off).  Starting tomorrow, you may use warm, moist heat or ice if needed.  You may have an increase or change in your discomfort for 36-48 hours after your treatment.  Apply ice and continue with any pain medication you have been prescribed.  Notify the [facility] if you have any of the following: redness, drainage, swelling, headache, stiff neck or fever above 100°F.    SPECIAL INSTRUCTIONS  Our office will contact you in approximately 14 days for a progress report.    MEDICATIONS  Continue to take all routine medications.  Our office may have instructed you to hold some medications.    As no general anesthesia was used in today's procedure, you should not experience any side effects related to anesthesia.     If you are diabetic, the steroids used in today's injection may temporarily increase your blood sugar levels after the first few days after your injection. Please keep a close eye on your sugars and alert the doctor who manages your diabetes if your sugars are significantly high from your baseline or you are symptomatic.     If you have a  problem specifically related to your procedure, please call our office at [phone].  Problems not related to your procedure should be directed to your primary care physician.

## 2025-07-03 NOTE — H&P
History of Present Illness: The patient is a 59 y.o. female who presents with complaints of low back and right leg pain    Past Medical History[1]    Past Surgical History[2]    Current Medications[3]    Allergies[4]    Physical Exam: There were no vitals filed for this visit.  General: Awake, Alert, Oriented x 3, Mood and affect appropriate  Respiratory: Respirations even and unlabored  Cardiovascular: Peripheral pulses intact; no edema  Musculoskeletal Exam: TTP low back    ASA Score: 2         Assessment:   1. Lumbar radiculopathy        Plan: right L4-5, L5-s1 TFESI    The risks of the procedure were discussed in detail.  These risks include infection, increased pain, paralysis, bleeding.  Patient understands the risks and is willing to pursue with the procedure         [1]   Past Medical History:  Diagnosis Date    Allergic     Allergic rhinitis     last assessed 14     Arthritis     Asthma     Bleeding per rectum     Last assessed 16     Colon polyp     Diverticulitis of colon     GERD (gastroesophageal reflux disease)     Hypercholesterolemia     Hypertension     last assessed 3/24/15     Hyponatremia     last assessed 9/17/15     Obesity    [2]   Past Surgical History:  Procedure Laterality Date     SECTION      x2    COLONOSCOPY      FL INJECTION RIGHT SHOULDER (ARTHROGRAM)  2021    NE ARTHRP ACETBLR/PROX FEM PROSTC AGRFT/ALGRFT Right 2024    Procedure: RIGHT ARTHROPLASTY HIP TOTAL ANTERIOR;  Surgeon: Marlee Mccann MD;  Location: [facility] MA[facility] OR;  Service: Orthopedics    NE NDSC WRST SURG W/RLS TRANSVRS CARPL LIGM Right 3/5/2025    Procedure: RELEASE CARPAL TUNNEL ENDOSCOPIC;  Surgeon: Armando Kamara MD;  Location: [facility];  Service: Orthopedics    WISDOM TOOTH EXTRACTION      WRIST SURGERY     [3]   Current Outpatient Medications:     amoxicillin (AMOXIL) 500 mg capsule, Dental procedures only (Patient not taking: Reported on 2025), Disp: , Rfl:      "calcipotriene (DOVONEX) 0.005 % topical solution, In the one to two week break from the Clobetasol solution, apply this solution one to two times a day to ears and affected areas on scalp, Disp: 60 mL, Rfl: 5    celecoxib (CeleBREX) 200 mg capsule, Take 1 capsule (200 mg total) by mouth daily, Disp: 30 capsule, Rfl: 5    Cholecalciferol (VITAMIN D) 2000 units CAPS, Take by mouth, Disp: , Rfl:     cholestyramine (QUESTRAN) 4 GM/DOSE powder, Take 1 packet (4 g total) by mouth 2 (two) times a day As needed for diarrhea, Disp: 378 g, Rfl: 5    clobetasol (TEMOVATE) 0.05 % external solution, Apply twice a day for two weeks at a time to scalp and ears taking at least a weeks break in between uses, Disp: 50 mL, Rfl: 0    diazepam (VALIUM) 5 mg tablet, Take 1 tablet 2 hours prior to procedure.  May repeat after 1 hour, Disp: 2 tablet, Rfl: 0    Diclofenac Sodium (Voltaren) 1 %, Apply 2 g topically in the morning and 2 g at noon and 2 g in the evening and 2 g before bedtime., Disp: , Rfl:     diclofenac sodium (VOLTAREN) 50 mg EC tablet, Take 50 mg by mouth in the morning and 50 mg in the evening., Disp: , Rfl:     diphenhydrAMINE-acetaminophen (TYLENOL PM)  MG TABS, Take 1 tablet by mouth daily at bedtime as needed for sleep, Disp: , Rfl:     Elderberry-Vitamin C-Zinc ([place] GUMMY PO), Take 2 gums by mouth in the morning., Disp: , Rfl:     gabapentin (NEURONTIN) 400 mg capsule, Take 1 capsule (400 mg total) by mouth 3 (three) times a day, Disp: 90 capsule, Rfl: 1    glucosamine-chondroitin 500-400 MG tablet, Take 1 tablet by mouth 3 (three) times a day, Disp: 100 tablet, Rfl: 2    ketoconazole (NIZORAL) 2 % shampoo, Shampoo daily for 2 weeks straight and then on \"Mondays, Wednesdays and Fridays\":  Lather into scalp; leave on for 5 minutes and then rinse off completely., Disp: 120 mL, Rfl: 11    loratadine (CLARITIN) 10 mg tablet, Take 10 mg by mouth in the morning., Disp: , Rfl:     mometasone " (NASONEX) 50 mcg/act nasal spray, 2 sprays into each nostril daily, Disp: 17 g, Rfl: 1    Multiple Vitamins-Minerals (multivitamin with minerals) tablet, Take 1 tablet by mouth daily, Disp: 30 tablet, Rfl: 0    naloxone (NARCAN) 4 mg/0.1 mL nasal spray, Administer 1 spray into a nostril. If no response after 2-3 minutes, give another dose in the other nostril using a new spray., Disp: 1 each, Rfl: 1    Naltrexone-buPROPion HCl ER 8-90 MG TB12, Take 2 tablets by mouth in the morning and 2 tablets before bedtime., Disp: 360 tablet, Rfl: 0    omeprazole (PriLOSEC) 10 mg delayed release capsule, Take 1 capsule (10 mg total) by mouth daily, Disp: 90 capsule, Rfl: 1    oxyCODONE-acetaminophen (Percocet) 5-325 mg per tablet, Take 1 tablet by mouth every 6 (six) hours as needed for severe pain for up to 12 doses Max Daily Amount: 4 tablets (Patient not taking: Reported on 6/9/2025), Disp: 5 tablet, Rfl: 0    Probiotic Product (ALIGN) 4 MG CAPS, Take by mouth, Disp: , Rfl:     Rhubarb (ESTROVEN COMPLETE PO), , Disp: , Rfl:     simvastatin (ZOCOR) 5 MG tablet, Take 1 tablet (5 mg total) by mouth daily at bedtime, Disp: 90 tablet, Rfl: 1    spironolactone (ALDACTONE) 50 mg tablet, Take 1 tablet (50 mg total) by mouth daily at bedtime, Disp: 90 tablet, Rfl: 1    traMADol (Ultram) 50 mg tablet, Take 1 tablet (50 mg total) by mouth every 6 (six) hours as needed for severe pain, Disp: 28 tablet, Rfl: 1    verapamil (Verelan) 180 MG 24 hr capsule, Take 1 capsule (180 mg total) by mouth daily before breakfast, Disp: 90 capsule, Rfl: 1    vitamin E, tocopherol, 400 units capsule, Take 1 capsule (400 Units total) by mouth daily, Disp: , Rfl:   [4]   Allergies  Allergen Reactions    Levofloxacin Hives and Tachycardia     Annotation - 15Jan2013: LIGHTHEADED    Sulfamethoxazole-Trimethoprim Other (See Comments)     unknown    Hydrocodone Bitartrate Er Other (See Comments)     Annotation - 15Jan2013: FLUSHING    CHEST/RIB PAIN

## 2025-07-07 ENCOUNTER — HOSPITAL ENCOUNTER (OUTPATIENT)
Dept: MRI IMAGING | Facility: HOSPITAL | Age: 60
Discharge: HOME | End: 2025-07-07
Attending: PAIN MEDICINE
Payer: COMMERCIAL

## 2025-07-07 DIAGNOSIS — R29.2 HOFFMAN REFLEX POSITIVE: ICD-10-CM

## 2025-07-07 PROCEDURE — 72141 MRI NECK SPINE W/O DYE: CPT

## 2025-07-17 ENCOUNTER — TELEPHONE (OUTPATIENT)
Dept: PAIN MEDICINE | Facility: MEDICAL CENTER | Age: 60
End: 2025-07-17

## 2025-07-17 NOTE — TELEPHONE ENCOUNTER
Patient reports 0-25% improvement post inj  Pain level 1/10   Patient stated her back pain is gone but her leg pain is still the same especially while she is driving.

## 2025-07-21 ENCOUNTER — APPOINTMENT (OUTPATIENT)
Dept: RADIOLOGY | Facility: MEDICAL CENTER | Age: 60
End: 2025-07-21
Payer: COMMERCIAL

## 2025-07-21 ENCOUNTER — PROCEDURE VISIT (OUTPATIENT)
Dept: PAIN MEDICINE | Facility: CLINIC | Age: 60
End: 2025-07-21
Payer: COMMERCIAL

## 2025-07-21 VITALS — BODY MASS INDEX: 37.35 KG/M2 | WEIGHT: 238 LBS | HEIGHT: 67 IN

## 2025-07-21 DIAGNOSIS — M25.561 CHRONIC PAIN OF RIGHT KNEE: ICD-10-CM

## 2025-07-21 DIAGNOSIS — M25.571 CHRONIC PAIN OF RIGHT ANKLE: ICD-10-CM

## 2025-07-21 DIAGNOSIS — G57.01 COMMON PERONEAL NEUROPATHY OF RIGHT LOWER EXTREMITY: ICD-10-CM

## 2025-07-21 DIAGNOSIS — G89.29 CHRONIC PAIN OF RIGHT KNEE: ICD-10-CM

## 2025-07-21 DIAGNOSIS — M48.02 CERVICAL SPINAL STENOSIS: ICD-10-CM

## 2025-07-21 DIAGNOSIS — G89.29 CHRONIC PAIN OF RIGHT ANKLE: ICD-10-CM

## 2025-07-21 DIAGNOSIS — G57.01 COMMON PERONEAL NEUROPATHY OF RIGHT LOWER EXTREMITY: Primary | ICD-10-CM

## 2025-07-21 PROCEDURE — 73562 X-RAY EXAM OF KNEE 3: CPT

## 2025-07-21 PROCEDURE — 73590 X-RAY EXAM OF LOWER LEG: CPT

## 2025-07-21 PROCEDURE — 73600 X-RAY EXAM OF ANKLE: CPT

## 2025-07-21 PROCEDURE — 99213 OFFICE O/P EST LOW 20 MIN: CPT | Performed by: PAIN MEDICINE

## 2025-07-21 NOTE — ASSESSMENT & PLAN NOTE
MRI cervical spine was reviewed based on interpretation shows C3-4 moderate severe canal narrowing possible myelomalacia at this level positive Jeffry sign numbness and tingling bilateral hands however no improvement after carpal tunnel surgery she denies weakness in her upper extremities.  Will continue to monitor.

## 2025-07-21 NOTE — PROGRESS NOTES
Name: Teena Staples      : 1965      MRN: [id number]  Encounter Provider: Matty Molina MD  Encounter Date: 2025   Encounter department: [facility] [place]  :  Assessment & Plan  Common peroneal neuropathy of right lower extremity  EMG normal however signs symptoms consistent with common peroneal neuropathy of the right lower extremity will have the patient follow-up in 2 weeks for a, fibular nerve block at the level of the fibular head.  Orders:  •  XR tibia fibula 2 vw right; Future  Consider right si joint/right hip bursae injection  Chronic pain of right knee  Obtain a right knee x-ray  Orders:  •  XR knee 3 vw right non injury; Future    Chronic pain of right ankle  Obtain right ankle x-ray  Orders:  •  XR ankle 2 vw right; Future    Cervical spinal stenosis  MRI cervical spine was reviewed based on interpretation shows C3-4 moderate severe canal narrowing possible myelomalacia at this level positive Jeffry sign numbness and tingling bilateral hands however no improvement after carpal tunnel surgery she denies weakness in her upper extremities.  Will continue to monitor.               My impressions and treatment recommendations were discussed in detail with the patient who verbalized understanding and had no further questions.  Discharge instructions were provided. I personally saw and examined the patient and I agree with the above discussed plan of care.    History of Present Illness     Follow-up 2025  Teena comes for follow-up status post right L4-5 L5-S1 TFESI reporting 80% relief in her right lower back pain but minimal improvement in her right lower extremity radicular symptoms she reports that her symptoms are present when she is sitting with her knees flexed and driving.  Pain can increase to 7 out of 10.  She completed EMG of her lower extremities which were normal.  Also recently completed MRI of her cervical spine due to the positive Jeffry's  "test bilaterally.      Consult  Teena Staples is a 59 y.o. female with relevant PMH of with history of right hip replacement approximate 1 year prior    Presenting to  [facility] for chief complaint of low back right hip and right leg numbness and tingling pain extending past the knee occasional pain that radiates into the right groin and inner thigh pain with driving getting up from seated position pain is nearly constant currently on gabapentin  Symptoms have been present for over 1 year and include as above. Quality of pain: Moderate to severe.  Symptoms are worst: With driving sitting she has weakness with hip extension when sitting but no problems with standing alleviating factors identifiable by patient are: Lying flat on a scale of 1-10, pain typically increases to 8 out of 10, currently impacting quality of life she also has a chief complaint of bilateral hand numbness and tingling digits 1-3 status post carpal tunnel release without improvement in her symptoms      Conservative therapy (PT/chiro/accupuncture): Completed for back and hip 6 weeks in last 6 months  Previous treatments: Prior carpal tunnel release  Prior surgeries of the spine: none  Bowel/bladder incontinence or saddle anesthesia: none  Relevant imaging: MRI L spine  Anticoagulants: none  Contrast allergy: none    I have personally reviewed and/or updated the patient's past medical history, past surgical history, family history, social history, current medications, allergies, and vital signs today.             Review of Systems   Musculoskeletal:  Positive for arthralgias, joint swelling and myalgias.   All other systems reviewed and are negative.    Medical History Reviewed by provider this encounter:     .       Objective   Ht 5' 7\" (1.702 m)   Wt 108 kg (238 lb)   BMI 37.28 kg/m²         Physical Exam  Lumbar Spine:  No masses or atrophy,    Range of motion - Decreased extension/flexion  Palpation -  Tenderness to palpation " in the lumbar parapsinals   PSIS tenderness + right  Calvin's/HODA + right  Gaenslen's + right  SLR positive right       Strength Right Left   Hip flexion L1,2 4 when sitting 5   Knee extension L3 5 5   Ankle dorsiflexion L4 5 5   Great toe extension L5 5 5   Ankle Plantarflexion S1 5 5       Sensory Exam:  intact to light touch bilateral LE       Reflexes:     Right Left   Biceps 2+ 2+   Triceps 2+ 2+   Brachioradialis 2+ 2+   Patellar 2+ 2+   Achilles 2+ 2+   Babinski neg neg        Gait antalgic    Cervical Spine:  No masses or atrophy, No TTP cervical facets   Range of motion - full  Spurling's - negative    Strength Right Left   Elbow flexion/deltoid (C5) 5 5   Wrist extension (c6) 5 5   Elbow /finger extension (c7) 5 5   Finger flexion (c8) 5 5   Intrinsics (t1) 5 5        Reflexes:     Right Left   Biceps 2+ 2+   Triceps 2+ 2+   Brachioradialis 2+ 2+   Patellar 3+ 3+   Achilles 2+ 2+   Babinski neg neg        Sensory Exam: Full and equal sensation to light touch throughout bilateral UE  Reflexes: Phillips POSITIVE Bilaterally; Clonus negative bilaterally      On exam today 7/21/2025 there is tenderness to palpation in increase of symptoms when compressing the right common fibular nerve at the level of the fibular head with radiation to the right ankle consistent with the pain symptoms.    Gait normal      MRI L spine         IMPRESSION:     Narrative & Impression   MRI LUMBAR SPINE WITHOUT CONTRAST     INDICATION: M54.16: Radiculopathy, lumbar region.     COMPARISON: 7/3/2023; 5/17/2022     TECHNIQUE:  Multiplanar, multisequence imaging of the lumbar spine was performed. .        IMAGE QUALITY:  Diagnostic     FINDINGS:     VERTEBRAL BODIES:  There are 5 lumbar type vertebral bodies. Trace grade 1 retrolistheses of L1 on L2 and L2 on L3 and L3 on L4 are stable from the prior study scattered degenerative endplate changes noted. Additionally in the left pedicle there is   redemonstrated T2 and FLAIR  hyperintense stippled lesion best seen on series 5 image 5, demonstrating hypointensity on T1-weighted images, measuring 1.3 x 1.2 cm. This is unchanged from 2022 and may represent an atypical hemangioma or other nonaggressive   lesion. No new lesions. Type I Modic endplate changes superior endplate of L5.     SACRUM: Type II Modic endplate changes about L5-S1.     DISTAL CORD AND CONUS:  Normal size and signal within the distal cord and conus. The conus medullaris terminates at the L1 level.     PARASPINAL SOFT TISSUES:  Paraspinal soft tissues are unremarkable.     LOWER THORACIC DISC SPACES: T10-T11 and T12-L1: There is no focal disk herniation, central canal or neural foraminal narrowing..     T11-T12: There is a small central disc herniation, protrusion type. Mild central canal narrowing. Neural foramina patent bilaterally. This level is similar to the prior study.     LUMBAR DISC SPACES:     L1-L2: There is uncovering the intervertebral disc space. There is bilateral facet hypertrophy. There is a central/left paracentral disc herniation, protrusion type. Moderate left neural foraminal narrowing. Mild central canal narrowing. Minimal right   neural foraminal narrowing. This level is similar to the prior study.     L2-L3: There is bilateral facet hypertrophy. There is loss of disc height and signal. There is a right neural foraminal disc protrusion. Mild central canal and right neural foraminal narrowing. Minimal left neural foraminal narrowing. This level is   similar to the prior study.     L3-L4: There is uncovering the intervertebral disc space. There is bilateral facet hypertrophy. There is a left neural foraminal disc protrusion. There is moderate left neural foraminal narrowing. Mild central canal and right neural foraminal narrowing.   This level is similar to the prior study.     L4-L5: There is loss of disc height and signal. There is bilateral facet hypertrophy. There is a right neural foraminal disc  protrusion. Moderate right neural foraminal narrowing. Mild central canal narrowing. Mild left neural foraminal narrowing. This   level is similar to the prior study.     L5-S1: There is a disc osteophyte complex with a superimposed right neural foraminal disc protrusion. Mild right neural foraminal narrowing. Mild central canal narrowing. Left neural foramen patent. This level is similar to the prior study.       1.  Marrow lesion in the left L1 pedicle is stable from 2022 possibly an atypical hemangioma or other nonaggressive lesion.  2.  Scattered multilevel spondylosis also stable.          MRI C spine              IMPRESSION:     Multilevel cervical spondylitic degenerative change with annular bulging, endplate and uncinate joint hypertrophic changes. Since the prior examination there is new anterior subluxation at the C3-4 level with moderately severe canal stenosis and   flattening of the cervical cord. Subtle increased signal on T2 and inversion recovery imaging within the cord at this level may represent mild cord edema. Spine surgery consultation recommended.     Multilevel advanced foraminal narrowing.

## 2025-07-22 ENCOUNTER — OFFICE VISIT (OUTPATIENT)
Dept: INTERNAL MEDICINE CLINIC | Facility: CLINIC | Age: 60
End: 2025-07-22
Payer: COMMERCIAL

## 2025-07-22 VITALS
OXYGEN SATURATION: 96 % | RESPIRATION RATE: 18 BRPM | BODY MASS INDEX: 37.51 KG/M2 | WEIGHT: 239 LBS | HEART RATE: 87 BPM | HEIGHT: 67 IN | SYSTOLIC BLOOD PRESSURE: 124 MMHG | DIASTOLIC BLOOD PRESSURE: 86 MMHG

## 2025-07-22 DIAGNOSIS — R73.03 PREDIABETES: ICD-10-CM

## 2025-07-22 DIAGNOSIS — Z00.00 ANNUAL PHYSICAL EXAM: Primary | ICD-10-CM

## 2025-07-22 DIAGNOSIS — J45.909 MODERATE ASTHMA WITHOUT COMPLICATION, UNSPECIFIED WHETHER PERSISTENT: ICD-10-CM

## 2025-07-22 DIAGNOSIS — M54.16 LUMBAR RADICULOPATHY: ICD-10-CM

## 2025-07-22 DIAGNOSIS — I10 BENIGN ESSENTIAL HYPERTENSION: ICD-10-CM

## 2025-07-22 DIAGNOSIS — Z99.89 DEPENDENCE ON WALKING STICK: ICD-10-CM

## 2025-07-22 DIAGNOSIS — E78.5 HYPERLIPIDEMIA, UNSPECIFIED HYPERLIPIDEMIA TYPE: ICD-10-CM

## 2025-07-22 DIAGNOSIS — L65.9 ALOPECIA: ICD-10-CM

## 2025-07-22 PROCEDURE — 99396 PREV VISIT EST AGE 40-64: CPT | Performed by: INTERNAL MEDICINE

## 2025-07-22 PROCEDURE — 99214 OFFICE O/P EST MOD 30 MIN: CPT | Performed by: INTERNAL MEDICINE

## 2025-07-22 RX ORDER — SIMVASTATIN 5 MG
5 TABLET ORAL
Qty: 90 TABLET | Refills: 1 | Status: SHIPPED | OUTPATIENT
Start: 2025-07-22

## 2025-07-22 RX ORDER — VERAPAMIL HYDROCHLORIDE 180 MG/1
180 CAPSULE, DELAYED RELEASE ORAL
Qty: 90 CAPSULE | Refills: 1 | Status: SHIPPED | OUTPATIENT
Start: 2025-07-22

## 2025-07-22 RX ORDER — SPIRONOLACTONE 50 MG/1
50 TABLET, FILM COATED ORAL
Qty: 90 TABLET | Refills: 1 | Status: SHIPPED | OUTPATIENT
Start: 2025-07-22

## 2025-07-22 NOTE — ASSESSMENT & PLAN NOTE
Hypertension is controlled.  Continue current regimen.  Continue verapamil.  She denies any chest pain shortness of breath headaches.          Orders:    verapamil (Verelan) 180 MG 24 hr capsule; Take 1 capsule (180 mg total) by mouth daily before breakfast    CBC and differential; Future    Comprehensive metabolic panel; Future    TSH, 3rd generation with Free T4 reflex; Future

## 2025-07-22 NOTE — ASSESSMENT & PLAN NOTE
Chronic. Continue current regimen. 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                 Detail Level: Simple Patient Specific Counseling (Will Not Stick From Patient To Patient): I suggested continuing with metronidazole cream daily for maintenance. Detail Level: Zone

## 2025-07-22 NOTE — ASSESSMENT & PLAN NOTE
A1c reviewed.  Diet discussed.  Avoid sweets.  Avoid carbs.  A1c with next labs.      Orders:    Hemoglobin A1C; Future

## 2025-07-22 NOTE — PROGRESS NOTES
Adult Annual Physical  Name: Teena Staples      : 1965      MRN: [id number]  Encounter Provider: Juan Grover MD  Encounter Date: 2025   Encounter department: [facility] [place]    :  Assessment & Plan  Annual physical exam  Completed. Denies concerns. Due for colonoscopy- is aware, working with GI.    Insurance is no longer covering weight loss med and she first needs to see weight management, she will let me know when she wants me to place the referral for her       Benign essential hypertension  Hypertension is controlled.  Continue current regimen.  Continue verapamil.  She denies any chest pain shortness of breath headaches.          Orders:    verapamil (Verelan) 180 MG 24 hr capsule; Take 1 capsule (180 mg total) by mouth daily before breakfast    CBC and differential; Future    Comprehensive metabolic panel; Future    TSH, 3rd generation with Free T4 reflex; Future     Alopecia    Orders:    spironolactone (ALDACTONE) 50 mg tablet; Take 1 tablet (50 mg total) by mouth daily at bedtime    Moderate asthma without complication, unspecified whether persistent  Controlled.          Lumbar radiculopathy  Chronic. Continue current regimen. 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                Prediabetes  A1c reviewed.  Diet discussed.  Avoid sweets.  Avoid carbs.  A1c with next labs.      Orders:    Hemoglobin A1C; Future     Hyperlipidemia, unspecified hyperlipidemia type  The 10-year ASCVD risk score (Mary ALCANTAR, et al., 2019) is: 2.8%    Values used to calculate the score:      Age: 59 years      Clincally relevant sex: Female      Is Non- : No      Diabetic: No      Tobacco smoker: No      Systolic Blood Pressure: 124 mmHg      Is BP treated: Yes      HDL Cholesterol: 57 mg/dL      Total Cholesterol: 142 mg/dL    Orders:    simvastatin (ZOCOR) 5 MG tablet; Take 1 tablet (5 mg total) by mouth daily at  bedtime    Lipid Panel with Direct LDL reflex; Future    Dependence on walking stick  Noted.           Preventive Screenings:  - Diabetes Screening: screening up-to-date  - Cholesterol Screening: has hyperlipidemia and screening up-to-date   - Hepatitis C screening: screening up-to-date   - HIV screening: screening up-to-date   - Cervical cancer screening: screening up-to-date   - Breast cancer screening: screening up-to-date   - Colon cancer screening: screening up-to-date   - Lung cancer screening: screening not indicated     Immunizations:  - Immunizations due: Prevnar 20, Tdap and Zoster (Shingrix)    Counseling/Anticipatory Guidance:    - Diet: discussed recommendations for a healthy/well-balanced diet.   - Exercise: the importance of regular exercise/physical activity was discussed. Recommend exercise 3-5 times per week for at least 30 minutes.   - Injury prevention: discussed safety/seat belts, safety helmets, smoke detectors, carbon monoxide detectors, and smoking near bedding or upholstery.       Depression Screening and Follow-up Plan: Patient was screened for depression during today's encounter. They screened negative with a PHQ-2 score of 0.          History of Present Illness     Adult Annual Physical:  Patient presents for annual physical.     Diet and Physical Activity:  - Diet/Nutrition: no special diet. using [employer] it magda for weight loss  - Exercise: walking.    Depression Screening:  - PHQ-2 Score: 0    General Health:  - Sleep: sleeps well.  - Hearing: normal hearing bilateral ears.  - Vision: no vision problems and wears glasses.  - Dental: regular dental visits.    /GYN Health:  - Follows with GYN: yes.   - Menopause: postmenopausal.   - History of STDs: no  - Contraception: menopause.      Advanced Care Planning:  - Has an advanced directive?: no    - Has a durable medical POA?: no    - ACP document given to patient?: no      Review of Systems   Constitutional:  Negative for chills and fever.    HENT:  Negative for ear pain and sore throat.    Eyes:  Negative for pain and visual disturbance.   Respiratory:  Negative for cough and shortness of breath.    Cardiovascular:  Negative for chest pain and palpitations.   Gastrointestinal:  Negative for abdominal pain and vomiting.   Genitourinary:  Negative for dysuria and hematuria.   Musculoskeletal:  Positive for arthralgias and back pain.   Skin:  Negative for color change and rash.   Neurological:  Negative for seizures and syncope.   All other systems reviewed and are negative.    Past Medical History   Past Medical History[1]  Past Surgical History[2]  Family History[3]   reports that she has never smoked. She has never used smokeless tobacco. She reports current alcohol use. She reports that she does not use drugs.  Current Outpatient Medications   Medication Instructions    amoxicillin (AMOXIL) 500 mg capsule Dental procedures only    calcipotriene (DOVONEX) 0.005 % topical solution In the one to two week break from the Clobetasol solution, apply this solution one to two times a day to ears and affected areas on scalp    celecoxib (CELEBREX) 200 mg, Oral, Daily    Cholecalciferol (VITAMIN D) 2000 units CAPS Take by mouth    cholestyramine (QUESTRAN) 4 g, Oral, 2 times daily, As needed for diarrhea    clobetasol (TEMOVATE) 0.05 % external solution Apply twice a day for two weeks at a time to scalp and ears taking at least a weeks break in between uses    diazepam (VALIUM) 5 mg tablet Take 1 tablet 2 hours prior to procedure.  May repeat after 1 hour    Diclofenac Sodium (VOLTAREN) 2 g, 4 times daily    diclofenac sodium (VOLTAREN) 50 mg, 2 times daily    diphenhydrAMINE-acetaminophen (TYLENOL PM)  MG TABS 1 tablet, Daily at bedtime PRN    Elderberry-Vitamin C-Zinc (ELDERBERRY IMMUNE HEALTH GUMMY PO) 2 gums, Daily    gabapentin (NEURONTIN) 400 mg, Oral, 3 times daily    glucosamine-chondroitin 500-400 MG tablet 1 tablet, Oral, 3 times daily     "ketoconazole (NIZORAL) 2 % shampoo Shampoo daily for 2 weeks straight and then on \"Mondays, Wednesdays and Fridays\":  Lather into scalp; leave on for 5 minutes and then rinse off completely.    loratadine (CLARITIN) 10 mg, Daily    mometasone (NASONEX) 50 mcg/act nasal spray 2 sprays, Nasal, Daily    Multiple Vitamins-Minerals (multivitamin with minerals) tablet 1 tablet, Oral, Daily    naloxone (NARCAN) 4 mg/0.1 mL nasal spray Administer 1 spray into a nostril. If no response after 2-3 minutes, give another dose in the other nostril using a new spray.    Naltrexone-buPROPion HCl ER 8-90 MG TB12 2 tablets, Oral, 2 times daily    omeprazole (PRILOSEC) 10 mg, Oral, Daily    oxyCODONE-acetaminophen (Percocet) 5-325 mg per tablet 1 tablet, Oral, Every 6 hours PRN    Probiotic Product (ALIGN) 4 MG CAPS Take by mouth    Rhubarb (ESTROVEN COMPLETE PO)     simvastatin (ZOCOR) 5 mg, Oral, Daily at bedtime    spironolactone (ALDACTONE) 50 mg, Oral, Daily at bedtime    traMADol (ULTRAM) 50 mg, Oral, Every 6 hours PRN    verapamil (VERELAN) 180 mg, Oral, Daily before breakfast    vitamin E (tocopherol) 400 Units, Oral, Daily   Allergies[4]     Objective   /86 (BP Location: Left arm, Patient Position: Sitting, Cuff Size: Adult)   Pulse 87   Resp 18   Ht 5' 7\" (1.702 m)   Wt 108 kg (239 lb)   SpO2 96%   BMI 37.43 kg/m²     Physical Exam  Vitals and nursing note reviewed.   Constitutional:       General: She is not in acute distress.     Appearance: She is well-developed.   HENT:      Head: Normocephalic and atraumatic.     Eyes:      Conjunctiva/sclera: Conjunctivae normal.       Cardiovascular:      Rate and Rhythm: Normal rate and regular rhythm.      Heart sounds: No murmur heard.  Pulmonary:      Effort: Pulmonary effort is normal. No respiratory distress.      Breath sounds: Normal breath sounds.   Abdominal:      Palpations: Abdomen is soft.      Tenderness: There is no abdominal tenderness.     Musculoskeletal: "         General: No swelling.      Cervical back: Neck supple.     Skin:     General: Skin is warm and dry.      Capillary Refill: Capillary refill takes less than 2 seconds.     Neurological:      Mental Status: She is alert.     Psychiatric:         Mood and Affect: Mood normal.              [1]   Past Medical History:  Diagnosis Date    Allergic     Allergic rhinitis     last assessed 14     Arthritis     Asthma     Bleeding per rectum     Last assessed 16     Colon polyp     Diverticulitis of colon     GERD (gastroesophageal reflux disease)     Hypercholesterolemia     Hypertension     last assessed 3/24/15     Hyponatremia     last assessed 9/17/15     Obesity    [2]   Past Surgical History:  Procedure Laterality Date     SECTION      x2    COLONOSCOPY      FL INJECTION RIGHT SHOULDER (ARTHROGRAM)  2021    TX ARTHRP ACETBLR/PROX FEM PROSTC AGRFT/ALGRFT Right 2024    Procedure: RIGHT ARTHROPLASTY HIP TOTAL ANTERIOR;  Surgeon: Marlee Mccann MD;  Location: [facility] MAIN OR;  Service: Orthopedics    TX NDSC WRST SURG W/RLS TRANSVRS CARPL LIGM Right 3/5/2025    Procedure: RELEASE CARPAL TUNNEL ENDOSCOPIC;  Surgeon: Armando Kamara MD;  Location: [facility];  Service: Orthopedics    WISDOM TOOTH EXTRACTION      WRIST SURGERY     [3]   Family History  Adopted: Yes   Problem Relation Name Age of Onset    No Known Problems Mother      Migraines Daughter     [4]   Allergies  Allergen Reactions    Levofloxacin Hives and Tachycardia     Annotation - 2013: LIGHTHEADED    Sulfamethoxazole-Trimethoprim Other (See Comments)     unknown    Hydrocodone Bitartrate Er Other (See Comments)     Annotation - 2013: FLUSHING    CHEST/RIB PAIN

## 2025-08-04 ENCOUNTER — OFFICE VISIT (OUTPATIENT)
Dept: PAIN MEDICINE | Facility: CLINIC | Age: 60
End: 2025-08-04
Payer: COMMERCIAL

## 2025-08-04 ENCOUNTER — TELEPHONE (OUTPATIENT)
Dept: PAIN MEDICINE | Facility: MEDICAL CENTER | Age: 60
End: 2025-08-04

## 2025-08-04 VITALS — HEIGHT: 67 IN | BODY MASS INDEX: 37.04 KG/M2 | WEIGHT: 236 LBS

## 2025-08-04 DIAGNOSIS — M53.3 PAIN OF RIGHT SACROILIAC JOINT: ICD-10-CM

## 2025-08-04 DIAGNOSIS — G57.01 COMMON PERONEAL NEUROPATHY, RIGHT: Primary | ICD-10-CM

## 2025-08-04 PROCEDURE — 99213 OFFICE O/P EST LOW 20 MIN: CPT | Performed by: PAIN MEDICINE

## 2025-08-04 PROCEDURE — 76942 ECHO GUIDE FOR BIOPSY: CPT | Performed by: PAIN MEDICINE

## 2025-08-04 PROCEDURE — 64450 NJX AA&/STRD OTHER PN/BRANCH: CPT | Performed by: PAIN MEDICINE

## 2025-08-04 RX ORDER — BUPIVACAINE HYDROCHLORIDE 2.5 MG/ML
5 INJECTION, SOLUTION INFILTRATION; PERINEURAL
Status: COMPLETED | OUTPATIENT
Start: 2025-08-04 | End: 2025-08-04

## 2025-08-04 RX ORDER — GABAPENTIN 400 MG/1
400 CAPSULE ORAL 3 TIMES DAILY
Qty: 90 CAPSULE | Refills: 1 | Status: SHIPPED | OUTPATIENT
Start: 2025-08-04

## 2025-08-04 RX ADMIN — BUPIVACAINE HYDROCHLORIDE 5 ML: 2.5 INJECTION, SOLUTION INFILTRATION; PERINEURAL at 11:00

## 2025-08-06 DIAGNOSIS — G57.31 PERONEAL NEUROPATHY AT KNEE, RIGHT: Primary | ICD-10-CM

## 2025-08-07 ENCOUNTER — TELEPHONE (OUTPATIENT)
Dept: PAIN MEDICINE | Facility: CLINIC | Age: 60
End: 2025-08-07

## (undated) DEVICE — BETHLEHEM UNIV MAJOR ORTHO,KIT: Brand: CARDINAL HEALTH

## (undated) DEVICE — IMPERVIOUS STOCKINETTE: Brand: DEROYAL

## (undated) DEVICE — GLOVE INDICATOR PI UNDERGLOVE SZ 7 BLUE

## (undated) DEVICE — GLOVE INDICATOR PI UNDERGLOVE SZ 8.5 BLUE

## (undated) DEVICE — COBAN 4 IN STERILE

## (undated) DEVICE — CAPIT HIP UPCHRG GRIPTON CUP

## (undated) DEVICE — SPONGE SCRUB 4 PCT CHLORHEXIDINE

## (undated) DEVICE — STERILE DOUBLE BASIN SET PACK: Brand: CARDINAL HEALTH

## (undated) DEVICE — DRAPE C-ARM X-RAY

## (undated) DEVICE — NEEDLE 23G X 1 1/2 SAFETY-GLIDE THIN WALL

## (undated) DEVICE — DRESSING MEPILEX AG BORDER POST-OP 4 X 8 IN

## (undated) DEVICE — HANDPIECE SET WITH RETRACTABLE COAXIAL FAN SPRAY TIP AND SUCTION TUBE: Brand: INTERPULSE

## (undated) DEVICE — 3M™ IOBAN™ 2 ANTIMICROBIAL INCISE DRAPE 6650EZ: Brand: IOBAN™ 2

## (undated) DEVICE — DRAPE CAMERA/LASER

## (undated) DEVICE — ELECTRODE BLADE E-Z CLEAN 4IN -0014A

## (undated) DEVICE — NEEDLE 25GA X 1 IN SAFETY GLIDE

## (undated) DEVICE — ACE WRAP 3 IN UNSTERILE

## (undated) DEVICE — ANTI-FOG SOLUTION WITH FOAM PAD: Brand: DEVON

## (undated) DEVICE — GLOVE SRG BIOGEL 8

## (undated) DEVICE — STIRRUP STRAP ADULT DISP

## (undated) DEVICE — GLOVE SRG BIOGEL 6.5

## (undated) DEVICE — NEPTUNE E-SEP SMOKE EVACUATION PENCIL, COATED, 70MM BLADE, PUSH BUTTON SWITCH: Brand: NEPTUNE E-SEP

## (undated) DEVICE — SKN PRP WNG SPNGE PVP SCRB STR: Brand: MEDLINE INDUSTRIES, INC.

## (undated) DEVICE — MICRO HVTSA, 0.5G AND HVTSA SOURCEMARK PRODUCT CODE M1206 AND M1206-01: Brand: EXOFIN MICRO HVTSA, 0.5G

## (undated) DEVICE — ADHESIVE SKIN HIGH VISCOSITY EXOFIN 1ML

## (undated) DEVICE — CUFF TOURNIQUET 18 X 4 IN QUICK CONNECT DISP 1 BLADDER

## (undated) DEVICE — GLOVE SRG BIOGEL ECLIPSE 7

## (undated) DEVICE — PAD GROUNDING DUAL ADULT

## (undated) DEVICE — PLASTIC ADHESIVE BANDAGE: Brand: CURITY

## (undated) DEVICE — SUT CHROMIC 4-0 P-3 18 MM 1654G

## (undated) DEVICE — HEAVY DUTY TABLE COVER: Brand: CONVERTORS

## (undated) DEVICE — CHLORAPREP HI-LITE 26ML ORANGE

## (undated) DEVICE — GLOVE SRG BIOGEL 8.5

## (undated) DEVICE — STERILE BETHLEHEM PLASTIC HAND: Brand: CARDINAL HEALTH

## (undated) DEVICE — SUT STRATAFIX SPIRAL PDS PLUS 1 CTX 18 IN SXPP1A400

## (undated) DEVICE — BASIC SINGLE BASIN-LF: Brand: MEDLINE INDUSTRIES, INC.

## (undated) DEVICE — OSCILLATING TIP SAW CARTRIDGE: Brand: PRECISION FALCON

## (undated) DEVICE — TOWEL SET X-RAY

## (undated) DEVICE — INTENDED FOR TISSUE SEPARATION, AND OTHER PROCEDURES THAT REQUIRE A SHARP SURGICAL BLADE TO PUNCTURE OR CUT.: Brand: BARD-PARKER ® CARBON RIB-BACK BLADES

## (undated) DEVICE — DRAPE SHEET THREE QUARTER

## (undated) DEVICE — HOOD: Brand: FLYTE, SURGICOOL

## (undated) DEVICE — SUT VICRYL 2-0 CT-1 27 IN J259H

## (undated) DEVICE — CAPIT HIP COP -CMNT/POR-ACTIS

## (undated) DEVICE — DRAPE TOWEL: Brand: CONVERTORS

## (undated) DEVICE — SYSTEM ENDOSCOPIC CARPAL TUNNEL RELEASE DISP

## (undated) DEVICE — PAD CAST 4 IN COTTON NON STERILE

## (undated) DEVICE — 40601 PROLONGED POSITIONING SYSTEM: Brand: 40601 PROLONGED POSITIONING SYSTEM

## (undated) DEVICE — SUT MONOCRYL 3-0 PS-2 18 IN Y497G